# Patient Record
Sex: FEMALE | Race: WHITE | Employment: OTHER | URBAN - METROPOLITAN AREA
[De-identification: names, ages, dates, MRNs, and addresses within clinical notes are randomized per-mention and may not be internally consistent; named-entity substitution may affect disease eponyms.]

---

## 2017-04-19 ENCOUNTER — HOSPITAL ENCOUNTER (OUTPATIENT)
Dept: SURGERY | Age: 62
Discharge: HOME | DRG: 470 | End: 2017-04-19
Attending: ORTHOPAEDIC SURGERY
Payer: COMMERCIAL

## 2017-04-19 VITALS
TEMPERATURE: 98 F | HEART RATE: 70 BPM | SYSTOLIC BLOOD PRESSURE: 115 MMHG | RESPIRATION RATE: 16 BRPM | DIASTOLIC BLOOD PRESSURE: 74 MMHG | HEIGHT: 63 IN | OXYGEN SATURATION: 95 %

## 2017-04-19 LAB
ANION GAP BLD CALC-SCNC: 12 MMOL/L (ref 7–16)
APPEARANCE UR: CLEAR
APTT PPP: 27.3 SEC (ref 23.5–31.7)
BACTERIA SPEC CULT: NORMAL
BACTERIA URNS QL MICRO: 0 /HPF
BILIRUB UR QL: NEGATIVE
BUN SERPL-MCNC: 20 MG/DL (ref 8–23)
CALCIUM SERPL-MCNC: 9.3 MG/DL (ref 8.3–10.4)
CASTS URNS QL MICRO: ABNORMAL /LPF
CHLORIDE SERPL-SCNC: 103 MMOL/L (ref 98–107)
CO2 SERPL-SCNC: 24 MMOL/L (ref 21–32)
COLOR UR: YELLOW
CREAT SERPL-MCNC: 1.02 MG/DL (ref 0.6–1)
EPI CELLS #/AREA URNS HPF: ABNORMAL /HPF
ERYTHROCYTE [DISTWIDTH] IN BLOOD BY AUTOMATED COUNT: 14.5 % (ref 11.9–14.6)
GLUCOSE SERPL-MCNC: 91 MG/DL (ref 65–100)
GLUCOSE UR STRIP.AUTO-MCNC: NEGATIVE MG/DL
HCT VFR BLD AUTO: 41 % (ref 35.8–46.3)
HGB BLD-MCNC: 13.3 G/DL (ref 11.7–15.4)
HGB UR QL STRIP: ABNORMAL
INR PPP: 1 (ref 0.9–1.2)
KETONES UR QL STRIP.AUTO: NEGATIVE MG/DL
LEUKOCYTE ESTERASE UR QL STRIP.AUTO: NEGATIVE
MCH RBC QN AUTO: 28.5 PG (ref 26.1–32.9)
MCHC RBC AUTO-ENTMCNC: 32.4 G/DL (ref 31.4–35)
MCV RBC AUTO: 88 FL (ref 79.6–97.8)
NITRITE UR QL STRIP.AUTO: NEGATIVE
PH UR STRIP: 5.5 [PH] (ref 5–9)
PLATELET # BLD AUTO: 218 K/UL (ref 150–450)
PMV BLD AUTO: 10.8 FL (ref 10.8–14.1)
POTASSIUM SERPL-SCNC: 3.4 MMOL/L (ref 3.5–5.1)
PROT UR STRIP-MCNC: NEGATIVE MG/DL
PROTHROMBIN TIME: 10.7 SEC (ref 9.6–12)
RBC # BLD AUTO: 4.66 M/UL (ref 4.05–5.25)
RBC #/AREA URNS HPF: ABNORMAL /HPF
SERVICE CMNT-IMP: NORMAL
SODIUM SERPL-SCNC: 139 MMOL/L (ref 136–145)
SP GR UR REFRACTOMETRY: 1.01 (ref 1–1.02)
UROBILINOGEN UR QL STRIP.AUTO: 0.2 EU/DL (ref 0.2–1)
WBC # BLD AUTO: 8.6 K/UL (ref 4.3–11.1)
WBC URNS QL MICRO: 0 /HPF

## 2017-04-19 RX ORDER — APREPITANT 40 MG/1
80 CAPSULE ORAL ONCE
COMMUNITY
End: 2017-04-23

## 2017-04-19 NOTE — PERIOP NOTES
Lab results within anesthesia guidelines; MRSA swab result pending. All results faxed to pt's PCP, Yared Zhou MD, [phone], per surgeon's order.      Recent Results (from the past 12 hour(s))   CBC W/O DIFF    Collection Time: 04/19/17 11:50 AM   Result Value Ref Range    WBC 8.6 4.3 - 11.1 K/uL    RBC 4.66 4.05 - 5.25 M/uL    HGB 13.3 11.7 - 15.4 g/dL    HCT 41.0 35.8 - 46.3 %    MCV 88.0 79.6 - 97.8 FL    MCH 28.5 26.1 - 32.9 PG    MCHC 32.4 31.4 - 35.0 g/dL    RDW 14.5 11.9 - 14.6 %    PLATELET 253 944 - 385 K/uL    MPV 10.8 10.8 - 52.9 FL   METABOLIC PANEL, BASIC    Collection Time: 04/19/17 11:50 AM   Result Value Ref Range    Sodium 139 136 - 145 mmol/L    Potassium 3.4 (L) 3.5 - 5.1 mmol/L    Chloride 103 98 - 107 mmol/L    CO2 24 21 - 32 mmol/L    Anion gap 12 7 - 16 mmol/L    Glucose 91 65 - 100 mg/dL    BUN 20 8 - 23 MG/DL    Creatinine 1.02 (H) 0.6 - 1.0 MG/DL    GFR est AA >60 >60 ml/min/1.73m2    GFR est non-AA 59 (L) >60 ml/min/1.73m2    Calcium 9.3 8.3 - 10.4 MG/DL   PROTHROMBIN TIME + INR    Collection Time: 04/19/17 11:50 AM   Result Value Ref Range    Prothrombin time 10.7 9.6 - 12.0 sec    INR 1.0 0.9 - 1.2     PTT    Collection Time: 04/19/17 11:50 AM   Result Value Ref Range    aPTT 27.3 23.5 - 31.7 SEC   URINALYSIS W/ RFLX MICROSCOPIC    Collection Time: 04/19/17 12:19 PM   Result Value Ref Range    Color YELLOW      Appearance CLEAR      Specific gravity 1.015 1.001 - 1.023      pH (UA) 5.5 5.0 - 9.0      Protein NEGATIVE  NEG mg/dL    Glucose NEGATIVE  mg/dL    Ketone NEGATIVE  NEG mg/dL    Bilirubin NEGATIVE  NEG      Blood SMALL (A) NEG      Urobilinogen 0.2 0.2 - 1.0 EU/dL    Nitrites NEGATIVE  NEG      Leukocyte Esterase NEGATIVE  NEG      WBC 0 0 /hpf    RBC 3-5 0 /hpf    Epithelial cells 0-3 0 /hpf    Bacteria 0 0 /hpf    Casts 0-3 0 /lpf

## 2017-04-19 NOTE — PERIOP NOTES
Patient verified name, , and surgery as listed in [facility]. Type 3 surgery, joint PAT assessment complete. Labs per surgeon: CBC, BMP, PT/PTT, UA and MRSA swab collected  Labs per anesthesia protocol: no additional labs needed  EKG: done 10/25/16- result in EMR and states NSR    Pt reports hx of severe N/V with general anesthesia and had nausea after spinal anesthesia with TEREZA 2016. Call to Dr Mely Kim and Rx given to pt for Emend 40mg DOS. Pt given instructions on administration and verbalizes understanding. Hibiclens and instructions given per hospital policy. Nasal Swab collected per MD order and instructions for Mupirocin nasal ointment if required. Patient provided with handouts including Guide to Surgery, Pain Management, Hand Hygiene, Blood Transfusion Education, and [facility] Brochure. Patient answered medical/surgical history questions at their best of ability. All prior to admission medications documented in [facility]. Original medication prescription bottles not visualized during patient appointment. Patient instructed to hold all vitamins 7 days prior to surgery and NSAIDS 5 days prior to surgery. Medications to be held prior to surgery- none. Patient instructed to continue previous medications as prescribed prior to surgery and to take the following medications the day of surgery according to anesthesia guidelines with a small sip of water: atorvastatin, synthroid, omeprazole, verapamil, emend. Patient taught back and verbalized understanding.

## 2017-04-19 NOTE — PERIOP NOTES
MRSA swab result reviewed. No further action required at this time.     /19/2017  2:22 PM - Oskar, Lab In [facility]       Component Results      Component Value Flag Ref Range Units Status     Special Requests: NO SPECIAL REQUESTS     Final     Culture result:      Final     SA target not detected.                                 A MRSA NEGATIVE, SA NEGATIVE test result does not preclude MRSA or SA nasal colonization.

## 2017-04-20 ENCOUNTER — ANESTHESIA EVENT (OUTPATIENT)
Dept: SURGERY | Age: 62
DRG: 470 | End: 2017-04-20
Payer: COMMERCIAL

## 2017-04-20 NOTE — H&P
[id number] [facility]  Pre Operative History and Physical Exam    Patient ID:  Peter Mistry  [id number]  65 y.o.  1955    Today: April 20, 2017       Assessment:   1. Arthritis of the left knee      Plan:    1. Proceed with scheduled Procedure(s) (LRB):  LEFT KNEE ARTHROPLASTY TOTAL/ NIRMALA (Left)            CC: Left knee pain    HPI:   The patient has end stage arthritis of the left knee. The patient was evaluated and examined during a consultation prior to this office visit. There have been no changes to the patient's orthopedic condition since the initial consultation. The patient has failed previous conservative treatment for this condition including antiinflammatories , and lifestyle modifications. The necessity for joint replacement is present.  The patient will be admitted the day of surgery for Procedure(s) (LRB):  LEFT KNEE ARTHROPLASTY TOTAL/ NIRMALA (Left)      Past Medical/Surgical History:  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 Lacho Hidalgo        Allergies: Allergies   Allergen Reactions    Ambien [Zolpidem] Other (comments)     Periods of forgetfulness    Augmentin [Amoxicillin-Pot Clavulanate] Itching     Itching in mouth and ears    Codeine Nausea Only    Contrave [Naltrexone-Bupropion] Other (comments)     lethargy    Crestor [Rosuvastatin] Swelling    Macrobid [Nitrofurantoin Monohyd/M-Cryst] Itching      itching under arm    Tape [Adhesive] Other (comments)     Little bruises    Topamax [Topiramate] Other (comments)     Felt weird    Zocor [Simvastatin] Myalgia        Objective:                    HEENT: NC/AT                   Lungs:  Unlabored respirations, clear breath sounds                    Heart:   RRR                    Abdomen: soft                   Extremities:  Pain with ROM of the left knee    Meds:   No current facility-administered medications for this encounter. Current Outpatient Prescriptions   Medication Sig    potassium chloride (K-DUR, KLOR-CON) 10 mEq tablet Take 2 tabs today/prn    aprepitant (EMEND) 40 mg capsule Take 80 mg by mouth once.  verapamil ER (VERELAN PM) 300 mg capsule Take 1 Cap by mouth nightly. (Patient taking differently: Take 300 mg by mouth daily.)    valsartan-hydroCHLOROthiazide (DIOVAN-HCT) 160-25 mg per tablet Take 1 Tab by mouth daily.  omeprazole (PRILOSEC) 20 mg capsule Take 1 Cap by mouth daily.  levothyroxine (SYNTHROID) 125 mcg tablet Take 1 Tab by mouth Daily (before breakfast). Indications: HYPOTHYROIDISM (Patient taking differently: Take 125 mcg by mouth Daily (before breakfast). Take / use AM day of surgery  per anesthesia protocols. Indications: hypothyroidism)    atorvastatin (LIPITOR) 40 mg tablet Take 1 Tab by mouth nightly. (Patient taking differently: Take 40 mg by mouth daily.  Take / use AM day of surgery  per anesthesia protocols.  )    rOPINIRole (REQUIP) 0.5 mg tablet Take 1 Tab by mouth nightly as needed. (Patient taking differently: Take 0.5 mg by mouth every evening. Indications: Restless Legs Syndrome)    Cholecalciferol, Vitamin D3, (VITAMIN D3) 2,000 unit cap capsule Take 2,000 Units by mouth daily. [address] [place]:  Hospital Outpatient Visit on 04/19/2017   Component Date Value Ref Range Status    WBC 04/19/2017 8.6  4.3 - 11.1 K/uL Final    RBC 04/19/2017 4.66  4.05 - 5.25 M/uL Final    HGB 04/19/2017 13.3  11.7 - 15.4 g/dL Final    HCT 04/19/2017 41.0  35.8 - 46.3 % Final    MCV 04/19/2017 88.0  79.6 - 97.8 FL Final    MCH 04/19/2017 28.5  26.1 - 32.9 PG Final    MCHC 04/19/2017 32.4  31.4 - 35.0 g/dL Final    RDW 04/19/2017 14.5  11.9 - 14.6 % Final    PLATELET 27/60/2453 202  150 - 450 K/uL Final    MPV 04/19/2017 10.8  10.8 - 14.1 FL Final    Sodium 04/19/2017 139  136 - 145 mmol/L Final    Potassium 04/19/2017 3.4* 3.5 - 5.1 mmol/L Final    Chloride 04/19/2017 103  98 - 107 mmol/L Final    CO2 04/19/2017 24  21 - 32 mmol/L Final    Anion gap 04/19/2017 12  7 - 16 mmol/L Final    Glucose 04/19/2017 91  65 - 100 mg/dL Final    Comment: 47 - 60 mg/dl Consistent with, but not fully diagnostic of hypoglycemia. 101 - 125 mg/dl Impaired fasting glucose/consistent with pre-diabetes mellitus  > 126 mg/dl Fasting glucose consistent with overt diabetes mellitus      BUN 04/19/2017 20  8 - 23 MG/DL Final    Creatinine 04/19/2017 1.02* 0.6 - 1.0 MG/DL Final    GFR est AA 04/19/2017 >60  >60 ml/min/1.73m2 Final    GFR est non-AA 04/19/2017 59* >60 ml/min/1.73m2 Final    Comment: (NOTE)  Estimated GFR is calculated using the Modification of Diet in Renal   Disease (MDRD) Study equation, reported for both  Americans   (GFRAA) and non- Americans (GFRNA), and normalized to 1.73m2   body surface area. The physician must decide which value applies to   the patient.  The MDRD study equation should only be used in   individuals age 25 or older. It has not been validated for the   following: pregnant women, patients with serious comorbid conditions,   or on certain medications, or persons with extremes of body size,   muscle mass, or nutritional status.  Calcium 04/19/2017 9.3  8.3 - 10.4 MG/DL Final    Prothrombin time 04/19/2017 10.7  9.6 - 12.0 sec Final    INR 04/19/2017 1.0  0.9 - 1.2   Final    Comment: Suggested therapeutic INR range:  Venous thrombosis and embolus  2.0-3.0  Prosthetic heart valve         2.5-3.5      aPTT 04/19/2017 27.3  23.5 - 31.7 SEC Final    Heparin Therapeutic Range = 56.6-81.7 secs    Color 04/19/2017 YELLOW    Final    Appearance 04/19/2017 CLEAR    Final    Specific gravity 04/19/2017 1.015  1.001 - 1.023   Final    pH (UA) 04/19/2017 5.5  5.0 - 9.0   Final    Protein 04/19/2017 NEGATIVE   NEG mg/dL Final    Glucose 04/19/2017 NEGATIVE   mg/dL Final    Ketone 04/19/2017 NEGATIVE   NEG mg/dL Final    Bilirubin 04/19/2017 NEGATIVE   NEG   Final    Blood 04/19/2017 SMALL* NEG   Final    Urobilinogen 04/19/2017 0.2  0.2 - 1.0 EU/dL Final    Nitrites 04/19/2017 NEGATIVE   NEG   Final    Leukocyte Esterase 04/19/2017 NEGATIVE   NEG   Final    WBC 04/19/2017 0  0 /hpf Final    RBC 04/19/2017 3-5  0 /hpf Final    Epithelial cells 04/19/2017 0-3  0 /hpf Final    Bacteria 04/19/2017 0  0 /hpf Final    Casts 04/19/2017 0-3  0 /lpf Final    HYALINE    Special Requests: 04/19/2017 NO SPECIAL REQUESTS    Final    Culture result: 04/19/2017 SA target not detected. A MRSA NEGATIVE, SA NEGATIVE test result does not preclude MRSA or SA nasal colonization.     Final                 Patient Active Problem List   Diagnosis Code    Hypertension I10    GERD (gastroesophageal reflux disease) K21.9    HLD (hyperlipidemia) E78.5    Osteopenia M85.80    Sleep apnea G47.30    Chronic insomnia F51.04    Vitamin D deficiency E55.9    Restless legs G25.81    Acquired hypothyroidism E03.9    Allergic rhinitis due to allergen J30.9    Chronic pain of left knee M25.562, G89.29    Morbid obesity (HCC) E66.01    Vasomotor rhinitis J30.0    CKD (chronic kidney disease) stage 3, GFR 30-59 ml/min N18.3    Status post total replacement of right hip Z96.641         Signed By: MARY Waite  April 20, 2017

## 2017-04-21 ENCOUNTER — ANESTHESIA (OUTPATIENT)
Dept: SURGERY | Age: 62
DRG: 470 | End: 2017-04-21
Payer: COMMERCIAL

## 2017-04-21 ENCOUNTER — HOSPITAL ENCOUNTER (INPATIENT)
Age: 62
LOS: 2 days | Discharge: HOME HEALTH | DRG: 470 | End: 2017-04-23
Attending: ORTHOPAEDIC SURGERY | Admitting: ORTHOPAEDIC SURGERY
Payer: COMMERCIAL

## 2017-04-21 DIAGNOSIS — Z96.652 STATUS POST TOTAL LEFT KNEE REPLACEMENT: Primary | ICD-10-CM

## 2017-04-21 PROBLEM — Z99.89 OSA ON CPAP: Chronic | Status: ACTIVE | Noted: 2017-04-21

## 2017-04-21 PROBLEM — G47.33 OSA ON CPAP: Chronic | Status: ACTIVE | Noted: 2017-04-21

## 2017-04-21 LAB
ABO + RH BLD: NORMAL
ANION GAP BLD CALC-SCNC: 11 MMOL/L (ref 7–16)
APTT PPP: 24.9 SEC (ref 23.5–31.7)
BASOPHILS # BLD AUTO: 0 K/UL (ref 0–0.2)
BASOPHILS # BLD: 0 % (ref 0–2)
BLOOD GROUP ANTIBODIES SERPL: NORMAL
BUN SERPL-MCNC: 18 MG/DL (ref 8–23)
CALCIUM SERPL-MCNC: 8.9 MG/DL (ref 8.3–10.4)
CHLORIDE SERPL-SCNC: 105 MMOL/L (ref 98–107)
CO2 SERPL-SCNC: 25 MMOL/L (ref 21–32)
CREAT SERPL-MCNC: 1.19 MG/DL (ref 0.6–1)
DIFFERENTIAL METHOD BLD: ABNORMAL
EOSINOPHIL # BLD: 0 K/UL (ref 0–0.8)
EOSINOPHIL NFR BLD: 0 % (ref 0.5–7.8)
ERYTHROCYTE [DISTWIDTH] IN BLOOD BY AUTOMATED COUNT: 14.5 % (ref 11.9–14.6)
GLUCOSE BLD STRIP.AUTO-MCNC: 107 MG/DL (ref 65–100)
GLUCOSE SERPL-MCNC: 134 MG/DL (ref 65–100)
HCT VFR BLD AUTO: 38 % (ref 35.8–46.3)
HGB BLD-MCNC: 11.3 G/DL (ref 11.7–15.4)
HGB BLD-MCNC: 12.2 G/DL (ref 11.7–15.4)
IMM GRANULOCYTES # BLD: 0 K/UL (ref 0–0.5)
IMM GRANULOCYTES NFR BLD AUTO: 0.3 % (ref 0–5)
INR PPP: 1.1 (ref 0.9–1.2)
LYMPHOCYTES # BLD AUTO: 8 % (ref 13–44)
LYMPHOCYTES # BLD: 1 K/UL (ref 0.5–4.6)
MCH RBC QN AUTO: 28.3 PG (ref 26.1–32.9)
MCHC RBC AUTO-ENTMCNC: 32.1 G/DL (ref 31.4–35)
MCV RBC AUTO: 88.2 FL (ref 79.6–97.8)
MONOCYTES # BLD: 0.1 K/UL (ref 0.1–1.3)
MONOCYTES NFR BLD AUTO: 1 % (ref 4–12)
NEUTS SEG # BLD: 11.1 K/UL (ref 1.7–8.2)
NEUTS SEG NFR BLD AUTO: 91 % (ref 43–78)
PLATELET # BLD AUTO: 180 K/UL (ref 150–450)
PMV BLD AUTO: 10.5 FL (ref 10.8–14.1)
POTASSIUM SERPL-SCNC: 3.3 MMOL/L (ref 3.5–5.1)
PROTHROMBIN TIME: 11.3 SEC (ref 9.6–12)
RBC # BLD AUTO: 4.31 M/UL (ref 4.05–5.25)
SODIUM SERPL-SCNC: 141 MMOL/L (ref 136–145)
SPECIMEN EXP DATE BLD: NORMAL
WBC # BLD AUTO: 12.3 K/UL (ref 4.3–11.1)

## 2017-04-21 PROCEDURE — 74011250636 HC RX REV CODE- 250/636: Performed by: ORTHOPAEDIC SURGERY

## 2017-04-21 PROCEDURE — 77030002912 HC SUT ETHBND J&J -A: Performed by: ORTHOPAEDIC SURGERY

## 2017-04-21 PROCEDURE — 85730 THROMBOPLASTIN TIME PARTIAL: CPT | Performed by: PHYSICIAN ASSISTANT

## 2017-04-21 PROCEDURE — 74011000250 HC RX REV CODE- 250: Performed by: ANESTHESIOLOGY

## 2017-04-21 PROCEDURE — 76060000035 HC ANESTHESIA 2 TO 2.5 HR: Performed by: ORTHOPAEDIC SURGERY

## 2017-04-21 PROCEDURE — 77030020782 HC GWN BAIR PAWS FLX 3M -B: Performed by: ANESTHESIOLOGY

## 2017-04-21 PROCEDURE — 77030019557 HC ELECTRD VES SEAL MEDT -F: Performed by: ORTHOPAEDIC SURGERY

## 2017-04-21 PROCEDURE — 65270000029 HC RM PRIVATE

## 2017-04-21 PROCEDURE — 74011000302 HC RX REV CODE- 302: Performed by: ORTHOPAEDIC SURGERY

## 2017-04-21 PROCEDURE — 97161 PT EVAL LOW COMPLEX 20 MIN: CPT

## 2017-04-21 PROCEDURE — 74011250637 HC RX REV CODE- 250/637: Performed by: PHYSICIAN ASSISTANT

## 2017-04-21 PROCEDURE — 74011000258 HC RX REV CODE- 258: Performed by: ORTHOPAEDIC SURGERY

## 2017-04-21 PROCEDURE — 76010010054 HC POST OP PAIN BLOCK: Performed by: ORTHOPAEDIC SURGERY

## 2017-04-21 PROCEDURE — 74011000250 HC RX REV CODE- 250

## 2017-04-21 PROCEDURE — 77030002966 HC SUT PDS J&J -A: Performed by: ORTHOPAEDIC SURGERY

## 2017-04-21 PROCEDURE — 85018 HEMOGLOBIN: CPT | Performed by: PHYSICIAN ASSISTANT

## 2017-04-21 PROCEDURE — 77030007880 HC KT SPN EPDRL BBMI -B: Performed by: ANESTHESIOLOGY

## 2017-04-21 PROCEDURE — 77030035236 HC SUT PDS STRATFX BARB J&J -B: Performed by: ORTHOPAEDIC SURGERY

## 2017-04-21 PROCEDURE — 76210000016 HC OR PH I REC 1 TO 1.5 HR: Performed by: ORTHOPAEDIC SURGERY

## 2017-04-21 PROCEDURE — 77030008467 HC STPLR SKN COVD -B: Performed by: ORTHOPAEDIC SURGERY

## 2017-04-21 PROCEDURE — 76942 ECHO GUIDE FOR BIOPSY: CPT | Performed by: ORTHOPAEDIC SURGERY

## 2017-04-21 PROCEDURE — 77030003665 HC NDL SPN BBMI -A: Performed by: ANESTHESIOLOGY

## 2017-04-21 PROCEDURE — 74011250636 HC RX REV CODE- 250/636: Performed by: PHYSICIAN ASSISTANT

## 2017-04-21 PROCEDURE — 74011250636 HC RX REV CODE- 250/636: Performed by: ANESTHESIOLOGY

## 2017-04-21 PROCEDURE — 77030006789 HC BLD SAW OSC STRY -C: Performed by: ORTHOPAEDIC SURGERY

## 2017-04-21 PROCEDURE — 77030012890

## 2017-04-21 PROCEDURE — 97165 OT EVAL LOW COMPLEX 30 MIN: CPT

## 2017-04-21 PROCEDURE — 77030034849: Performed by: ORTHOPAEDIC SURGERY

## 2017-04-21 PROCEDURE — 77030003602 HC NDL NRV BLK BBMI -B: Performed by: ANESTHESIOLOGY

## 2017-04-21 PROCEDURE — 74011250636 HC RX REV CODE- 250/636

## 2017-04-21 PROCEDURE — 85025 COMPLETE CBC W/AUTO DIFF WBC: CPT | Performed by: PHYSICIAN ASSISTANT

## 2017-04-21 PROCEDURE — 77030011640 HC PAD GRND REM COVD -A: Performed by: ORTHOPAEDIC SURGERY

## 2017-04-21 PROCEDURE — 77030031139 HC SUT VCRL2 J&J -A: Performed by: ORTHOPAEDIC SURGERY

## 2017-04-21 PROCEDURE — 80048 BASIC METABOLIC PNL TOTAL CA: CPT | Performed by: PHYSICIAN ASSISTANT

## 2017-04-21 PROCEDURE — C1776 JOINT DEVICE (IMPLANTABLE): HCPCS | Performed by: ORTHOPAEDIC SURGERY

## 2017-04-21 PROCEDURE — 74011000250 HC RX REV CODE- 250: Performed by: ORTHOPAEDIC SURGERY

## 2017-04-21 PROCEDURE — 36415 COLL VENOUS BLD VENIPUNCTURE: CPT | Performed by: PHYSICIAN ASSISTANT

## 2017-04-21 PROCEDURE — 77030006720 HC BLD PAT RMR ZIMM -B: Performed by: ORTHOPAEDIC SURGERY

## 2017-04-21 PROCEDURE — 77030012935 HC DRSG AQUACEL BMS -B: Performed by: ORTHOPAEDIC SURGERY

## 2017-04-21 PROCEDURE — 77030018836 HC SOL IRR NACL ICUM -A: Performed by: ORTHOPAEDIC SURGERY

## 2017-04-21 PROCEDURE — 82962 GLUCOSE BLOOD TEST: CPT

## 2017-04-21 PROCEDURE — 77030011208: Performed by: ORTHOPAEDIC SURGERY

## 2017-04-21 PROCEDURE — 76010000171 HC OR TIME 2 TO 2.5 HR INTENSV-TIER 1: Performed by: ORTHOPAEDIC SURGERY

## 2017-04-21 PROCEDURE — 85610 PROTHROMBIN TIME: CPT | Performed by: PHYSICIAN ASSISTANT

## 2017-04-21 PROCEDURE — 77030013727 HC IRR FAN PULSVC ZIMM -B: Performed by: ORTHOPAEDIC SURGERY

## 2017-04-21 PROCEDURE — 86580 TB INTRADERMAL TEST: CPT | Performed by: ORTHOPAEDIC SURGERY

## 2017-04-21 PROCEDURE — 0SRD0J9 REPLACEMENT OF LEFT KNEE JOINT WITH SYNTHETIC SUBSTITUTE, CEMENTED, OPEN APPROACH: ICD-10-PCS | Performed by: ORTHOPAEDIC SURGERY

## 2017-04-21 PROCEDURE — 86900 BLOOD TYPING SEROLOGIC ABO: CPT | Performed by: PHYSICIAN ASSISTANT

## 2017-04-21 DEVICE — BASEPLATE TIB SZ 4 AP46MM ML70MM KNEE TRITANIUM 4 CRUCFRM: Type: IMPLANTABLE DEVICE | Site: KNEE | Status: FUNCTIONAL

## 2017-04-21 DEVICE — INSERT TIB SZ 4 THK11MM UNIV KNEE CONVENTIONAL POLYETH POST: Type: IMPLANTABLE DEVICE | Site: KNEE | Status: FUNCTIONAL

## 2017-04-21 DEVICE — COMPONENT PAT DIA35MM THK10MM SUPERIOR/INFERIOR KNEE: Type: IMPLANTABLE DEVICE | Site: KNEE | Status: FUNCTIONAL

## 2017-04-21 DEVICE — COMPONENT FEM SZ 4 L KNEE TRITANIUM PERI APATITE POST STBL: Type: IMPLANTABLE DEVICE | Site: KNEE | Status: FUNCTIONAL

## 2017-04-21 RX ORDER — TETRACAINE HCL 10 MG/ML
INJECTION SUBARACHNOID AS NEEDED
Status: DISCONTINUED | OUTPATIENT
Start: 2017-04-21 | End: 2017-04-21 | Stop reason: HOSPADM

## 2017-04-21 RX ORDER — SODIUM CHLORIDE 0.9 % (FLUSH) 0.9 %
5-10 SYRINGE (ML) INJECTION EVERY 8 HOURS
Status: DISCONTINUED | OUTPATIENT
Start: 2017-04-21 | End: 2017-04-23 | Stop reason: HOSPADM

## 2017-04-21 RX ORDER — DIPHENHYDRAMINE HCL 25 MG
25 CAPSULE ORAL
Status: DISCONTINUED | OUTPATIENT
Start: 2017-04-21 | End: 2017-04-23 | Stop reason: HOSPADM

## 2017-04-21 RX ORDER — CEFAZOLIN SODIUM IN 0.9 % NACL 2 G/50 ML
2 INTRAVENOUS SOLUTION, PIGGYBACK (ML) INTRAVENOUS EVERY 8 HOURS
Status: COMPLETED | OUTPATIENT
Start: 2017-04-21 | End: 2017-04-22

## 2017-04-21 RX ORDER — MIDAZOLAM HYDROCHLORIDE 1 MG/ML
INJECTION, SOLUTION INTRAMUSCULAR; INTRAVENOUS AS NEEDED
Status: DISCONTINUED | OUTPATIENT
Start: 2017-04-21 | End: 2017-04-21 | Stop reason: HOSPADM

## 2017-04-21 RX ORDER — HYDROCODONE BITARTRATE AND ACETAMINOPHEN 5; 325 MG/1; MG/1
2 TABLET ORAL AS NEEDED
Status: DISCONTINUED | OUTPATIENT
Start: 2017-04-21 | End: 2017-04-21 | Stop reason: HOSPADM

## 2017-04-21 RX ORDER — AMOXICILLIN 250 MG
2 CAPSULE ORAL DAILY
Status: DISCONTINUED | OUTPATIENT
Start: 2017-04-22 | End: 2017-04-23 | Stop reason: HOSPADM

## 2017-04-21 RX ORDER — CEFAZOLIN SODIUM IN 0.9 % NACL 2 G/50 ML
2 INTRAVENOUS SOLUTION, PIGGYBACK (ML) INTRAVENOUS ONCE
Status: COMPLETED | OUTPATIENT
Start: 2017-04-21 | End: 2017-04-21

## 2017-04-21 RX ORDER — LIDOCAINE HYDROCHLORIDE 20 MG/ML
INJECTION, SOLUTION EPIDURAL; INFILTRATION; INTRACAUDAL; PERINEURAL AS NEEDED
Status: DISCONTINUED | OUTPATIENT
Start: 2017-04-21 | End: 2017-04-21 | Stop reason: HOSPADM

## 2017-04-21 RX ORDER — ROPINIROLE 0.25 MG/1
0.5 TABLET, FILM COATED ORAL
Status: DISCONTINUED | OUTPATIENT
Start: 2017-04-21 | End: 2017-04-23 | Stop reason: HOSPADM

## 2017-04-21 RX ORDER — LIDOCAINE HYDROCHLORIDE 10 MG/ML
0.1 INJECTION INFILTRATION; PERINEURAL AS NEEDED
Status: DISCONTINUED | OUTPATIENT
Start: 2017-04-21 | End: 2017-04-21 | Stop reason: HOSPADM

## 2017-04-21 RX ORDER — SODIUM CHLORIDE, SODIUM LACTATE, POTASSIUM CHLORIDE, CALCIUM CHLORIDE 600; 310; 30; 20 MG/100ML; MG/100ML; MG/100ML; MG/100ML
75 INJECTION, SOLUTION INTRAVENOUS CONTINUOUS
Status: DISCONTINUED | OUTPATIENT
Start: 2017-04-21 | End: 2017-04-21 | Stop reason: HOSPADM

## 2017-04-21 RX ORDER — ROPIVACAINE HYDROCHLORIDE 2 MG/ML
INJECTION, SOLUTION EPIDURAL; INFILTRATION; PERINEURAL AS NEEDED
Status: DISCONTINUED | OUTPATIENT
Start: 2017-04-21 | End: 2017-04-21 | Stop reason: HOSPADM

## 2017-04-21 RX ORDER — PROPOFOL 10 MG/ML
INJECTION, EMULSION INTRAVENOUS
Status: DISCONTINUED | OUTPATIENT
Start: 2017-04-21 | End: 2017-04-21 | Stop reason: HOSPADM

## 2017-04-21 RX ORDER — KETOROLAC TROMETHAMINE 30 MG/ML
INJECTION, SOLUTION INTRAMUSCULAR; INTRAVENOUS AS NEEDED
Status: DISCONTINUED | OUTPATIENT
Start: 2017-04-21 | End: 2017-04-21 | Stop reason: HOSPADM

## 2017-04-21 RX ORDER — ASPIRIN 325 MG
325 TABLET ORAL 2 TIMES DAILY
Qty: 60 TAB | Refills: 1 | Status: SHIPPED | OUTPATIENT
Start: 2017-04-21 | End: 2017-05-26

## 2017-04-21 RX ORDER — HYDROMORPHONE HYDROCHLORIDE 2 MG/ML
0.5 INJECTION, SOLUTION INTRAMUSCULAR; INTRAVENOUS; SUBCUTANEOUS
Status: DISCONTINUED | OUTPATIENT
Start: 2017-04-21 | End: 2017-04-21 | Stop reason: HOSPADM

## 2017-04-21 RX ORDER — MIDAZOLAM HYDROCHLORIDE 1 MG/ML
5 INJECTION, SOLUTION INTRAMUSCULAR; INTRAVENOUS ONCE
Status: COMPLETED | OUTPATIENT
Start: 2017-04-21 | End: 2017-04-21

## 2017-04-21 RX ORDER — FAMOTIDINE 20 MG/1
20 TABLET, FILM COATED ORAL ONCE
Status: DISCONTINUED | OUTPATIENT
Start: 2017-04-21 | End: 2017-04-21 | Stop reason: HOSPADM

## 2017-04-21 RX ORDER — VERAPAMIL HYDROCHLORIDE 300 MG/1
300 CAPSULE, EXTENDED RELEASE ORAL DAILY
Status: DISCONTINUED | OUTPATIENT
Start: 2017-04-21 | End: 2017-04-23 | Stop reason: HOSPADM

## 2017-04-21 RX ORDER — OXYCODONE HYDROCHLORIDE 5 MG/1
5 TABLET ORAL
Status: DISCONTINUED | OUTPATIENT
Start: 2017-04-21 | End: 2017-04-21 | Stop reason: HOSPADM

## 2017-04-21 RX ORDER — ACETAMINOPHEN 10 MG/ML
1000 INJECTION, SOLUTION INTRAVENOUS ONCE
Status: DISPENSED | OUTPATIENT
Start: 2017-04-21 | End: 2017-04-22

## 2017-04-21 RX ORDER — ASPIRIN 325 MG
325 TABLET, DELAYED RELEASE (ENTERIC COATED) ORAL EVERY 12 HOURS
Status: DISCONTINUED | OUTPATIENT
Start: 2017-04-21 | End: 2017-04-23 | Stop reason: HOSPADM

## 2017-04-21 RX ORDER — FENTANYL CITRATE 50 UG/ML
100 INJECTION, SOLUTION INTRAMUSCULAR; INTRAVENOUS ONCE
Status: COMPLETED | OUTPATIENT
Start: 2017-04-21 | End: 2017-04-21

## 2017-04-21 RX ORDER — ONDANSETRON 2 MG/ML
4 INJECTION INTRAMUSCULAR; INTRAVENOUS
Status: DISCONTINUED | OUTPATIENT
Start: 2017-04-21 | End: 2017-04-23 | Stop reason: HOSPADM

## 2017-04-21 RX ORDER — PANTOPRAZOLE SODIUM 40 MG/1
40 TABLET, DELAYED RELEASE ORAL
Status: DISCONTINUED | OUTPATIENT
Start: 2017-04-22 | End: 2017-04-23 | Stop reason: HOSPADM

## 2017-04-21 RX ORDER — DEXAMETHASONE SODIUM PHOSPHATE 100 MG/10ML
10 INJECTION INTRAMUSCULAR; INTRAVENOUS ONCE
Status: ACTIVE | OUTPATIENT
Start: 2017-04-22 | End: 2017-04-23

## 2017-04-21 RX ORDER — NALOXONE HYDROCHLORIDE 0.4 MG/ML
.2-.4 INJECTION, SOLUTION INTRAMUSCULAR; INTRAVENOUS; SUBCUTANEOUS
Status: DISCONTINUED | OUTPATIENT
Start: 2017-04-21 | End: 2017-04-23 | Stop reason: HOSPADM

## 2017-04-21 RX ORDER — POTASSIUM CHLORIDE 750 MG/1
10 TABLET, EXTENDED RELEASE ORAL DAILY
Status: DISCONTINUED | OUTPATIENT
Start: 2017-04-22 | End: 2017-04-23 | Stop reason: HOSPADM

## 2017-04-21 RX ORDER — HYDROMORPHONE HYDROCHLORIDE 2 MG/1
2 TABLET ORAL
Status: DISCONTINUED | OUTPATIENT
Start: 2017-04-21 | End: 2017-04-23 | Stop reason: HOSPADM

## 2017-04-21 RX ORDER — MORPHINE SULFATE 10 MG/ML
INJECTION, SOLUTION INTRAMUSCULAR; INTRAVENOUS AS NEEDED
Status: DISCONTINUED | OUTPATIENT
Start: 2017-04-21 | End: 2017-04-21 | Stop reason: HOSPADM

## 2017-04-21 RX ORDER — MIDAZOLAM HYDROCHLORIDE 1 MG/ML
2 INJECTION, SOLUTION INTRAMUSCULAR; INTRAVENOUS
Status: DISCONTINUED | OUTPATIENT
Start: 2017-04-21 | End: 2017-04-21 | Stop reason: HOSPADM

## 2017-04-21 RX ORDER — SODIUM CHLORIDE 0.9 % (FLUSH) 0.9 %
5-10 SYRINGE (ML) INJECTION AS NEEDED
Status: DISCONTINUED | OUTPATIENT
Start: 2017-04-21 | End: 2017-04-23 | Stop reason: HOSPADM

## 2017-04-21 RX ORDER — LEVOTHYROXINE SODIUM 125 UG/1
125 TABLET ORAL
Status: DISCONTINUED | OUTPATIENT
Start: 2017-04-22 | End: 2017-04-23 | Stop reason: HOSPADM

## 2017-04-21 RX ORDER — CELECOXIB 200 MG/1
200 CAPSULE ORAL EVERY 12 HOURS
Status: DISCONTINUED | OUTPATIENT
Start: 2017-04-21 | End: 2017-04-21

## 2017-04-21 RX ORDER — ACETAMINOPHEN 10 MG/ML
1000 INJECTION, SOLUTION INTRAVENOUS ONCE
Status: COMPLETED | OUTPATIENT
Start: 2017-04-21 | End: 2017-04-21

## 2017-04-21 RX ORDER — HYDROMORPHONE HYDROCHLORIDE 2 MG/1
2 TABLET ORAL
Qty: 40 TAB | Refills: 0 | Status: SHIPPED | OUTPATIENT
Start: 2017-04-21 | End: 2017-06-22

## 2017-04-21 RX ORDER — ACETAMINOPHEN 500 MG
1000 TABLET ORAL EVERY 6 HOURS
Status: DISCONTINUED | OUTPATIENT
Start: 2017-04-22 | End: 2017-04-23 | Stop reason: HOSPADM

## 2017-04-21 RX ORDER — ZOLPIDEM TARTRATE 5 MG/1
5 TABLET ORAL
Status: DISCONTINUED | OUTPATIENT
Start: 2017-04-21 | End: 2017-04-22

## 2017-04-21 RX ORDER — HYDROMORPHONE HYDROCHLORIDE 1 MG/ML
1 INJECTION, SOLUTION INTRAMUSCULAR; INTRAVENOUS; SUBCUTANEOUS
Status: DISCONTINUED | OUTPATIENT
Start: 2017-04-21 | End: 2017-04-23 | Stop reason: HOSPADM

## 2017-04-21 RX ORDER — BUPIVACAINE HYDROCHLORIDE 7.5 MG/ML
INJECTION, SOLUTION INTRASPINAL AS NEEDED
Status: DISCONTINUED | OUTPATIENT
Start: 2017-04-21 | End: 2017-04-21 | Stop reason: HOSPADM

## 2017-04-21 RX ORDER — SODIUM CHLORIDE 9 MG/ML
100 INJECTION, SOLUTION INTRAVENOUS CONTINUOUS
Status: DISPENSED | OUTPATIENT
Start: 2017-04-21 | End: 2017-04-23

## 2017-04-21 RX ADMIN — PROPOFOL 50 MCG/KG/MIN: 10 INJECTION, EMULSION INTRAVENOUS at 12:35

## 2017-04-21 RX ADMIN — MIDAZOLAM HYDROCHLORIDE 0.5 MG: 1 INJECTION, SOLUTION INTRAMUSCULAR; INTRAVENOUS at 13:40

## 2017-04-21 RX ADMIN — LIDOCAINE HYDROCHLORIDE 40 MG: 20 INJECTION, SOLUTION EPIDURAL; INFILTRATION; INTRACAUDAL; PERINEURAL at 12:35

## 2017-04-21 RX ADMIN — ROPIVACAINE HYDROCHLORIDE 30 ML: 2 INJECTION, SOLUTION EPIDURAL; INFILTRATION; PERINEURAL at 11:46

## 2017-04-21 RX ADMIN — BUPIVACAINE HYDROCHLORIDE 1 ML: 7.5 INJECTION, SOLUTION INTRASPINAL at 12:24

## 2017-04-21 RX ADMIN — HYDROMORPHONE HYDROCHLORIDE 0.5 MG: 2 INJECTION, SOLUTION INTRAMUSCULAR; INTRAVENOUS; SUBCUTANEOUS at 14:51

## 2017-04-21 RX ADMIN — CEFAZOLIN 2 G: 1 INJECTION, POWDER, FOR SOLUTION INTRAMUSCULAR; INTRAVENOUS; PARENTERAL at 12:20

## 2017-04-21 RX ADMIN — SODIUM CHLORIDE, SODIUM LACTATE, POTASSIUM CHLORIDE, AND CALCIUM CHLORIDE: 600; 310; 30; 20 INJECTION, SOLUTION INTRAVENOUS at 12:09

## 2017-04-21 RX ADMIN — CEFAZOLIN 2 G: 1 INJECTION, POWDER, FOR SOLUTION INTRAMUSCULAR; INTRAVENOUS; PARENTERAL at 19:15

## 2017-04-21 RX ADMIN — FENTANYL CITRATE 50 MCG: 50 INJECTION, SOLUTION INTRAMUSCULAR; INTRAVENOUS at 11:43

## 2017-04-21 RX ADMIN — SODIUM CHLORIDE, SODIUM LACTATE, POTASSIUM CHLORIDE, AND CALCIUM CHLORIDE: 600; 310; 30; 20 INJECTION, SOLUTION INTRAVENOUS at 12:56

## 2017-04-21 RX ADMIN — HYDROMORPHONE HYDROCHLORIDE 2 MG: 2 TABLET ORAL at 19:14

## 2017-04-21 RX ADMIN — LIDOCAINE HYDROCHLORIDE 0.1 ML: 10 INJECTION, SOLUTION INFILTRATION; PERINEURAL at 10:50

## 2017-04-21 RX ADMIN — MIDAZOLAM 3 MG: 1 INJECTION INTRAMUSCULAR; INTRAVENOUS at 11:43

## 2017-04-21 RX ADMIN — MIDAZOLAM HYDROCHLORIDE 0.5 MG: 1 INJECTION, SOLUTION INTRAMUSCULAR; INTRAVENOUS at 12:56

## 2017-04-21 RX ADMIN — MIDAZOLAM HYDROCHLORIDE 0.5 MG: 1 INJECTION, SOLUTION INTRAMUSCULAR; INTRAVENOUS at 13:34

## 2017-04-21 RX ADMIN — TUBERCULIN PURIFIED PROTEIN DERIVATIVE 50 UNITS: 5 INJECTION, SOLUTION INTRADERMAL at 10:47

## 2017-04-21 RX ADMIN — Medication 10 ML: at 19:15

## 2017-04-21 RX ADMIN — MIDAZOLAM HYDROCHLORIDE 1 MG: 1 INJECTION, SOLUTION INTRAMUSCULAR; INTRAVENOUS at 12:22

## 2017-04-21 RX ADMIN — TETRACAINE HCL 1 ML: 10 INJECTION SUBARACHNOID at 12:24

## 2017-04-21 RX ADMIN — MIDAZOLAM HYDROCHLORIDE 0.5 MG: 1 INJECTION, SOLUTION INTRAMUSCULAR; INTRAVENOUS at 12:53

## 2017-04-21 RX ADMIN — MIDAZOLAM HYDROCHLORIDE 1 MG: 1 INJECTION, SOLUTION INTRAMUSCULAR; INTRAVENOUS at 12:23

## 2017-04-21 RX ADMIN — SODIUM CHLORIDE, SODIUM LACTATE, POTASSIUM CHLORIDE, AND CALCIUM CHLORIDE 75 ML/HR: 600; 310; 30; 20 INJECTION, SOLUTION INTRAVENOUS at 10:49

## 2017-04-21 RX ADMIN — VERAPAMIL HYDROCHLORIDE 300 MG: 300 CAPSULE, EXTENDED RELEASE ORAL at 21:00

## 2017-04-21 RX ADMIN — SODIUM CHLORIDE 100 ML/HR: 900 INJECTION, SOLUTION INTRAVENOUS at 16:00

## 2017-04-21 RX ADMIN — ASPIRIN 325 MG: 325 TABLET, DELAYED RELEASE ORAL at 19:15

## 2017-04-21 RX ADMIN — ACETAMINOPHEN 1000 MG: 10 INJECTION, SOLUTION INTRAVENOUS at 15:01

## 2017-04-21 RX ADMIN — ONDANSETRON 4 MG: 2 INJECTION INTRAMUSCULAR; INTRAVENOUS at 19:30

## 2017-04-21 NOTE — PROGRESS NOTES
Patient is alert and oriented x4. Able to verbalize needs. Resting quietly with no distress noted. Aquacel dressing to left knee is dry and intact. Neurovascular and peripheral vascular checks WNL. Decker draining clear yellow urine to bag. Pain is being managed with PRN Dilaudid, tolerating well. Bed low and locked. Call light within reach. Instructed to call for assistance. Patient verbalizes understanding. Will monitor.

## 2017-04-21 NOTE — PROGRESS NOTES
Problem: Self Care Deficits Care Plan (Adult)  Goal: *Acute Goals and Plan of Care (Insert Text)  GOALS:   DISCHARGE GOALS (in preparation for going home/rehab): 3 days  1. Ms. Justice Alvarenga will perform one lower body dressing activity with minimal assistance required to demonstrate improved functional mobility and safety. 2. Ms. Justice Alvarenga will perform one lower body bathing activity with minimal assistance required to demonstrate improved functional mobility and safety. 3. Ms. Justice Alvarenga will perform toileting/toilet transfer with contact guard assistance to demonstrate improved functional mobility and safety. 4. Ms. Justice Alvarenga will perform shower transfer with contact guard assistance to demonstrate improved functional mobility and safety. JOINT CAMP OCCUPATIONAL THERAPY TKA: Initial Assessment 4/21/2017  INPATIENT: Hospital Day: 1  Payor: [address] [place] [address] / Plan: [address] / Product Type: PPO /      NAME/AGE/GENDER: Kymberly Rodrgíuez is a 64 y.o. female    PRIMARY DIAGNOSIS:  Primary osteoarthritis of left knee [M17.12]              Procedure(s) and Anesthesia Type:     * LEFT KNEE ARTHROPLASTY TOTAL - Spinal (Left)  ICD-10: Treatment Diagnosis:        · Pain in Left Knee (M25.562)  · Stiffness of Left Knee, Not elsewhere classified (A46.588)       ASSESSMENT:      Ms. Justice Alvarenga is s/p left TKA and presents with decreased weight bearing on left LE and decreased independence with functional mobility and activities of daily living. Patient would benefit from skilled Occupational Therapy to maximize independence and safety with self-care task and functional mobility. Pt would also benefit from education on adaptive equipment and safety precautions in preparation for going home or for recommendations for post-hospital rehab program.  Patient plans for further rehab at home with home health services and good family support, . OT reviewed therapy schedule and plan of care with patient.   Patient was able to transfer and preform self care skills as charted below. Patient instructed to call for assistance when needing to get up from the bed and all needs in reach. Patient verbalized understanding of call light. This section established at most recent assessment   PROBLEM LIST (Impairments causing functional limitations):  1. Decreased Strength  2. Decreased ADL/Functional Activities  3. Decreased Transfer Abilities  4. Increased Pain  5. Increased Fatigue  6. Decreased Flexibility/Joint Mobility  7. Decreased Knowledge of Precautions    INTERVENTIONS PLANNED: (Benefits and precautions of occupational therapy have been discussed with the patient.)  1. Activities of daily living training  2. Adaptive equipment training  3. Balance training  4. Clothing management  5. Donning&doffing training  6. Theraputic activity      TREATMENT PLAN: Frequency/Duration: Follow patient 1-2 times to address above goals. Rehabilitation Potential For Stated Goals: GOOD      RECOMMENDED REHABILITATION/EQUIPMENT: (at time of discharge pending progress): Continue Skilled Therapy and Home Health: Physical Therapy. OCCUPATIONAL PROFILE AND HISTORY:   History of Present Injury/Illness (Reason for Referral): Pt presents this date s/p (left) TKA. Past Medical History/Comorbidities:   Ms. 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 She also has no past medical history of Adverse effect of anesthesia; Aneurysm ([facility] [address] Arrhythmia; Asthma; Autoimmune disease ([facility] [address] [facility] CAD (coronary artery disease); Cancer ([facility] [address] Chronic obstructive pulmonary disease ([facility] [address] Coagulation disorder ([facility] [address] Diabetes ([facility] [address] Difficult intubation; Endocarditis; Heart failure ([facility] [address] Ill-defined condition; Liver disease; Malignant hyperthermia due to anesthesia; Pseudocholinesterase deficiency; Psychiatric disorder; PUD (peptic ulcer disease); Rheumatic fever; Seizures ([facility] [address] Stroke [facility]); or Thromboembolus ([facility] [address] Ms. Frances Helton  has a past surgical history that includes breast augmentation (1990); knee arthroscopy (Left, 10/2012); sancho and bso (2009); tonsil and adenoidectomy (1960); bunionectomy (Bilateral, 1980); orthopaedic (Bilateral, 2010); and hip replacement (Right, 11/2016). Social History/Living Environment:   Home Environment: Private residence  # Steps to Enter: 2  Rails to Enter: Yes  Hand Rails : Right  One/Two Story Residence: Two story, live on 1st floor  Living Alone: No  Support Systems: Spouse/Significant Other/Partner  Patient Expects to be Discharged to[de-identified] Private residence  Current DME Used/Available at Home: Commode, bedside, Walker, rolling  Prior Level of Function/Work/Activity:  Independent       Number of Personal Factors/Comorbidities that affect the Plan of Care: Brief history (0):  LOW COMPLEXITY   ASSESSMENT OF OCCUPATIONAL PERFORMANCE[de-identified]   Most Recent Physical Functioning:   Balance  Sitting: Intact; Without support  Standing: Impaired; With support (walker)        Patient Vitals for the past 6 hrs:       BP BP Patient Position SpO2 O2 Flow Rate (L/min) Pulse   04/21/17 1143 126/72 At rest 100 % 2 l/min 78   04/21/17 1146 132/78 At rest 100 % 2 l/min 78   04/21/17 1151 123/60 - 96 % 2 l/min 89   04/21/17 1156 128/63 - 98 % 2 l/min 87   04/21/17 1201 130/65 - 98 % 2 l/min 93   04/21/17 1424 123/58 At rest 99 % 10 l/min 87   04/21/17 1429 124/60 At rest 100 % 10 l/min 84   04/21/17 1434 119/56 At rest 100 % 10 l/min 85   04/21/17 1439 122/65 At rest 96 % 4 l/min 87   04/21/17 1454 125/64 At rest 97 % 4 l/min 82   04/21/17 1509 135/67 At rest 96 % 4 l/min 77   04/21/17 1524 124/70 At rest 98 % 4 l/min 77        Gross Assessment: Yes  Gross Assessment  AROM: Generally decreased, functional (right LE)  Strength: Generally decreased, functional (right LE)            LLE Assessment  LLE Assessment (WDL): Exception to WDL  LLE PROM  L Knee Flexion: 50 (~post op)  L Knee Extension: -15 (~post op) Coordination  Fine Motor Skills-Upper: Left Intact; Right Intact  Gross Motor Skills-Upper: Left Intact; Right Intact           Mental Status  Neurologic State: Alert  Orientation Level: Oriented X4  Cognition: Appropriate decision making  Perception: Appears intact  Perseveration: No perseveration noted  Safety/Judgement: Awareness of environment            RLE Assessment  RLE Assessment (WDL): Within defined limits       Basic ADLs (From Assessment) Complex ADLs (From Assessment)   Basic ADL  Feeding: Independent  Oral Facial Hygiene/Grooming: Supervision  Bathing: Moderate assistance  Upper Body Dressing: Supervision  Lower Body Dressing: Moderate assistance  Toileting: Moderate assistance     Grooming/Bathing/Dressing Activities of Daily Living     Cognitive Retraining  Safety/Judgement: Awareness of environment                 Functional Transfers  Toilet Transfer : Minimum assistance  Shower Transfer: Minimum assistance     Bed/Mat Mobility  Supine to Sit: Contact guard assistance  Sit to Supine: Contact guard assistance  Sit to Stand: Minimum assistance  Bed to Chair:  (NT)  Scooting: Contact guard assistance           Physical Skills Involved:  1. Range of Motion  2. Balance  3. Mobility Cognitive Skills Affected (resulting in the inability to perform in a timely and safe manner): 1. none Psychosocial Skills Affected:  1.  Environmental Adaptations Number of elements that affect the Plan of Care: 3-5:  MODERATE COMPLEXITY   CLINICAL DECISION MAKIN[address] [place] [address] AM-PAC 6 Clicks   Basic Mobility Inpatient Short Form  How much help from another person does the patient currently need. .. Total A Lot A Little None   1. Putting on and taking off regular lower body clothing?   [ ] 1   [X] 2   [ ] 3   [ ] 4   2. Bathing (including washing, rinsing, drying)? [ ] 1   [X] 2   [ ] 3   [ ] 4   3. Toileting, which includes using toilet, bedpan or urinal?   [ ] 1   [ ] 2   [X] 3   [ ] 4   4. Putting on and taking off regular upper body clothing?   [ ] 1   [ ] 2   [ ] 3   [X] 4   5. Taking care of personal grooming such as brushing teeth? [ ] 1   [ ] 2   [ ] 3   [X] 4   6. Eating meals? [ ] 1   [ ] 2   [ ] 3   [X] 4   © , Trustees of [address] [place] [id number], under license to [employer]. All rights reserved   Score:  Initial: 19 Most Recent: X (Date: -- )     Interpretation of Tool:  Represents activities that are increasingly more difficult (i.e. Bed mobility, Transfers, Gait). Score 24 23 22-20 19-15 14-10 9-7 6       Modifier CH CI CJ CK CL CM CN         · Self Care:               - CURRENT STATUS:    CK - 40%-59% impaired, limited or restricted               - GOAL STATUS:           CJ - 20%-39% impaired, limited or restricted               - D/C STATUS:                       ---------------To be determined---------------  Payor: [facility] / Plan: [place] [facility] / Product Type: PPO /       Medical Necessity:     · Patient is expected to demonstrate progress in range of motion, balance and functional technique to increase independence with self care. Reason for Services/Other Comments:  · Patient would benefit from OT to maximize independence and safety with self care and functional mobility. .   Use of outcome tool(s) and clinical judgement create a POC that gives a: LOW COMPLEXITY                 TREATMENT:   (In addition to Assessment/Re-Assessment sessions the following treatments were rendered)      Pre-treatment Symptoms/Complaints:  Complaint of pain, RN aware   Pain: Initial:   Pain Intensity 1: 5 5 Post Session:  5      Assessment/Reassessment only, no treatment provided today     Treatment/Session Assessment:         Response to Treatment:  Pt .     Education:  [ ] Home Exercises  [X] Fall Precautions  [ ] Hip Precautions [ ] [address] Video  [X] Knee/Hip Prosthesis Review  [X] Walker Management/Safety [X] Adaptive Equipment as Needed         Interdisciplinary Collaboration:   · Physical Therapist  · Occupational Therapist  · Registered Nurse     After treatment position/precautions:   · Supine in bed  · Bed/Chair-wheels locked  · Call light within reach  · RN notified  · Family at bedside     Compliance with Program/Exercises: compliant all of the time. Recommendations/Intent for next treatment session:  Treatment next visit will focus on increasing Ms. Salinas's independence with bed mobility, transfers, self care and patient education.        Total Treatment Duration:  OT Patient Time In/Time Out  Time In: Christiano 89  Time Out: Sushma 83, OT

## 2017-04-21 NOTE — PERIOP NOTES
TRANSFER - OUT REPORT:    Verbal report given to Crys WOOD  on [employer]  being transferred to [facility]  for routine progression of care       Report consisted of patients Situation, Background, Assessment and   Recommendations(SBAR). Information from the following report(s) SBAR, Kardex, OR Summary, Intake/Output and MAR was reviewed with the receiving nurse. Lines:   Peripheral IV 04/21/17 Left Wrist (Active)   Site Assessment Clean, dry, & intact 4/21/2017  3:16 PM   Phlebitis Assessment 0 4/21/2017  3:16 PM   Infiltration Assessment 0 4/21/2017  3:16 PM   Dressing Status Clean, dry, & intact 4/21/2017  3:16 PM   Dressing Type Tape;Transparent 4/21/2017  3:16 PM   Hub Color/Line Status Infusing 4/21/2017  3:16 PM   Action Taken Blood drawn 4/21/2017 10:05 AM        Opportunity for questions and clarification was provided.       Patient transported with:   O2 @ 4 liters

## 2017-04-21 NOTE — OP NOTES
[facility]  Total Knee Arthroplasty   [facility]   : 1955  Medical Record Number:[id number]  Pre-operative Diagnosis:  Primary osteoarthritis of left knee [M17.12]  Post-operative Diagnosis: Status post total left knee replacement  Location: [address] [place] [address]  Surgeon: Konrad Burgos MD  Assistant: Tiffanie Gambino PA-C    Anesthesia: Spinal and FNB    Procedure: Procedure(s) (LRB):  LEFT KNEE ARTHROPLASTY TOTAL (Left)    The complexity of the total joint surgery requires the use of a first assistant for positioning, retraction and expertise in closure. Tourniquet Time: 0 minutes  EBL: 250cc  Findings: severe degenerative arthritis, patellar osteophytes, posterior femoral osteophytes   BMI: Body mass index is 46.47 kg/(m^2). Paris Vanessa was brought to the operating room and positioned on the operating table. She was anethestized with anesthesia. A chandler catheter was placed preoperatively and IV antibiotics was administered. Prior to the incision being made a timeout was called identifying the patient, procedure ,operative side and surgeon. .The operative leg was prepped and draped in the usual sterile manner. An anterior longitudinal incision was accomplished just medial to the tibial tubercle and extending approximal 6 centimeters proximal to the superior pole of the patella. A medial parapatellar capsular incision was performed. The medial capsular flap was elevated around to the insertion of the semimembranous tendon. The patella was everted and the knee flexed and externally rotated. The medial and external menisci were excised. The lateral half of the fat pad excised and the patella femoral ligament was released. The anterior cruciate ligament was resected and the posterior cruciate ligament was substituted. Using extramedullary instrumentation, the tibial cut was accomplished with appropriate posterior slope.   Approxiamately 9mm of bone was removed from the high side of the tibia. The distal femur was next addressed. A drill hole was made above the intracondolar notch. Using appropriate intramedullary instrumentation,a five degree valgus distal cut was accomplished. The femur was sized. The anterior and posterior cuts were then made about the distal femur. The osteophytes were removed from the tibial and femoral surfaces. The flexion and extension gaps were assessed with the appropriate spacer blocks. Additional surgical procedures included: none. The flexion and extension gaps were deemed appropriately balanced. The appropriate cutting blocks were then utilized to perform the anterior chamfer, posterior chamfer and notch cuts, with appropriate lateral tranlation accomplished for the patellofemoral groove. The tibia baseplate was sized. The tibial base plate was pinned into place with the appropriate external rotation and stem site prepared. A preliminary range of motion was accomplished with  trial components. The patient was able to obtain full extension as well as appropriate flexion. The patient's ligaments were stable in flexion and extension to medial and lateral stressing and the alignment was through the appropriate mechanical axis. The patella was then everted. The bone was resected to accomodate the three peg  patella button. A trial reduction revealed appropriate tracking through the patellofemoral groove with no lateral retinacular release being accomplished. All trial components were removed. The knee was irrigated. There were no femoral deficiencies. There were no tibial deficiencies. No augmentation was utilized. The permanent Tibial and Femoral components were impacted into place. The patella component was then pressed in place. [address] [place] [address] knee was placed through range of motion and noted to be stable as mentioned above with the trail components.   The wound was dry, therefore no drain was used. The operative knee was injected with 60cc of Naropin, 10 cc's of morphine and 1 cc of 30mg of Toradol. The knee was then soaked with a diluted betadine solution for approximately 3 min. This was then thoroughly irrigated. The capsular layer was closed using a #1 PDS suture. The knee joint was then injected with transexamic acid. The subcutaneous layers were closed using 2-0 Stratafix suture. Finally the skin was closed using 3-0 Vicryl and skin staples, which were applied in occlusive fashion and sterile bandage applied. An Iceman cryo pad was applied on the operative leg. Sponge count and needle counts were correct. [email or web address] left the operating room     Implants:   Implant Name Type Inv.  Item Serial No.  Lot No. LRB No. Used   COMPNT FEM PS TRIATHLN 4 L PA --  - [id number]  COMPNT FEM PS TRIATHLN 4 L PA --  [id number] [employer] [facility] [id number] Left 1   BASEPLT TIB PC TRITNM SZ 4 -- TRIATHLON - [id number]  BASEPLT TIB PC TRITNM SZ 4 -- TRIATHLON [id number] [employer] HOW [id number] Left 1   PAT ASYM MTL-BK 10MM SZ A35 -- TRIATHLON - [id number]  PAT ASYM MTL-BK 10MM SZ A35 -- TRIATHLON D1EN [employer] HOW [id number] Left 1   INSERT TIB PS TRIATHLN 4 11MM --  - [id number]   INSERT TIB PS TRIATHLN 4 11MM --  [id number] [employer] HOW [id number] Left 1           Signed By: Rosa Isela Olmstead MD  4/21/2017,  2:31 PM

## 2017-04-21 NOTE — CONSULTS
MD Yumiko   Medical Director  [address] [place] [address], [address] [facility]  Tel: [phone]     Physical Medicine & Rehabilitation Note-consult    Patient: Elizabeth Duke MRN: [id number]  SSN: [id number]    YOB: 1955  Age: 64 y.o. Sex: female      Admit Date: 4/21/2017  Admitting Physician: Di Bruno MD    Medical Decision Making/Plan/Recommend:  Gait impairment and functional deficits. Therapy progressing steadily, without unusual barriers to progress. She is at [facility] with transfers, and ambulation using a RW. Patient plans for SNF discharge. Best care option is admission to a sub acute rehab program at [facility]. She has non acute medical conditions described below and post op  functional deficits. She will benefit from continued daily skilled rehabilitation efforts and regular medical and nursing care at SNF. She is expected to make cotinued functional gains. Continued rehab at home via [facility] PT would be reasonable and necessary. Continue PT, OT for active/assisted/passive left TKA ROM, strengthening, mobility, transfers, gait training. Will follow progress. Chief Complaint : Gait dysfunction secondary to below.   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  Acute Rehab Dx:  Gait impairment  Debility    Mobility and ambulation deficits  Self Care/ADL deficits    Medical Dx:  Past Medical History:   Diagnosis Date    Arthritis     Back pain     Chronic insomnia     Chronic pain     left knee    CKD (chronic kidney disease) stage 3, GFR 30-59 ml/min 10/25/2016    Classical migraine without intractable migraine     Fatigue     GERD (gastroesophageal reflux disease)     controlled with med   [employer] HLD (hyperlipidemia)     HTN (hypertension)     Hypothyroidism     Morbid obesity (HCC)     Nausea & vomiting     even with spinal anes    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 female patient at [address] [place] [address] who was admitted on 4/21/2017  by Rell Bolton MD with below mentioned medical history, is being seen for Physical Medicine and Rehabilitation consult. Genet Jennings is admitted with debilitating  end stage DJD. She has exhausted conservative management efforts and is now s/p a left total knee arthroplasty per Dr. Rell Bolton MD on 4/21/2017. We are consulted to assist with rehab needs and placement. Patient is to be WBAT LLE. Patient is starting to stand, taking participate in post op acute PT and OT. She is tolerating it well, shows no major barriers. She is expected still very limited by surgical knee pain, decreased ROM and strength. Genet Jennings is seen and examined today. Medical Records reviewed. Pt denies any history of DVT/PE. She had right TEREZA in 11/2016. Se states she has still some hip pain. She denies any other pre morbid functional deficits. She has been independent with ambulation, prior to admission, limited by left knee pain.       Current Level of Function: bed mobility - min A, transfers - min A, decreased balance , ambulation -  3' with min assist      Prior Level of Function/Work/Activity:   Independent           Principal Problem:    Status post total left knee replacement (4/21/2017)    Active Problems:    Hypertension () Overview: meds      Chronic insomnia ()      Acquired hypothyroidism (10/22/2015)      Morbid obesity ([address] (5/2/2016)      Overview: Did OK with Belviq but limited success, off of it now      CKD (chronic kidney disease) stage 3, GFR 30-59 ml/min (10/25/2016)      LUCAS on CPAP (4/21/2017)        Family History   Problem Relation Age of Onset    Hypertension Father     Sleep Apnea Father     Cancer Father      prostate, pancreatic    Diabetes Father      glucose intolerance    Elevated Lipids Sister     Hypertension Sister     Stroke Mother     Dementia Mother     Heart Disease Mother      CHF    Diabetes Paternal Aunt       Social History   Substance Use Topics    Smoking status: Never Smoker    Smokeless tobacco: Never Used    Alcohol use No     Past Surgical History:   Procedure Laterality Date    HX BREAST AUGMENTATION  1990    HX BUNIONECTOMY Bilateral 1980    HX HIP REPLACEMENT Right 11/2016    HX KNEE ARTHROSCOPY Left 10/2012    HX ORTHOPAEDIC Bilateral 2010    neuroma's removed from feet    HX PHYLLIS AND BSO  2009    r/t large fibroids; Dr. Lacho Hidalgo      Prior to Admission medications    Medication Sig Start Date End Date Taking? Authorizing Provider   aspirin (ASPIRIN) 325 mg tablet Take 1 Tab by mouth two (2) times a day for 35 days. 4/21/17 5/26/17 Yes MARY Wilson   HYDROmorphone (DILAUDID) 2 mg tablet Take 1 Tab by mouth every four (4) hours as needed for Pain. Max Daily Amount: 12 mg. 4/21/17  Yes MARY Wilson   aprepitant (EMEND) 40 mg capsule Take 80 mg by mouth once. Yes Historical Provider   verapamil ER (VERELAN PM) 300 mg capsule Take 1 Cap by mouth nightly. Patient taking differently: Take 300 mg by mouth daily. 1/19/17  Yes Gerardo Vines MD   omeprazole (PRILOSEC) 20 mg capsule Take 1 Cap by mouth daily.  1/19/17  Yes Gerardo Vines MD   levothyroxine (SYNTHROID) 125 mcg tablet Take 1 Tab by mouth Daily (before breakfast). Indications: HYPOTHYROIDISM  Patient taking differently: Take 125 mcg by mouth Daily (before breakfast). Take / use AM day of surgery  per anesthesia protocols. Indications: hypothyroidism 5/2/16  Yes Rachael Burkett MD   atorvastatin (LIPITOR) 40 mg tablet Take 1 Tab by mouth nightly. Patient taking differently: Take 40 mg by mouth daily. Take / use AM day of surgery  per anesthesia protocols. 5/2/16  Yes Rachael Burkett MD   potassium chloride (K-DUR, KLOR-CON) 10 mEq tablet Take 2 tabs today/prn 4/20/17   Rachael Burkett MD   valsartan-hydroCHLOROthiazide (DIOVAN-HCT) 160-25 mg per tablet Take 1 Tab by mouth daily. 1/19/17   Rachael Burkett MD   rOPINIRole (REQUIP) 0.5 mg tablet Take 1 Tab by mouth nightly as needed. Patient taking differently: Take 0.5 mg by mouth every evening. Indications: Restless Legs Syndrome 5/2/16   Rachael Burkett MD   Cholecalciferol, Vitamin D3, (VITAMIN D3) 2,000 unit cap capsule Take 2,000 Units by mouth daily. EVERY OTHER DAY    Historical Provider     Allergies   Allergen Reactions    Ambien [Zolpidem] Other (comments)     Periods of forgetfulness    Augmentin [Amoxicillin-Pot Clavulanate] Itching     Itching in mouth and ears    Codeine Nausea Only    Contrave [Naltrexone-Bupropion] Other (comments)     lethargy    Crestor [Rosuvastatin] Swelling    Macrobid [Nitrofurantoin Monohyd/M-Cryst] Itching      itching under arm    Tape [Adhesive] Other (comments)     Little bruises    Topamax [Topiramate] Other (comments)     Felt weird    Zocor [Simvastatin] Myalgia        Review of Systems: +left knee pain, +antalgic gait. Denies chest pain, shortness of breath, cough, headache, visual problems, abdominal pain, dysurea, calf pain. Pertinent positives are as noted in the medical records and unremarkable otherwise. Objective:     Vitals:  Blood pressure (!) 143/91, pulse 80, temperature 96.5 °F (35.8 °C), resp.  rate 16, height 5' 3\" (1.6 m), weight 262 lb 5.6 oz (119 kg), SpO2 96 %. Temp (24hrs), Av.5 °F (36.4 °C), Min:96.1 °F (35.6 °C), Max:99 °F (37.2 °C)    BMI (calculated): 46.5 (17 0821)   Intake and Output:   0701 -  1900  In: 1200 [I.V.:1200]  Out: 450 [Urine:150]    Physical Exam:   General: Alert and age appropriately oriented. Obese. No acute cardio respiratory distress. HEENT: Normocephalic, no conjunctival pallor. Oral mucosa moist without cyanosis. No JVD. Lungs: Clear to auscultation anteriorly  Respiration even and unlabored   Heart: Regular rate and rhythm, S1, S2   No  Murmurs. Abdomen: Soft, non-tender, non-distended. Genitourinary: defered   Neuromuscular:      Grossly no focal motor deficits. Left knee extension strong  Left ankle dorsiflexion 5/5  Left ankle plantarflexion 5/5  No sensory deficits distally BLE to soft touch. Skin/extremity: Non tender calves BLE. No rashes, no marginal erythema.                                                                                          Labs/Studies:  Recent Results (from the past 72 hour(s))   CBC W/O DIFF    Collection Time: 17 11:50 AM   Result Value Ref Range    WBC 8.6 4.3 - 11.1 K/uL    RBC 4.66 4.05 - 5.25 M/uL    HGB 13.3 11.7 - 15.4 g/dL    HCT 41.0 35.8 - 46.3 %    MCV 88.0 79.6 - 97.8 FL    MCH 28.5 26.1 - 32.9 PG    MCHC 32.4 31.4 - 35.0 g/dL    RDW 14.5 11.9 - 14.6 %    PLATELET 051 513 - 317 K/uL    MPV 10.8 10.8 - 01.8 FL   METABOLIC PANEL, BASIC    Collection Time: 17 11:50 AM   Result Value Ref Range    Sodium 139 136 - 145 mmol/L    Potassium 3.4 (L) 3.5 - 5.1 mmol/L    Chloride 103 98 - 107 mmol/L    CO2 24 21 - 32 mmol/L    Anion gap 12 7 - 16 mmol/L    Glucose 91 65 - 100 mg/dL    BUN 20 8 - 23 MG/DL    Creatinine 1.02 (H) 0.6 - 1.0 MG/DL    GFR est AA >60 >60 ml/min/1.73m2    GFR est non-AA 59 (L) >60 ml/min/1.73m2    Calcium 9.3 8.3 - 10.4 MG/DL   PROTHROMBIN TIME + INR    Collection Time: 17 11:50 AM Result Value Ref Range    Prothrombin time 10.7 9.6 - 12.0 sec    INR 1.0 0.9 - 1.2     PTT    Collection Time: 04/19/17 11:50 AM   Result Value Ref Range    aPTT 27.3 23.5 - 31.7 SEC   MSSA/MRSA SC BY PCR, NASAL SWAB    Collection Time: 04/19/17 11:55 AM   Result Value Ref Range    Special Requests: NO SPECIAL REQUESTS      Culture result:        SA target not detected. A MRSA NEGATIVE, SA NEGATIVE test result does not preclude MRSA or SA nasal colonization. URINALYSIS W/ RFLX MICROSCOPIC    Collection Time: 04/19/17 12:19 PM   Result Value Ref Range    Color YELLOW      Appearance CLEAR      Specific gravity 1.015 1.001 - 1.023      pH (UA) 5.5 5.0 - 9.0      Protein NEGATIVE  NEG mg/dL    Glucose NEGATIVE  mg/dL    Ketone NEGATIVE  NEG mg/dL    Bilirubin NEGATIVE  NEG      Blood SMALL (A) NEG      Urobilinogen 0.2 0.2 - 1.0 EU/dL    Nitrites NEGATIVE  NEG      Leukocyte Esterase NEGATIVE  NEG      WBC 0 0 /hpf    RBC 3-5 0 /hpf    Epithelial cells 0-3 0 /hpf    Bacteria 0 0 /hpf    Casts 0-3 0 /lpf   TYPE & SCREEN    Collection Time: 04/21/17 10:05 AM   Result Value Ref Range    Crossmatch Expiration 04/24/2017     ABO/Rh(D) Bernerd Amel NEGATIVE     Antibody screen NEG    GLUCOSE, POC    Collection Time: 04/21/17 10:53 AM   Result Value Ref Range    Glucose (POC) 107 (H) 65 - 100 mg/dL   CBC WITH AUTOMATED DIFF    Collection Time: 04/21/17  5:21 PM   Result Value Ref Range    WBC 12.3 (H) 4.3 - 11.1 K/uL    RBC 4.31 4.05 - 5.25 M/uL    HGB 12.2 11.7 - 15.4 g/dL    HCT 38.0 35.8 - 46.3 %    MCV 88.2 79.6 - 97.8 FL    MCH 28.3 26.1 - 32.9 PG    MCHC 32.1 31.4 - 35.0 g/dL    RDW 14.5 11.9 - 14.6 %    PLATELET 933 291 - 996 K/uL    MPV 10.5 (L) 10.8 - 14.1 FL    DF AUTOMATED      NEUTROPHILS 91 (H) 43 - 78 %    LYMPHOCYTES 8 (L) 13 - 44 %    MONOCYTES 1 (L) 4.0 - 12.0 %    EOSINOPHILS 0 (L) 0.5 - 7.8 %    BASOPHILS 0 0.0 - 2.0 %    IMMATURE GRANULOCYTES 0.3 0.0 - 5.0 %    ABS.  NEUTROPHILS 11.1 (H) 1.7 - 8.2 K/UL    ABS. LYMPHOCYTES 1.0 0.5 - 4.6 K/UL    ABS. MONOCYTES 0.1 0.1 - 1.3 K/UL    ABS. EOSINOPHILS 0.0 0.0 - 0.8 K/UL    ABS. BASOPHILS 0.0 0.0 - 0.2 K/UL    ABS. IMM. GRANS. 0.0 0.0 - 0.5 K/UL   PROTHROMBIN TIME + INR    Collection Time: 04/21/17  5:21 PM   Result Value Ref Range    Prothrombin time 11.3 9.6 - 12.0 sec    INR 1.1 0.9 - 1.2     PTT    Collection Time: 04/21/17  5:21 PM   Result Value Ref Range    aPTT 24.9 23.5 - 79.1 SEC   METABOLIC PANEL, BASIC    Collection Time: 04/21/17  5:21 PM   Result Value Ref Range    Sodium 141 136 - 145 mmol/L    Potassium 3.3 (L) 3.5 - 5.1 mmol/L    Chloride 105 98 - 107 mmol/L    CO2 25 21 - 32 mmol/L    Anion gap 11 7 - 16 mmol/L    Glucose 134 (H) 65 - 100 mg/dL    BUN 18 8 - 23 MG/DL    Creatinine 1.19 (H) 0.6 - 1.0 MG/DL    GFR est AA 59 (L) >60 ml/min/1.73m2    GFR est non-AA 49 (L) >60 ml/min/1.73m2    Calcium 8.9 8.3 - 10.4 MG/DL   HEMOGLOBIN    Collection Time: 04/21/17  8:57 PM   Result Value Ref Range    HGB 11.3 (L) 11.7 - 15.4 g/dL       Functional Assessment:  Reviewed participation and progress in therapies  Gross Assessment  AROM: Generally decreased, functional (right LE) (04/21/17 1700)  Strength: Generally decreased, functional (right LE) (04/21/17 1700)   Bed Mobility  Supine to Sit: Contact guard assistance (04/21/17 1700)  Sit to Supine: Contact guard assistance (04/21/17 1700)  Scooting: Contact guard assistance (04/21/17 1700)   Balance  Sitting: Intact; Without support (04/21/17 1700)  Standing: Impaired; With support (walker) (04/21/17 1700)               Bed/Mat Mobility  Supine to Sit: Contact guard assistance (04/21/17 1700)  Sit to Supine: Contact guard assistance (04/21/17 1700)  Sit to Stand: Minimum assistance (04/21/17 1700)  Bed to Chair:  (NT) (04/21/17 1700)  Scooting: Contact guard assistance (04/21/17 1700)     Ambulation:  Gait  Speed/Raegan: Shuffled; Slow (04/21/17 1700)  Stance: Left decreased (04/21/17 1700)  Gait Abnormalities: Antalgic;Decreased step clearance (04/21/17 1700)  Ambulation - Level of Assistance: Minimal assistance (04/21/17 1700)  Distance (ft): 4 Feet (ft) (04/21/17 1700)  Assistive Device: Walker, rolling (04/21/17 1700)    Impression/Plan:     Principal Problem:    Status post total left knee replacement (4/21/2017)    Active Problems:    Hypertension ()      Overview: meds      Chronic insomnia ()      Acquired hypothyroidism (10/22/2015)      Morbid obesity ([facility] [address] (5/2/2016)      Overview: Did OK with Belviq but limited success, off of it now      CKD (chronic kidney disease) stage 3, GFR 30-59 ml/min (10/25/2016)      LUCAS on CPAP (4/21/2017)        Current Facility-Administered Medications   Medication Dose Route Frequency Provider Last Rate Last Dose    [START ON 4/22/2017] levothyroxine (SYNTHROID) tablet 125 mcg  125 mcg Oral ACB Pennie Climes, [address]        [START ON 4/22/2017] pantoprazole (PROTONIX) tablet 40 mg  40 mg Oral ACB Pennie Climes, [address]        [START ON 4/22/2017] potassium chloride (K-DUR, KLOR-CON) tablet 10 mEq  10 mEq Oral DAILY Pennie Climes, PA        rOPINIRole (REQUIP) tablet 0.5 mg  0.5 mg Oral QHS PRN Pennie Climes, PA        verapamil ER (VERELAN PM) capsule 300 mg  300 mg Oral DAILY Pennie Climes, [address]        0.9% sodium chloride infusion  100 mL/hr IntraVENous CONTINUOUS Pennie Climes,  mL/hr at 04/21/17 1600 100 mL/hr at 04/21/17 1600    sodium chloride (NS) flush 5-10 mL  5-10 mL IntraVENous Q8H Pennie Climes, PA   10 mL at 04/21/17 1915    sodium chloride (NS) flush 5-10 mL  5-10 mL IntraVENous PRN Pennie Climes, PA        ceFAZolin in 0.9% NS (ANCEF) IVPB soln 2 g  2 g IntraVENous Q8H Pennie Climes,  mL/hr at 04/21/17 1915 2 g at 04/21/17 1915    acetaminophen (OFIRMEV) infusion 1,000 mg  1,000 mg IntraVENous ONCE Pennie Cantu, Melisa Slaughter        [START ON 4/22/2017] acetaminophen (TYLENOL) tablet 1,000 mg  1,000 mg Oral Q6H Adra Rushing Laura Crump        HYDROmorphone (DILAUDID) tablet 2 mg  2 mg Oral Q4H PRN MARY Rainey   2 mg at 04/21/17 1914    HYDROmorphone (PF) (DILAUDID) injection 1 mg  1 mg IntraVENous Q3H PRN MARY Rainey        naloxone [facility]) injection 0.2-0.4 mg  0.2-0.4 mg IntraVENous Q10MIN PRN MARY Rainey        [START ON 4/22/2017] dexamethasone (DECADRON) injection 10 mg  10 mg IntraVENous ONCE Laura Rainey        ondansetron [facility]) injection 4 mg  4 mg IntraVENous Q4H PRN MARY Rainey   4 mg at 04/21/17 1930    diphenhydrAMINE (BENADRYL) capsule 25 mg  25 mg Oral Q4H PRN Jonny Raineyma        [START ON 4/22/2017] senna-docusate (PERICOLACE) 8.6-50 mg per tablet 2 Tab  2 Tab Oral DAILY Laura Rainey        aspirin delayed-release tablet 325 mg  325 mg Oral Q12H Jonny Raineyma   325 mg at 04/21/17 1915    [START ON 4/22/2017] valsartan/hydroCHLOROthiazide (DIOVAN HCT) 160/25 mg   Oral DAILY Maurizio Mckeon MD        zolpidem [facility]) tablet 5 mg  5 mg Oral QHS PRN Emily Rios MD            Recommendations: [facility]. Pt requests john arita. Continue Acute Rehab Program. PT, OT  to focus on  gait training, transfer training, balance activities, ROM and strengthening exercises. Coordination of rehab/medical care. Counseling of Physical Medicine & Rehab care issues management. Monitoring and management of rehab conditions per the plan of care/orders. Will follow along with you for PM&R issues and provide you follow up. Will follow with SW/ /Admissions Coordinators regarding insurance approvals and acceptance. Rehabilitation Management/ Medical Management: 1. Devices:Walkers, Type: Rolling Walker  2. Consult:Rehab team including PT, OT,  and . 3. Disposition Rehab-discussed with patient. 4. Thigh-high or knee-high JESSE's when out of bed. 5. DVT Prophylaxis - started aspirin 325mg bid x 35days.   6. Incentive spirometer Q1H while awake  7. Post op hemorrhagic anemia-   monitor.  hgb 11.3  8. Activity: WBAT LLE  9. Planned Labs: CBC,BMP  10. Pain Control: Continue scheduled tylenol,   and  PRN meds. 11. Wound Care: Keep surgical wound clean and dry and reinforce dressing PRN. May remove Aquacel 1 week post op ad replace with new one. Remove staples 12-14 post surgery, when incision appears appropriately closed and apply benzoin and 1/2\" steristrips. Follow up with Dr Enrike Casanova  2 weeks after discharge from rehab. Follow up with ORTHO per instructions. Thank you for the opportunity to participate in the care of this patient.     Signed By: Suzanne Tirado MD     April 21, 2017

## 2017-04-21 NOTE — PROGRESS NOTES
Problem: Mobility Impaired (Adult and Pediatric)  Goal: *Acute Goals and Plan of Care (Insert Text)  GOALS (1-4 days):  (1.)Ms. Mandeep Rodas will move from supine to sit and sit to supine in bed with SUPERVISION. (2.)Ms. Mandeep Rodas will transfer from bed to chair and chair to bed with STAND BY ASSIST using the least restrictive device. (3.)Ms. Mandeep Rodas will ambulate with STAND BY ASSIST for 200 feet with the least restrictive device. (4.)Ms. Mandeep Rodas will ambulate up/down 2 steps with right railing with CONTACT GUARD ASSIST with no device. (5.)Ms. Mandeep Rodas will increase left knee ROM to 5°-80°.  ________________________________________________________________________________________________      PHYSICAL THERAPY JOINT CAMP TKA: INITIAL ASSESSMENT, TREATMENT DAY: DAY OF ASSESSMENT, PM 4/21/2017  INPATIENT: Hospital Day: 1  Payor: [address] [place] [address] / Plan: [address] / Product Type: PPO /      NAME/AGE/GENDER: Silvino Ferrari is a 64 y.o. female    PRIMARY DIAGNOSIS:  Primary osteoarthritis of left knee [M17.12]              Procedure(s) and Anesthesia Type:     * LEFT KNEE ARTHROPLASTY TOTAL - Spinal (Left)  ICD-10: Treatment Diagnosis:        · Pain in Left Knee (M25.562)  · Stiffness of Left Knee, Not elsewhere classified (M25.662)  · Difficulty in walking, Not elsewhere classified (R26.2)       ASSESSMENT:      Ms. Madneep Rodas presents impaired strength & mobility s/p left TKA. Pt also had decreased stability during out of bed activity. Pt will benefit from follow up therapy to help restore safe function prior to returning home with caregiver. This section established at most recent assessment   PROBLEM LIST (Impairments causing functional limitations):  1. Decreased Strength  2. Decreased ADL/Functional Activities  3. Decreased Transfer Abilities  4. Decreased Ambulation Ability/Technique  5. Decreased Balance  6. Increased Pain  7. Decreased Activity Tolerance  8.  Decreased Flexibility/Joint Mobility  9. Decreased [place] with Home Exercise Program    INTERVENTIONS PLANNED: (Benefits and precautions of physical therapy have been discussed with the patient.)  1. Bed Mobility  2. Gait Training  3. Home Exercise Program (HEP)  4. Therapeutic Exercise/Strengthening  5. Transfer Training  6. Range of Motion: active/assisted/passive  7. Therapeutic Activities  8. Group Therapy      TREATMENT PLAN: Frequency/Duration: Follow patient BID   to address above goals. Rehabilitation Potential For Stated Goals: GOOD      RECOMMENDED REHABILITATION/EQUIPMENT: (at time of discharge pending progress): Continue Skilled Therapy and Home Health: Physical Therapy. HISTORY:   History of Present Injury/Illness (Reason for Referral): The patient has end stage arthritis of the left knee. The patient was evaluated and examined during a consultation prior to this office visit. There have been no changes to the patient's orthopedic condition since the initial consultation. The patient has failed previous conservative treatment for this condition including antiinflammatories , and lifestyle modifications. The necessity for joint replacement is present. The patient will be admitted the day of surgery for Procedure(s) (LRB):  LEFT KNEE ARTHROPLASTY TOTAL/ NIRMALA (Left)      Past Medical History/Comorbidities:   Ms. 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 She also has no past medical history of Adverse effect of anesthesia; Aneurysm ([facility] [address] Arrhythmia; Asthma; Autoimmune disease ([facility] [address] CAD (coronary artery disease); Cancer ([facility] [address] Chronic obstructive pulmonary disease ([facility] [address] Coagulation disorder ([facility] [address] Diabetes ([facility] [address] Difficult intubation; Endocarditis; Heart failure ([facility] [address] Ill-defined condition; Liver disease; Malignant hyperthermia due to anesthesia; Pseudocholinesterase deficiency; Psychiatric disorder; PUD (peptic ulcer disease); Rheumatic fever; Seizures ([facility] [address] Stroke [facility]); or Thromboembolus ([facility] [address] Ms. Abdoul Powers  has a past surgical history that includes breast augmentation (1990); knee arthroscopy (Left, 10/2012); sancho and bso (2009); tonsil and adenoidectomy (1960); bunionectomy (Bilateral, 1980); orthopaedic (Bilateral, 2010); and hip replacement (Right, 11/2016).   Social History/Living Environment:   Home Environment: Private residence  # Steps to Enter: 2  Rails to Enter: Yes  Hand Rails : Right  One/Two Story Residence: Two story, live on 1st floor  Living Alone: No  Support Systems: Spouse/Significant Other/Partner  Patient Expects to be Discharged to[de-identified] Private residence  Current DME Used/Available at Home: Commode, bedside, Walker, rolling  Prior Level of Function/Work/Activity:  Pt was independent without an assistive device prior to this admission   Number of Personal Factors/Comorbidities that affect the Plan of Care: 3+: HIGH COMPLEXITY   EXAMINATION:   Most Recent Physical Functioning:   Gross Assessment: Yes  Gross Assessment  AROM: Generally decreased, functional (right LE)  Strength: Generally decreased, functional (right LE)            LLE PROM  L Knee Flexion: 50 (~post op)  L Knee Extension: -13 (~post op)           Bed Mobility  Supine to Sit: Contact guard assistance  Sit to Supine: Contact guard assistance  Scooting: Contact guard assistance     Transfers  Sit to Stand: Minimum assistance  Stand to Sit: Minimum assistance  Bed to Chair:  (NT)     Balance  Sitting: Intact; Without support  Standing: Impaired; With support (walker)                Weight Bearing Status  Left Side Weight Bearing: As tolerated  Distance (ft): 4 Feet (ft)  Ambulation - Level of Assistance: Minimal assistance  Assistive Device: Walker, rolling  Speed/Raegan: Shuffled; Slow  Stance: Left decreased  Gait Abnormalities: Antalgic;Decreased step clearance         Braces/Orthotics: none     Left Knee Cold  Type: Cryocuff       Body Structures Involved:  1. Bones  2. Joints Body Functions Affected:  1. Sensory/Pain  2. Movement Related Activities and Participation Affected:  1. Mobility   Number of elements that affect the Plan of Care: 4+: HIGH COMPLEXITY   CLINICAL PRESENTATION:   Presentation: Stable and uncomplicated: LOW COMPLEXITY   CLINICAL DECISION MAKIN[address] [place] [id number] AM-PAC 6 Clicks   Basic Mobility Inpatient Short Form  How much difficulty does the patient currently have. .. Unable A Lot A Little None   1. Turning over in bed (including adjusting bedclothes, sheets and blankets)? [ ] 1   [ ] 2   [X] 3   [ ] 4   2. Sitting down on and standing up from a chair with arms ( e.g., wheelchair, bedside commode, etc.)   [ ] 1   [ ] 2   [X] 3   [ ] 4   3. Moving from lying on back to sitting on the side of the bed? [ ] 1   [ ] 2   [X] 3   [ ] 4   How much help from another person does the patient currently need. .. Total A Lot A Little None   4. Moving to and from a bed to a chair (including a wheelchair)? [ ] 1   [ ] 2   [X] 3   [ ] 4   5. Need to walk in hospital room? [ ] 1   [ ] 2   [X] 3   [ ] 4   6. Climbing 3-5 steps with a railing? [X] 1   [ ] 2   [ ] 3   [ ] 4   © , Trustees of [address] [place] [address], under license to [employer].  All rights reserved       Score:  Initial: 16 Most Recent: X (Date: -- )     Interpretation of Tool:  Represents activities that are increasingly more difficult (i.e. Bed mobility, Transfers, Gait). Score 24 23 22-20 19-15 14-10 9-7 6       Modifier CH CI CJ CK CL CM CN         · Mobility - Walking and Moving Around:               - CURRENT STATUS:    CK - 40%-59% impaired, limited or restricted               - GOAL STATUS:           CJ - 20%-39% impaired, limited or restricted               - D/C STATUS:                       ---------------To be determined---------------  Payor: [employer] [place] / Plan: [place] [employer] [facility] / Product Type: PPO /       Medical Necessity:     · Patient is expected to demonstrate progress in strength, range of motion, balance, coordination and functional technique to decrease assistance required with bed mobility, transfers & gait. Reason for Services/Other Comments:  · Patient continues to require skilled intervention due to pt not independent with functional mobility.    Use of outcome tool(s) and clinical judgement create a POC that gives a: Clear prediction of patient's progress: LOW COMPLEXITY                 TREATMENT:   (In addition to Assessment/Re-Assessment sessions the following treatments were rendered)      Pre-treatment Symptoms/Complaints:  none  Pain: Initial: visual scale  Pain Intensity 1: 2  Pain Location 1: Knee  Pain Orientation 1: Left  Pain Intervention(s) 1: Cold pack, Repositioned  Post Session:  2/10      Assessment/Reassessment only, no treatment provided today        Date:    Date:    Date:      ACTIVITY/EXERCISE AM PM AM PM AM PM   GROUP THERAPY  [ ]  [ ]  [ ]  [ ]  [ ]  [ ]   Ankle Pumps               Quad Sets               Gluteal Sets               Hip ABd/ADduction               Straight Leg Raises               Knee Slides               Short Arc Quads               Long Arc Quads               Chair Slides                               B = bilateral; AA = active assistive; A = active; P = passive       Treatment/Session Assessment:         Response to Treatment:  Tolerated well.     Education:  [ ] Home Exercises  [X] Fall Precautions  [ ] Hip Precautions [ ] Going Home Video  [ ] Knee/Hip Prosthesis Review  [X] Walker Management/Safety [ ] Adaptive Equipment as Needed         Interdisciplinary Collaboration:   · Occupational Therapist  · Registered Nurse     After treatment position/precautions:   · Supine in bed  · Bed/Chair-wheels locked  · Bed in low position  · Caregiver at bedside  · Call light within reach  · RN notified  · Family at bedside     Compliance with Program/Exercises: Will assess as treatment progresses. Recommendations/Intent for next treatment session:  Treatment next visit will focus on increasing Ms. Salinas's independence with bed mobility, transfers, gait training, strength/ROM exercises, modalities for pain, and patient education.        Total Treatment Duration:  PT Patient Time In/Time Out  Time In: 1625  [facility]: [facility]

## 2017-04-21 NOTE — ANESTHESIA POSTPROCEDURE EVALUATION
Post-Anesthesia Evaluation and Assessment    Patient: Amanuel Beard MRN: [id number]  SSN: [id number]    YOB: 1955  Age: 64 y.o. Sex: female       Cardiovascular Function/Vital Signs  Visit Vitals    /67 (BP 1 Location: Right arm, BP Patient Position: At rest)    Pulse 77    Temp 36.8 °C (98.3 °F)    Resp 18    Ht 5' 3\" (1.6 m)    Wt 119 kg (262 lb 5.6 oz)    SpO2 96%    BMI 46.47 kg/m2       Patient is status post spinal anesthesia for Procedure(s):  LEFT KNEE ARTHROPLASTY TOTAL. Nausea/Vomiting: None    Postoperative hydration reviewed and adequate. Pain:  Pain Scale 1: Numeric (0 - 10) (04/21/17 1509)  Pain Intensity 1: 5 (04/21/17 1509)   Managed    Neurological Status:   Neuro (WDL): Exceptions to WDL (04/21/17 1509)  Neuro  Neurologic State: Alert (04/21/17 1509)  Orientation Level: Oriented X4 (04/21/17 1509)  Cognition: Follows commands (04/21/17 1509)  LUE Motor Response: Purposeful (04/21/17 1509)  LLE Motor Response: Numbness; Pharmacologically paralyzed (04/21/17 1509)  RUE Motor Response: Purposeful (04/21/17 1509)  RLE Motor Response: Numbness; Pharmacologically paralyzed (04/21/17 1509)   At baseline    Mental Status and Level of Consciousness: Arousable    Pulmonary Status:   O2 Device: Nasal cannula (04/21/17 1509)   Adequate oxygenation and airway patent    Complications related to anesthesia: None    Post-anesthesia assessment completed.  No concerns    Signed By: Opal Plascencia MD     April 21, 2017

## 2017-04-21 NOTE — ROUTINE PROCESS
TRANSFER - IN REPORT:    Verbal report received from Gwen(name) on Narberth Mahamed  being received from [facility]) for routine post - op      Report consisted of patients Situation, Background, Assessment and   Recommendations(SBAR). Information from the following report(s) SBAR, Kardex, Procedure Summary, Intake/Output, MAR and Recent Results was reviewed with the receiving nurse. Opportunity for questions and clarification was provided. Assessment completed upon patients arrival to unit and care assumed.

## 2017-04-21 NOTE — IP AVS SNAPSHOT
Current Discharge Medication List  
  
START taking these medications Dose & Instructions Dispensing Information Comments Morning Noon Evening Bedtime  
 aspirin 325 mg tablet Commonly known as:  ASPIRIN Your last dose was: Your next dose is:    
   
   
 Dose:  325 mg Take 1 Tab by mouth two (2) times a day for 35 days. Quantity:  60 Tab Refills:  1 HYDROmorphone 2 mg tablet Commonly known as:  DILAUDID Your last dose was: Your next dose is:    
   
   
 Dose:  2 mg Take 1 Tab by mouth every four (4) hours as needed for Pain. Max Daily Amount: 12 mg. Quantity:  40 Tab Refills:  0 CONTINUE these medications which have CHANGED Dose & Instructions Dispensing Information Comments Morning Noon Evening Bedtime  
 atorvastatin 40 mg tablet Commonly known as:  LIPITOR What changed:   
- when to take this 
- additional instructions Your last dose was: Your next dose is:    
   
   
 Dose:  40 mg Take 1 Tab by mouth nightly. Quantity:  90 Tab Refills:  3  
     
   
   
   
  
 levothyroxine 125 mcg tablet Commonly known as:  SYNTHROID What changed:  additional instructions Your last dose was: Your next dose is:    
   
   
 Dose:  125 mcg Take 1 Tab by mouth Daily (before breakfast). Indications: HYPOTHYROIDISM Quantity:  90 Tab Refills:  4  
     
   
   
   
  
 rOPINIRole 0.5 mg tablet Commonly known as:  Geoffreyjose Calderón What changed:  when to take this Your last dose was: Your next dose is:    
   
   
 Dose:  0.5 mg Take 1 Tab by mouth nightly as needed. Quantity:  90 Tab Refills:  3  
     
   
   
   
  
 verapamil  mg capsule Commonly known as:  VERELAN PM  
What changed:  when to take this Your last dose was: Your next dose is:    
   
   
 Dose:  300 mg Take 1 Cap by mouth nightly. Quantity:  90 Cap Refills:  3 CONTINUE these medications which have NOT CHANGED Dose & Instructions Dispensing Information Comments Morning Noon Evening Bedtime Cholecalciferol (Vitamin D3) 2,000 unit Cap capsule Commonly known as:  VITAMIN D3 Your last dose was: Your next dose is:    
   
   
 Dose:  2000 Units Take 2,000 Units by mouth daily. EVERY OTHER DAY Refills:  0  
     
   
   
   
  
 omeprazole 20 mg capsule Commonly known as:  PRILOSEC Your last dose was: Your next dose is:    
   
   
 Dose:  20 mg Take 1 Cap by mouth daily. Quantity:  90 Cap Refills:  3  
     
   
   
   
  
 potassium chloride 10 mEq tablet Commonly known as:  K-DUR, KLOR-CON Your last dose was: Your next dose is: Take 2 tabs today/prn Quantity:  30 Tab Refills:  0  
     
   
   
   
  
 valsartan-hydroCHLOROthiazide 160-25 mg per tablet Commonly known as:  DIOVAN-HCT Your last dose was: Your next dose is:    
   
   
 Dose:  1 Tab Take 1 Tab by mouth daily. Quantity:  90 Tab Refills:  3 STOP taking these medications EMEND 40 mg capsule Generic drug:  aprepitant Where to Get Your Medications Information on where to get these meds will be given to you by the nurse or doctor. ! Ask your nurse or doctor about these medications  
  aspirin 325 mg tablet HYDROmorphone 2 mg tablet

## 2017-04-21 NOTE — CONSULTS
Subjective:     Patient: Mercy Garcia MRN: [id number]  SSN: [id number]    YOB: 1955  Age: 64 y.o. Sex: female        HPI: Ms. Karol Cai is a 65 yo WF with PMH of LUCAS on CPAP, insomnia, HTN, obesity s/p left TKA today.  at bedside and no complaints excluding pain. Had some postop hypoxia, very complaint with CPAP. 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     Neoplasm of uncertain behavior of uterus     Osteopenia     Restless legs syndrome     Sleep apnea     uses CPAP - will bring DOS    Status post total left knee replacement 4/21/2017    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    Unspecified vitamin D deficiency       Past Surgical History:   Procedure Laterality Date    HX BREAST AUGMENTATION  1990    HX BUNIONECTOMY Bilateral 1980    HX HIP REPLACEMENT Right 11/2016    HX KNEE ARTHROSCOPY Left 10/2012    HX ORTHOPAEDIC Bilateral 2010    neuroma's removed from feet    HX PHYLLIS AND BSO  2009    r/t large fibroids; Dr. Jonathan Yee      Prescriptions Prior to Admission   Medication Sig    aprepitant (EMEND) 40 mg capsule Take 80 mg by mouth once.  verapamil ER (VERELAN PM) 300 mg capsule Take 1 Cap by mouth nightly. (Patient taking differently: Take 300 mg by mouth daily.)    omeprazole (PRILOSEC) 20 mg capsule Take 1 Cap by mouth daily.  levothyroxine (SYNTHROID) 125 mcg tablet Take 1 Tab by mouth Daily (before breakfast). Indications: HYPOTHYROIDISM (Patient taking differently: Take 125 mcg by mouth Daily (before breakfast). Take / use AM day of surgery  per anesthesia protocols. Indications: hypothyroidism)    atorvastatin (LIPITOR) 40 mg tablet Take 1 Tab by mouth nightly. (Patient taking differently: Take 40 mg by mouth daily. Take / use AM day of surgery  per anesthesia protocols.  )    potassium chloride (K-DUR, KLOR-CON) 10 mEq tablet Take 2 tabs today/prn    valsartan-hydroCHLOROthiazide (DIOVAN-HCT) 160-25 mg per tablet Take 1 Tab by mouth daily.  rOPINIRole (REQUIP) 0.5 mg tablet Take 1 Tab by mouth nightly as needed. (Patient taking differently: Take 0.5 mg by mouth every evening. Indications: Restless Legs Syndrome)    Cholecalciferol, Vitamin D3, (VITAMIN D3) 2,000 unit cap capsule Take 2,000 Units by mouth daily.  EVERY OTHER DAY     Current Facility-Administered Medications   Medication Dose Route Frequency    [START ON 4/22/2017] levothyroxine (SYNTHROID) tablet 125 mcg  125 mcg Oral ACB    [START ON 4/22/2017] pantoprazole (PROTONIX) tablet 40 mg  40 mg Oral ACB    [START ON 4/22/2017] potassium chloride (K-DUR, KLOR-CON) tablet 10 mEq  10 mEq Oral DAILY    rOPINIRole (REQUIP) tablet 0.5 mg  0.5 mg Oral QHS PRN    verapamil ER (VERELAN PM) capsule 300 mg  300 mg Oral DAILY    0.9% sodium chloride infusion  100 mL/hr IntraVENous CONTINUOUS    sodium chloride (NS) flush 5-10 mL  5-10 mL IntraVENous Q8H    sodium chloride (NS) flush 5-10 mL  5-10 mL IntraVENous PRN    ceFAZolin in 0.9% NS (ANCEF) IVPB soln 2 g  2 g IntraVENous Q8H    acetaminophen (OFIRMEV) infusion 1,000 mg  1,000 mg IntraVENous ONCE    [START ON 4/22/2017] acetaminophen (TYLENOL) tablet 1,000 mg  1,000 mg Oral Q6H    celecoxib (CELEBREX) capsule 200 mg  200 mg Oral Q12H    HYDROmorphone (DILAUDID) tablet 2 mg  2 mg Oral Q4H PRN    HYDROmorphone (PF) (DILAUDID) injection 1 mg  1 mg IntraVENous Q3H PRN    naloxone (NARCAN) injection 0.2-0.4 mg  0.2-0.4 mg IntraVENous Q10MIN PRN    [START ON 4/22/2017] dexamethasone (DECADRON) injection 10 mg  10 mg IntraVENous ONCE    ondansetron (ZOFRAN) injection 4 mg  4 mg IntraVENous Q4H PRN    diphenhydrAMINE (BENADRYL) capsule 25 mg  25 mg Oral Q4H PRN    [START ON 4/22/2017] senna-docusate (Merly Riches) 8.6-50 mg per tablet 2 Tab  2 Tab Oral DAILY    aspirin delayed-release tablet 325 mg  325 mg Oral Q12H    [START ON 4/22/2017] valsartan/hydroCHLOROthiazide (DIOVAN HCT) 160/25 mg   Oral DAILY     Allergies   Allergen Reactions    Ambien [Zolpidem] Other (comments)     Periods of forgetfulness    Augmentin [Amoxicillin-Pot Clavulanate] Itching     Itching in mouth and ears    Codeine Nausea Only    Contrave [Naltrexone-Bupropion] Other (comments)     lethargy    Crestor [Rosuvastatin] Swelling    Macrobid [Nitrofurantoin Monohyd/M-Cryst] Itching      itching under arm    Tape [Adhesive] Other (comments)     Little bruises    Topamax [Topiramate] Other (comments)     Felt weird    Zocor [Simvastatin] Myalgia      Social History   Substance Use Topics    Smoking status: Never Smoker    Smokeless tobacco: Never Used    Alcohol use No      Family History   Problem Relation Age of Onset    Hypertension Father     Sleep Apnea Father     Cancer Father      prostate, pancreatic    Diabetes Father      glucose intolerance    Elevated Lipids Sister     Hypertension Sister     Stroke Mother     Dementia Mother     Heart Disease Mother      CHF    Diabetes Paternal Aunt         Review of Systems  Per HPI      I have reviewed all the pertinent medical and surgical history, social history, allergies, family history,  as well as pertinent labs and studies .       Objective:     Patient Vitals for the past 24 hrs:   BP Temp Pulse Resp SpO2   04/21/17 1524 124/70 - 77 18 98 %   04/21/17 1509 135/67 - 77 18 96 %   04/21/17 1454 125/64 - 82 18 97 %   04/21/17 1439 122/65 - 87 18 96 % 04/21/17 1434 119/56 - 85 16 100 %   04/21/17 1429 124/60 - 84 16 100 %   04/21/17 1424 123/58 98.3 °F (36.8 °C) 87 16 99 %   04/21/17 1201 130/65 - 93 16 98 %   04/21/17 1156 128/63 - 87 16 98 %   04/21/17 1151 123/60 - 89 16 96 %   04/21/17 1146 132/78 - 78 16 100 %   04/21/17 1143 126/72 - 78 16 100 %   04/21/17 1113 138/71 - 80 16 100 %   04/21/17 1002 (!) 158/94 99 °F (37.2 °C) 88 16 95 %     04/21 0701 - 04/21 1900  In: 1200 [I.V.:1200]  Out: 450 [Urine:150]       Exam:  General: well developed, well nourished, no distress, alert  Eyes: anicteric   HEENT: nasal septum midline  Neck: supple, symmetrical, trachea midline  Lungs: clear to auscultation bilaterally, good effort anterior exam  Heart: regular rate and rhythm, S1, S2 normal, no murmur, click, rub or gallop, no edema   Abdomen: soft, non-tender.  Bowel sounds normal. No masses,  no organomegaly  Extremities: extremities normal, atraumatic  Skin: Skin color, texture, turgor normal. No rashes or lesions  Neurologic: Alert and oriented X 3,    Musculoskeletal: no gross deficits   Psychiatric: appropriate mood and affect    ECG:     Data Review (Labs):   Recent Results (from the past 24 hour(s))   TYPE & SCREEN    Collection Time: 04/21/17 10:05 AM   Result Value Ref Range    Crossmatch Expiration 04/24/2017     ABO/Rh(D) Ob[place] Boyce NEGATIVE     Antibody screen NEG    GLUCOSE, POC    Collection Time: 04/21/17 10:53 AM   Result Value Ref Range    Glucose (POC) 107 (H) 65 - 100 mg/dL       Assessment / Plan:   Principal Problem:    Status post total left knee replacement (4/21/2017)    Active Problems:    Hypertension ()      Overview: meds      Chronic insomnia ()      Acquired hypothyroidism (10/22/2015)      Morbid obesity ([address] (5/2/2016)      Overview: Did OK with Belviq but limited success, off of it now      CKD (chronic kidney disease) stage 3, GFR 30-59 ml/min (10/25/2016)      LUCAS on CPAP (4/21/2017)        -followup postop labs  -stop celebrex with CKD  -add prn ambien, states not true allergy and would like to retry  -wean O2 as tolerant, continue home CPAP  DVT Prophylaxis:  FEN:oral,IVF      Will sign off and be available as needed, please call thanks    Signed By: Enid Fisher MD     April 21, 2017

## 2017-04-21 NOTE — ANESTHESIA PREPROCEDURE EVALUATION
Anesthetic History     PONV          Review of Systems / Medical History  Patient summary reviewed and pertinent labs reviewed    Pulmonary        Sleep apnea: CPAP           Neuro/Psych   Within defined limits           Cardiovascular    Hypertension: well controlled              Exercise tolerance: >4 METS     GI/Hepatic/Renal     GERD: well controlled           Endo/Other      Hypothyroidism: well controlled  Morbid obesity     Other Findings            Physical Exam    Airway  Mallampati: III  TM Distance: 4 - 6 cm  Neck ROM: normal range of motion   Mouth opening: Diminished (comment)     Cardiovascular  Regular rate and rhythm,  S1 and S2 normal,  no murmur, click, rub, or gallop             Dental         Pulmonary  Breath sounds clear to auscultation               Abdominal  GI exam deferred       Other Findings            Anesthetic Plan    ASA: 3  Anesthesia type: spinal      Post-op pain plan if not by surgeon: peripheral nerve block single      Anesthetic plan and risks discussed with: Patient

## 2017-04-21 NOTE — ANESTHESIA PROCEDURE NOTES
Spinal Block    Start time: 4/21/2017 12:23 PM  End time: 4/21/2017 12:24 PM  Performed by: Gustavo Fiore  Authorized by: Gustavo Fiore     Pre-procedure:   Indications: primary anesthetic  Preanesthetic Checklist: patient identified, risks and benefits discussed, anesthesia consent, patient being monitored and timeout performed    Timeout Time: 12:22          Spinal Block:   Patient Position:  Seated  Prep Region:  Lumbar  Prep: chlorhexidine and patient draped      Location:  L3-4  Technique:  Single shot  Local:  Lidocaine 1%  Local Dose (mL):  2    Needle:   Needle Type:  Pencan  Needle Gauge:  25 G  Attempts:  1      Events: CSF confirmed, no blood with aspiration and no paresthesia        Assessment:  Insertion:  Uncomplicated  Patient tolerance:  Patient tolerated the procedure well with no immediate complications

## 2017-04-21 NOTE — ANESTHESIA PROCEDURE NOTES
Peripheral Block    Start time: 4/21/2017 11:44 AM  End time: 4/21/2017 11:46 AM  Performed by: Paris Haynes  Authorized by: Jennie NOLASCO       Pre-procedure: Indications: at surgeon's request, post-op pain management and procedure for pain    Preanesthetic Checklist: patient identified, risks and benefits discussed, site marked, timeout performed, anesthesia consent given and patient being monitored    Timeout Time: 11:43          Block Type:   Block Type:   Adductor canal  Laterality:  Left  Monitoring:  Standard ASA monitoring, responsive to questions, continuous pulse ox, oxygen, frequent vital sign checks and heart rate  Injection Technique:  Single shot  Procedures: ultrasound guided    Patient Position: prone  Prep: chlorhexidine    Location:  Mid thigh  Needle Type:  Stimuplex  Needle Gauge:  22 G  Needle Localization:  Ultrasound guidance  Medication Injected:  0.2%  ropivacaine  Adds:  Epi 1:200K  Volume (mL):  40    Assessment:  Number of attempts:  1  Injection Assessment:  Incremental injection every 5 mL, no paresthesia, ultrasound image on chart, local visualized surrounding nerve on ultrasound, negative aspiration for blood and no intravascular symptoms  Patient tolerance:  Patient tolerated the procedure well with no immediate complications

## 2017-04-21 NOTE — PERIOP NOTES
Betadine lavage: 17.5cc of betadine lot # [id number] , exp. Date [id number]  ,  in 1cc of . 9NS Lot # [id number]  , exp.  Date : [id number]

## 2017-04-21 NOTE — IP AVS SNAPSHOT
[address] 
[phone] Patient: Jade Knox MRN: [id number] ZXN:[id number] You are allergic to the following Allergen Reactions Ambien (Zolpidem) Other (comments) Periods of forgetfulness Augmentin (Amoxicillin-Pot Clavulanate) Itching Itching in mouth and ears Codeine Nausea Only Contrave (Naltrexone-Bupropion) Other (comments) lethargy Crestor (Rosuvastatin) Swelling Macrobid (Nitrofurantoin Monohyd/M-Cryst) Itching  
  itching under arm Tape (Adhesive) Other (comments) Little bruises Topamax (Topiramate) Other (comments) Felt weird Zocor (Simvastatin) Myalgia Immunizations Administered for This Admission Name Date  
 TB Skin Test (PPD) Intradermal 4/21/2017 Recent Documentation Height Weight BMI OB Status Smoking Status 1.6 m 119 kg 46.47 kg/m2 Hysterectomy Never Smoker Emergency Contacts Name Discharge Info Relation Home Work Mobile Romie Salinas  Spouse [3] [phone] About your hospitalization You were admitted on:  April 21, 2017 [facility] You were discharged on:  April 23, 2017 Unit phone number:  [phone] Why you were hospitalized Your primary diagnosis was:  Status Post Total Left Knee Replacement Your diagnoses also included:  Acquired Hypothyroidism, Chronic Insomnia, Ckd (Chronic Kidney Disease) Stage 3, Gfr 30-59 Ml/Min, Hypertension, Morbid Obesity (Hcc), Jimbo On Cpap Providers Seen During Your Hospitalizations Provider Role Specialty Primary office phone Julien Montano MD Attending Provider Orthopedic Surgery [phone] Your Primary Care Physician (PCP) Primary Care Physician Office Phone Office Fax Judd Chaparro [phone] Follow-up Information Follow up With Details Comments Contact Info MD [address] [facility] [address] 
[phone] Nilda Pablo MD  Go to scheduled follow-up appointment. [facility] [employer] [facility] [address] [phone] Kim Alexis  Will call you within 48 hours to schedule home visit. [phone] Your Appointments Wednesday May 10, 2017  8:15 AM EDT  
LAB with [facility] LAB [facility] Internal Medicine ([facility] INTERNAL MEDICINE) [address] [place] [facility] [address]  
[phone] Wednesday May 17, 2017  2:30 PM EDT Follow Up with Temi Bowers MD  
[facility] Internal Medicine ([facility] INTERNAL MEDICINE) [address] [place] [facility] [address]  
[phone] Current Discharge Medication List  
  
START taking these medications Dose & Instructions Dispensing Information Comments Morning Noon Evening Bedtime  
 aspirin 325 mg tablet Commonly known as:  ASPIRIN Your last dose was: Your next dose is:    
   
   
 Dose:  325 mg Take 1 Tab by mouth two (2) times a day for 35 days. Quantity:  60 Tab Refills:  1 HYDROmorphone 2 mg tablet Commonly known as:  DILAUDID Your last dose was: Your next dose is:    
   
   
 Dose:  2 mg Take 1 Tab by mouth every four (4) hours as needed for Pain. Max Daily Amount: 12 mg. Quantity:  40 Tab Refills:  0 CONTINUE these medications which have CHANGED Dose & Instructions Dispensing Information Comments Morning Noon Evening Bedtime  
 atorvastatin 40 mg tablet Commonly known as:  LIPITOR What changed:   
- when to take this 
- additional instructions Your last dose was: Your next dose is:    
   
   
 Dose:  40 mg Take 1 Tab by mouth nightly. Quantity:  90 Tab Refills:  3  
     
   
   
   
  
 levothyroxine 125 mcg tablet Commonly known as:  SYNTHROID What changed:  additional instructions Your last dose was: Your next dose is:    
   
   
 Dose:  125 mcg Take 1 Tab by mouth Daily (before breakfast). Indications: HYPOTHYROIDISM Quantity:  90 Tab Refills:  4  
     
   
   
   
  
 rOPINIRole 0.5 mg tablet Commonly known as:  Milady Do What changed:  when to take this Your last dose was: Your next dose is:    
   
   
 Dose:  0.5 mg Take 1 Tab by mouth nightly as needed. Quantity:  90 Tab Refills:  3  
     
   
   
   
  
 verapamil  mg capsule Commonly known as:  VERELAN PM  
What changed:  when to take this Your last dose was: Your next dose is:    
   
   
 Dose:  300 mg Take 1 Cap by mouth nightly. Quantity:  90 Cap Refills:  3 CONTINUE these medications which have NOT CHANGED Dose & Instructions Dispensing Information Comments Morning Noon Evening Bedtime Cholecalciferol (Vitamin D3) 2,000 unit Cap capsule Commonly known as:  VITAMIN D3 Your last dose was: Your next dose is:    
   
   
 Dose:  2000 Units Take 2,000 Units by mouth daily. EVERY OTHER DAY Refills:  0  
     
   
   
   
  
 omeprazole 20 mg capsule Commonly known as:  PRILOSEC Your last dose was: Your next dose is:    
   
   
 Dose:  20 mg Take 1 Cap by mouth daily. Quantity:  90 Cap Refills:  3  
     
   
   
   
  
 potassium chloride 10 mEq tablet Commonly known as:  K-DUR, KLOR-CON Your last dose was: Your next dose is: Take 2 tabs today/prn Quantity:  30 Tab Refills:  0  
     
   
   
   
  
 valsartan-hydroCHLOROthiazide 160-25 mg per tablet Commonly known as:  DIOVAN-HCT Your last dose was: Your next dose is:    
   
   
 Dose:  1 Tab Take 1 Tab by mouth daily. Quantity:  90 Tab Refills:  3 STOP taking these medications EMEND 40 mg capsule Generic drug:  aprepitant Where to Get Your Medications Information on where to get these meds will be given to you by the nurse or doctor. ! Ask your nurse or doctor about these medications  
  aspirin 325 mg tablet HYDROmorphone 2 mg tablet Discharge Instructions [facility] [employer] Patient Discharge Instructions Ralph العلي / [id number] : 1955 Admitted 2017 Discharged: 2017 IF YOU HAVE ANY PROBLEMS ONCE YOU ARE AT HOME CALL THE FOLLOWING NUMBERS:  
Main office number: [phone] Medications · The medications you are to continue on are listed on the medication reconciliation sheet. · Narcotic pain medications as well as supplemental iron can cause constipation. If this occurs try stopping the narcotic pain medication and/or the iron. · It is important that you take the medication exactly as they are prescribed. · Medications which increase your risk of blood clots are listed to stop for 5 weeks after surgery as well as medications or supplements which increase your risk of bleeding complications. · Keep your medication in the bottles provided by the pharmacist and keep a list of the medication names, dosages, and times to be taken in your wallet. · Do not take other medications without consulting your doctor. Important Information Do NOT smoke as this will greatly increase your risk of infection! Resume your prehospital diet. If you have excessive nausea or vomitting call your doctor's office Leg swelling and warmth is normal for 6 months after surgery. If you experience swelling in your leg elevate you leg while laying down with your toes above your heart. If you have sudden onset severe swelling with leg pain call our office.  Use Han Hose stockings until we see you in the office for your follow up appointment. The stitches deep inside take approximately 6 months to dissolve. There will be sharp shooting, stinging and burning pain. This is normal and will resolve between 3-6 months after surgery. Difficulty sleeping is normal following total Knee and Hip replacement. You may try melatonin, an over-the-counter sleep aid or benadryl to help with sleep. Most patients will resume sleeping through the night 8 weeks after surgery. Home Physical Therapy is arranged. Home Health will contact you within 48 hrs of discharge that you have chosen. If you have not received a call within this time frame please contact that provider you chose. You should be given this information before you leave the hospital.  
 
You are at a risk for falls. Use the rolling walker when walking. Patients who have had a joint replacement should not drive if they are still taking narcotic pain mediation during the daytime hours. Most patients wean themselves off of pain medication within 2-5 weeks after surgery. When to Call the office - If you have a temperature greater then 101 
- Uncontrolled vomiting - Loose control of your bladder or bowel function - Are unable to bear any wieght  
- Need a pain medication refill Information obtained by : 
I understand that if any problems occur once I am at home I am to contact my physician. I understand and acknowledge receipt of the instructions indicated above. Physician's or R.N.'s Signature                                                                  Date/Time Patient or Representative Signature                                                          Date/Time DISCHARGE SUMMARY from Nurse The following personal items are in your possession at time of discharge: 
 
Dental Appliances: None Visual Aid: None Home Medications: Kept at bedside Jewelry: None Clothing: Footwear, Shirt, Pants Other Valuables: None PATIENT INSTRUCTIONS: 
 
After general anesthesia or intravenous sedation, for 24 hours or while taking prescription Narcotics: · Limit your activities · Do not drive and operate hazardous machinery · Do not make important personal or business decisions · Do  not drink alcoholic beverages · If you have not urinated within 8 hours after discharge, please contact your surgeon on call. Report the following to your surgeon: 
· Excessive pain, swelling, redness or odor of or around the surgical area · Temperature over 100.5 · Nausea and vomiting lasting longer than 4 hours or if unable to take medications · Any signs of decreased circulation or nerve impairment to extremity: change in color, persistent  numbness, tingling, coldness or increase pain · Any questions These are general instructions for a healthy lifestyle: No smoking/ No tobacco products/ Avoid exposure to second hand smoke Surgeon General's Warning:  Quitting smoking now greatly reduces serious risk to your health. Obesity, smoking, and sedentary lifestyle greatly increases your risk for illness A healthy diet, regular physical exercise & weight monitoring are important for maintaining a healthy lifestyle You may be retaining fluid if you have a history of heart failure or if you experience any of the following symptoms:  Weight gain of 3 pounds or more overnight or 5 pounds in a week, increased swelling in our hands or feet or shortness of breath while lying flat in bed. Please call your doctor as soon as you notice any of these symptoms; do not wait until your next office visit. Recognize signs and symptoms of STROKE: 
 
F-face looks uneven A-arms unable to move or move unevenly S-speech slurred or non-existent T-time-call 911 as soon as signs and symptoms begin-DO NOT go Back to bed or wait to see if you get better-TIME IS BRAIN. Warning Signs of HEART ATTACK Call 911 if you have these symptoms: 
? Chest discomfort. Most heart attacks involve discomfort in the center of the chest that lasts more than a few minutes, or that goes away and comes back. It can feel like uncomfortable pressure, squeezing, fullness, or pain. ? Discomfort in other areas of the upper body. Symptoms can include pain or discomfort in one or both arms, the back, neck, jaw, or stomach. ? Shortness of breath with or without chest discomfort. ? Other signs may include breaking out in a cold sweat, nausea, or lightheadedness. Don't wait more than five minutes to call [address]! Fast action can save your life. Calling 911 is almost always the fastest way to get lifesaving treatment. Emergency Medical Services staff can begin treatment when they arrive  up to an hour sooner than if someone gets to the hospital by car. The discharge information has been reviewed with the patient. The patient verbalized understanding. Discharge medications reviewed with the patient and appropriate educational materials and side effects teaching were provided. Total Knee Replacement: What to Expect at Home Your Recovery When you leave the hospital, you should be able to move around with a walker or crutches. But you will need someone to help you at home for the next few weeks or until you have more energy and can move around better. If there is no one to help you at home, you may go to a rehabilitation center. You will go home with a bandage and stitches or staples. Change the bandage as your doctor tells you to. Your doctor will remove your stitches or staples 10 to 21 days after your surgery.  You may still have some mild pain, and the area may be swollen for 3 to 6 months after surgery. Your knee will continue to improve for 6 to 12 months. You will probably use a walker for 1 to 3 weeks and then use crutches. When you are ready, you can use a cane. You will probably be able to walk on your own in 4 to 8 weeks. You will need to do months of physical rehabilitation (rehab) after a knee replacement. Rehab will help you strengthen the muscles of the knee and help you regain movement. After you recover, your artificial knee will allow you to do normal daily activities with less pain or no pain at all. You may be able to hike, dance, ride a bike, and play golf. Talk to your doctor about whether you can do more strenuous activities. Always tell your caregivers that you have an artificial knee. How long it will take to walk on your own, return to normal activities, and go back to work depends on your health and how well your rehabilitation (rehab) program goes. The better you do with your rehab exercises, the quicker you will get your strength and movement back. This care sheet gives you a general idea about how long it will take for you to recover. But each person recovers at a different pace. Follow the steps below to get better as quickly as possible. How can you care for yourself at home? Activity · Rest when you feel tired. You may take a nap, but do not stay in bed all day. When you sit, use a chair with arms. You can use the arms to help you stand up. · Work with your physical therapist to find the best way to exercise. You may be able to take frequent, short walks using crutches or a walker. What you can do as your knee heals will depend on whether your new knee is cemented or uncemented. You may not be able to do certain things for a while if your new knee is uncemented. · After your knee has healed enough, you can do more strenuous activities with caution. ¨ You can golf, but use a golf cart, and do not wear shoes with spikes. ¨ You can bike on a flat road or on a stationary bike. Avoid biking up hills. ¨ Your doctor may suggest that you stay away from activities that put stress on your knee. These include tennis or badminton, squash or racquetball, contact sports like football, jumping (such as in basketball), jogging, or running. ¨ Avoid activities where you might fall. These include horseback riding, skiing, and mountain biking. · Do not sit for more than 1 hour at a time. Get up and walk around for a while before you sit again. If you must sit for a long time, prop up your leg with a chair or footstool. This will help you avoid swelling. · Ask your doctor when you can shower. You may need to take sponge baths until your stitches or staples have been removed. · Ask your doctor when you can drive again. It may take up to 8 weeks after knee replacement surgery before it is safe for you to drive. · When you get into a car, sit on the edge of the seat. Then pull in your legs, and turn to face the front. · You should be able to do many everyday activities 3 to 6 weeks after your surgery. You will probably need to take 4 to 16 weeks off from work. When you can go back to work depends on the type of work you do and how you feel. · Ask your doctor when it is okay for you to have sex. · Do not lift anything heavier than 10 pounds and do not lift weights for 12 weeks. Diet · By the time you leave the hospital, you should be eating your normal diet. If your stomach is upset, try bland, low-fat foods like plain rice, broiled chicken, toast, and yogurt. Your doctor may suggest that you take iron and vitamin supplements. · Drink plenty of fluids (unless your doctor tells you not to). · Eat healthy foods, and watch your portion sizes. Try to stay at your ideal weight. Too much weight puts more stress on your new knee. · You may notice that your bowel movements are not regular right after your surgery. This is common. Try to avoid constipation and straining with bowel movements. You may want to take a fiber supplement every day. If you have not had a bowel movement after a couple of days, ask your doctor about taking a mild laxative. Medicines · Your doctor will tell you if and when you can restart your medicines. He or she will also give you instructions about taking any new medicines. · If you take blood thinners, such as warfarin (Coumadin), clopidogrel (Plavix), or aspirin, be sure to talk to your doctor. He or she will tell you if and when to start taking those medicines again. Make sure that you understand exactly what your doctor wants you to do. · Your doctor may give you a blood-thinning medicine to prevent blood clots. If you take a blood thinner, be sure you get instructions about how to take your medicine safely. Blood thinners can cause serious bleeding problems. This medicine could be in pill form or as a shot (injection). If a shot is necessary, your doctor will tell you how to do this. · Be safe with medicines. Take pain medicines exactly as directed. ¨ If the doctor gave you a prescription medicine for pain, take it as prescribed. ¨ If you are not taking a prescription pain medicine, ask your doctor if you can take an over-the-counter medicine. ¨ Plan to take your pain medicine 30 minutes before exercises. It is easier to prevent pain before it starts than to stop it once it has started. · If you think your pain medicine is making you sick to your stomach: 
¨ Take your medicine after meals (unless your doctor has told you not to). ¨ Ask your doctor for a different pain medicine. · If your doctor prescribed antibiotics, take them as directed. Do not stop taking them just because you feel better. You need to take the full course of antibiotics. Incision care · You will have a bandage over the cut (incision) and staples or stitches. Take the bandage off when your doctor says it is okay. · Your doctor will remove the staples or stitches 10 days to 3 weeks after the surgery and replace them with strips of tape. Leave the tape on for a week or until it falls off. Exercise · Your rehab program will give you a number of exercises to do to help you get back your knee's range of motion and strength. Always do them as your therapist tells you. Ice and elevation · For pain and swelling, put ice or a cold pack on the area for 10 to 20 minutes at a time. Put a thin cloth between the ice and your skin. Other instructions · Continue to wear your support stockings as your doctor says. These help to prevent blood clots. The length of time that you will have to wear them depends on your activity level and the amount of swelling. · Wear medical alert jewelry that says you may need antibiotics before any procedure, including dental work. You can buy this at most [employer]. · You have metal pieces in your knee. These may set off some airport metal detectors. Carry a medical alert card that says you have an artificial joint, just in case. Follow-up care is a key part of your treatment and safety. Be sure to make and go to all appointments, and call your doctor if you are having problems. It's also a good idea to know your test results and keep a list of the medicines you take. When should you call for help? Call 911 anytime you think you may need emergency care. For example, call if: 
· You passed out (lost consciousness). · You have severe trouble breathing. · You have sudden chest pain and shortness of breath, or you cough up blood. Call your doctor now or seek immediate medical care if: 
· You have signs of infection, such as: 
¨ Increased pain, swelling, warmth, or redness. ¨ Red streaks leading from the incision. ¨ Pus draining from the incision. ¨ A fever. · You have signs of a blood clot, such as: 
¨ Pain in your calf, back of the knee, thigh, or groin. ¨ Redness and swelling in your leg or groin. · Your incision comes open and begins to bleed, or the bleeding increases. · You have pain that does not get better after you take pain medicine. Watch closely for changes in your health, and be sure to contact your doctor if: 
· You do not have a bowel movement after taking a laxative. Where can you learn more? Go to http://victorino-justin.info/. Enter L742 in the search box to learn more about \"Total Knee Replacement: What to Expect at Home. \" Current as of: August 4, 2016 Content Version: 11.2 © 7862-1605 [employer]. Care instructions adapted under license by [employer] (which disclaims liability or warranty for this information). If you have questions about a medical condition or this instruction, always ask your healthcare professional. [place] [address] any warranty or liability for your use of this information. Discharge Orders Procedure Order Date Status Priority Quantity Spec Type Associated Dx CALL YOUR DOCTOR For: Temperature greater than 100.4., Severe uncontrolled pain. , Persistant nausea and vomiting., Persistant dizziness or light-headedness. , Hives, Difficulty breathing, headache, or visual disturbances. , Redness, tenderness, or s. .. 04/21/17 1441 Normal Routine 1  Status post total left knee replacement [[id number]] Questions: For:  Temperature greater than 100.4. For:  Severe uncontrolled pain. For:  Persistant nausea and vomiting. For:  Persistant dizziness or light-headedness. For:  Campbell Eduardo For:  Difficulty breathing, headache, or visual disturbances. For:  Redness, tenderness, or signs of infection.   
        
 ACTIVITY AFTER DISCHARGE Patient should: Restrict driving, Restrict lifting, Other (specify) 04/21/17 1441 Normal Routine 1  Status post total left knee replacement [[id number]] Questions: Patient should:  Restrict driving Patient should:  Restrict lifting Patient should: Other (specify) DRESSING, CHANGE SPECIFY 04/21/17 1441 Normal Routine 1  Status post total left knee replacement [[id number]] Comments:  Routine dressing changes. Notify if excessive drainage. If staples are present they are to be removed 10 days post surgery and steri strips applied. REFERRAL TO HOME HEALTH 04/21/17 1441 Normal Routine 1  Status post total left knee replacement [[id number]] REFERRAL TO PHYSICAL THERAPY 04/21/17 1441 Normal Routine 1  Status post total left knee replacement [[id number]] Comments:  Referral to Home PT [facility]! Dear Frantz Awan: 
Thank you for requesting a [employer] account. Our records indicate that you already have an active [employer] account. You can access your account anytime at [email or web address]. [email or web address] Did you know that you can access your hospital and ER discharge instructions at any time in Mor[employer]? You can also review all of your test results from your hospital stay or ER visit. Additional Information If you have questions, please visit the Frequently Asked Questions section of the [employer] website at [email or web address]. [email or web address]/. Remember, [employer]sl is NOT to be used for urgent needs. For medical emergencies, dial 911. Now available from your iPhone and Android! General Information Please provide this summary of care documentation to your next provider. Patient Signature:  ____________________________________________________________ Date:  ____________________________________________________________  
  
Peter Yen Provider Signature:  ____________________________________________________________ Date:  ____________________________________________________________

## 2017-04-21 NOTE — H&P
The patient has end stage arthritis of the left knee. The patient was see and examined and there are no changes to the patient's orthopedic condition. They have tried conservative treatment for this condition; including antiinflammatories and lifestyle modifications and have failed. The necessity for the joint replacement is still present, and the H&P from the office is still current. The patient will be admitted today for Procedure(s) (LRB):  LEFT KNEE ARTHROPLASTY TOTAL/ NIRMALA (Left) .

## 2017-04-22 LAB
ANION GAP BLD CALC-SCNC: 7 MMOL/L (ref 7–16)
BUN SERPL-MCNC: 20 MG/DL (ref 8–23)
CALCIUM SERPL-MCNC: 8.5 MG/DL (ref 8.3–10.4)
CHLORIDE SERPL-SCNC: 104 MMOL/L (ref 98–107)
CO2 SERPL-SCNC: 26 MMOL/L (ref 21–32)
CREAT SERPL-MCNC: 1.11 MG/DL (ref 0.6–1)
GLUCOSE SERPL-MCNC: 132 MG/DL (ref 65–100)
HGB BLD-MCNC: 10.8 G/DL (ref 11.7–15.4)
POTASSIUM SERPL-SCNC: 3.8 MMOL/L (ref 3.5–5.1)
SODIUM SERPL-SCNC: 137 MMOL/L (ref 136–145)

## 2017-04-22 PROCEDURE — 74011250637 HC RX REV CODE- 250/637: Performed by: ORTHOPAEDIC SURGERY

## 2017-04-22 PROCEDURE — 97110 THERAPEUTIC EXERCISES: CPT

## 2017-04-22 PROCEDURE — 65270000029 HC RM PRIVATE

## 2017-04-22 PROCEDURE — 97116 GAIT TRAINING THERAPY: CPT

## 2017-04-22 PROCEDURE — 94760 N-INVAS EAR/PLS OXIMETRY 1: CPT

## 2017-04-22 PROCEDURE — 74011250636 HC RX REV CODE- 250/636: Performed by: PHYSICIAN ASSISTANT

## 2017-04-22 PROCEDURE — 74011250637 HC RX REV CODE- 250/637: Performed by: PHYSICIAN ASSISTANT

## 2017-04-22 PROCEDURE — 85018 HEMOGLOBIN: CPT | Performed by: PHYSICIAN ASSISTANT

## 2017-04-22 PROCEDURE — 36415 COLL VENOUS BLD VENIPUNCTURE: CPT | Performed by: PHYSICIAN ASSISTANT

## 2017-04-22 PROCEDURE — 97530 THERAPEUTIC ACTIVITIES: CPT

## 2017-04-22 PROCEDURE — 80048 BASIC METABOLIC PNL TOTAL CA: CPT | Performed by: PHYSICIAN ASSISTANT

## 2017-04-22 PROCEDURE — 97535 SELF CARE MNGMENT TRAINING: CPT

## 2017-04-22 RX ADMIN — HYDROMORPHONE HYDROCHLORIDE 2 MG: 2 TABLET ORAL at 12:51

## 2017-04-22 RX ADMIN — HYDROMORPHONE HYDROCHLORIDE 2 MG: 2 TABLET ORAL at 17:42

## 2017-04-22 RX ADMIN — ACETAMINOPHEN 1000 MG: 500 TABLET, FILM COATED ORAL at 05:15

## 2017-04-22 RX ADMIN — CEFAZOLIN 2 G: 1 INJECTION, POWDER, FOR SOLUTION INTRAMUSCULAR; INTRAVENOUS; PARENTERAL at 05:15

## 2017-04-22 RX ADMIN — LEVOTHYROXINE SODIUM 125 MCG: 125 TABLET ORAL at 05:15

## 2017-04-22 RX ADMIN — SODIUM CHLORIDE 100 ML/HR: 900 INJECTION, SOLUTION INTRAVENOUS at 00:24

## 2017-04-22 RX ADMIN — SENNOSIDES AND DOCUSATE SODIUM 2 TABLET: 8.6; 5 TABLET ORAL at 08:38

## 2017-04-22 RX ADMIN — HYDROMORPHONE HYDROCHLORIDE 2 MG: 2 TABLET ORAL at 05:15

## 2017-04-22 RX ADMIN — Medication 10 ML: at 05:15

## 2017-04-22 RX ADMIN — ACETAMINOPHEN 1000 MG: 500 TABLET, FILM COATED ORAL at 17:42

## 2017-04-22 RX ADMIN — ASPIRIN 325 MG: 325 TABLET, DELAYED RELEASE ORAL at 20:26

## 2017-04-22 RX ADMIN — HYDROCHLOROTHIAZIDE: 25 TABLET ORAL at 08:37

## 2017-04-22 RX ADMIN — HYDROMORPHONE HYDROCHLORIDE 2 MG: 2 TABLET ORAL at 08:37

## 2017-04-22 RX ADMIN — HYDROMORPHONE HYDROCHLORIDE 2 MG: 2 TABLET ORAL at 00:24

## 2017-04-22 RX ADMIN — VERAPAMIL HYDROCHLORIDE 300 MG: 300 CAPSULE, EXTENDED RELEASE ORAL at 21:00

## 2017-04-22 RX ADMIN — POTASSIUM CHLORIDE 10 MEQ: 10 TABLET, EXTENDED RELEASE ORAL at 08:37

## 2017-04-22 RX ADMIN — PANTOPRAZOLE SODIUM 40 MG: 40 TABLET, DELAYED RELEASE ORAL at 05:15

## 2017-04-22 RX ADMIN — ACETAMINOPHEN 1000 MG: 500 TABLET, FILM COATED ORAL at 00:24

## 2017-04-22 RX ADMIN — ASPIRIN 325 MG: 325 TABLET, DELAYED RELEASE ORAL at 08:37

## 2017-04-22 RX ADMIN — HYDROMORPHONE HYDROCHLORIDE 2 MG: 2 TABLET ORAL at 21:37

## 2017-04-22 RX ADMIN — ACETAMINOPHEN 1000 MG: 500 TABLET, FILM COATED ORAL at 12:51

## 2017-04-22 RX ADMIN — Medication 10 ML: at 20:26

## 2017-04-22 NOTE — PROGRESS NOTES
Problem: Mobility Impaired (Adult and Pediatric)  Goal: *Acute Goals and Plan of Care (Insert Text)  GOALS (1-4 days):  (1.)Ms. Terri Velez will move from supine to sit and sit to supine in bed with SUPERVISION. (2.)Ms. Terri Velez will transfer from bed to chair and chair to bed with STAND BY ASSIST using the least restrictive device. (3.)Ms. Terri Velez will ambulate with STAND BY ASSIST for 200 feet with the least restrictive device. (4.)Ms. Terri Velez will ambulate up/down 2 steps with right railing with CONTACT GUARD ASSIST with no device. (5.)Ms. Terri Velez will increase left knee ROM to 5°-80°.  ________________________________________________________________________________________________      PHYSICAL THERAPY JOINT CAMP TKA: Daily Note, Treatment Day: 1st and AM 4/22/2017  INPATIENT: Hospital Day: 2  Payor: [address] [place] [address] / Plan: [facility] / Product Type: PPO /      NAME/AGE/GENDER: Amanuel Beard is a 64 y.o. female    PRIMARY DIAGNOSIS:  Primary osteoarthritis of left knee [M17.12]              Procedure(s) and Anesthesia Type:     * LEFT KNEE ARTHROPLASTY TOTAL - Spinal (Left)  ICD-10: Treatment Diagnosis:        · Pain in Left Knee (M25.562)  · Stiffness of Left Knee, Not elsewhere classified (M25.662)  · Difficulty in walking, Not elsewhere classified (R26.2)       ASSESSMENT:      Ms. Terri Velez showed increased gait distance & improved level of assist for transfers & gait in am session. This section established at most recent assessment   PROBLEM LIST (Impairments causing functional limitations):  1. Decreased Strength  2. Decreased ADL/Functional Activities  3. Decreased Transfer Abilities  4. Decreased Ambulation Ability/Technique  5. Decreased Balance  6. Increased Pain  7. Decreased Activity Tolerance  8. Decreased Flexibility/Joint Mobility  9.  Decreased [place] with Home Exercise Program    INTERVENTIONS PLANNED: (Benefits and precautions of physical therapy have been discussed with the patient.)  1. Bed Mobility  2. Gait Training  3. Home Exercise Program (HEP)  4. Therapeutic Exercise/Strengthening  5. Transfer Training  6. Range of Motion: active/assisted/passive  7. Therapeutic Activities  8. Group Therapy      TREATMENT PLAN: Frequency/Duration: Follow patient BID   to address above goals. Rehabilitation Potential For Stated Goals: GOOD      RECOMMENDED REHABILITATION/EQUIPMENT: (at time of discharge pending progress): Continue Skilled Therapy and Home Health: Physical Therapy. HISTORY:   History of Present Injury/Illness (Reason for Referral): The patient has end stage arthritis of the left knee. The patient was evaluated and examined during a consultation prior to this office visit. There have been no changes to the patient's orthopedic condition since the initial consultation. The patient has failed previous conservative treatment for this condition including antiinflammatories , and lifestyle modifications. The necessity for joint replacement is present. The patient will be admitted the day of surgery for Procedure(s) (LRB):  LEFT KNEE ARTHROPLASTY TOTAL/ NIRMALA (Left)      Past Medical History/Comorbidities:   Ms. 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 She also has no past medical history of Adverse effect of anesthesia; Aneurysm ([address] Arrhythmia;  Asthma; Autoimmune disease ([facility] CAD (coronary artery disease); Cancer ([facility] Chronic obstructive pulmonary disease ([facility] Coagulation disorder ([facility] Diabetes ([facility] Difficult intubation; Endocarditis; Heart failure ([facility] Ill-defined condition; Liver disease; Malignant hyperthermia due to anesthesia; Pseudocholinesterase deficiency; Psychiatric disorder; PUD (peptic ulcer disease); Rheumatic fever; Seizures ([facility] Stroke [facility]); or Thromboembolus ([facility] Ms. Fili Collins  has a past surgical history that includes breast augmentation (1990); knee arthroscopy (Left, 10/2012); sancho and bso (2009); tonsil and adenoidectomy (1960); bunionectomy (Bilateral, 1980); orthopaedic (Bilateral, 2010); and hip replacement (Right, 11/2016). Social History/Living Environment:   Home Environment: Private residence  # Steps to Enter: 2  Rails to Enter: Yes  Hand Rails : Right  One/Two Story Residence: Two story, live on 1st floor  Living Alone: No  Support Systems: Spouse/Significant Other/Partner  Patient Expects to be Discharged to[de-identified] Private residence  Current DME Used/Available at Home: Walker, rolling, Commode, bedside  Prior Level of Function/Work/Activity:  Pt was independent without an assistive device prior to this admission   Number of Personal Factors/Comorbidities that affect the Plan of Care: 3+: HIGH COMPLEXITY   EXAMINATION:   Most Recent Physical Functioning:                   LLE PROM  L Knee Flexion: 60 (~post op)  L Knee Extension: -10 (~post op)           Bed Mobility  Supine to Sit: Contact guard assistance  Sit to Supine:  (NT)  Scooting: Contact guard assistance     Transfers  Sit to Stand: Contact guard assistance  Stand to Sit: Contact guard assistance  Bed to Chair: Contact guard assistance (with walker)     Balance  Sitting: Intact; Without support  Standing: Impaired; With support (walker)                Weight Bearing Status  Left Side Weight Bearing: As tolerated  Distance (ft): 60 Feet (ft)  Ambulation - Level of Assistance: Contact guard assistance  Assistive Device: Walker, rolling  Speed/Raegan: Delayed  Stance: Left decreased  Gait Abnormalities: Antalgic;Decreased step clearance         Braces/Orthotics: none     Left Knee Cold  Type: Cryocuff       Body Structures Involved:  1. Bones  2. Joints Body Functions Affected:  1. Sensory/Pain  2. Movement Related Activities and Participation Affected:  1. Mobility   Number of elements that affect the Plan of Care: 4+: HIGH COMPLEXITY   CLINICAL PRESENTATION:   Presentation: Stable and uncomplicated: LOW COMPLEXITY   CLINICAL DECISION MAKIN[address] [place] [address] AM-PAC 6 Clicks   Basic Mobility Inpatient Short Form  How much difficulty does the patient currently have. .. Unable A Lot A Little None   1. Turning over in bed (including adjusting bedclothes, sheets and blankets)? [ ] 1   [ ] 2   [X] 3   [ ] 4   2. Sitting down on and standing up from a chair with arms ( e.g., wheelchair, bedside commode, etc.)   [ ] 1   [ ] 2   [X] 3   [ ] 4   3. Moving from lying on back to sitting on the side of the bed? [ ] 1   [ ] 2   [X] 3   [ ] 4   How much help from another person does the patient currently need. .. Total A Lot A Little None   4. Moving to and from a bed to a chair (including a wheelchair)? [ ] 1   [ ] 2   [X] 3   [ ] 4   5. Need to walk in hospital room? [ ] 1   [ ] 2   [X] 3   [ ] 4   6. Climbing 3-5 steps with a railing? [X] 1   [ ] 2   [ ] 3   [ ] 4   © , Trustees of [address] [place] [address], under license to [employer]. All rights reserved       Score:  Initial: 16 Most Recent: X (Date: -- )     Interpretation of Tool:  Represents activities that are increasingly more difficult (i.e. Bed mobility, Transfers, Gait).        Score 24 23 22-20 19-15 14-10 9-7 6       Modifier CH CI CJ CK CL CM CN         · Mobility - Walking and Moving Around:               - CURRENT STATUS:    CK - 40%-59% impaired, limited or restricted               - GOAL STATUS:           CJ - 20%-39% impaired, limited or restricted               - D/C STATUS:                       ---------------To be determined---------------  Payor: [facility] / Plan: [address] [facility] [address] / Product Type: PPO /       Medical Necessity:     · Patient is expected to demonstrate progress in strength, range of motion, balance, coordination and functional technique to decrease assistance required with bed mobility, transfers & gait. Reason for Services/Other Comments:  · Patient continues to require skilled intervention due to pt not independent with functional mobility. Use of outcome tool(s) and clinical judgement create a POC that gives a: Clear prediction of patient's progress: LOW COMPLEXITY                 TREATMENT:   (In addition to Assessment/Re-Assessment sessions the following treatments were rendered)      Pre-treatment Symptoms/Complaints:  Pt encouraged by her progress  Pain: Initial: visual scale  Pain Intensity 1: 3  Pain Location 1: Knee  Pain Orientation 1: Left  Pain Intervention(s) 1: Cold pack, Exercise  Post Session:  3/10      Therapeutic Activity: (  12 Minutes (extra time to work through activity noted) ):  Therapeutic activities including bed mobility, transfers, short distance ambualtion to improve mobility, strength, balance, coordination and dynamic movement of leg - left to improve functional WB potential.     Therapeutic Exercise: (13 Minutes):  Exercises per grid below to improve mobility and dynamic movement of leg - left to improve functional endurance. Required minimal verbal cues to promote proper body alignment and promote proper body mechanics. Progressed range and repetitions as indicated.              Date:  4/22  Date:    Date:      ACTIVITY/EXERCISE AM PM AM PM AM PM   GROUP THERAPY  [ ]  [ ]  [ ]  [ ]  [ ]  [ ]   Ankle Pumps 15              Quad Sets  15             Gluteal Sets  15             Hip ABd/ADduction 15              Straight Leg Raises  15             Knee Slides  15             Short Arc Quads  15             Long Arc Quads               Chair Slides                               B = bilateral; AA = active assistive; A = active; P = passive       Treatment/Session Assessment:         Response to Treatment:  Tolerated well. Education:  [x ] Home Exercises  [X] Fall Precautions  [ ] Hip Precautions [ ] Going Home Video  [ ] Knee/Hip Prosthesis Review  [X] Walker Management/Safety [ ] Adaptive Equipment as Needed         Interdisciplinary Collaboration:   · Registered Nurse     After treatment position/precautions:   · Up in chair, Bed/Chair-wheels locked, Call light within reach and RN notified     Compliance with Program/Exercises: Will assess as treatment progresses. Recommendations/Intent for next treatment session:  Treatment next visit will focus on increasing Ms. Salinas's independence with bed mobility, transfers, gait training, strength/ROM exercises, modalities for pain, and patient education.        Total Treatment Duration:  PT Patient Time In/Time Out  Time In: 0712  Time Out: 7[address] Ben Montero, PT

## 2017-04-22 NOTE — PROGRESS NOTES
Problem: Self Care Deficits Care Plan (Adult)  Goal: *Acute Goals and Plan of Care (Insert Text)  GOALS:   DISCHARGE GOALS (in preparation for going home/rehab): 3 days  1. Ms. Terri Velez will perform one lower body dressing activity with minimal assistance required to demonstrate improved functional mobility and safety. GOAL MET 4/22/2017    2. Ms. Terri Velez will perform one lower body bathing activity with minimal assistance required to demonstrate improved functional mobility and safety. GOAL MET 4/22/2017    3. Ms. Terri Velez will perform toileting/toilet transfer with contact guard assistance to demonstrate improved functional mobility and safety. GOAL MET 4/22/2017    4. Ms. Terri Velez will perform shower transfer with contact guard assistance to demonstrate improved functional mobility and safety. GOAL MET 4/22/2017        JOINT CAMP OCCUPATIONAL THERAPY TKA: Daily Note and AM 4/22/2017  INPATIENT: Hospital Day: 2  Payor: [address] [place] [address] / Plan: [address] [place] [address] Avenue / Product Type: PPO /      NAME/AGE/GENDER: Amanuel Beard is a 64 y.o. female    PRIMARY DIAGNOSIS:  Primary osteoarthritis of left knee [M17.12]              Procedure(s) and Anesthesia Type:     * LEFT KNEE ARTHROPLASTY TOTAL - Spinal (Left)  ICD-10: Treatment Diagnosis:        · Pain in Left Knee (M25.562)  · Stiffness of Left Knee, Not elsewhere classified (G62.867)       ASSESSMENT:      Ms. Terri Velez is s/p left TKA and presents with decreased weight bearing on left LE and decreased independence with functional mobility and activities of daily living. Patient completed shower and dressing as charter below in ADL grid and is ambulating with rolling walker and stand by assist.  Patient has met 4/4 goals and plans to return home tomorrow  with good family support. Family able to provide patient with appropriate level of assistance at this time. OT reviewed safety precautions throughout session and therapy schedule for the remainder of today.   Patient instructed to call for assistance when needing to get up from recliner and all needs in reach. Patient verbalized understanding of call light. This section established at most recent assessment   PROBLEM LIST (Impairments causing functional limitations):  1. Decreased Strength  2. Decreased ADL/Functional Activities  3. Decreased Transfer Abilities  4. Increased Pain  5. Increased Fatigue  6. Decreased Flexibility/Joint Mobility  7. Decreased Knowledge of Precautions    INTERVENTIONS PLANNED: (Benefits and precautions of occupational therapy have been discussed with the patient.)  1. Activities of daily living training  2. Adaptive equipment training  3. Balance training  4. Clothing management  5. Donning&doffing training  6. Theraputic activity      TREATMENT PLAN: Frequency/Duration: Follow patient 1-2 times to address above goals. Rehabilitation Potential For Stated Goals: GOOD      RECOMMENDED REHABILITATION/EQUIPMENT: (at time of discharge pending progress): Continue Skilled Therapy and Home Health: Physical Therapy. OCCUPATIONAL PROFILE AND HISTORY:   History of Present Injury/Illness (Reason for Referral): Pt presents this date s/p (left) TKA. Past Medical History/Comorbidities:   Ms. 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  She also has no past medical history of Adverse effect of anesthesia; Aneurysm ([facility] Arrhythmia; Asthma; Autoimmune disease ([address] CAD (coronary artery disease); Cancer ([address] Chronic obstructive pulmonary disease ([address] Coagulation disorder ([address] Diabetes ([facility] Difficult intubation; Endocarditis; Heart failure ([address] Ill-defined condition; Liver disease; Malignant hyperthermia due to anesthesia; Pseudocholinesterase deficiency; Psychiatric disorder; PUD (peptic ulcer disease); Rheumatic fever; Seizures ([facility] Stroke [facility]); or Thromboembolus ([facility] Ms. Loyola Lung  has a past surgical history that includes breast augmentation (1990); knee arthroscopy (Left, 10/2012); sancho and bso (2009); tonsil and adenoidectomy (1960); bunionectomy (Bilateral, 1980); orthopaedic (Bilateral, 2010); and hip replacement (Right, 11/2016). Social History/Living Environment:   Home Environment: Private residence  # Steps to Enter: 2  Rails to Enter: Yes  Hand Rails : Right  One/Two Story Residence: Two story, live on 1st floor  Living Alone: No  Support Systems: Spouse/Significant Other/Partner  Patient Expects to be Discharged to[de-identified] Private residence  Current DME Used/Available at Home: silvia Castro, Commode, bedside  Prior Level of Function/Work/Activity:  Independent       Number of Personal Factors/Comorbidities that affect the Plan of Care: Brief history (0):  LOW COMPLEXITY   ASSESSMENT OF OCCUPATIONAL PERFORMANCE[de-identified]   Most Recent Physical Functioning:   Balance  Sitting: Intact; Without support  Standing: Impaired; With support (walker)        Patient Vitals for the past 6 hrs:   BP BP Patient Position SpO2 Pulse   04/22/17 0500 114/66 At rest 92 % 68   04/22/17 0852 123/82 At rest 92 % 65   04/22/17 0859 - - 93 % -                     LLE Assessment  LLE Assessment (WDL): Exception to WDL  LLE PROM  L Knee Flexion: 60 (~post op)  L Knee Extension: -10 (~post op)             Mental Status  Neurologic State: Alert; Appropriate for age  Orientation Level: Appropriate for age  Cognition: Appropriate decision making; Appropriate for age attention/concentration; Appropriate safety awareness; Follows commands  Perception: Appears intact  Perseveration: No perseveration noted  Safety/Judgement: Awareness of environment; Fall prevention            RLE Assessment  RLE Assessment (WDL): Within defined limits       Basic ADLs (From Assessment) Complex ADLs (From Assessment)   Basic ADL  Feeding: Independent  Oral Facial Hygiene/Grooming: Supervision  Bathing: Moderate assistance  Upper Body Dressing: Supervision  Lower Body Dressing: Moderate assistance  Toileting: Moderate assistance     Grooming/Bathing/Dressing Activities of Daily Living   Grooming  Grooming Assistance: Supervision/set up;Stand-by assistance  Washing Face: Supervision/set-up; Stand-by assistance  Washing Hands: Supervision/set-up; Stand-by assistance  Brushing Teeth: Supervision/set-up; Stand-by assistance  Brushing/Combing Hair: Supervision/set-up; Stand-by assistance Cognitive Retraining  Safety/Judgement: Awareness of environment; Fall prevention   Upper Body Bathing  Bathing Assistance: Supervision/set-up; Stand-by assistance  Position Performed: Standing     Lower Body Bathing  Bathing Assistance: Minimum assistance  Perineal  : Stand-by assistance  Position Performed: Standing  Lower Body : Minimum assistance  Position Performed: Seated in chair;Standing  Adaptive Equipment: Grab bar; Shower chair     Upper Body Dressing Assistance  Dressing Assistance: Supervision/set-up  Pullover Shirt: Supervision/set-up Functional Transfers  Bathroom Mobility: Stand-by assistance  Toilet Transfer : Stand-by asssistance  Shower Transfer: Stand-by asssistance   Lower Body Dressing Assistance  Dressing Assistance: Minimum assistance  Underpants: Minimum assistance  Protective Undergarmet: Supervision/set-up  Pants With Elastic Waist: Minimum assistance  Socks:  Moderate assistance  Antiembolitic Stockings: Maximum assistance Bed/Mat Mobility  Supine to Sit: Contact guard assistance  Sit to Supine:  (NT)  Sit to Stand: Contact guard assistance  Bed to Chair: Contact guard assistance (with walker)  Scooting: Contact guard assistance           Physical Skills Involved:  1. Range of Motion  2. Balance  3. Mobility Cognitive Skills Affected (resulting in the inability to perform in a timely and safe manner): 1. none Psychosocial Skills Affected:  1. Environmental Adaptations   Number of elements that affect the Plan of Care: 3-5:  MODERATE COMPLEXITY   CLINICAL DECISION MAKIN[address] [place] [address] AM-PAC 6 Clicks   Basic Mobility Inpatient Short Form  How much help from another person does the patient currently need. .. Total A Lot A Little None   1. Putting on and taking off regular lower body clothing?   [ ] 1   [X] 2   [ ] 3   [ ] 4   2. Bathing (including washing, rinsing, drying)? [ ] 1   [X] 2   [ ] 3   [ ] 4   3. Toileting, which includes using toilet, bedpan or urinal?   [ ] 1   [ ] 2   [X] 3   [ ] 4   4. Putting on and taking off regular upper body clothing?   [ ] 1   [ ] 2   [ ] 3   [X] 4   5. Taking care of personal grooming such as brushing teeth? [ ] 1   [ ] 2   [ ] 3   [X] 4   6. Eating meals? [ ] 1   [ ] 2   [ ] 3   [X] 4   © , Trustees of [address] [place] [address], under license to [employer]. All rights reserved   Score:  Initial: 19 Most Recent: X (Date: -- )     Interpretation of Tool:  Represents activities that are increasingly more difficult (i.e. Bed mobility, Transfers, Gait).        Score 24 23 22-20 19-15 14-10 9-7 6       Modifier CH CI CJ CK CL CM CN         · Self Care:               - CURRENT STATUS:    CK - 40%-59% impaired, limited or restricted               - GOAL STATUS:           CJ - 20%-39% impaired, limited or restricted               - D/C STATUS:                       ---------------To be determined---------------  Payor: [employer] / Plan: [place] [employer] / Product Type: PPO /       Medical Necessity:     · Patient is expected to demonstrate progress in range of motion, balance and functional technique to increase independence with self care. Reason for Services/Other Comments:  · Patient would benefit from OT to maximize independence and safety with self care and functional mobility. .   Use of outcome tool(s) and clinical judgement create a POC that gives a: LOW COMPLEXITY                 TREATMENT:   (In addition to Assessment/Re-Assessment sessions the following treatments were rendered)      Pre-treatment Symptoms/Complaints:  Complaint of pain, RN aware   Pain: Initial:   Pain Intensity 1: 3  Pain Location 1: Knee  Pain Orientation 1: Left  Pain Intervention(s) 1: Shower Post Session:  2/10 iceman      Self Care: (25): Procedure(s) (per grid) utilized to improve and/or restore self-care/home management as related to dressing, bathing, toileting and grooming. Required minimal visual and verbal cueing to facilitate activities of daily living skills and compensatory activities. Treatment/Session Assessment:         Response to Treatment:  Pt wanted to shower today, tolerated well, plans to go home tomorrow     Education:  [ ] Home Exercises  [X] Fall Precautions  [ ] Hip Precautions [ ] [address] [employer] Video  [X] Knee/Hip Prosthesis Review  [X] Walker Management/Safety [X] Adaptive Equipment as Needed         Interdisciplinary Collaboration:   · Physical Therapist, Occupational Therapist, Registered Nurse and Certified Nursing Assistant/Patient Care Technician     After treatment position/precautions:   · Up in chair, Bed/Chair-wheels locked, Bed in low position, Call light within reach and RN notified     Compliance with Program/Exercises: compliant all of the time. Recommendations/Intent for next treatment session:  Treatment next visit will focus on increasing Ms. Salinas's independence with bed mobility, transfers, self care and patient education.        Total Treatment Duration:25  OT Patient Time In/Time Out  Time In: 0139  Time Out: 1100 [facility], OT

## 2017-04-22 NOTE — PROGRESS NOTES
Care Management Interventions  Transition of Care Consult (CM Consult): Discharge Planning  Current Support Network: Lives with Spouse  Plan discussed with Pt/Family/Caregiver: Yes  Discharge Location  Discharge Placement:  (Patient requesting [facility])

## 2017-04-22 NOTE — PROGRESS NOTES
Problem: Mobility Impaired (Adult and Pediatric)  Goal: *Acute Goals and Plan of Care (Insert Text)  GOALS (1-4 days):  (1.)Ms. Fili Collins will move from supine to sit and sit to supine in bed with SUPERVISION. (2.)Ms. Fili Collins will transfer from bed to chair and chair to bed with STAND BY ASSIST using the least restrictive device. Met 4/22  (3.)Ms. Fili Collins will ambulate with STAND BY ASSIST for 200 feet with the least restrictive device. (4.)Ms. Fili Collins will ambulate up/down 2 steps with right railing with CONTACT GUARD ASSIST with no device. (5.)Ms. Fili Collins will increase left knee ROM to 5°-80°.  ________________________________________________________________________________________________      PHYSICAL THERAPY JOINT CAMP TKA: Daily Note, Treatment Day: 1st and PM 4/22/2017  INPATIENT: Hospital Day: 2  Payor: [address] [place] [address] / Plan: [address] / Product Type: PPO /      NAME/AGE/GENDER: René Martinez is a 64 y.o. female    PRIMARY DIAGNOSIS:  Primary osteoarthritis of left knee [M17.12]              Procedure(s) and Anesthesia Type:     * LEFT KNEE ARTHROPLASTY TOTAL - Spinal (Left)  ICD-10: Treatment Diagnosis:        · Pain in Left Knee (M25.562)  · Stiffness of Left Knee, Not elsewhere classified (M25.662)  · Difficulty in walking, Not elsewhere classified (R26.2)       ASSESSMENT:      Ms. Fili Collins showed increased gait distance & improved level of assist for transfers & gait in am session. In pm session pt continued to show steady gains with functional mobility & improved tolerance to exercises. This section established at most recent assessment   PROBLEM LIST (Impairments causing functional limitations):  1. Decreased Strength  2. Decreased ADL/Functional Activities  3. Decreased Transfer Abilities  4. Decreased Ambulation Ability/Technique  5. Decreased Balance  6. Increased Pain  7. Decreased Activity Tolerance  8. Decreased Flexibility/Joint Mobility  9.  Decreased [place] with Home Exercise Program    INTERVENTIONS PLANNED: (Benefits and precautions of physical therapy have been discussed with the patient.)  1. Bed Mobility  2. Gait Training  3. Home Exercise Program (HEP)  4. Therapeutic Exercise/Strengthening  5. Transfer Training  6. Range of Motion: active/assisted/passive  7. Therapeutic Activities  8. Group Therapy      TREATMENT PLAN: Frequency/Duration: Follow patient BID   to address above goals. Rehabilitation Potential For Stated Goals: GOOD      RECOMMENDED REHABILITATION/EQUIPMENT: (at time of discharge pending progress): Continue Skilled Therapy and Home Health: Physical Therapy. HISTORY:   History of Present Injury/Illness (Reason for Referral): The patient has end stage arthritis of the left knee. The patient was evaluated and examined during a consultation prior to this office visit. There have been no changes to the patient's orthopedic condition since the initial consultation. The patient has failed previous conservative treatment for this condition including antiinflammatories , and lifestyle modifications. The necessity for joint replacement is present. The patient will be admitted the day of surgery for Procedure(s) (LRB):  LEFT KNEE ARTHROPLASTY TOTAL/ NIRMALA (Left)      Past Medical History/Comorbidities:   Ms. 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 She also has no past medical history of Adverse effect of anesthesia; Aneurysm ([facility] [address] Arrhythmia; Asthma; Autoimmune disease ([facility] [address] CAD (coronary artery disease); Cancer ([address] Chronic obstructive pulmonary disease ([address] Coagulation disorder ([address] Diabetes ([facility] [address] [facility] Difficult intubation; Endocarditis; Heart failure ([facility] [address] Ill-defined condition; Liver disease; Malignant hyperthermia due to anesthesia; Pseudocholinesterase deficiency; Psychiatric disorder; PUD (peptic ulcer disease); Rheumatic fever; Seizures ([facility] [address] Stroke [facility]); or Thromboembolus ([facility] [address] Ms. Spencer Downey  has a past surgical history that includes breast augmentation (1990); knee arthroscopy (Left, 10/2012); sancho and bso (2009); tonsil and adenoidectomy (1960); bunionectomy (Bilateral, 1980); orthopaedic (Bilateral, 2010); and hip replacement (Right, 11/2016). Social History/Living Environment:   Home Environment: Private residence  # Steps to Enter: 2  Rails to Enter: Yes  Hand Rails : Right  One/Two Story Residence: Two story, live on 1st floor  Living Alone: No  Support Systems: Spouse/Significant Other/Partner  Patient Expects to be Discharged to[de-identified] Private residence  Current DME Used/Available at Home: Walker, rolling, Commode, bedside  Prior Level of Function/Work/Activity:  Pt was independent without an assistive device prior to this admission   Number of Personal Factors/Comorbidities that affect the Plan of Care: 3+: HIGH COMPLEXITY   EXAMINATION:   Most Recent Physical Functioning:                   LLE PROM  L Knee Flexion: 60 (~post op)  L Knee Extension: -10 (~post op)           Bed Mobility  Supine to Sit:  (NT)  Sit to Supine: Minimum assistance  Scooting: Stand-by asssistance     Transfers  Sit to Stand: Stand-by asssistance  Stand to Sit: Stand-by asssistance  Bed to Chair: Stand-by asssistance (with walker)     Balance  Sitting: Intact; Without support  Standing: Impaired; With support (walker)                Weight Bearing Status  Left Side Weight Bearing: As tolerated  Distance (ft): 140 Feet (ft)  Ambulation - Level of Assistance: Stand-by asssistance  Assistive Device: Walker, rolling  Speed/Raegan: Delayed  Stance: Left decreased  Gait Abnormalities: Antalgic;Decreased step clearance         Braces/Orthotics: none     Left Knee Cold  Type: Cryocuff       Body Structures Involved:  1. Bones  2. Joints Body Functions Affected:  1. Sensory/Pain  2. Movement Related Activities and Participation Affected:  1. Mobility   Number of elements that affect the Plan of Care: 4+: HIGH COMPLEXITY   CLINICAL PRESENTATION:   Presentation: Stable and uncomplicated: LOW COMPLEXITY   CLINICAL DECISION MAKIN[address] [place] [id number] AM-PAC 6 Clicks   Basic Mobility Inpatient Short Form  How much difficulty does the patient currently have. .. Unable A Lot A Little None   1. Turning over in bed (including adjusting bedclothes, sheets and blankets)? [ ] 1   [ ] 2   [X] 3   [ ] 4   2. Sitting down on and standing up from a chair with arms ( e.g., wheelchair, bedside commode, etc.)   [ ] 1   [ ] 2   [X] 3   [ ] 4   3. Moving from lying on back to sitting on the side of the bed? [ ] 1   [ ] 2   [X] 3   [ ] 4   How much help from another person does the patient currently need. .. Total A Lot A Little None   4. Moving to and from a bed to a chair (including a wheelchair)? [ ] 1   [ ] 2   [X] 3   [ ] 4   5. Need to walk in hospital room? [ ] 1   [ ] 2   [X] 3   [ ] 4   6. Climbing 3-5 steps with a railing? [X] 1   [ ] 2   [ ] 3   [ ] 4   © , Trustees of [address] [place] [id number], under license to [employer] | [employer]. All rights reserved       Score:  Initial: 16 Most Recent: X (Date: -- )     Interpretation of Tool:  Represents activities that are increasingly more difficult (i.e. Bed mobility, Transfers, Gait).        Score 24 23 22-20 19-15 14-10 9-7 6       Modifier CH CI CJ CK CL CM CN         · Mobility - Walking and Moving Around:               - CURRENT STATUS:    CK - 40%-59% impaired, limited or restricted               - GOAL STATUS:           CJ - 20%-39% impaired, limited or restricted               - D/C STATUS:                       ---------------To be determined---------------  Payor: [employer] / Plan: [address] / Product Type: PPO /       Medical Necessity:     · Patient is expected to demonstrate progress in strength, range of motion, balance, coordination and functional technique to decrease assistance required with bed mobility, transfers & gait. Reason for Services/Other Comments:  · Patient continues to require skilled intervention due to pt not independent with functional mobility. Use of outcome tool(s) and clinical judgement create a POC that gives a: Clear prediction of patient's progress: LOW COMPLEXITY                 TREATMENT:   (In addition to Assessment/Re-Assessment sessions the following treatments were rendered)      Pre-treatment Symptoms/Complaints:  none  Pain: Initial: visual scale  Pain Intensity 1: 3  Pain Location 1: Knee  Pain Orientation 1: Left  Pain Intervention(s) 1: Cold pack, Exercise  Post Session:  3/10      Therapeutic Exercise: (13 Minutes):  Exercises per grid below to improve mobility and dynamic movement of leg - left to improve functional endurance. Required minimal verbal cues to promote proper body alignment and promote proper body mechanics. Progressed range and repetitions as indicated. Gait Training (12 Minutes):  Gait training to improve and/or restore physical functioning as related to mobility, strength, balance, coordination and dynamic movement of leg - left to improve functional gait. Ambulated 140 Feet (ft) with Stand-by asssistance using a Walker, rolling and minimal cues related to their stride length and heel strike to promote proper body alignment, promote proper body posture and promote proper body mechanics.              Date:  4/22  Date:    Date: ACTIVITY/EXERCISE AM PM AM PM AM PM   GROUP THERAPY  [ ]  [ ]  [ ]  [ ]  [ ]  [ ]   Ankle Pumps 15  15            Quad Sets  15  15           Gluteal Sets  15  15           Hip ABd/ADduction 15   15           Straight Leg Raises  15  15           Knee Slides  15  15           Short Arc Quads  13  15           Long Arc Quads               Chair Slides                               B = bilateral; AA = active assistive; A = active; P = passive       Treatment/Session Assessment:         Response to Treatment:  No concerns     Education:  [x ] Home Exercises  [X] Fall Precautions  [ ] Hip Precautions [ ] Going Home Video  [ ] Knee/Hip Prosthesis Review  [X] Walker Management/Safety [ ] Adaptive Equipment as Needed         Interdisciplinary Collaboration:   · Registered Nurse     After treatment position/precautions:   · Supine in bed, Bed/Chair-wheels locked, Bed in low position, Caregiver at bedside, Call light within reach, RN notified and Family at bedside     Compliance with Program/Exercises: Will assess as treatment progresses. Recommendations/Intent for next treatment session:  Treatment next visit will focus on increasing Ms. Salinas's independence with bed mobility, transfers, gait training, strength/ROM exercises, modalities for pain, and patient education.        Total Treatment Duration:  PT Patient Time In/Time Out  Time In: 1435  Time Out: Ruth 37, PT

## 2017-04-23 VITALS
TEMPERATURE: 97.5 F | WEIGHT: 262.35 LBS | DIASTOLIC BLOOD PRESSURE: 92 MMHG | SYSTOLIC BLOOD PRESSURE: 138 MMHG | OXYGEN SATURATION: 97 % | HEART RATE: 65 BPM | HEIGHT: 63 IN | RESPIRATION RATE: 18 BRPM | BODY MASS INDEX: 46.48 KG/M2

## 2017-04-23 LAB — HGB BLD-MCNC: 10 G/DL (ref 11.7–15.4)

## 2017-04-23 PROCEDURE — 74011250637 HC RX REV CODE- 250/637: Performed by: ORTHOPAEDIC SURGERY

## 2017-04-23 PROCEDURE — 36415 COLL VENOUS BLD VENIPUNCTURE: CPT | Performed by: PHYSICIAN ASSISTANT

## 2017-04-23 PROCEDURE — 85018 HEMOGLOBIN: CPT | Performed by: PHYSICIAN ASSISTANT

## 2017-04-23 PROCEDURE — 97116 GAIT TRAINING THERAPY: CPT

## 2017-04-23 PROCEDURE — 97150 GROUP THERAPEUTIC PROCEDURES: CPT

## 2017-04-23 PROCEDURE — 74011250637 HC RX REV CODE- 250/637: Performed by: PHYSICIAN ASSISTANT

## 2017-04-23 PROCEDURE — 77030012935 HC DRSG AQUACEL BMS -B

## 2017-04-23 RX ADMIN — LEVOTHYROXINE SODIUM 125 MCG: 125 TABLET ORAL at 05:32

## 2017-04-23 RX ADMIN — HYDROMORPHONE HYDROCHLORIDE 2 MG: 2 TABLET ORAL at 09:41

## 2017-04-23 RX ADMIN — HYDROMORPHONE HYDROCHLORIDE 2 MG: 2 TABLET ORAL at 05:32

## 2017-04-23 RX ADMIN — HYDROCHLOROTHIAZIDE: 25 TABLET ORAL at 09:41

## 2017-04-23 RX ADMIN — SENNOSIDES AND DOCUSATE SODIUM 2 TABLET: 8.6; 5 TABLET ORAL at 09:41

## 2017-04-23 RX ADMIN — Medication 10 ML: at 05:32

## 2017-04-23 RX ADMIN — POTASSIUM CHLORIDE 10 MEQ: 10 TABLET, EXTENDED RELEASE ORAL at 09:41

## 2017-04-23 RX ADMIN — ASPIRIN 325 MG: 325 TABLET, DELAYED RELEASE ORAL at 09:41

## 2017-04-23 RX ADMIN — HYDROMORPHONE HYDROCHLORIDE 2 MG: 2 TABLET ORAL at 01:35

## 2017-04-23 RX ADMIN — PANTOPRAZOLE SODIUM 40 MG: 40 TABLET, DELAYED RELEASE ORAL at 05:32

## 2017-04-23 RX ADMIN — ACETAMINOPHEN 1000 MG: 500 TABLET, FILM COATED ORAL at 05:32

## 2017-04-23 NOTE — PROGRESS NOTES
Subjective:     Post-Operative Day: 2 Status Post left Total Knee Arthroplasty  Systemic or Specific Complaints:No Complaints    Objective:     Patient Vitals for the past 24 hrs:   BP Temp Pulse Resp SpO2   04/23/17 0812 (!) 138/92 97.5 °F (36.4 °C) 65 18 97 %   04/23/17 0500 150/89 96.7 °F (35.9 °C) 74 18 97 %   04/23/17 0100 113/66 96.8 °F (36 °C) 68 18 95 %   04/22/17 2143 129/69 97.9 °F (36.6 °C) 70 18 98 %   04/22/17 2005 - - - - 95 %   04/22/17 1215 110/61 97.2 °F (36.2 °C) 72 18 94 %       General: alert, cooperative, no distress, appears stated age   Wound: Wound clean and dry no evidence of infection. Motion: Extension: Full Extension   DVT Exam: No evidence of DVT seen on physical exam.     Data Review:    Recent Results (from the past 24 hour(s))   HEMOGLOBIN    Collection Time: 04/23/17  5:19 AM   Result Value Ref Range    HGB 10.0 (L) 11.7 - 15.4 g/dL         Assessment:     Status Post left Total Knee Arthroplasty. Doing well postoperatively.      Plan:     Discharge today, Return to Clinic: as scheduled

## 2017-04-23 NOTE — PROGRESS NOTES
Problem: Mobility Impaired (Adult and Pediatric)  Goal: *Acute Goals and Plan of Care (Insert Text)  GOALS (1-4 days):  (1.)Ms. Dulce Brothers will move from supine to sit and sit to supine in bed with SUPERVISION. (2.)Ms. Dulce Brothers will transfer from bed to chair and chair to bed with STAND BY ASSIST using the least restrictive device. Met 4/22  (3.)Ms. Dulce Brothers will ambulate with STAND BY ASSIST for 200 feet with the least restrictive device. (4.)Ms. Dulce Brothers will ambulate up/down 2 steps with right railing with CONTACT GUARD ASSIST with no device. Met 4/23  (5.)Ms. Dulce Brothers will increase left knee ROM to 5°-80°. Met 4/23  ________________________________________________________________________________________________      PHYSICAL THERAPY JOINT CAMP TKA: Daily Note, Treatment Day: 2nd and AM 4/23/2017  INPATIENT: Hospital Day: 3  Payor: [id number] [facility] / Plan: [address] / Product Type: PPO /      NAME/AGE/GENDER: Jade Knox is a 64 y.o. female    PRIMARY DIAGNOSIS:  Primary osteoarthritis of left knee [M17.12]              Procedure(s) and Anesthesia Type:     * LEFT KNEE ARTHROPLASTY TOTAL - Spinal (Left)  ICD-10: Treatment Diagnosis:        · Pain in Left Knee (M25.562)  · Stiffness of Left Knee, Not elsewhere classified (M25.662)  · Difficulty in walking, Not elsewhere classified (R26.2)       ASSESSMENT:      Ms. Dulce Brothers continues to show steady gains with functional mobility & tolerance to exercises. This section established at most recent assessment   PROBLEM LIST (Impairments causing functional limitations):  1. Decreased Strength  2. Decreased ADL/Functional Activities  3. Decreased Transfer Abilities  4. Decreased Ambulation Ability/Technique  5. Decreased Balance  6. Increased Pain  7. Decreased Activity Tolerance  8. Decreased Flexibility/Joint Mobility  9.  Decreased [place] with Home Exercise Program    INTERVENTIONS PLANNED: (Benefits and precautions of physical therapy have been discussed with the patient.)  1. Bed Mobility  2. Gait Training  3. Home Exercise Program (HEP)  4. Therapeutic Exercise/Strengthening  5. Transfer Training  6. Range of Motion: active/assisted/passive  7. Therapeutic Activities  8. Group Therapy      TREATMENT PLAN: Frequency/Duration: Follow patient BID   to address above goals. Rehabilitation Potential For Stated Goals: GOOD      RECOMMENDED REHABILITATION/EQUIPMENT: (at time of discharge pending progress): Continue Skilled Therapy and Home Health: Physical Therapy. HISTORY:   History of Present Injury/Illness (Reason for Referral): The patient has end stage arthritis of the left knee. The patient was evaluated and examined during a consultation prior to this office visit. There have been no changes to the patient's orthopedic condition since the initial consultation. The patient has failed previous conservative treatment for this condition including antiinflammatories , and lifestyle modifications. The necessity for joint replacement is present. The patient will be admitted the day of surgery for Procedure(s) (LRB):  LEFT KNEE ARTHROPLASTY TOTAL/ NIRMALA (Left)      Past Medical History/Comorbidities:   Ms. 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 She also has no past medical history of Adverse effect of anesthesia;  Aneurysm ([address] Arrhythmia; Asthma; Autoimmune disease ([facility] CAD (coronary artery disease); Cancer ([address] [facility] [address] Chronic obstructive pulmonary disease ([address] [facility] [address] [facility] Coagulation disorder ([address] [facility] [address] Diabetes ([address] [facility] [address] Difficult intubation; Endocarditis; Heart failure ([facility] [address] [facility] Ill-defined condition; Liver disease; Malignant hyperthermia due to anesthesia; Pseudocholinesterase deficiency; Psychiatric disorder; PUD (peptic ulcer disease); Rheumatic fever; Seizures ([address] [facility] [address] Stroke [facility]); or Thromboembolus ([address] [address] Ms. Jasmyn Marie  has a past surgical history that includes breast augmentation (1990); knee arthroscopy (Left, 10/2012); sancho and bso (2009); tonsil and adenoidectomy (1960); bunionectomy (Bilateral, 1980); orthopaedic (Bilateral, 2010); and hip replacement (Right, 11/2016). Social History/Living Environment:   Home Environment: Private residence  # Steps to Enter: 2  Rails to Enter: Yes  Hand Rails : Right  One/Two Story Residence: Two story, live on 1st floor  Living Alone: No  Support Systems: Spouse/Significant Other/Partner  Patient Expects to be Discharged to[de-identified] Private residence  Current DME Used/Available at Home: Walker, rolling, Commode, bedside  Prior Level of Function/Work/Activity:  Pt was independent without an assistive device prior to this admission   Number of Personal Factors/Comorbidities that affect the Plan of Care: 3+: HIGH COMPLEXITY   EXAMINATION:   Most Recent Physical Functioning:                   LLE PROM  L Knee Flexion: 80  L Knee Extension: 0           Bed Mobility  Supine to Sit:  (NT)  Sit to Supine:  (NT)  Scooting: Supervision     Transfers  Sit to Stand: Supervision  Stand to Sit: Supervision  Bed to Chair: Supervision (with walker)     Balance  Sitting: Intact; Without support  Standing: Intact; With support (walker)                Weight Bearing Status  Left Side Weight Bearing: As tolerated  Distance (ft): 92 Feet (ft) (x 2)  Ambulation - Level of Assistance: Supervision  Assistive Device: Walker, rolling  Speed/Raegan: Delayed  Stance: Left decreased  Gait Abnormalities: Antalgic;Decreased step clearance  Number of Stairs Trained: 3  Stairs - Level of Assistance: Contact guard assistance  Rail Use: Right       Braces/Orthotics: none     Left Knee Cold  Type: Cryocuff       Body Structures Involved:  1. Bones  2. Joints Body Functions Affected:  1. Sensory/Pain  2. Movement Related Activities and Participation Affected:  1. Mobility   Number of elements that affect the Plan of Care: 4+: HIGH COMPLEXITY   CLINICAL PRESENTATION:   Presentation: Stable and uncomplicated: LOW COMPLEXITY   CLINICAL DECISION MAKIN[address] [place] [address] AM-PAC 6 Clicks   Basic Mobility Inpatient Short Form  How much difficulty does the patient currently have. .. Unable A Lot A Little None   1. Turning over in bed (including adjusting bedclothes, sheets and blankets)? [ ] 1   [ ] 2   [X] 3   [ ] 4   2. Sitting down on and standing up from a chair with arms ( e.g., wheelchair, bedside commode, etc.)   [ ] 1   [ ] 2   [X] 3   [ ] 4   3. Moving from lying on back to sitting on the side of the bed? [ ] 1   [ ] 2   [X] 3   [ ] 4   How much help from another person does the patient currently need. .. Total A Lot A Little None   4. Moving to and from a bed to a chair (including a wheelchair)? [ ] 1   [ ] 2   [X] 3   [ ] 4   5. Need to walk in hospital room? [ ] 1   [ ] 2   [X] 3   [ ] 4   6. Climbing 3-5 steps with a railing? [X] 1   [ ] 2   [ ] 3   [ ] 4   © , Trustees of [address] [place] [address], under license to [employer]. All rights reserved       Score:  Initial: 16 Most Recent: X (Date: -- )     Interpretation of Tool:  Represents activities that are increasingly more difficult (i.e. Bed mobility, Transfers, Gait).        Score 24 23 22-20 19-15 14-10 9-7 6       Modifier CH CI CJ CK CL CM CN         · Mobility - Walking and Moving Around:               - CURRENT STATUS:    CK - 40%-59% impaired, limited or restricted               - GOAL STATUS:           CJ - 20%-39% impaired, limited or restricted               - D/C STATUS:                       ---------------To be determined---------------  Payor: [employer] / Plan: [address] / Product Type: PPO /       Medical Necessity:     · Patient is expected to demonstrate progress in strength, range of motion, balance, coordination and functional technique to decrease assistance required with bed mobility, transfers & gait. Reason for Services/Other Comments:  · Patient continues to require skilled intervention due to pt not independent with functional mobility. Use of outcome tool(s) and clinical judgement create a POC that gives a: Clear prediction of patient's progress: LOW COMPLEXITY                 TREATMENT:   (In addition to Assessment/Re-Assessment sessions the following treatments were rendered)      Pre-treatment Symptoms/Complaints:  Pt happy with her progress  Pain: Initial: visual scale  Pain Intensity 1: 2  Pain Location 1: Knee  Pain Orientation 1: Left  Pain Intervention(s) 1: Cold pack, Exercise  Post Session:  2/10      Therapeutic Exercise: (45 Minutes (group)):  Exercises per grid below to improve mobility and dynamic movement of leg - left to improve functional endurance. Required minimal verbal cues to promote proper body alignment and promote proper body mechanics. Progressed range and repetitions as indicated. Gait Training (15 Minutes (extra time to work through activity noted)):  Gait training to improve and/or restore physical functioning as related to mobility, strength, balance, coordination and dynamic movement of leg - left to improve functional gait. Ambulated 92 Feet (ft) (x 2) with Supervision using a Walker, rolling and minimal cues related to their stride length and heel strike to promote proper body alignment, promote proper body posture and promote proper body mechanics.              Date:  4/22  Date:  4/23  Date: ACTIVITY/EXERCISE AM PM AM PM AM PM   GROUP THERAPY  [ ]  [ ]  [x ]  [ ]  [ ]  [ ]   Ankle Pumps 15  15   20         Quad Sets  15  15  20         Gluteal Sets  15  15  20         Hip ABd/ADduction 15   15  20         Straight Leg Raises  15  15  20aa         Knee Slides  15  15  20         Short Arc Quads  15  15  20         Long Arc Quads               Chair Slides      20                         B = bilateral; AA = active assistive; A = active; P = passive       Treatment/Session Assessment:         Response to Treatment:  Tolerated well     Education:  [x ] Home Exercises  [X] Fall Precautions  [ ] Hip Precautions [ ] Going Home Video  [ x] Knee/Hip Prosthesis Review  [X] Walker Management/Safety [ ] Adaptive Equipment as Needed         Interdisciplinary Collaboration:   · Registered Nurse     After treatment position/precautions:   · Up in chair, Bed/Chair-wheels locked, Caregiver at bedside, Call light within reach, RN notified and Family at bedside     Compliance with Program/Exercises: Will assess as treatment progresses. Recommendations/Intent for next treatment session:  Treatment next visit will focus on increasing Ms. Salinas's independence with bed mobility, transfers, gait training, strength/ROM exercises, modalities for pain, and patient education.        Total Treatment Duration:  PT Patient Time In/Time Out  Time In: 1030  Time Out: 1130     Myles Ruiz, PT

## 2017-04-23 NOTE — DISCHARGE INSTRUCTIONS
[facility]   Patient Discharge Instructions    Francine Conn / [id number] : 1955    Admitted 2017 Discharged: 2017     IF YOU HAVE ANY PROBLEMS ONCE YOU ARE AT HOME CALL THE FOLLOWING NUMBERS:   Main office number: [phone]      Medications    · The medications you are to continue on are listed on the medication reconciliation sheet. · Narcotic pain medications as well as supplemental iron can cause constipation. If this occurs try stopping the narcotic pain medication and/or the iron. · It is important that you take the medication exactly as they are prescribed. · Medications which increase your risk of blood clots are listed to stop for 5 weeks after surgery as well as medications or supplements which increase your risk of bleeding complications. · Keep your medication in the bottles provided by the pharmacist and keep a list of the medication names, dosages, and times to be taken in your wallet. · Do not take other medications without consulting your doctor. Important Information    Do NOT smoke as this will greatly increase your risk of infection! Resume your prehospital diet. If you have excessive nausea or vomitting call your doctor's office     Leg swelling and warmth is normal for 6 months after surgery. If you experience swelling in your leg elevate you leg while laying down with your toes above your heart. If you have sudden onset severe swelling with leg pain call our office. Use Han Hose stockings until we see you in the office for your follow up appointment. The stitches deep inside take approximately 6 months to dissolve. There will be sharp shooting, stinging and burning pain. This is normal and will resolve between 3-6 months after surgery. Difficulty sleeping is normal following total Knee and Hip replacement. You may try melatonin, an over-the-counter sleep aid or benadryl to help with sleep.  Most patients will resume sleeping through the night 8 weeks after surgery. Home Physical Therapy is arranged. Home Health will contact you within 48 hrs of discharge that you have chosen. If you have not received a call within this time frame please contact that provider you chose. You should be given this information before you leave the hospital.     You are at a risk for falls. Use the rolling walker when walking. Patients who have had a joint replacement should not drive if they are still taking narcotic pain mediation during the daytime hours. Most patients wean themselves off of pain medication within 2-5 weeks after surgery. When to Call the office    - If you have a temperature greater then 101  - Uncontrolled vomiting   - Loose control of your bladder or bowel function  - Are unable to bear any wieght   - Need a pain medication refill     Information obtained by :  I understand that if any problems occur once I am at home I am to contact my physician. I understand and acknowledge receipt of the instructions indicated above. Physician's or R.N.'s Signature                                                                  Date/Time                                                                                                                                              Patient or Representative Signature                                                          Date/Time      DISCHARGE SUMMARY from Nurse    The following personal items are in your possession at time of discharge:    Dental Appliances: None  Visual Aid: None     Home Medications: Kept at bedside  Jewelry: None  Clothing:  Footwear, Shirt, Pants  Other Valuables: None             PATIENT INSTRUCTIONS:    After general anesthesia or intravenous sedation, for 24 hours or while taking prescription Narcotics:  · Limit your activities  · Do not drive and operate hazardous machinery  · Do not make important personal or business decisions  · Do  not drink alcoholic beverages  · If you have not urinated within 8 hours after discharge, please contact your surgeon on call. Report the following to your surgeon:  · Excessive pain, swelling, redness or odor of or around the surgical area  · Temperature over 100.5  · Nausea and vomiting lasting longer than 4 hours or if unable to take medications  · Any signs of decreased circulation or nerve impairment to extremity: change in color, persistent  numbness, tingling, coldness or increase pain  · Any questions      These are general instructions for a healthy lifestyle:    No smoking/ No tobacco products/ Avoid exposure to second hand smoke    Surgeon General's Warning:  Quitting smoking now greatly reduces serious risk to your health. Obesity, smoking, and sedentary lifestyle greatly increases your risk for illness    A healthy diet, regular physical exercise & weight monitoring are important for maintaining a healthy lifestyle    You may be retaining fluid if you have a history of heart failure or if you experience any of the following symptoms:  Weight gain of 3 pounds or more overnight or 5 pounds in a week, increased swelling in our hands or feet or shortness of breath while lying flat in bed. Please call your doctor as soon as you notice any of these symptoms; do not wait until your next office visit. Recognize signs and symptoms of STROKE:    F-face looks uneven    A-arms unable to move or move unevenly    S-speech slurred or non-existent    T-time-call 911 as soon as signs and symptoms begin-DO NOT go       Back to bed or wait to see if you get better-TIME IS BRAIN. Warning Signs of HEART ATTACK     Call 911 if you have these symptoms:   Chest discomfort.  Most heart attacks involve discomfort in the center of the chest that lasts more than a few minutes, or that goes away and comes back. It can feel like uncomfortable pressure, squeezing, fullness, or pain.  Discomfort in other areas of the upper body. Symptoms can include pain or discomfort in one or both arms, the back, neck, jaw, or stomach.  Shortness of breath with or without chest discomfort.  Other signs may include breaking out in a cold sweat, nausea, or lightheadedness. Don't wait more than five minutes to call 911 - MINUTES MATTER! Fast action can save your life. Calling 911 is almost always the fastest way to get lifesaving treatment. Emergency Medical Services staff can begin treatment when they arrive -- up to an hour sooner than if someone gets to the hospital by car. The discharge information has been reviewed with the patient. The patient verbalized understanding. Discharge medications reviewed with the patient and appropriate educational materials and side effects teaching were provided. Total Knee Replacement: What to Expect at [address] [place] [address]    When you leave the hospital, you should be able to move around with a walker or crutches. But you will need someone to help you at home for the next few weeks or until you have more energy and can move around better. If there is no one to help you at home, you may go to a rehabilitation center. You will go home with a bandage and stitches or staples. Change the bandage as your doctor tells you to. Your doctor will remove your stitches or staples 10 to 21 days after your surgery. You may still have some mild pain, and the area may be swollen for 3 to 6 months after surgery. Your knee will continue to improve for 6 to 12 months. You will probably use a walker for 1 to 3 weeks and then use crutches. When you are ready, you can use a cane. You will probably be able to walk on your own in 4 to 8 weeks. You will need to do months of physical rehabilitation (rehab) after a knee replacement.  Rehab will help you strengthen the muscles of the knee and help you regain movement. After you recover, your artificial knee will allow you to do normal daily activities with less pain or no pain at all. You may be able to hike, dance, ride a bike, and play golf. Talk to your doctor about whether you can do more strenuous activities. Always tell your caregivers that you have an artificial knee. How long it will take to walk on your own, return to normal activities, and go back to work depends on your health and how well your rehabilitation (rehab) program goes. The better you do with your rehab exercises, the quicker you will get your strength and movement back. This care sheet gives you a general idea about how long it will take for you to recover. But each person recovers at a different pace. Follow the steps below to get better as quickly as possible. How can you care for yourself at home? Activity  · Rest when you feel tired. You may take a nap, but do not stay in bed all day. When you sit, use a chair with arms. You can use the arms to help you stand up. · Work with your physical therapist to find the best way to exercise. You may be able to take frequent, short walks using crutches or a walker. What you can do as your knee heals will depend on whether your new knee is cemented or uncemented. You may not be able to do certain things for a while if your new knee is uncemented. · After your knee has healed enough, you can do more strenuous activities with caution. ¨ You can golf, but use a golf cart, and do not wear shoes with spikes. ¨ You can bike on a flat road or on a stationary bike. Avoid biking up hills. ¨ Your doctor may suggest that you stay away from activities that put stress on your knee. These include tennis or badminton, squash or racquetball, contact sports like football, jumping (such as in basketball), jogging, or running. ¨ Avoid activities where you might fall. These include horseback riding, skiing, and mountain biking.   · Do not sit for more than 1 hour at a time. Get up and walk around for a while before you sit again. If you must sit for a long time, prop up your leg with a chair or footstool. This will help you avoid swelling. · Ask your doctor when you can shower. You may need to take sponge baths until your stitches or staples have been removed. · Ask your doctor when you can drive again. It may take up to 8 weeks after knee replacement surgery before it is safe for you to drive. · When you get into a car, sit on the edge of the seat. Then pull in your legs, and turn to face the front. · You should be able to do many everyday activities 3 to 6 weeks after your surgery. You will probably need to take 4 to 16 weeks off from work. When you can go back to work depends on the type of work you do and how you feel. · Ask your doctor when it is okay for you to have sex. · Do not lift anything heavier than 10 pounds and do not lift weights for 12 weeks. Diet  · By the time you leave the hospital, you should be eating your normal diet. If your stomach is upset, try bland, low-fat foods like plain rice, broiled chicken, toast, and yogurt. Your doctor may suggest that you take iron and vitamin supplements. · Drink plenty of fluids (unless your doctor tells you not to). · Eat healthy foods, and watch your portion sizes. Try to stay at your ideal weight. Too much weight puts more stress on your new knee. · You may notice that your bowel movements are not regular right after your surgery. This is common. Try to avoid constipation and straining with bowel movements. You may want to take a fiber supplement every day. If you have not had a bowel movement after a couple of days, ask your doctor about taking a mild laxative. Medicines  · Your doctor will tell you if and when you can restart your medicines. He or she will also give you instructions about taking any new medicines.   · If you take blood thinners, such as warfarin (Coumadin), clopidogrel (Plavix), or aspirin, be sure to talk to your doctor. He or she will tell you if and when to start taking those medicines again. Make sure that you understand exactly what your doctor wants you to do. · Your doctor may give you a blood-thinning medicine to prevent blood clots. If you take a blood thinner, be sure you get instructions about how to take your medicine safely. Blood thinners can cause serious bleeding problems. This medicine could be in pill form or as a shot (injection). If a shot is necessary, your doctor will tell you how to do this. · Be safe with medicines. Take pain medicines exactly as directed. ¨ If the doctor gave you a prescription medicine for pain, take it as prescribed. ¨ If you are not taking a prescription pain medicine, ask your doctor if you can take an over-the-counter medicine. ¨ Plan to take your pain medicine 30 minutes before exercises. It is easier to prevent pain before it starts than to stop it once it has started. · If you think your pain medicine is making you sick to your stomach:  ¨ Take your medicine after meals (unless your doctor has told you not to). ¨ Ask your doctor for a different pain medicine. · If your doctor prescribed antibiotics, take them as directed. Do not stop taking them just because you feel better. You need to take the full course of antibiotics. Incision care  · You will have a bandage over the cut (incision) and staples or stitches. Take the bandage off when your doctor says it is okay. · Your doctor will remove the staples or stitches 10 days to 3 weeks after the surgery and replace them with strips of tape. Leave the tape on for a week or until it falls off. Exercise  · Your rehab program will give you a number of exercises to do to help you get back your knee's range of motion and strength. Always do them as your therapist tells you.   Ice and elevation  · For pain and swelling, put ice or a cold pack on the area for 10 to 20 minutes at a time. Put a thin cloth between the ice and your skin. Other instructions  · Continue to wear your support stockings as your doctor says. These help to prevent blood clots. The length of time that you will have to wear them depends on your activity level and the amount of swelling. · Wear medical alert jewelry that says you may need antibiotics before any procedure, including dental work. You can buy this at most drugstores. · You have metal pieces in your knee. These may set off some airport metal detectors. Carry a medical alert card that says you have an artificial joint, just in case. Follow-up care is a key part of your treatment and safety. Be sure to make and go to all appointments, and call your doctor if you are having problems. It's also a good idea to know your test results and keep a list of the medicines you take. When should you call for help? Call 911 anytime you think you may need emergency care. For example, call if:  · You passed out (lost consciousness). · You have severe trouble breathing. · You have sudden chest pain and shortness of breath, or you cough up blood. Call your doctor now or seek immediate medical care if:  · You have signs of infection, such as:  ¨ Increased pain, swelling, warmth, or redness. ¨ Red streaks leading from the incision. ¨ Pus draining from the incision. ¨ A fever. · You have signs of a blood clot, such as:  ¨ Pain in your calf, back of the knee, thigh, or groin. ¨ Redness and swelling in your leg or groin. · Your incision comes open and begins to bleed, or the bleeding increases. · You have pain that does not get better after you take pain medicine. Watch closely for changes in your health, and be sure to contact your doctor if:  · You do not have a bowel movement after taking a laxative. Where can you learn more? Go to http://victorino-justin.info/.   Enter J395 in the search box to learn more about \"Total Knee Replacement: What to Expect at Home. \"  Current as of: August 4, 2016  Content Version: 11.2  © 5951-3375 [employer]. Care instructions adapted under license by [employer] (which disclaims liability or warranty for this information). If you have questions about a medical condition or this instruction, always ask your healthcare professional. [place] [address] any warranty or liability for your use of this information.

## 2017-04-23 NOTE — PROGRESS NOTES
Patient is alert and oriented x4. Able to verbalize needs. Resting quietly with no distress noted. Aquacel dressing to left knee is dry and intact. Neurovascular and peripheral vascular checks WNL. Voiding clear yellow urine. Pain is being managed with Dilaudid, tolerating well. Bed low and locked. Call light within reach. Instructed to call for assistance. Patient verbalizes understanding. Will monitor.

## 2017-05-16 ENCOUNTER — HOSPITAL ENCOUNTER (OUTPATIENT)
Dept: PHYSICAL THERAPY | Age: 62
Discharge: HOME | End: 2017-05-16
Payer: COMMERCIAL

## 2017-05-16 PROCEDURE — 97110 THERAPEUTIC EXERCISES: CPT

## 2017-05-16 PROCEDURE — 97161 PT EVAL LOW COMPLEX 20 MIN: CPT

## 2017-05-17 NOTE — PROGRESS NOTES
Ambulatory/Rehab Services H2 Model Falls Risk Assessment    Risk Factor Pts. ·   Confusion/Disorientation/Impulsivity  []    4 ·   Symptomatic Depression  []   2 ·   Altered Elimination  []   1 ·   Dizziness/Vertigo  []   1 ·   Gender (Male)  []   1 ·   Any administered antiepileptics (anticonvulsants):  []   2 ·   Any administered benzodiazepines:  []   1 ·   Visual Impairment (specify):  []   1 ·   Portable Oxygen Use  []   1 ·   Orthostatic ? BP  []   1 ·   History of Recent Falls (within 3 mos.)  []   5     Ability to Rise from Chair (choose one) Pts. ·   Ability to rise in a single movement  []   0 ·   Pushes up, successful in one attempt  [x]   1 ·   Multiple attempts, but successful  []   3 ·   Unable to rise without assistance  []   4   Total: (5 or greater = High Risk) 1     Falls Prevention Plan:   []                Physical Limitations to Exercise (specify):   []                Mobility Assistance Device (type):   []                Exercise/Equipment Adaptation (specify):    ©2010 [facility] [place] Patent #[id number].  Federal Law prohibits the replication, distribution or use without written permission from [facility]

## 2017-05-17 NOTE — PROGRESS NOTES
[facility]   [address], [address]  [phone]   [phone]    Valentin Clifton  : 1955       OUTPATIENT PHYSICAL THERAPY:Initial Assessment 2017    ICD-10: Treatment Diagnosis:   R26.2 Difficulty in walking, not elsewhere classified     M25.662 Stiffness of left knee, not elsewhere classified     M25.562 Pain in left knee     Precautions/Allergies:   Ambien [zolpidem]; Augmentin [amoxicillin-pot clavulanate]; Codeine; Contrave [naltrexone-bupropion]; Crestor [rosuvastatin]; Macrobid [nitrofurantoin monohyd/m-cryst]; Tape [adhesive]; Topamax [topiramate]; and Zocor [simvastatin]   Fall Risk Score: 1 (? 5 = High Risk)  MD Orders: Eval and treat MEDICAL/REFERRING DIAGNOSIS:  Presence of left artificial knee joint [Z96.652]   DATE OF ONSET:   REFERRING PHYSICIAN: Sarahi Dixon, *  RETURN PHYSICIAN APPOINTMENT: 2 weeks      INITIAL ASSESSMENT:  Ms. Jasmyn Marie presents with decreased ROM of L knee, weakness of both LE's, painin R knee and fair gait post L TKA on 17. PT will progress from rolling walker to straight cane. Work to increase ROM and strength to enable return to prior activity level. PROBLEM LIST (Impacting functional limitations):  1. Decreased Strength  2. Decreased Ambulation Ability/Technique  3. Increased Pain  4. Increased Fatigue  5. Decreased Flexibility/Joint Mobility  6. Edema/Girth INTERVENTIONS PLANNED:  1. Home Exercise Program (HEP)  2. Manual Therapy  3. Range of Motion (ROM)  4. Therapeutic Activites  5. Therapeutic Exercise/Strengthening   TREATMENT PLAN:  Effective Dates: 17 TO 17. Frequency/Duration: 2 times a week for 12 weeks and upon reassessment,  will adjust frequency and duration as progress indicates. GOALS: (Goals have been discussed and agreed upon with patient.)  Short-Term Functional Goals: Time Frame: 4 weeks  1.  Establish independent HEP with no cueing to increase ROM and strength  2. Increase L knee ROM to 0-125 to increase ease of sitting and ambulation    3. Independent gait with straight cane in home and community  4. Increase strength to enable initiation of reciprocal pattern on stairs. .  Discharge Goals: Time Frame: 12 weeks   1. Improve score on LEFS by 9 points to enable prolonged sitting, standing and ambulation   2. Independent gait without assistive device in home and community  3. Increase LE strength 1/2 to 1 grade to enable reciprocal pattern on stairs. 4. Able to ambulate 20 min with minimal to no increase in pain  Rehabilitation Potential For Stated Goals: Good  Regarding Jean Caballero Brad's therapy, I certify that the treatment plan above will be carried out by a therapist or under their direction. Thank you for this referral,  Hoda Holloway, PT     Referring Physician Signature: Bety Sanford., *              Date                    The information in this section was collected on 5/16/17 (except where otherwise noted). HISTORY:   History of Present Injury/Illness (Reason for Referral):  Pt is post L TKA on 4/21/17. Pt initially injured her L knee in 2009 secondary to a fall. She had an arthroscopy for MMT. Reports that she never had complete relief of pain after surgery and that it has become progressively worse. She had Home PT for 8 visits until yesterday. She presents with decreased ROM of L knee, weakness of both LE's and fair gait. She is referred to outpatient PT for ROM and strengthening of L knee and LE as well as gait training. Past Medical History/Comorbidities:   Ms. 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 She also has no past medical history of Adverse effect of anesthesia; Aneurysm ([facility] [address] Arrhythmia; Asthma; Autoimmune disease ([facility] [address] CAD (coronary artery disease); Cancer ([address] Chronic obstructive pulmonary disease ([address] Coagulation disorder ([address] Diabetes ([address] Difficult intubation; Endocarditis; Heart failure ([facility] [address] Ill-defined condition; Liver disease; Malignant hyperthermia due to anesthesia; Pseudocholinesterase deficiency; Psychiatric disorder; PUD (peptic ulcer disease); Rheumatic fever; Seizures ([facility] [address] Stroke [facility]); or Thromboembolus ([facility] [address] Ms. Abdoul Powers  has a past surgical history that includes breast augmentation (1990); knee arthroscopy (Left, 10/2012); sancho and bso (2009); tonsil and adenoidectomy (1960); bunionectomy (Bilateral, 1980); orthopaedic (Bilateral, 2010); and hip replacement (Right, 11/2016). 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 Wants to be able to walk without pain and be able to travel  Dominant Side:         RIGHT  Current Medications:       Current Outpatient Prescriptions:     aspirin (ASPIRIN) 325 mg tablet, Take 1 Tab by mouth two (2) times a day for 35 days. , Disp: 60 Tab, Rfl: 1    HYDROmorphone (DILAUDID) 2 mg tablet, Take 1 Tab by mouth every four (4) hours as needed for Pain. Max Daily Amount: 12 mg., Disp: 40 Tab, Rfl: 0    potassium chloride (K-DUR, KLOR-CON) 10 mEq tablet, Take 2 tabs today/prn, Disp: 30 Tab, Rfl: 0    verapamil ER (VERELAN PM) 300 mg capsule, Take 1 Cap by mouth nightly.  (Patient taking differently: Take 300 mg by mouth daily.), Disp: 90 Cap, Rfl: 3    valsartan-hydroCHLOROthiazide (DIOVAN-HCT) 160-25 mg per tablet, Take 1 Tab by mouth daily. , Disp: 90 Tab, Rfl: 3    omeprazole (PRILOSEC) 20 mg capsule, Take 1 Cap by mouth daily. , Disp: 90 Cap, Rfl: 3    levothyroxine (SYNTHROID) 125 mcg tablet, Take 1 Tab by mouth Daily (before breakfast). Indications: HYPOTHYROIDISM (Patient taking differently: Take 125 mcg by mouth Daily (before breakfast). Take / use AM day of surgery  per anesthesia protocols. Indications: hypothyroidism), Disp: 90 Tab, Rfl: 4    atorvastatin (LIPITOR) 40 mg tablet, Take 1 Tab by mouth nightly. (Patient taking differently: Take 40 mg by mouth daily. Take / use AM day of surgery  per anesthesia protocols. ), Disp: 90 Tab, Rfl: 3    rOPINIRole (REQUIP) 0.5 mg tablet, Take 1 Tab by mouth nightly as needed. (Patient taking differently: Take 0.5 mg by mouth every evening. Indications: Restless Legs Syndrome), Disp: 90 Tab, Rfl: 3    Cholecalciferol, Vitamin D3, (VITAMIN D3) 2,000 unit cap capsule, Take 2,000 Units by mouth daily. EVERY OTHER DAY, Disp: , Rfl:    Date Last Reviewed:  5/16/2017     Number of Personal Factors/Comorbidities that affect the Plan of Care: 1-2: MODERATE COMPLEXITY   EXAMINATION:   Observation/Orthostatic Postural Assessment:          Pt to PT with rolling walker. Slow gait with short step length. Slight limp to L. Pt with decreased knee ext at heel strike an stance. Decreased toe off. Tight HS. In 90/90, Hs are 60. Moderate edema noted   Palpation:          Pain along medial patella and back of knee. Soreness in thigh  Functional Mobility:         Gait/Ambulation:  Independent with rolling walker        Stairs:  Unable to do reciprocal pattern.   Difficulty with single step  ROM:     L  Knee -10 to 120   R knee  0-130                                    Strength:     Knee ext  L 3+/5,  R 4/5    Knee et  L 4-/5,  R 4/5         Hip flex  L 4/5, R 4/5    Hip abd  L 3+/5,  R 4-/5    Hip ext   L 4-/5,  R 4/5              Neurological Screen: Intact to light touch  Balance:  Good with rolling walker   Body Structures Involved:  1. Bones  2. Joints  3. Muscles  4. Ligaments Body Functions Affected:  1. Sensory/Pain  2. Neuromusculoskeletal  3. Movement Related Activities and Participation Affected:  1. Learning and Applying Knowledge  2. General Tasks and Demands  3. Mobility  4. Domestic Life  5. Community, Social and [place]   Number of elements (examined above) that affect the Plan of Care: 3: MODERATE COMPLEXITY   CLINICAL PRESENTATION:   Presentation: Evolving clinical presentation with changing clinical characteristics: MODERATE COMPLEXITY   CLINICAL DECISION MAKING:   Outcome Measure: Tool Used: Lower Extremity Functional Scale (LEFS)  Score:  Initial: 32/80 Most Recent: X/80 (Date: -- )   Interpretation of Score: 20 questions each scored on a 5 point scale with 0 representing \"extreme difficulty or unable to perform\" and 4 representing \"no difficulty\". The lower the score, the greater the functional disability. 80/80 represents no disability. Minimal detectable change is 9 points. Score 80 79-63 62-48 47-32 31-16 15-1 0   Modifier CH CI CJ CK CL CM CN     ? Mobility - Walking and Moving Around:     - CURRENT STATUS: CK - 40%-59% impaired, limited or restricted    - GOAL STATUS: CK - 40%-59% impaired, limited or restricted    - D/C STATUS:  ---------------To be determined---------------    Medical Necessity:   · Patient is expected to demonstrate progress in strength, range of motion, balance and gait and decreased pain to increase independence with with home and community activities. Reason for Services/Other Comments:  · Patient continues to require skilled intervention due to decreased ROM and strength of L LE as well as increased pain and fair gait.    Use of outcome tool(s) and clinical judgement create a POC that gives a: Clear prediction of patient's progress: LOW COMPLEXITY            TREATMENT:   (In addition to Assessment/Re-Assessment sessions the following treatments were rendered)  Pre-treatment Symptoms/Complaints:  Pain and stiffness of L knee. Weakness of L LE. Fair gait with rolling walker  Pain: Initial:   Pain Intensity 1: 5  Post Session:  4     THERAPEUTIC EXERCISE: (15 minutes):  Exercises per grid below to improve mobility, strength and gait and pain. Required moderate verbal and manual cues to promote proper body alignment, promote proper body posture and promote proper body mechanics. Progressed resistance, range and repetitions as indicated. Instructed in QS (quadriceps sets), SLR (straight leg raises), standing hip abd, SAQ's (short arc quadriceps), LAQ's (long arc quadriceps), and standing HSC. Instructed in HS and HC stretch. Worked on gait with straight cane and minimal assist progressing to CGA and SBA. Told to use cane in home. Instructed in 6 in step up x 10. Evaluation (  xx   ):    Manual Therapy (      ): Patella and patella tendon mobs. Soft tissue work to [employer] and HS. PROM for knee flex and extn. Therapeutic Modalities:    HEP: As above; handouts given to patient for all exercises. Treatment/Session Assessment:  Pt is post L TKA on 4/21/17. She presents with decreased ROM of L knee and decreased LE strength. Pain is a limiting factor. To PT with rolling walker. Instructed and progressed to ambulation with straight cane . Told to use cane at home. Work to increase ROM to increase ease of sitting and ambulation. Work to increase strenght to normalize gait and to enable return to prior activity level including walking for exercise. Pain is currently a limiting factor. Very motivated and should progress well. · Response to Treatment:  Gait with straight cane and SBA. Increased knee morion. · Compliance with Program/Exercises: Will assess as treatment progresses.   · Recommendations/Intent for next treatment session: \"Next visit will focus on aggressive ROM and strengtheing of L knee and LE. \".   Total Treatment Duration:  PT Patient Time In/Time Out  Time In: 0400  Time Out: 0500  Treatment number  1  Renate Lynn, PT

## 2017-05-19 ENCOUNTER — HOSPITAL ENCOUNTER (OUTPATIENT)
Dept: PHYSICAL THERAPY | Age: 62
Discharge: HOME | End: 2017-05-19
Payer: COMMERCIAL

## 2017-05-19 PROCEDURE — 97140 MANUAL THERAPY 1/> REGIONS: CPT

## 2017-05-19 PROCEDURE — 97166 OT EVAL MOD COMPLEX 45 MIN: CPT

## 2017-05-19 PROCEDURE — 97110 THERAPEUTIC EXERCISES: CPT

## 2017-05-19 NOTE — PROGRESS NOTES
[facility]   [address], [address]  [phone]   [phone]    April Diana  : 1955       OUTPATIENT PHYSICAL THERAPY:Daily Note 2017    ICD-10: Treatment Diagnosis:   R26.2 Difficulty in walking, not elsewhere classified     M25.662 Stiffness of left knee, not elsewhere classified     M25.562 Pain in left knee     Precautions/Allergies:   Ambien [zolpidem]; Augmentin [amoxicillin-pot clavulanate]; Codeine; Contrave [naltrexone-bupropion]; Crestor [rosuvastatin]; Macrobid [nitrofurantoin monohyd/m-cryst]; Tape [adhesive]; Topamax [topiramate]; and Zocor [simvastatin]   Fall Risk Score: 1 (? 5 = High Risk)  MD Orders: Eval and treat MEDICAL/REFERRING DIAGNOSIS:  Presence of left artificial knee joint [Z96.652]   DATE OF ONSET:   REFERRING PHYSICIAN: Srinivasa Holliday., *  RETURN PHYSICIAN APPOINTMENT: 2 weeks      INITIAL ASSESSMENT:  Ms. John Almaguer presents with decreased ROM of L knee, weakness of both LE's, painin R knee and fair gait post L TKA on 17. PT will progress from rolling walker to straight cane. Work to increase ROM and strength to enable return to prior activity level. PROBLEM LIST (Impacting functional limitations):  1. Decreased Strength  2. Decreased Ambulation Ability/Technique  3. Increased Pain  4. Increased Fatigue  5. Decreased Flexibility/Joint Mobility  6. Edema/Girth INTERVENTIONS PLANNED:  1. Home Exercise Program (HEP)  2. Manual Therapy  3. Range of Motion (ROM)  4. Therapeutic Activites  5. Therapeutic Exercise/Strengthening   TREATMENT PLAN:  Effective Dates: 17 TO 17. Frequency/Duration: 2 times a week for 12 weeks and upon reassessment,  will adjust frequency and duration as progress indicates. GOALS: (Goals have been discussed and agreed upon with patient.)  Short-Term Functional Goals: Time Frame: 4 weeks  1.  Establish independent HEP with no cueing to increase ROM and strength  2. Increase L knee ROM to 0-125 to increase ease of sitting and ambulation    3. Independent gait with straight cane in home and community  4. Increase strength to enable initiation of reciprocal pattern on stairs. .  Discharge Goals: Time Frame: 12 weeks   1. Improve score on LEFS by 9 points to enable prolonged sitting, standing and ambulation   2. Independent gait without assistive device in home and community  3. Increase LE strength 1/2 to 1 grade to enable reciprocal pattern on stairs. 4. Able to ambulate 20 min with minimal to no increase in pain  Rehabilitation Potential For Stated Goals: Good  Regarding Rell Salinas's therapy, I certify that the treatment plan above will be carried out by a therapist or under their direction. Thank you for this referral,  Yevgeniy Rodrgiuez, PT     Referring Physician Signature: Carlyle Richter, *              Date                    The information in this section was collected on 5/16/17 (except where otherwise noted). HISTORY:   History of Present Injury/Illness (Reason for Referral):  Pt is post L TKA on 4/21/17. Pt initially injured her L knee in 2009 secondary to a fall. She had an arthroscopy for MMT. Reports that she never had complete relief of pain after surgery and that it has become progressively worse. She had Home PT for 8 visits until yesterday. She presents with decreased ROM of L knee, weakness of both LE's and fair gait. She is referred to outpatient PT for ROM and strengthening of L knee and LE as well as gait training. Past Medical History/Comorbidities:   Ms. 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 She also has no past medical history of Adverse effect of anesthesia; Aneurysm ([facility] Arrhythmia; Asthma; Autoimmune disease ([facility] CAD (coronary artery disease); Cancer ([address] Chronic obstructive pulmonary disease ([address] Coagulation disorder ([address] Diabetes ([facility] Difficult intubation; Endocarditis; Heart failure ([facility] Ill-defined condition; Liver disease; Malignant hyperthermia due to anesthesia; Pseudocholinesterase deficiency; Psychiatric disorder; PUD (peptic ulcer disease); Rheumatic fever; Seizures ([facility] Stroke [facility]); or Thromboembolus ([facility] Ms. Nelly Jeronimo  has a past surgical history that includes breast augmentation (1990); knee arthroscopy (Left, 10/2012); sancho and bso (2009); tonsil and adenoidectomy (1960); bunionectomy (Bilateral, 1980); orthopaedic (Bilateral, 2010); and hip replacement (Right, 11/2016). 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 Wants to be able to walk without pain and be able to travel  Dominant Side:         RIGHT  Current Medications:       Current Outpatient Prescriptions:     aspirin (ASPIRIN) 325 mg tablet, Take 1 Tab by mouth two (2) times a day for 35 days. , Disp: 60 Tab, Rfl: 1    HYDROmorphone (DILAUDID) 2 mg tablet, Take 1 Tab by mouth every four (4) hours as needed for Pain. Max Daily Amount: 12 mg., Disp: 40 Tab, Rfl: 0    potassium chloride (K-DUR, KLOR-CON) 10 mEq tablet, Take 2 tabs today/prn, Disp: 30 Tab, Rfl: 0    verapamil ER (VERELAN PM) 300 mg capsule, Take 1 Cap by mouth nightly.  (Patient taking differently: Take 300 mg by mouth daily.), Disp: 90 Cap, Rfl: 3    valsartan-hydroCHLOROthiazide (DIOVAN-HCT) 160-25 mg per tablet, Take 1 Tab by mouth daily. , Disp: 90 Tab, Rfl: 3    omeprazole (PRILOSEC) 20 mg capsule, Take 1 Cap by mouth daily. , Disp: 90 Cap, Rfl: 3    levothyroxine (SYNTHROID) 125 mcg tablet, Take 1 Tab by mouth Daily (before breakfast). Indications: HYPOTHYROIDISM (Patient taking differently: Take 125 mcg by mouth Daily (before breakfast). Take / use AM day of surgery  per anesthesia protocols. Indications: hypothyroidism), Disp: 90 Tab, Rfl: 4    atorvastatin (LIPITOR) 40 mg tablet, Take 1 Tab by mouth nightly. (Patient taking differently: Take 40 mg by mouth daily. Take / use AM day of surgery  per anesthesia protocols. ), Disp: 90 Tab, Rfl: 3    rOPINIRole (REQUIP) 0.5 mg tablet, Take 1 Tab by mouth nightly as needed. (Patient taking differently: Take 0.5 mg by mouth every evening. Indications: Restless Legs Syndrome), Disp: 90 Tab, Rfl: 3    Cholecalciferol, Vitamin D3, (VITAMIN D3) 2,000 unit cap capsule, Take 2,000 Units by mouth daily. EVERY OTHER DAY, Disp: , Rfl:    Date Last Reviewed:  5/19/2017     Number of Personal Factors/Comorbidities that affect the Plan of Care: 1-2: MODERATE COMPLEXITY   EXAMINATION:   Observation/Orthostatic Postural Assessment:          Pt to PT with rolling walker. Slow gait with short step length. Slight limp to L. Pt with decreased knee ext at heel strike an stance. Decreased toe off. Tight HS. In 90/90, Hs are 60. Moderate edema noted   Palpation:          Pain along medial patella and back of knee. Soreness in thigh  Functional Mobility:         Gait/Ambulation:  Independent with rolling walker        Stairs:  Unable to do reciprocal pattern.   Difficulty with single step  ROM:     L  Knee -10 to 120   R knee  0-130                                    Strength:     Knee ext  L 3+/5,  R 4/5    Knee et  L 4-/5,  R 4/5         Hip flex  L 4/5, R 4/5    Hip abd  L 3+/5,  R 4-/5    Hip ext   L 4-/5,  R 4/5              Neurological Screen: Intact to light touch  Balance:  Good with rolling walker   Body Structures Involved:  1. Bones  2. Joints  3. Muscles  4. Ligaments Body Functions Affected:  1. Sensory/Pain  2. Neuromusculoskeletal  3. Movement Related Activities and Participation Affected:  1. Learning and Applying Knowledge  2. General Tasks and Demands  3. Mobility  4. Domestic Life  5. Community, Social and [place]   Number of elements (examined above) that affect the Plan of Care: 3: MODERATE COMPLEXITY   CLINICAL PRESENTATION:   Presentation: Evolving clinical presentation with changing clinical characteristics: MODERATE COMPLEXITY   CLINICAL DECISION MAKING:   Outcome Measure: Tool Used: Lower Extremity Functional Scale (LEFS)  Score:  Initial: 32/80 Most Recent: X/80 (Date: -- )   Interpretation of Score: 20 questions each scored on a 5 point scale with 0 representing \"extreme difficulty or unable to perform\" and 4 representing \"no difficulty\". The lower the score, the greater the functional disability. 80/80 represents no disability. Minimal detectable change is 9 points. Score 80 79-63 62-48 47-32 31-16 15-1 0   Modifier CH CI CJ CK CL CM CN     ? Mobility - Walking and Moving Around:     - CURRENT STATUS: CK - 40%-59% impaired, limited or restricted    - GOAL STATUS: CK - 40%-59% impaired, limited or restricted    - D/C STATUS:  ---------------To be determined---------------    Medical Necessity:   · Patient is expected to demonstrate progress in strength, range of motion, balance and gait and decreased pain to increase independence with with home and community activities. Reason for Services/Other Comments:  · Patient continues to require skilled intervention due to decreased ROM and strength of L LE as well as increased pain and fair gait.    Use of outcome tool(s) and clinical judgement create a POC that gives a: Clear prediction of patient's progress: LOW COMPLEXITY            TREATMENT:   (In addition to Assessment/Re-Assessment sessions the following treatments were rendered)  Pre-treatment Symptoms/Complaints:  Pain and stiffness of L knee. Painful to move knee much. Reports low back discomfort. To PT with straight cane. Pt reports that she was sore with exercises. R hip has had more discomfort since knee surgery. Weakness of B LE's. Trying to decrease pain med   Pain: Initial:   Pain Intensity 1: 5  Post Session:  4     THERAPEUTIC EXERCISE: (30 minutes):  Exercises per grid below to improve mobility, strength and gait and pain. Required moderate verbal and manual cues to promote proper body alignment, promote proper body posture and promote proper body mechanics. Progressed resistance, range and repetitions as indicated. Reviewed  QS (quadriceps sets), SLR (straight leg raises), standing hip abd, SAQ's (short arc quadriceps),and  LAQ's (long arc quadriceps) correcting technique as needed    HS and HC stretch. Seated HS curls at 15# x 3 sets of 10. Initiated cable for hio flex and abd x 15 at 12 3. Complained of R hip pain. Worked on gait with straight cane  and SBA. Worked on heel toe gait. Olivia Escalera Performed  6 in step up x  2 sets of 1010. Manual Therapy ( 30 min  ): Patella and patella tendon mobs. Soft tissue work to [employer] and HS. PROM for knee flex and extn. Sitting knee flex to 122. Passive ext to -4. Pt with discomfort of lateral knee. Therapeutic Modalities:    HEP: As above; handouts given to patient for all exercises. Treatment/Session Assessment:  Pt is post L TKA on 4/21/17. She presents with decreased ROM of L knee and decreased LE strength. Pain is a limiting factor. Discussed taking OTC for pain as she is trying to decrease the Dilaudid. Now using straight cane. Discussed need to work to increase strength to increase activity level. Pain is limiting factor. .Discussed taking pain med at night to sleep.   Work to increase ROM to increase ease of sitting and ambulation. Work to increase strenght to normalize gait and to enable return to prior activity level including walking for exercise. Pain is currently a limiting factor. Very motivated and should progress well. · Response to Treatment:  Gait with straight cane and SBA. Increased knee morion. · Compliance with Program/Exercises: Will assess as treatment progresses. · Recommendations/Intent for next treatment session: \"Next visit will focus on aggressive ROM and strengtheing of L knee and LE. \".   Total Treatment Duration:  PT Patient Time In/Time Out  Time In: 0100  Time Out: 0200  Treatment number  2  Katia Vázquez, PT

## 2017-05-19 NOTE — PROGRESS NOTES
[address] [place] [address]  : 1955 [facility]  [address], [facility], [address]  [phone]   [phone]         OUTPATIENT OCCUPATIONAL THERAPY: Initial Assessment and Daily Note 2017    ICD-10: Treatment Diagnosis: I89.0 lymphedema, not elsewhere specified  Precautions/Allergies:   Ambien [zolpidem]; Augmentin [amoxicillin-pot clavulanate]; Codeine; Contrave [naltrexone-bupropion]; Crestor [rosuvastatin]; Macrobid [nitrofurantoin monohyd/m-cryst]; Tape [adhesive]; Topamax [topiramate]; and Zocor [simvastatin]   Fall Risk Score: 1 (? 5 = High Risk)  MD Orders: lymphedema eval and treat LLE MEDICAL/REFERRING DIAGNOSIS:   Lymphedema, not elsewhere classified [I89.0]   DATE OF ONSET: 4 weeks ago   REFERRING PHYSICIAN: Zack Prieto MD  RETURN PHYSICIAN APPOINTMENT: TBD     INITIAL ASSESSMENT:  Ms. Chapo Sahu presents with LLE lymphedema following recent L TKA. Since her surgery she has had persistent swelling of the LLE that is not resolving on its own. She will benefit from MLD, multi layer bandaging and fitting for compression to aid in promoting optimal surgical outcome of her L TKA. PLAN OF CARE:   PROBLEM LIST:  1. Edema/Girth INTERVENTIONS PLANNED:  1. Manual therapy/MLD  2. Theraputic exercise/activity  3. Multi layer bandaging  4. kinesiotaping prn  5. Fitting for compression  6. Patient education. TREATMENT PLAN:  Effective Dates: 2017 TO 2017. Frequency/Duration: 2 times a week for 8 weeks  GOALS: (Goals have been discussed and agreed upon with patient.)  Short-Term Functional Goals: Time Frame: 4 weeks  1. Patient to verbalize lymphedema precautions. 2. Patient to be independent in self bandaging of LLE  3. Patient to be independent in lymphatic exercises  Discharge Goals: Time Frame: 8 weeks  1.  Patient's LLE circumferential measurements will decrease on volumetric graph by 5-8cm to aid in optimal outcome of L TKA  2. Patient to be independent for HEP  3. Patient to be fitted for compression garments prn  Rehabilitation Potential For Stated Goals: Good  Regarding Rad Salinas's therapy, I certify that the treatment plan above will be carried out by a therapist or under their direction. Thank you for this referral,  Brisa Pugh, OT     Referring Physician Signature: Flores Castellanos MD _________________________  Date _________            The information in this section was collected on 5/19/2017 (except where otherwise noted). OCCUPATIONAL PROFILE & HISTORY:   History of Present Injury/Illness (Reason for Referral):  Patient was referred to occupational therapy for LLE lymphedema following recent L TKA. Since her surgery she has had persistent swelling of the LLE that is not resolving on its own. She will benefit from MLD, multi layer bandaging and fitting for compression to aid in promoting optimal surgical outcome of her L TKA  Past Medical History/Comorbidities:   Ms. 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 She also has no past medical history of Adverse effect of anesthesia; Aneurysm ([address] Arrhythmia; Asthma; Autoimmune disease ([address] CAD (coronary artery disease); Cancer ([address] Chronic obstructive pulmonary disease ([address] Coagulation disorder ([address] Diabetes ([address]  Difficult intubation; Endocarditis; Heart failure ([facility] Ill-defined condition; Liver disease; Malignant hyperthermia due to anesthesia; Pseudocholinesterase deficiency; Psychiatric disorder; PUD (peptic ulcer disease); Rheumatic fever; Se[facility]ures ([facility] Stroke [facility]); or Thromboembolus ([facility] Ms. Renzo Abebe  has a past surgical history that includes breast augmentation (1990); knee arthroscopy (Left, 10/2012); sancho and bso (2009); tonsil and adenoidectomy (1960); bunionectomy (Bilateral, 1980); orthopaedic (Bilateral, 2010); and hip replacement (Right, 11/2016). Social History/Living Environment:    patient is  and has a supportive family that lives close by  Prior Level of Function/Work/Activity:  Retired educator  Dominant Side:         RIGHT  Personal Factors:          Sex:  female        Age:  58 y.o. Current Medications:    Current Outpatient Prescriptions:     aspirin (ASPIRIN) 325 mg tablet, Take 1 Tab by mouth two (2) times a day for 35 days. , Disp: 60 Tab, Rfl: 1    HYDROmorphone (DILAUDID) 2 mg tablet, Take 1 Tab by mouth every four (4) hours as needed for Pain. Max Daily Amount: 12 mg., Disp: 40 Tab, Rfl: 0    potassium chloride (K-DUR, KLOR-CON) 10 mEq tablet, Take 2 tabs today/prn, Disp: 30 Tab, Rfl: 0    verapamil ER (VERELAN PM) 300 mg capsule, Take 1 Cap by mouth nightly. (Patient taking differently: Take 300 mg by mouth daily.), Disp: 90 Cap, Rfl: 3    valsartan-hydroCHLOROthiazide (DIOVAN-HCT) 160-25 mg per tablet, Take 1 Tab by mouth daily. , Disp: 90 Tab, Rfl: 3    omeprazole (PRILOSEC) 20 mg capsule, Take 1 Cap by mouth daily. , Disp: 90 Cap, Rfl: 3    levothyroxine (SYNTHROID) 125 mcg tablet, Take 1 Tab by mouth Daily (before breakfast). Indications: HYPOTHYROIDISM (Patient taking differently: Take 125 mcg by mouth Daily (before breakfast). Take / use AM day of surgery  per anesthesia protocols.    Indications: hypothyroidism), Disp: 90 Tab, Rfl: 4    atorvastatin (LIPITOR) 40 mg tablet, Take 1 Tab by mouth nightly. (Patient taking differently: Take 40 mg by mouth daily. Take / use AM day of surgery  per anesthesia protocols. ), Disp: 90 Tab, Rfl: 3    rOPINIRole (REQUIP) 0.5 mg tablet, Take 1 Tab by mouth nightly as needed. (Patient taking differently: Take 0.5 mg by mouth every evening. Indications: Restless Legs Syndrome), Disp: 90 Tab, Rfl: 3    Cholecalciferol, Vitamin D3, (VITAMIN D3) 2,000 unit cap capsule, Take 2,000 Units by mouth daily. EVERY OTHER DAY, Disp: , Rfl:             Date Last Reviewed:  5/19/2017   Complexity Level : Expanded review of therapy/medical records (1-2):  MODERATE COMPLEXITY   ASSESSMENT OF OCCUPATIONAL PERFORMANCE:   Skin Integrity:          Healed scar LLE from recent L TKA  Sensation:         intact  Palpation:          pitting edema noted LLE from mid thigh to ankle  ROM:          WFL LLE except 120 knee flexion from recent L TKA    Edema/Girth:  1+    Left Right    Initial Most Recent Initial Most Recent   Upper  Extremity           Lower  Extremity  310.5cm  Foot to upper thigh             Physical Skills Involved:  1. Edema/girth  2. Limb heaviness Cognitive Skills Affected (resulting in the inability to perform in a timely and safe manner):  1. Psychosocial Skills Affected:  1. Habits  2. Routines and Behaviors   Number of elements that affect the Plan of Care: 3-5:  MODERATE COMPLEXITY   CLINICAL DECISION MAKING:   Outcome Measure: Tool Used: Lymphedema Life Impact Scale   Score:  Initial: 28 Most Recent: X (Date: -- )   Interpretation of Score: The Lymphedema Life Impact Scale (LLIS) is a validated instrument that measures the physical, functional, and psychosocial concerns pertinent to patients with extremity lymphedema. The Scale's questionnaire is administered to patients to gauge impairments, activity limitations, and participation restrictions resulting from their lymphedema. Score 0 1-13 14-26 27-40 41-54 55-67 68   Modifier CH CI CJ CK CL CM CN     ?  Other PT/OT Primary Functional Limitations:    [id number] - CURRENT STATUS: CK - 40%-59% impaired, limited or restricted    - GOAL STATUS: CJ - 20%-39% impaired, limited or restricted    - D/C STATUS:  ---------------To be determined---------------     Medical Necessity:   · Skilled intervention continues to be required due to LLE lymphedema post L TKA. Reason for Services/Other Comments:  · Patient continues to require skilled intervention due to patient's inability to self manage LLE lymphedema post L TKA. Clinical Decision-Making Assessment:  Patient requires skilled OT services to address LLE lymphedema post L TKA. She will benefit from MLD, multi layer bandaging, fitting for compression and patient education for self management principles of her lymphedema   Assessment process, impact of co-morbidities, assessment modification\need for assistance, and selection of interventions: Analytical Complexity:MODERATE COMPLEXITY   TREATMENT:   (In addition to Assessment/Re-Assessment sessions the following treatments were rendered)    Pre-treatment Symptoms/Complaints:  LLE lymphedema post L TKA  Pain: Initial:   Pain Intensity 1: 5  Post Session:  1:5     OT eval completed followed by patient education for: lymphedema signs/symptoms, treatment guidelines and compression options. Therapy initiated today. Manual (30 minutes):  MLD to LLE/trunk followed by multi layer bandaging from the knee to foot over surgigrip using 2 short stretch bandages. She will keep bandages on until her next appointment  unless she experiences pain in LLE or SOB. Patient encouraged to continue with knee rehab exercises with bandages on to aid in promoting lymphatic flow. Treatment/Session Assessment:    · Response to Treatment:  Patient tolerated treatment well with no complications. .  · Compliance with Program/Exercises: Will assess as treatment progresses. · Recommendations/Intent for next treatment session:  \"Next visit will focus on lymphedema treatment guidelines to decrease LLE lymphedema for optimal knee rehab outcome for recent L TKA\".   Total Treatment Duration:  OT Patient Time In/Time Out  Time In: 1200  Time Out: Bianca [address], OT

## 2017-05-22 ENCOUNTER — HOSPITAL ENCOUNTER (OUTPATIENT)
Dept: PHYSICAL THERAPY | Age: 62
Discharge: HOME | End: 2017-05-22
Payer: COMMERCIAL

## 2017-05-22 PROCEDURE — 97140 MANUAL THERAPY 1/> REGIONS: CPT

## 2017-05-22 PROCEDURE — 97110 THERAPEUTIC EXERCISES: CPT

## 2017-05-23 ENCOUNTER — HOSPITAL ENCOUNTER (OUTPATIENT)
Dept: PHYSICAL THERAPY | Age: 62
Discharge: HOME | End: 2017-05-23
Payer: COMMERCIAL

## 2017-05-23 PROCEDURE — 97140 MANUAL THERAPY 1/> REGIONS: CPT

## 2017-05-23 NOTE — PROGRESS NOTES
[facility] [place] [address] Therapy   [address], [address]  [phone]   [phone]    Gamal Bailey  : 1955       OUTPATIENT PHYSICAL THERAPY:Daily Note 2017    ICD-10: Treatment Diagnosis:   R26.2 Difficulty in walking, not elsewhere classified     M25.662 Stiffness of left knee, not elsewhere classified     M25.562 Pain in left knee     Precautions/Allergies:   Ambien [zolpidem]; Augmentin [amoxicillin-pot clavulanate]; Codeine; Contrave [naltrexone-bupropion]; Crestor [rosuvastatin]; Macrobid [nitrofurantoin monohyd/m-cryst]; Tape [adhesive]; Topamax [topiramate]; and Zocor [simvastatin]   Fall Risk Score: 1 (? 5 = High Risk)  MD Orders: Eval and treat MEDICAL/REFERRING DIAGNOSIS:  Presence of left artificial knee joint [Z96.652]   DATE OF ONSET:   REFERRING PHYSICIAN: Wei Cedeño., *  RETURN PHYSICIAN APPOINTMENT: 2 weeks      INITIAL ASSESSMENT:  Ms. Nicole Salgado presents with decreased ROM of L knee, weakness of both LE's, painin R knee and fair gait post L TKA on 17. PT will progress from rolling walker to straight cane. Work to increase ROM and strength to enable return to prior activity level. PROBLEM LIST (Impacting functional limitations):  1. Decreased Strength  2. Decreased Ambulation Ability/Technique  3. Increased Pain  4. Increased Fatigue  5. Decreased Flexibility/Joint Mobility  6. Edema/Girth INTERVENTIONS PLANNED:  1. Home Exercise Program (HEP)  2. Manual Therapy  3. Range of Motion (ROM)  4. Therapeutic Activites  5. Therapeutic Exercise/Strengthening   TREATMENT PLAN:  Effective Dates: 17 TO 17. Frequency/Duration: 2 times a week for 12 weeks and upon reassessment,  will adjust frequency and duration as progress indicates. GOALS: (Goals have been discussed and agreed upon with patient.)  Short-Term Functional Goals: Time Frame: 4 weeks  1.  Establish independent HEP with no cueing to increase ROM and strength  2. Increase L knee ROM to 0-125 to increase ease of sitting and ambulation    3. Independent gait with straight cane in home and community  4. Increase strength to enable initiation of reciprocal pattern on stairs. .  Discharge Goals: Time Frame: 12 weeks   1. Improve score on LEFS by 9 points to enable prolonged sitting, standing and ambulation   2. Independent gait without assistive device in home and community  3. Increase LE strength 1/2 to 1 grade to enable reciprocal pattern on stairs. 4. Able to ambulate 20 min with minimal to no increase in pain  Rehabilitation Potential For Stated Goals: Good  Regarding Rut Salinas's therapy, I certify that the treatment plan above will be carried out by a therapist or under their direction. Thank you for this referral,  Leanne Ramirez, PT     Referring Physician Signature: Rom Sen., *              Date                    The information in this section was collected on 5/16/17 (except where otherwise noted). HISTORY:   History of Present Injury/Illness (Reason for Referral):  Pt is post L TKA on 4/21/17. Pt initially injured her L knee in 2009 secondary to a fall. She had an arthroscopy for MMT. Reports that she never had complete relief of pain after surgery and that it has become progressively worse. She had Home PT for 8 visits until yesterday. She presents with decreased ROM of L knee, weakness of both LE's and fair gait. She is referred to outpatient PT for ROM and strengthening of L knee and LE as well as gait training. Past Medical History/Comorbidities:   Ms. 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 She also has no past medical history of Adverse effect of anesthesia; Aneurysm ([address] Arrhythmia; Asthma; Autoimmune disease ([address] CAD (coronary artery disease); Cancer ([address] Chronic obstructive pulmonary disease ([address] Coagulation disorder ([address] Diabetes ([address] Difficult intubation; Endocarditis; Heart failure ([address] Ill-defined condition; Liver disease; Malignant hyperthermia due to anesthesia; Pseudocholinesterase deficiency; Psychiatric disorder; PUD (peptic ulcer disease); Rheumatic fever; Seizures ([facility] Stroke [facility]); or Thromboembolus ([address] Ms. John Almaguer  has a past surgical history that includes breast augmentation (1990); knee arthroscopy (Left, 10/2012); sancho and bso (2009); tonsil and adenoidectomy (1960); bunionectomy (Bilateral, 1980); orthopaedic (Bilateral, 2010); and hip replacement (Right, 11/2016). 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 Wants to be able to walk without pain and be able to travel  Dominant Side:         RIGHT  Current Medications:       Current Outpatient Prescriptions:     aspirin (ASPIRIN) 325 mg tablet, Take 1 Tab by mouth two (2) times a day for 35 days. , Disp: 60 Tab, Rfl: 1    HYDROmorphone (DILAUDID) 2 mg tablet, Take 1 Tab by mouth every four (4) hours as needed for Pain. Max Daily Amount: 12 mg., Disp: 40 Tab, Rfl: 0    potassium chloride (K-DUR, KLOR-CON) 10 mEq tablet, Take 2 tabs today/prn, Disp: 30 Tab, Rfl: 0    verapamil ER (VERELAN PM) 300 mg capsule, Take 1 Cap by mouth nightly.  (Patient taking differently: Take 300 mg by mouth daily.), Disp: 90 Cap, Rfl: 3    valsartan-hydroCHLOROthiazide (DIOVAN-HCT) 160-25 mg per tablet, Take 1 Tab by mouth daily. , Disp: 90 Tab, Rfl: 3    omeprazole (PRILOSEC) 20 mg capsule, Take 1 Cap by mouth daily. , Disp: 90 Cap, Rfl: 3    levothyroxine (SYNTHROID) 125 mcg tablet, Take 1 Tab by mouth Daily (before breakfast). Indications: HYPOTHYROIDISM (Patient taking differently: Take 125 mcg by mouth Daily (before breakfast). Take / use AM day of surgery  per anesthesia protocols. Indications: hypothyroidism), Disp: 90 Tab, Rfl: 4    atorvastatin (LIPITOR) 40 mg tablet, Take 1 Tab by mouth nightly. (Patient taking differently: Take 40 mg by mouth daily. Take / use AM day of surgery  per anesthesia protocols. ), Disp: 90 Tab, Rfl: 3    rOPINIRole (REQUIP) 0.5 mg tablet, Take 1 Tab by mouth nightly as needed. (Patient taking differently: Take 0.5 mg by mouth every evening. Indications: Restless Legs Syndrome), Disp: 90 Tab, Rfl: 3    Cholecalciferol, Vitamin D3, (VITAMIN D3) 2,000 unit cap capsule, Take 2,000 Units by mouth daily. EVERY OTHER DAY, Disp: , Rfl:    Date Last Reviewed:  5/22/2017     Number of Personal Factors/Comorbidities that affect the Plan of Care: 1-2: MODERATE COMPLEXITY   EXAMINATION:   Observation/Orthostatic Postural Assessment:          Pt to PT with rolling walker. Slow gait with short step length. Slight limp to L. Pt with decreased knee ext at heel strike an stance. Decreased toe off. Tight HS. In 90/90, Hs are 60. Moderate edema noted   Palpation:          Pain along medial patella and back of knee. Soreness in thigh  Functional Mobility:         Gait/Ambulation:  Independent with rolling walker        Stairs:  Unable to do reciprocal pattern.   Difficulty with single step  ROM:     L  Knee -10 to 120   R knee  0-130                                    Strength:     Knee ext  L 3+/5,  R 4/5    Knee et  L 4-/5,  R 4/5         Hip flex  L 4/5, R 4/5    Hip abd  L 3+/5,  R 4-/5    Hip ext   L 4-/5,  R 4/5              Neurological Screen: Intact to light touch  Balance:  Good with rolling walker   Body Structures Involved:  1. Bones  2. Joints  3. Muscles  4. Ligaments Body Functions Affected:  1. Sensory/Pain  2. Neuromusculoskeletal  3. Movement Related Activities and Participation Affected:  1. Learning and Applying Knowledge  2. General Tasks and Demands  3. Mobility  4. Domestic Life  5. Community, Social and [place]   Number of elements (examined above) that affect the Plan of Care: 3: MODERATE COMPLEXITY   CLINICAL PRESENTATION:   Presentation: Evolving clinical presentation with changing clinical characteristics: MODERATE COMPLEXITY   CLINICAL DECISION MAKING:   Outcome Measure: Tool Used: Lower Extremity Functional Scale (LEFS)  Score:  Initial: 32/80 Most Recent: X/80 (Date: -- )   Interpretation of Score: 20 questions each scored on a 5 point scale with 0 representing \"extreme difficulty or unable to perform\" and 4 representing \"no difficulty\". The lower the score, the greater the functional disability. 80/80 represents no disability. Minimal detectable change is 9 points. Score 80 79-63 62-48 47-32 31-16 15-1 0   Modifier CH CI CJ CK CL CM CN     ? Mobility - Walking and Moving Around:     - CURRENT STATUS: CK - 40%-59% impaired, limited or restricted    - GOAL STATUS: CK - 40%-59% impaired, limited or restricted    - D/C STATUS:  ---------------To be determined---------------    Medical Necessity:   · Patient is expected to demonstrate progress in strength, range of motion, balance and gait and decreased pain to increase independence with with home and community activities. Reason for Services/Other Comments:  · Patient continues to require skilled intervention due to decreased ROM and strength of L LE as well as increased pain and fair gait.    Use of outcome tool(s) and clinical judgement create a POC that gives a: Clear prediction of patient's progress: LOW COMPLEXITY            TREATMENT:   (In addition to Assessment/Re-Assessment sessions the following treatments were rendered)  Pre-treatment Symptoms/Complaints:  Pain and stiffness of L knee. Painful to move L knee much. Reports low back discomfort. To PT with straight cane. Pt reports that she was sore with exercises. R hip has had more discomfort since knee surgery. Weakness of B LE's. Trying to decrease pain med   Pain: Initial:   Pain Intensity 1: 5  Post Session:  4     THERAPEUTIC EXERCISE: (30 minutes):  Exercises per grid below to improve mobility, strength and gait and pain. Required moderate verbal and manual cues to promote proper body alignment, promote proper body posture and promote proper body mechanics. Progressed resistance, range and repetitions as indicated. Performed  QS (quadriceps sets),standing hip abd, SAQ's (short arc quadriceps),and  LAQ's (long arc quadriceps) correcting technique as needed    HS and HC stretch. Seated HS curls at 15# x 3 sets of 10. Performed  cable for hip flex and abd x 15 at 12#. Complained of R hip pain and deferred cable with R LE. Discussed use of pain med to sleep. PT with sharp knee pain last night and concerned about moving L LE much. Discussed that she needs to work to keep knee moving and should take OTC med if needed to do exercises. . Worked on gait with straight cane  and SBA. Worked on heel toe gait. [place]wot Worthy Performed  6 in step up x  2 sets of 10. Instructed in sitting stretch of low back and trunk rotation. Manual Therapy ( 30 min  ): Patella and patella tendon mobs. Soft tissue work to [employer] and HS. PROM for knee flex and extn. Sitting knee flex to 122. Passive ext to -4. Pt with discomfort of lateral knee and R hip. .          Therapeutic Modalities:    HEP: As above; handouts given to patient for all exercises. Treatment/Session Assessment:  Pt is post L TKA on 4/21/17.   She presents with decreased ROM of L knee and decreased LE strength. Pain is a limiting factor. Discussed taking OTC for pain as she is trying to decrease the Dilaudid. Also discussed taking Dilaudid at night if needed to sleep. Had sharp pain last night and had difficulty moving knee. Did well with moveent during PT. Now using straight cane. Discussed need to work to increase strength to increase activity level. Pain is limiting factor. .  Work to increase ROM to increase ease of sitting and ambulation. Work to increase strenght to normalize gait and to enable return to prior activity level including walking for exercise. Pain is currently a limiting factor. Very motivated and should progress well. · Response to Treatment:  Gait with straight cane and SBA. Increased knee morion. · Compliance with Program/Exercises: Will assess as treatment progresses. · Recommendations/Intent for next treatment session: \"Next visit will focus on aggressive ROM and strengtheing of L knee and LE. \".   Total Treatment Duration:  PT Patient Time In/Time Out  Time In: 0100  Time Out: 0200  Treatment number  3  Cathie Enolia Aase, PT

## 2017-05-23 NOTE — PROGRESS NOTES
[address] [place] [address]  : 1955 [facility] Therapy  [address], [facility], [address]  [phone]   [phone]         OUTPATIENT OCCUPATIONAL THERAPY: Daily Note 2017    ICD-10: Treatment Diagnosis: I89.0 lymphedema, not elsewhere specified  Precautions/Allergies:   Ambien [zolpidem]; Augmentin [amoxicillin-pot clavulanate]; Codeine; Contrave [naltrexone-bupropion]; Crestor [rosuvastatin]; Macrobid [nitrofurantoin monohyd/m-cryst]; Tape [adhesive]; Topamax [topiramate]; and Zocor [simvastatin]   Fall Risk Score: 1 (? 5 = High Risk)  MD Orders: lymphedema eval and treat LLE MEDICAL/REFERRING DIAGNOSIS:   Lymphedema, not elsewhere classified [I89.0]   DATE OF ONSET: 4 weeks ago   REFERRING PHYSICIAN: Melba Garcia MD  RETURN PHYSICIAN APPOINTMENT: TBD     INITIAL ASSESSMENT:  Ms. Yaa Oneal presents with LLE lymphedema following recent L TKA. Since her surgery she has had persistent swelling of the LLE that is not resolving on its own. She will benefit from MLD, multi layer bandaging and fitting for compression to aid in promoting optimal surgical outcome of her L TKA. PLAN OF CARE:   PROBLEM LIST:  1. Edema/Girth INTERVENTIONS PLANNED:  1. Manual therapy/MLD  2. Theraputic exercise/activity  3. Multi layer bandaging  4. kinesiotaping prn  5. Fitting for compression  6. Patient education. TREATMENT PLAN:  Effective Dates: 2017 TO 2017. Frequency/Duration: 2 times a week for 8 weeks  GOALS: (Goals have been discussed and agreed upon with patient.)  Short-Term Functional Goals: Time Frame: 4 weeks  1. Patient to verbalize lymphedema precautions. 2. Patient to be independent in self bandaging of LLE  3. Patient to be independent in lymphatic exercises  Discharge Goals: Time Frame: 8 weeks  1. Patient's LLE circumferential measurements will decrease on volumetric graph by 5-8cm to aid in optimal outcome of L TKA  2.  Patient to be independent for HEP  3. Patient to be fitted for compression garments prn  Rehabilitation Potential For Stated Goals: Good  Regarding Dawit Salinas's therapy, I certify that the treatment plan above will be carried out by a therapist or under their direction. Thank you for this referral,  Mandi Casey OT     Referring Physician Signature: Sasha Del Toro MD _________________________  Date _________            The information in this section was collected on 5/19/2017 (except where otherwise noted). OCCUPATIONAL PROFILE & HISTORY:   History of Present Injury/Illness (Reason for Referral):  Patient was referred to occupational therapy for LLE lymphedema following recent L TKA. Since her surgery she has had persistent swelling of the LLE that is not resolving on its own. She will benefit from MLD, multi layer bandaging and fitting for compression to aid in promoting optimal surgical outcome of her L TKA  Past Medical History/Comorbidities:   Ms. 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 She also has no past medical history of Adverse effect of anesthesia; Aneurysm ([address] Arrhythmia; Asthma; Autoimmune disease ([address] CAD (coronary artery disease); Cancer ([address] Chronic obstructive pulmonary disease ([address] Coagulation disorder ([address] Diabetes ([address] Difficult intubation; Endocarditis;  Heart failure ([facility] Ill-defined condition; Liver disease; Malignant hyperthermia due to anesthesia; Pseudocholinesterase deficiency; Psychiatric disorder; PUD (peptic ulcer disease); Rheumatic fever; Seizures ([facility] Stroke [facility]); or Thromboembolus ([facility] Ms. Chaop Sahu  has a past surgical history that includes breast augmentation (1990); knee arthroscopy (Left, 10/2012); sancho and bso (2009); tonsil and adenoidectomy (1960); bunionectomy (Bilateral, 1980); orthopaedic (Bilateral, 2010); and hip replacement (Right, 11/2016). Social History/Living Environment:    patient is  and has a supportive family that lives close by  Prior Level of Function/Work/Activity:  Retired educator  Dominant Side:         RIGHT  Personal Factors:          Sex:  female        Age:  58 y.o. Current Medications:    Current Outpatient Prescriptions:     aspirin (ASPIRIN) 325 mg tablet, Take 1 Tab by mouth two (2) times a day for 35 days. , Disp: 60 Tab, Rfl: 1    HYDROmorphone (DILAUDID) 2 mg tablet, Take 1 Tab by mouth every four (4) hours as needed for Pain. Max Daily Amount: 12 mg., Disp: 40 Tab, Rfl: 0    potassium chloride (K-DUR, KLOR-CON) 10 mEq tablet, Take 2 tabs today/prn, Disp: 30 Tab, Rfl: 0    verapamil ER (VERELAN PM) 300 mg capsule, Take 1 Cap by mouth nightly. (Patient taking differently: Take 300 mg by mouth daily.), Disp: 90 Cap, Rfl: 3    valsartan-hydroCHLOROthiazide (DIOVAN-HCT) 160-25 mg per tablet, Take 1 Tab by mouth daily. , Disp: 90 Tab, Rfl: 3    omeprazole (PRILOSEC) 20 mg capsule, Take 1 Cap by mouth daily. , Disp: 90 Cap, Rfl: 3    levothyroxine (SYNTHROID) 125 mcg tablet, Take 1 Tab by mouth Daily (before breakfast). Indications: HYPOTHYROIDISM (Patient taking differently: Take 125 mcg by mouth Daily (before breakfast). Take / use AM day of surgery  per anesthesia protocols. Indications: hypothyroidism), Disp: 90 Tab, Rfl: 4    atorvastatin (LIPITOR) 40 mg tablet, Take 1 Tab by mouth nightly.  (Patient taking differently: Take 40 mg by mouth daily. Take / use AM day of surgery  per anesthesia protocols. ), Disp: 90 Tab, Rfl: 3    rOPINIRole (REQUIP) 0.5 mg tablet, Take 1 Tab by mouth nightly as needed. (Patient taking differently: Take 0.5 mg by mouth every evening. Indications: Restless Legs Syndrome), Disp: 90 Tab, Rfl: 3    Cholecalciferol, Vitamin D3, (VITAMIN D3) 2,000 unit cap capsule, Take 2,000 Units by mouth daily. EVERY OTHER DAY, Disp: , Rfl:             Date Last Reviewed:  5/23/2017   Complexity Level : Expanded review of therapy/medical records (1-2):  MODERATE COMPLEXITY   ASSESSMENT OF OCCUPATIONAL PERFORMANCE:   Skin Integrity:          Healed scar LLE from recent L TKA  Sensation:         intact  Palpation:          pitting edema noted LLE from mid thigh to ankle  ROM:          WFL LLE except 120 knee flexion from recent L TKA    Edema/Girth:  1+    Left Right    Initial Most Recent Initial Most Recent   Upper  Extremity           Lower  Extremity  310.5cm  Foot to upper thigh  304cm  Foot to upper thigh           Physical Skills Involved:  1. Edema/girth  2. Limb heaviness Cognitive Skills Affected (resulting in the inability to perform in a timely and safe manner):  1. Psychosocial Skills Affected:  1. Habits  2. Routines and Behaviors   Number of elements that affect the Plan of Care: 3-5:  MODERATE COMPLEXITY   CLINICAL DECISION MAKING:   Outcome Measure: Tool Used: Lymphedema Life Impact Scale   Score:  Initial: 28 Most Recent: X (Date: -- )   Interpretation of Score: The Lymphedema Life Impact Scale (LLIS) is a validated instrument that measures the physical, functional, and psychosocial concerns pertinent to patients with extremity lymphedema. The Scale's questionnaire is administered to patients to gauge impairments, activity limitations, and participation restrictions resulting from their lymphedema.   Score 0 1-13 14-26 27-40 41-54 55-67 68   Modifier CH CI CJ CK CL CM CN ? Other PT/OT Primary Functional Limitations:     - CURRENT STATUS: CK - 40%-59% impaired, limited or restricted    - GOAL STATUS: CJ - 20%-39% impaired, limited or restricted    - D/C STATUS:  ---------------To be determined---------------     Medical Necessity:   · Skilled intervention continues to be required due to LLE lymphedema post L TKA. Reason for Services/Other Comments:  · Patient continues to require skilled intervention due to patient's inability to self manage LLE lymphedema post L TKA. Clinical Decision-Making Assessment:  Patient requires skilled OT services to address LLE lymphedema post L TKA. She will benefit from MLD, multi layer bandaging, fitting for compression and patient education for self management principles of her lymphedema   Assessment process, impact of co-morbidities, assessment modification\need for assistance, and selection of interventions: Analytical Complexity:MODERATE COMPLEXITY   TREATMENT:   (In addition to Assessment/Re-Assessment sessions the following treatments were rendered)    Pre-treatment Symptoms/Complaints:  LLE lymphedema post L TKA - patient reports bandages and tape became loose so she removed a few days ago. Pain: Initial:   Pain Intensity 1: 5  Post Session:  1:5     OT eval completed followed by patient education for: lymphedema signs/symptoms, treatment guidelines and compression options. Therapy initiated today. Manual (60 minutes):  MLD to LLE/trunk followed by measuring of the LLE from the foot to upper thigh and since therapy began she has lost 6.5cm in limb size. Due to bandages becoming loose surgigrip will be used for her compression. Patient encouraged to continue with knee rehab exercises with bandages on to aid in promoting lymphatic flow. Treatment/Session Assessment:    · Response to Treatment:  Patient tolerated treatment well with no complications. . [place] Dienes will be used for compression.  She is already responding to MLD and compression as evidenced by 6.5cm loss in LLE limb size. · Compliance with Program/Exercises: good to date  · Recommendations/Intent for next treatment session: \"Next visit will focus on lymphedema treatment guidelines to decrease LLE lymphedema for optimal knee rehab outcome for recent L TKA\".   Total Treatment Duration:  OT Patient Time In/Time Out  Time In: 0300  Time Out: [address], OT

## 2017-05-25 ENCOUNTER — HOSPITAL ENCOUNTER (OUTPATIENT)
Dept: PHYSICAL THERAPY | Age: 62
Discharge: HOME | End: 2017-05-25
Payer: COMMERCIAL

## 2017-05-25 PROCEDURE — 97110 THERAPEUTIC EXERCISES: CPT

## 2017-05-25 PROCEDURE — 97140 MANUAL THERAPY 1/> REGIONS: CPT

## 2017-05-26 ENCOUNTER — HOSPITAL ENCOUNTER (OUTPATIENT)
Dept: PHYSICAL THERAPY | Age: 62
Discharge: HOME | End: 2017-05-26
Payer: COMMERCIAL

## 2017-05-26 PROCEDURE — 97140 MANUAL THERAPY 1/> REGIONS: CPT

## 2017-05-26 NOTE — PROGRESS NOTES
[address] [place] [address]  : 1955 [facility]  [address], [facility], [address]  [phone]   [phone]         OUTPATIENT OCCUPATIONAL THERAPY: Daily Note 2017    ICD-10: Treatment Diagnosis: I89.0 lymphedema, not elsewhere specified  Precautions/Allergies:   Ambien [zolpidem]; Augmentin [amoxicillin-pot clavulanate]; Codeine; Contrave [naltrexone-bupropion]; Crestor [rosuvastatin]; Macrobid [nitrofurantoin monohyd/m-cryst]; Tape [adhesive]; Topamax [topiramate]; and Zocor [simvastatin]   Fall Risk Score: 1 (? 5 = High Risk)  MD Orders: lymphedema eval and treat LLE MEDICAL/REFERRING DIAGNOSIS:   Lymphedema, not elsewhere classified [I89.0]   DATE OF ONSET: 4 weeks ago   REFERRING PHYSICIAN: Archana Vu MD  RETURN PHYSICIAN APPOINTMENT: TBD     INITIAL ASSESSMENT:  Ms. Betina Bustamante presents with LLE lymphedema following recent L TKA. Since her surgery she has had persistent swelling of the LLE that is not resolving on its own. She will benefit from MLD, multi layer bandaging and fitting for compression to aid in promoting optimal surgical outcome of her L TKA. PLAN OF CARE:   PROBLEM LIST:  1. Edema/Girth INTERVENTIONS PLANNED:  1. Manual therapy/MLD  2. Theraputic exercise/activity  3. Multi layer bandaging  4. kinesiotaping prn  5. Fitting for compression  6. Patient education. TREATMENT PLAN:  Effective Dates: 2017 TO 2017. Frequency/Duration: 2 times a week for 8 weeks  GOALS: (Goals have been discussed and agreed upon with patient.)  Short-Term Functional Goals: Time Frame: 4 weeks  1. Patient to verbalize lymphedema precautions. 2. Patient to be independent in self bandaging of LLE  3. Patient to be independent in lymphatic exercises  Discharge Goals: Time Frame: 8 weeks  1. Patient's LLE circumferential measurements will decrease on volumetric graph by 5-8cm to aid in optimal outcome of L TKA  2.  Patient to be independent for HEP  3. Patient to be fitted for compression garments prn  Rehabilitation Potential For Stated Goals: Good  Regarding Anders Salinas's therapy, I certify that the treatment plan above will be carried out by a therapist or under their direction. Thank you for this referral,  Prateek Meyer OT     Referring Physician Signature: Nivia Landau, MD _________________________  Date _________            The information in this section was collected on 5/19/2017 (except where otherwise noted). OCCUPATIONAL PROFILE & HISTORY:   History of Present Injury/Illness (Reason for Referral):  Patient was referred to occupational therapy for LLE lymphedema following recent L TKA. Since her surgery she has had persistent swelling of the LLE that is not resolving on its own. She will benefit from MLD, multi layer bandaging and fitting for compression to aid in promoting optimal surgical outcome of her L TKA  Past Medical History/Comorbidities:   Ms. 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 She also has no past medical history of Adverse effect of anesthesia; Aneurysm ([address] Arrhythmia; Asthma; Autoimmune disease ([address] CAD (coronary artery disease); Cancer ([address] Chronic obstructive pulmonary disease ([address] Coagulation disorder ([address] Diabetes ([address] Difficult intubation; Endocarditis;  Heart failure ([facility] Ill-defined condition; Liver disease; Malignant hyperthermia due to anesthesia; Pseudocholinesterase deficiency; Psychiatric disorder; PUD (peptic ulcer disease); Rheumatic fever; Seizures ([facility] Stroke [facility]); or Thromboembolus ([facility] Ms. Koby Stevenson  has a past surgical history that includes breast augmentation (1990); knee arthroscopy (Left, 10/2012); sancho and bso (2009); tonsil and adenoidectomy (1960); bunionectomy (Bilateral, 1980); orthopaedic (Bilateral, 2010); and hip replacement (Right, 11/2016). Social History/Living Environment:    patient is  and has a supportive family that lives close by  Prior Level of Function/Work/Activity:  Retired educator  Dominant Side:         RIGHT  Personal Factors:          Sex:  female        Age:  58 y.o. Current Medications:    Current Outpatient Prescriptions:     rOPINIRole (REQUIP) 0.5 mg tablet, TAKE 1 TABLET NIGHTLY AS NEEDED, Disp: 90 Tab, Rfl: 2    aspirin (ASPIRIN) 325 mg tablet, Take 1 Tab by mouth two (2) times a day for 35 days. , Disp: 60 Tab, Rfl: 1    HYDROmorphone (DILAUDID) 2 mg tablet, Take 1 Tab by mouth every four (4) hours as needed for Pain. Max Daily Amount: 12 mg., Disp: 40 Tab, Rfl: 0    potassium chloride (K-DUR, KLOR-CON) 10 mEq tablet, Take 2 tabs today/prn, Disp: 30 Tab, Rfl: 0    verapamil ER (VERELAN PM) 300 mg capsule, Take 1 Cap by mouth nightly. (Patient taking differently: Take 300 mg by mouth daily.), Disp: 90 Cap, Rfl: 3    valsartan-hydroCHLOROthiazide (DIOVAN-HCT) 160-25 mg per tablet, Take 1 Tab by mouth daily. , Disp: 90 Tab, Rfl: 3    omeprazole (PRILOSEC) 20 mg capsule, Take 1 Cap by mouth daily. , Disp: 90 Cap, Rfl: 3    levothyroxine (SYNTHROID) 125 mcg tablet, Take 1 Tab by mouth Daily (before breakfast). Indications: HYPOTHYROIDISM (Patient taking differently: Take 125 mcg by mouth Daily (before breakfast). Take / use AM day of surgery  per anesthesia protocols.    Indications: hypothyroidism), Disp: 90 Tab, Rfl: 4    atorvastatin (LIPITOR) 40 mg tablet, Take 1 Tab by mouth nightly. (Patient taking differently: Take 40 mg by mouth daily. Take / use AM day of surgery  per anesthesia protocols. ), Disp: 90 Tab, Rfl: 3    Cholecalciferol, Vitamin D3, (VITAMIN D3) 2,000 unit cap capsule, Take 2,000 Units by mouth daily. EVERY OTHER DAY, Disp: , Rfl:             Date Last Reviewed:  5/26/2017   Complexity Level : Expanded review of therapy/medical records (1-2):  MODERATE COMPLEXITY   ASSESSMENT OF OCCUPATIONAL PERFORMANCE:   Skin Integrity:          Healed scar LLE from recent L TKA  Sensation:         intact  Palpation:          pitting edema noted LLE from mid thigh to ankle  ROM:          WFL LLE except 120 knee flexion from recent L TKA    Edema/Girth:  1+    Left Right    Initial Most Recent Initial Most Recent   Upper  Extremity           Lower  Extremity  310.5cm  Foot to upper thigh  304cm  Foot to upper thigh           Physical Skills Involved:  1. Edema/girth  2. Limb heaviness Cognitive Skills Affected (resulting in the inability to perform in a timely and safe manner):  1. Psychosocial Skills Affected:  1. Habits  2. Routines and Behaviors   Number of elements that affect the Plan of Care: 3-5:  MODERATE COMPLEXITY   CLINICAL DECISION MAKING:   Outcome Measure: Tool Used: Lymphedema Life Impact Scale   Score:  Initial: 28 Most Recent: X (Date: -- )   Interpretation of Score: The Lymphedema Life Impact Scale (LLIS) is a validated instrument that measures the physical, functional, and psychosocial concerns pertinent to patients with extremity lymphedema. The Scale's questionnaire is administered to patients to gauge impairments, activity limitations, and participation restrictions resulting from their lymphedema. Score 0 1-13 14-26 27-40 41-54 55-67 68   Modifier CH CI CJ CK CL CM CN     ?  Other PT/OT Primary Functional Limitations:     - CURRENT STATUS: CK - 40%-59% impaired, limited or restricted    - GOAL STATUS: CJ - 20%-39% impaired, limited or restricted    - D/C STATUS:  ---------------To be determined---------------     Medical Necessity:   · Skilled intervention continues to be required due to LLE lymphedema post L TKA. Reason for Services/Other Comments:  · Patient continues to require skilled intervention due to patient's inability to self manage LLE lymphedema post L TKA. Clinical Decision-Making Assessment:  Patient requires skilled OT services to address LLE lymphedema post L TKA. She will benefit from MLD, multi layer bandaging, fitting for compression and patient education for self management principles of her lymphedema   Assessment process, impact of co-morbidities, assessment modification\need for assistance, and selection of interventions: Analytical Complexity:MODERATE COMPLEXITY   TREATMENT:   (In addition to Assessment/Re-Assessment sessions the following treatments were rendered)    Pre-treatment Symptoms/Complaints:  LLE lymphedema post L TKA - patient had a follow up appointment with Dr. Park Walker yesterday and per patient he was pleased with progress of LLE. Pain: Initial:   Pain Intensity 1: 5  Post Session:  1:5     OT eval completed followed by patient education for: lymphedema signs/symptoms, treatment guidelines and compression options. Manual (60 minutes):  MLD to LLE/trunk followed by measuring of the LLE from the foot to upper thigh and since therapy began she has lost 13.5cm in limb size. Due to bandages becoming loose surgigrip will be used for her compression. Patient is continuing with knee rehab exercises with surgigrip on to aid in promoting lymphatic flow. Treatment/Session Assessment:    · Response to Treatment:  Patient tolerated treatment well with no complications. . Kev Dotter will be used for compression. She is already responding to MLD and compression as evidenced by 13.5cm loss in LLE limb size.   BLE's are now equal in size.  · Compliance with Program/Exercises: good to date  · Recommendations/Intent for next treatment session: \"Next visit will focus on lymphedema treatment guidelines to decrease LLE lymphedema for optimal knee rehab outcome for recent L TKA\".   Total Treatment Duration:  OT Patient Time In/Time Out  Time In: 0900  Time Out: [id number] Caro Carson OT

## 2017-05-26 NOTE — PROGRESS NOTES
[facility] at [facility]   [address], [address]  [phone]   [phone]    [place] Razo  : 1955       OUTPATIENT PHYSICAL THERAPY:Daily Note 2017    ICD-10: Treatment Diagnosis:   R26.2 Difficulty in walking, not elsewhere classified     M25.662 Stiffness of left knee, not elsewhere classified     M25.562 Pain in left knee     Precautions/Allergies:   Ambien [zolpidem]; Augmentin [amoxicillin-pot clavulanate]; Codeine; Contrave [naltrexone-bupropion]; Crestor [rosuvastatin]; Macrobid [nitrofurantoin monohyd/m-cryst]; Tape [adhesive]; Topamax [topiramate]; and Zocor [simvastatin]   Fall Risk Score: 1 (? 5 = High Risk)  MD Orders: Eval and treat MEDICAL/REFERRING DIAGNOSIS:  Presence of left artificial knee joint [Z96.652]   DATE OF ONSET:   REFERRING PHYSICIAN: Kaitlin Welch., *  RETURN PHYSICIAN APPOINTMENT: 2 weeks      INITIAL ASSESSMENT:  Ms. Job Landaverde presents with decreased ROM of L knee, weakness of both LE's, painin R knee and fair gait post L TKA on 17. PT will progress from rolling walker to straight cane. Work to increase ROM and strength to enable return to prior activity level. PROBLEM LIST (Impacting functional limitations):  1. Decreased Strength  2. Decreased Ambulation Ability/Technique  3. Increased Pain  4. Increased Fatigue  5. Decreased Flexibility/Joint Mobility  6. Edema/Girth INTERVENTIONS PLANNED:  1. Home Exercise Program (HEP)  2. Manual Therapy  3. Range of Motion (ROM)  4. Therapeutic Activites  5. Therapeutic Exercise/Strengthening   TREATMENT PLAN:  Effective Dates: 17 TO 17. Frequency/Duration: 2 times a week for 12 weeks and upon reassessment,  will adjust frequency and duration as progress indicates. GOALS: (Goals have been discussed and agreed upon with patient.)  Short-Term Functional Goals: Time Frame: 4 weeks  1.  Establish independent HEP with no cueing to increase ROM and strength  2. Increase L knee ROM to 0-125 to increase ease of sitting and ambulation    3. Independent gait with straight cane in home and community  4. Increase strength to enable initiation of reciprocal pattern on stairs. .  Discharge Goals: Time Frame: 12 weeks   1. Improve score on LEFS by 9 points to enable prolonged sitting, standing and ambulation   2. Independent gait without assistive device in home and community  3. Increase LE strength 1/2 to 1 grade to enable reciprocal pattern on stairs. 4. Able to ambulate 20 min with minimal to no increase in pain  Rehabilitation Potential For Stated Goals: Good  Regarding Dawit Salinas's therapy, I certify that the treatment plan above will be carried out by a therapist or under their direction. Thank you for this referral,  Conor Rose, PT     Referring Physician Signature: Sarahi Joe., *              Date                    The information in this section was collected on 5/16/17 (except where otherwise noted). HISTORY:   History of Present Injury/Illness (Reason for Referral):  Pt is post L TKA on 4/21/17. Pt initially injured her L knee in 2009 secondary to a fall. She had an arthroscopy for MMT. Reports that she never had complete relief of pain after surgery and that it has become progressively worse. She had Home PT for 8 visits until yesterday. She presents with decreased ROM of L knee, weakness of both LE's and fair gait. She is referred to outpatient PT for ROM and strengthening of L knee and LE as well as gait training. Past Medical History/Comorbidities:   Ms. 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 She also has no past medical history of Adverse effect of anesthesia; Aneurysm ([facility] Arrhythmia; Asthma; Autoimmune disease ([facility] CAD (coronary artery disease); Cancer ([address] Chronic obstructive pulmonary disease ([address] Coagulation disorder ([address] Diabetes ([facility] Difficult intubation; Endocarditis; Heart failure ([facility] Ill-defined condition; Liver disease; Malignant hyperthermia due to anesthesia; Pseudocholinesterase deficiency; Psychiatric disorder; PUD (peptic ulcer disease); Rheumatic fever; Seizures ([facility] Stroke [facility]); or Thromboembolus ([facility] Ms. Milla Cabrera  has a past surgical history that includes breast augmentation (1990); knee arthroscopy (Left, 10/2012); sancho and bso (2009); tonsil and adenoidectomy (1960); bunionectomy (Bilateral, 1980); orthopaedic (Bilateral, 2010); and hip replacement (Right, 11/2016). 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 Wants to be able to walk without pain and be able to travel  Dominant Side:         RIGHT  Current Medications:       Current Outpatient Prescriptions:     rOPINIRole (REQUIP) 0.5 mg tablet, TAKE 1 TABLET NIGHTLY AS NEEDED, Disp: 90 Tab, Rfl: 2    aspirin (ASPIRIN) 325 mg tablet, Take 1 Tab by mouth two (2) times a day for 35 days. , Disp: 60 Tab, Rfl: 1    HYDROmorphone (DILAUDID) 2 mg tablet, Take 1 Tab by mouth every four (4) hours as needed for Pain.  Max Daily Amount: 12 mg., Disp: 40 Tab, Rfl: 0    potassium chloride (K-DUR, KLOR-CON) 10 mEq tablet, Take 2 tabs today/prn, Disp: 30 Tab, Rfl: 0    verapamil ER (VERELAN PM) 300 mg capsule, Take 1 Cap by mouth nightly. (Patient taking differently: Take 300 mg by mouth daily.), Disp: 90 Cap, Rfl: 3    valsartan-hydroCHLOROthiazide (DIOVAN-HCT) 160-25 mg per tablet, Take 1 Tab by mouth daily. , Disp: 90 Tab, Rfl: 3    omeprazole (PRILOSEC) 20 mg capsule, Take 1 Cap by mouth daily. , Disp: 90 Cap, Rfl: 3    levothyroxine (SYNTHROID) 125 mcg tablet, Take 1 Tab by mouth Daily (before breakfast). Indications: HYPOTHYROIDISM (Patient taking differently: Take 125 mcg by mouth Daily (before breakfast). Take / use AM day of surgery  per anesthesia protocols. Indications: hypothyroidism), Disp: 90 Tab, Rfl: 4    atorvastatin (LIPITOR) 40 mg tablet, Take 1 Tab by mouth nightly. (Patient taking differently: Take 40 mg by mouth daily. Take / use AM day of surgery  per anesthesia protocols. ), Disp: 90 Tab, Rfl: 3    Cholecalciferol, Vitamin D3, (VITAMIN D3) 2,000 unit cap capsule, Take 2,000 Units by mouth daily. EVERY OTHER DAY, Disp: , Rfl:    Date Last Reviewed:  5/25/2017     Number of Personal Factors/Comorbidities that affect the Plan of Care: 1-2: MODERATE COMPLEXITY   EXAMINATION:   Observation/Orthostatic Postural Assessment:          Pt to PT with rolling walker. Slow gait with short step length. Slight limp to L. Pt with decreased knee ext at heel strike an stance. Decreased toe off. Tight HS. In 90/90, Hs are 60. Moderate edema noted   Palpation:          Pain along medial patella and back of knee. Soreness in thigh  Functional Mobility:         Gait/Ambulation:  Independent with rolling walker        Stairs:  Unable to do reciprocal pattern.   Difficulty with single step  ROM:     L  Knee -10 to 120   R knee  0-130                                    Strength:     Knee ext  L 3+/5,  R 4/5    Knee et  L 4-/5,  R 4/5         Hip flex  L 4/5, R 4/5    Hip abd  L 3+/5,  R 4-/5    Hip ext   L 4-/5,  R 4/5              Neurological Screen: Intact to light touch  Balance:  Good with rolling walker Body Structures Involved:  1. Bones  2. Joints  3. Muscles  4. Ligaments Body Functions Affected:  1. Sensory/Pain  2. Neuromusculoskeletal  3. Movement Related Activities and Participation Affected:  1. Learning and Applying Knowledge  2. General Tasks and Demands  3. Mobility  4. Domestic Life  5. Community, Social and [place]   Number of elements (examined above) that affect the Plan of Care: 3: MODERATE COMPLEXITY   CLINICAL PRESENTATION:   Presentation: Evolving clinical presentation with changing clinical characteristics: MODERATE COMPLEXITY   CLINICAL DECISION MAKING:   Outcome Measure: Tool Used: Lower Extremity Functional Scale (LEFS)  Score:  Initial: 32/80 Most Recent: X/80 (Date: -- )   Interpretation of Score: 20 questions each scored on a 5 point scale with 0 representing \"extreme difficulty or unable to perform\" and 4 representing \"no difficulty\". The lower the score, the greater the functional disability. 80/80 represents no disability. Minimal detectable change is 9 points. Score 80 79-63 62-48 47-32 31-16 15-1 0   Modifier CH CI CJ CK CL CM CN     ? Mobility - Walking and Moving Around:     - CURRENT STATUS: CK - 40%-59% impaired, limited or restricted    - GOAL STATUS: CK - 40%-59% impaired, limited or restricted    - D/C STATUS:  ---------------To be determined---------------    Medical Necessity:   · Patient is expected to demonstrate progress in strength, range of motion, balance and gait and decreased pain to increase independence with with home and community activities. Reason for Services/Other Comments:  · Patient continues to require skilled intervention due to decreased ROM and strength of L LE as well as increased pain and fair gait.    Use of outcome tool(s) and clinical judgement create a POC that gives a: Clear prediction of patient's progress: LOW COMPLEXITY            TREATMENT:   (In addition to Assessment/Re-Assessment sessions the following treatments were rendered)  Pre-treatment Symptoms/Complaints:  Pain and stiffness of L knee. Pt reports that still with lateral knee paincthough better. Discussed that this is normal.  She did take a Dilaudid last night and slept better. Still some R hip pain post R TEREZA. See  this afternoon. To PT with straight cane. .  R hip has had more discomfort since knee surgery. Weakness of B LE's. Trying to decrease pain med . Discussed taking OTC as needed to do exercises. Pain: Initial:   Pain Intensity 1: 5  Post Session:  4     THERAPEUTIC EXERCISE: (30 minutes):  Exercises per grid below to improve mobility, strength and gait and pain. Required moderate verbal and manual cues to promote proper body alignment, promote proper body posture and promote proper body mechanics. Progressed resistance, range and repetitions as indicated. Performed  QS (quadriceps sets),standing hip abd, SAQ's (short arc quadriceps),and  LAQ's (long arc quadriceps) correcting technique as needed    HS and HC stretch. Seated HS curls at 15# x 4 sets of 10. Performed  cable for hip flex and abd x 3 sets of 10 at 12#. Complained of R hip pain and deferred cable with R LE. PT with sharp knee pain though used Dilaudid last night with some relief. Concerned about moving L LE and hurting knee. Discussed that the movement we are doing will not hurt knee though may have some pain due to not taking pain med. . Discussed that she needs to work to keep knee moving and should take OTC med if needed to do exercises. . Worked on gait with straight cane  and SBA. Worked on heel toe gait. Rudolph Becerra Performed  6 in step up x  2 sets of 10. Instructed in sitting stretch of low back and trunk rotation. Manual Therapy ( 30 min  ): Patella and patella tendon mobs. Soft tissue work to [employer] and HS. PROM for knee flex and extn. Sitting knee flex to 122. Passive ext to -3. Pt with discomfort of lateral knee and R hip. .Some relief with distraction. Kinesiotape for lateral knee pain. Therapeutic Modalities:    HEP: As above; handouts given to patient for all exercises. Treatment/Session Assessment:  Pt is post L TKA on 4/21/17. She presents with decreased ROM of L knee and decreased LE strength. Pain is a limiting factor. Discussed taking OTC for pain as she is trying to decrease the Dilaudid. She did take Dilaudid last night with better rest. . Still difficulty moving knee due to fear. .  Did well with movement during PT. Now using straight cane. Discussed need to work to increase strength to increase activity level. Pain is limiting factor. .  Work to increase ROM to increase ease of sitting and ambulation. Work to increase strenght to normalize gait and to enable return to prior activity level including walking for exercise. Pain is currently a limiting factor. Very motivated and should progress well. · Response to Treatment:  Gait with straight cane and SBA. Increased knee morion. · Compliance with Program/Exercises: Will assess as treatment progresses. · Recommendations/Intent for next treatment session: \"Next visit will focus on aggressive ROM and strengtheing of L knee and LE. \".   Total Treatment Duration:  PT Patient Time In/Time Out  Time In: 0100  Time Out: 0200  Treatment number  [address] [place], PT

## 2017-06-02 ENCOUNTER — HOSPITAL ENCOUNTER (OUTPATIENT)
Dept: PHYSICAL THERAPY | Age: 62
Discharge: HOME | End: 2017-06-02
Payer: COMMERCIAL

## 2017-06-02 PROCEDURE — 97110 THERAPEUTIC EXERCISES: CPT

## 2017-06-02 PROCEDURE — 97140 MANUAL THERAPY 1/> REGIONS: CPT

## 2017-06-02 NOTE — PROGRESS NOTES
[address] [place] [address]  : 1955 [facility] [place] [facility] [address] Therapy  [address] [place] [address], [address]  [phone]   [phone]         OUTPATIENT OCCUPATIONAL THERAPY: Daily Note 2017    ICD-10: Treatment Diagnosis: I89.0 lymphedema, not elsewhere specified  Precautions/Allergies:   Ambien [zolpidem]; Augmentin [amoxicillin-pot clavulanate]; Codeine; Contrave [naltrexone-bupropion]; Crestor [rosuvastatin]; Macrobid [nitrofurantoin monohyd/m-cryst]; Tape [adhesive]; Topamax [topiramate]; and Zocor [simvastatin]   Fall Risk Score: 1 (? 5 = High Risk)  MD Orders: lymphedema eval and treat LLE MEDICAL/REFERRING DIAGNOSIS:   Lymphedema, not elsewhere classified [I89.0]   DATE OF ONSET: 4 weeks ago   REFERRING PHYSICIAN: Nivia Landau, MD  RETURN PHYSICIAN APPOINTMENT: TBD     INITIAL ASSESSMENT:  Ms. Silvio Tejada presents with LLE lymphedema following recent L TKA. Since her surgery she has had persistent swelling of the LLE that is not resolving on its own. She will benefit from MLD, multi layer bandaging and fitting for compression to aid in promoting optimal surgical outcome of her L TKA. PLAN OF CARE:   PROBLEM LIST:  1. Edema/Girth INTERVENTIONS PLANNED:  1. Manual therapy/MLD  2. Theraputic exercise/activity  3. Multi layer bandaging  4. kinesiotaping prn  5. Fitting for compression  6. Patient education. TREATMENT PLAN:  Effective Dates: 2017 TO 2017. Frequency/Duration: 2 times a week for 8 weeks  GOALS: (Goals have been discussed and agreed upon with patient.)  Short-Term Functional Goals: Time Frame: 4 weeks  1. Patient to verbalize lymphedema precautions. 2. Patient to be independent in self bandaging of LLE  3. Patient to be independent in lymphatic exercises  Discharge Goals: Time Frame: 8 weeks  1. Patient's LLE circumferential measurements will decrease on volumetric graph by 5-8cm to aid in optimal outcome of L TKA  2.  Patient to be independent for HEP  3. Patient to be fitted for compression garments prn  Rehabilitation Potential For Stated Goals: Good  Regarding Alem Salinas's therapy, I certify that the treatment plan above will be carried out by a therapist or under their direction. Thank you for this referral,  Betsy Villarreal OT     Referring Physician Signature: Robert Bartholomew MD _________________________  Date _________            The information in this section was collected on 5/19/2017 (except where otherwise noted). OCCUPATIONAL PROFILE & HISTORY:   History of Present Injury/Illness (Reason for Referral):  Patient was referred to occupational therapy for LLE lymphedema following recent L TKA. Since her surgery she has had persistent swelling of the LLE that is not resolving on its own. She will benefit from MLD, multi layer bandaging and fitting for compression to aid in promoting optimal surgical outcome of her L TKA  Past Medical History/Comorbidities:   Ms. 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 She also has no past medical history of Adverse effect of anesthesia; Aneurysm ([address] Arrhythmia; Asthma; Autoimmune disease ([address] CAD (coronary artery disease); Cancer ([address] Chronic obstructive pulmonary disease ([address] Coagulation disorder ([address] Diabetes ([address] Difficult intubation; Endocarditis;  Heart failure ([facility] Ill-defined condition; Liver disease; Malignant hyperthermia due to anesthesia; Pseudocholinesterase deficiency; Psychiatric disorder; PUD (peptic ulcer disease); Rheumatic fever; Seizures ([facility] Stroke [facility]); or Thromboembolus ([facility] Ms. Jasmyn Marie  has a past surgical history that includes breast augmentation (1990); knee arthroscopy (Left, 10/2012); sancho and bso (2009); tonsil and adenoidectomy (1960); bunionectomy (Bilateral, 1980); orthopaedic (Bilateral, 2010); and hip replacement (Right, 11/2016). Social History/Living Environment:    patient is  and has a supportive family that lives close by  Prior Level of Function/Work/Activity:  Retired educator  Dominant Side:         RIGHT  Personal Factors:          Sex:  female        Age:  58 y.o. Current Medications:    Current Outpatient Prescriptions:     rOPINIRole (REQUIP) 0.5 mg tablet, TAKE 1 TABLET NIGHTLY AS NEEDED, Disp: 90 Tab, Rfl: 2    HYDROmorphone (DILAUDID) 2 mg tablet, Take 1 Tab by mouth every four (4) hours as needed for Pain. Max Daily Amount: 12 mg., Disp: 40 Tab, Rfl: 0    potassium chloride (K-DUR, KLOR-CON) 10 mEq tablet, Take 2 tabs today/prn, Disp: 30 Tab, Rfl: 0    verapamil ER (VERELAN PM) 300 mg capsule, Take 1 Cap by mouth nightly. (Patient taking differently: Take 300 mg by mouth daily.), Disp: 90 Cap, Rfl: 3    valsartan-hydroCHLOROthiazide (DIOVAN-HCT) 160-25 mg per tablet, Take 1 Tab by mouth daily. , Disp: 90 Tab, Rfl: 3    omeprazole (PRILOSEC) 20 mg capsule, Take 1 Cap by mouth daily. , Disp: 90 Cap, Rfl: 3    levothyroxine (SYNTHROID) 125 mcg tablet, Take 1 Tab by mouth Daily (before breakfast). Indications: HYPOTHYROIDISM (Patient taking differently: Take 125 mcg by mouth Daily (before breakfast). Take / use AM day of surgery  per anesthesia protocols. Indications: hypothyroidism), Disp: 90 Tab, Rfl: 4    atorvastatin (LIPITOR) 40 mg tablet, Take 1 Tab by mouth nightly.  (Patient taking differently: Take 40 mg by mouth daily. Take / use AM day of surgery  per anesthesia protocols. ), Disp: 90 Tab, Rfl: 3    Cholecalciferol, Vitamin D3, (VITAMIN D3) 2,000 unit cap capsule, Take 2,000 Units by mouth daily. EVERY OTHER DAY, Disp: , Rfl:             Date Last Reviewed:  6/2/2017   Complexity Level : Expanded review of therapy/medical records (1-2):  MODERATE COMPLEXITY   ASSESSMENT OF OCCUPATIONAL PERFORMANCE:   Skin Integrity:          Healed scar LLE from recent L TKA  Sensation:         intact  Palpation:          pitting edema noted LLE from mid thigh to ankle  ROM:          WFL LLE except 120 knee flexion from recent L TKA    Edema/Girth:  1+    Left Right    Initial Most Recent Initial Most Recent   Upper  Extremity           Lower  Extremity  310.5cm  Foot to upper thigh  304cm  Foot to upper thigh           Physical Skills Involved:  1. Edema/girth  2. Limb heaviness Cognitive Skills Affected (resulting in the inability to perform in a timely and safe manner):  1. Psychosocial Skills Affected:  1. Habits  2. Routines and Behaviors   Number of elements that affect the Plan of Care: 3-5:  MODERATE COMPLEXITY   CLINICAL DECISION MAKING:   Outcome Measure: Tool Used: Lymphedema Life Impact Scale   Score:  Initial: 28 Most Recent: X (Date: -- )   Interpretation of Score: The Lymphedema Life Impact Scale (LLIS) is a validated instrument that measures the physical, functional, and psychosocial concerns pertinent to patients with extremity lymphedema. The Scale's questionnaire is administered to patients to gauge impairments, activity limitations, and participation restrictions resulting from their lymphedema. Score 0 1-13 14-26 27-40 41-54 55-67 68   Modifier CH CI CJ CK CL CM CN     ?  Other PT/OT Primary Functional Limitations:     - CURRENT STATUS: CK - 40%-59% impaired, limited or restricted    - GOAL STATUS: CJ - 20%-39% impaired, limited or restricted    - D/C STATUS:  ---------------To be determined---------------     Medical Necessity:   · Skilled intervention continues to be required due to LLE lymphedema post L TKA. Reason for Services/Other Comments:  · Patient continues to require skilled intervention due to patient's inability to self manage LLE lymphedema post L TKA. Clinical Decision-Making Assessment:  Patient requires skilled OT services to address LLE lymphedema post L TKA. She will benefit from MLD, multi layer bandaging, fitting for compression and patient education for self management principles of her lymphedema   Assessment process, impact of co-morbidities, assessment modification\need for assistance, and selection of interventions: Analytical Complexity:MODERATE COMPLEXITY   TREATMENT:   (In addition to Assessment/Re-Assessment sessions the following treatments were rendered)    Pre-treatment Symptoms/Complaints:  LLE lymphedema post L TKA - patient has no new complaints. Pain: Initial:   Pain Intensity 1: 4  Post Session:  1:5     OT eval completed followed by patient education for: lymphedema signs/symptoms, treatment guidelines and compression options. Manual (60 minutes):  MLD to LLE/trunk followed by measuring of the LLE from the foot to upper thigh and since therapy began she has lost 13.5cm in limb size - limb size being maintained. . Due to bandages becoming loose surgigrip will be used for her compression. Patient is continuing with knee rehab exercises with surgigrip on to aid in promoting lymphatic flow. Treatment/Session Assessment:    · Response to Treatment:  Patient tolerated treatment well with no complications. . Nevaeh Maxx will be used for compression. She is already responding to MLD and compression as evidenced by 13.5cm loss in LLE limb size. BLE's are now equal in size. Iff LLE maintains limb siize next week will drop down to 1x/week for MLD sessions.   · Compliance with Program/Exercises: good to date  · Recommendations/Intent for next treatment session: \"Next visit will focus on lymphedema treatment guidelines to decrease LLE lymphedema for optimal knee rehab outcome for recent L TKA\".   Total Treatment Duration:  OT Patient Time In/Time Out  Time In: 1000  Time Out: [address], [place]

## 2017-06-02 NOTE — PROGRESS NOTES
[facility]   [address], [address]  [phone]   [phone]    Tamika Raymundo  : 1955       OUTPATIENT PHYSICAL THERAPY:Daily Note 2017    ICD-10: Treatment Diagnosis:   R26.2 Difficulty in walking, not elsewhere classified     M25.662 Stiffness of left knee, not elsewhere classified     M25.562 Pain in left knee     Precautions/Allergies:   Ambien [zolpidem]; Augmentin [amoxicillin-pot clavulanate]; Codeine; Contrave [naltrexone-bupropion]; Crestor [rosuvastatin]; Macrobid [nitrofurantoin monohyd/m-cryst]; Tape [adhesive]; Topamax [topiramate]; and Zocor [simvastatin]   Fall Risk Score: 1 (? 5 = High Risk)  MD Orders: Eval and treat MEDICAL/REFERRING DIAGNOSIS:  Presence of left artificial knee joint [Z96.652]   DATE OF ONSET:   REFERRING PHYSICIAN: Ulysses Jovel, *  RETURN PHYSICIAN APPOINTMENT: 2 weeks      INITIAL ASSESSMENT:  Ms. Swati Richter presents with decreased ROM of L knee, weakness of both LE's, painin R knee and fair gait post L TKA on 17. PT will progress from rolling walker to straight cane. Work to increase ROM and strength to enable return to prior activity level. PROBLEM LIST (Impacting functional limitations):  1. Decreased Strength  2. Decreased Ambulation Ability/Technique  3. Increased Pain  4. Increased Fatigue  5. Decreased Flexibility/Joint Mobility  6. Edema/Girth INTERVENTIONS PLANNED:  1. Home Exercise Program (HEP)  2. Manual Therapy  3. Range of Motion (ROM)  4. Therapeutic Activites  5. Therapeutic Exercise/Strengthening   TREATMENT PLAN:  Effective Dates: 17 TO 17. Frequency/Duration: 2 times a week for 12 weeks and upon reassessment,  will adjust frequency and duration as progress indicates. GOALS: (Goals have been discussed and agreed upon with patient.)  Short-Term Functional Goals: Time Frame: 4 weeks  1.  Establish independent HEP with no cueing to increase ROM and strength  2. Increase L knee ROM to 0-125 to increase ease of sitting and ambulation    3. Independent gait with straight cane in home and community  4. Increase strength to enable initiation of reciprocal pattern on stairs. .  Discharge Goals: Time Frame: 12 weeks   1. Improve score on LEFS by 9 points to enable prolonged sitting, standing and ambulation   2. Independent gait without assistive device in home and community  3. Increase LE strength 1/2 to 1 grade to enable reciprocal pattern on stairs. 4. Able to ambulate 20 min with minimal to no increase in pain  Rehabilitation Potential For Stated Goals: Good  Regarding Olivia Salinas's therapy, I certify that the treatment plan above will be carried out by a therapist or under their direction. Thank you for this referral,  Ashley Solis, PT     Referring Physician Signature: Rosy Amador., *              Date                    The information in this section was collected on 5/16/17 (except where otherwise noted). HISTORY:   History of Present Injury/Illness (Reason for Referral):  Pt is post L TKA on 4/21/17. Pt initially injured her L knee in 2009 secondary to a fall. She had an arthroscopy for MMT. Reports that she never had complete relief of pain after surgery and that it has become progressively worse. She had Home PT for 8 visits until yesterday. She presents with decreased ROM of L knee, weakness of both LE's and fair gait. She is referred to outpatient PT for ROM and strengthening of L knee and LE as well as gait training. Past Medical History/Comorbidities:   Ms. 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 She also has no past medical history of Adverse effect of anesthesia; Aneurysm ([facility] Arrhythmia; Asthma; Autoimmune disease ([facility] CAD (coronary artery disease); Cancer ([address] Chronic obstructive pulmonary disease ([address] Coagulation disorder ([address] Diabetes ([address] Difficult intubation; Endocarditis; Heart failure ([facility] Ill-defined condition; Liver disease; Malignant hyperthermia due to anesthesia; Pseudocholinesterase deficiency; Psychiatric disorder; PUD (peptic ulcer disease); Rheumatic fever; Seizures ([facility] Stroke [facility]); or Thromboembolus ([facility] Ms. Charmaine Hager  has a past surgical history that includes breast augmentation (1990); knee arthroscopy (Left, 10/2012); sancho and bso (2009); tonsil and adenoidectomy (1960); bunionectomy (Bilateral, 1980); orthopaedic (Bilateral, 2010); and hip replacement (Right, 11/2016). 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 Wants to be able to walk without pain and be able to travel  Dominant Side:         RIGHT  Current Medications:       Current Outpatient Prescriptions:     rOPINIRole (REQUIP) 0.5 mg tablet, TAKE 1 TABLET NIGHTLY AS NEEDED, Disp: 90 Tab, Rfl: 2    HYDROmorphone (DILAUDID) 2 mg tablet, Take 1 Tab by mouth every four (4) hours as needed for Pain. Max Daily Amount: 12 mg., Disp: 40 Tab, Rfl: 0    potassium chloride (K-DUR, KLOR-CON) 10 mEq tablet, Take 2 tabs today/prn, Disp: 30 Tab, Rfl: 0    verapamil ER (VERELAN PM) 300 mg capsule, Take 1 Cap by mouth nightly.  (Patient taking differently: Take 300 mg by mouth daily.), Disp: 90 Cap, Rfl: 3   valsartan-hydroCHLOROthiazide (DIOVAN-HCT) 160-25 mg per tablet, Take 1 Tab by mouth daily. , Disp: 90 Tab, Rfl: 3    omeprazole (PRILOSEC) 20 mg capsule, Take 1 Cap by mouth daily. , Disp: 90 Cap, Rfl: 3    levothyroxine (SYNTHROID) 125 mcg tablet, Take 1 Tab by mouth Daily (before breakfast). Indications: HYPOTHYROIDISM (Patient taking differently: Take 125 mcg by mouth Daily (before breakfast). Take / use AM day of surgery  per anesthesia protocols. Indications: hypothyroidism), Disp: 90 Tab, Rfl: 4    atorvastatin (LIPITOR) 40 mg tablet, Take 1 Tab by mouth nightly. (Patient taking differently: Take 40 mg by mouth daily. Take / use AM day of surgery  per anesthesia protocols. ), Disp: 90 Tab, Rfl: 3    Cholecalciferol, Vitamin D3, (VITAMIN D3) 2,000 unit cap capsule, Take 2,000 Units by mouth daily. EVERY OTHER DAY, Disp: , Rfl:    Date Last Reviewed:  6/2/2017     Number of Personal Factors/Comorbidities that affect the Plan of Care: 1-2: MODERATE COMPLEXITY   EXAMINATION:   Observation/Orthostatic Postural Assessment:          Pt to PT with rolling walker. Slow gait with short step length. Slight limp to L. Pt with decreased knee ext at heel strike an stance. Decreased toe off. Tight HS. In 90/90, Hs are 60. Moderate edema noted   Palpation:          Pain along medial patella and back of knee. Soreness in thigh  Functional Mobility:         Gait/Ambulation:  Independent with rolling walker        Stairs:  Unable to do reciprocal pattern. Difficulty with single step  ROM:     L  Knee -10 to 120   R knee  0-130                                    Strength:     Knee ext  L 3+/5,  R 4/5    Knee et  L 4-/5,  R 4/5         Hip flex  L 4/5, R 4/5    Hip abd  L 3+/5,  R 4-/5    Hip ext   L 4-/5,  R 4/5              Neurological Screen: Intact to light touch  Balance:  Good with rolling walker   Body Structures Involved:  1. Bones  2. Joints  3. Muscles  4.  Ligaments Body Functions Affected:  1. Sensory/Pain  2. Neuromusculoskeletal  3. Movement Related Activities and Participation Affected:  1. Learning and Applying Knowledge  2. General Tasks and Demands  3. Mobility  4. Domestic Life  5. Community, Social and [place]   Number of elements (examined above) that affect the Plan of Care: 3: MODERATE COMPLEXITY   CLINICAL PRESENTATION:   Presentation: Evolving clinical presentation with changing clinical characteristics: MODERATE COMPLEXITY   CLINICAL DECISION MAKING:   Outcome Measure: Tool Used: Lower Extremity Functional Scale (LEFS)  Score:  Initial: 32/80 Most Recent: X/80 (Date: -- )   Interpretation of Score: 20 questions each scored on a 5 point scale with 0 representing \"extreme difficulty or unable to perform\" and 4 representing \"no difficulty\". The lower the score, the greater the functional disability. 80/80 represents no disability. Minimal detectable change is 9 points. Score 80 79-63 62-48 47-32 31-16 15-1 0   Modifier CH CI CJ CK CL CM CN     ? Mobility - Walking and Moving Around:     - CURRENT STATUS: CK - 40%-59% impaired, limited or restricted    - GOAL STATUS: CK - 40%-59% impaired, limited or restricted    - D/C STATUS:  ---------------To be determined---------------    Medical Necessity:   · Patient is expected to demonstrate progress in strength, range of motion, balance and gait and decreased pain to increase independence with with home and community activities. Reason for Services/Other Comments:  · Patient continues to require skilled intervention due to decreased ROM and strength of L LE as well as increased pain and fair gait.    Use of outcome tool(s) and clinical judgement create a POC that gives a: Clear prediction of patient's progress: LOW COMPLEXITY            TREATMENT:   (In addition to Assessment/Re-Assessment sessions the following treatments were rendered)  Pre-treatment Symptoms/Complaints:   Pt reports that still with lateral knee pain.  When I move my knee a certain way I get a sharp pain. Discussed that this not unusual.  Tends to be with slight rotation motion and discussed not twisting knee. Still some R hip pain with prolonged weight bearing post R TEREZA. To PT with straight cane. Weakness of B LE's. Trying to decrease pain med . Pain: Initial:   Pain Intensity 1: 4  Post Session:  3     THERAPEUTIC EXERCISE: (30 minutes):  Exercises per grid below to improve mobility, strength and gait and pain. Required moderate verbal and manual cues to promote proper body alignment, promote proper body posture and promote proper body mechanics. Progressed resistance, range and repetitions as indicated. Performed  QS (quadriceps sets),standing hip abd, SAQ's (short arc quadriceps),and  LAQ's (long arc quadriceps) correcting technique as needed    HS and HC stretch. Seated HS curls at 15# x 4 sets of 10. Performed  cable for hip flex and abd x 3 sets of 10 at 12#. Complained of R hip pain and deferred cable with R LE. Concerned about moving L LE and hurting knee. Discussed that the movement we are doing will not hurt knee though may have some pain due to not taking pain med. . Discussed that she needs to work to keep knee moving and should take OTC med if needed to do exercises. Worked on gait with straight cane  and SBA. Worked on heel toe gait. Performed  sitting stretch of low back and trunk rotation. Manual Therapy ( 30 min  ): Patella and patella tendon mobs. Soft tissue work to [employer] and HS. PROM for knee flex and extn. Sitting knee flex to 125. Passive ext to -2. Pt with discomfort of lateral knee and R hip. .Some relief with distraction. Therapeutic Modalities:    HEP: As above; handouts given to patient for all exercises. Treatment/Session Assessment:  Pt is post L TKA on 4/21/17. She presents with decreased ROM of L knee and decreased LE strength. Pain is a limiting factor.  Discussed taking OTC for pain as she is trying to decrease the Dilaudid. She did take Dilaudid at night with better rest. .Better knee flexion today. .  Did well with movement during PT. Using straight cane. Discussed need to work to increase strength to increase activity level. Pain is limiting factor. Work to increase ROM to increase ease of sitting and ambulation. Work to increase strength to normalize gait and to enable return to prior activity level including walking for exercise. Needs to work to increase strength. Pain is currently a limiting factor. Very motivated and should progress well. · Response to Treatment:  Gait with straight cane and SBA. Increased knee morion. · Compliance with Program/Exercises: Will assess as treatment progresses. · Recommendations/Intent for next treatment session: \"Next visit will focus on aggressive ROM and strengtheing of L knee and LE. \".   Total Treatment Duration:  PT Patient Time In/Time Out  Time In: 0900  Time Out: 1000  Treatment number  [address], PT

## 2017-06-05 ENCOUNTER — HOSPITAL ENCOUNTER (OUTPATIENT)
Dept: PHYSICAL THERAPY | Age: 62
Discharge: HOME | End: 2017-06-05
Payer: COMMERCIAL

## 2017-06-05 PROCEDURE — 97140 MANUAL THERAPY 1/> REGIONS: CPT

## 2017-06-05 PROCEDURE — 97110 THERAPEUTIC EXERCISES: CPT

## 2017-06-05 NOTE — PROGRESS NOTES
[address] [place] [address]  : 1955 [facility] [place] [address] [facility]  [address], [address]  [phone]   [phone]         OUTPATIENT OCCUPATIONAL THERAPY:Progress and Daily Note 2017    ICD-10: Treatment Diagnosis: I89.0 lymphedema, not elsewhere specified  Precautions/Allergies:   Ambien [zolpidem]; Augmentin [amoxicillin-pot clavulanate]; Codeine; Contrave [naltrexone-bupropion]; Crestor [rosuvastatin]; Macrobid [nitrofurantoin monohyd/m-cryst]; Tape [adhesive]; Topamax [topiramate]; and Zocor [simvastatin]   Fall Risk Score: 1 (? 5 = High Risk)  MD Orders: lymphedema eval and treat LLE MEDICAL/REFERRING DIAGNOSIS:   Lymphedema, not elsewhere classified [I89.0]   DATE OF ONSET: 4 weeks ago   REFERRING PHYSICIAN: Arianna Galeas MD  RETURN PHYSICIAN APPOINTMENT: TBD     INITIAL ASSESSMENT:  Ms. Swati Richter presents with LLE lymphedema following recent L TKA. Since her surgery she has had persistent swelling of the LLE that is not resolving on its own. She will benefit from MLD, multi layer bandaging and fitting for compression to aid in promoting optimal surgical outcome of her L TKA. PLAN OF CARE:   PROBLEM LIST:  1. Edema/Girth INTERVENTIONS PLANNED:  1. Manual therapy/MLD  2. Theraputic exercise/activity  3. Multi layer bandaging  4. kinesiotaping prn  5. Fitting for compression  6. Patient education. TREATMENT PLAN:  Effective Dates: 2017 TO 2017. Frequency/Duration: 2 times a week for 8 weeks  GOALS: (Goals have been discussed and agreed upon with patient.)  Short-Term Functional Goals: Time Frame: 4 weeks STG MET  1. Patient to verbalize lymphedema precautions. 2. Patient to be independent in self bandaging of LLE  3. Patient to be independent in lymphatic exercises  Discharge Goals: Time Frame: 8 weeks  1.  Patient's LLE circumferential measurements will decrease on volumetric graph by 5-8cm to aid in optimal outcome of L TKA - GOAL MET  2. Patient to be independent for HEP - GOAL ONGOING  3. Patient to be fitted for compression garments prn - GOAL ONGOING  Rehabilitation Potential For Stated Goals: Good  Regarding Ladonna Salinas's therapy, I certify that the treatment plan above will be carried out by a therapist or under their direction. Thank you for this referral,  Maye Lugo OT     Referring Physician Signature: Babak Jordan MD _________________________  Date _________            The information in this section was collected on 5/19/2017 (except where otherwise noted). OCCUPATIONAL PROFILE & HISTORY:   History of Present Injury/Illness (Reason for Referral):  Patient was referred to occupational therapy for LLE lymphedema following recent L TKA. Since her surgery she has had persistent swelling of the LLE that is not resolving on its own. She will benefit from MLD, multi layer bandaging and fitting for compression to aid in promoting optimal surgical outcome of her L TKA  Past Medical History/Comorbidities:   Ms. 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 She also has no past medical history of Adverse effect of anesthesia; Aneurysm ([address] Arrhythmia; Asthma; Autoimmune disease ([address] CAD (coronary artery disease); Cancer ([address] Chronic obstructive pulmonary disease ([address]  Coagulation disorder ([address] Diabetes ([facility] Difficult intubation; Endocarditis; Heart failure ([facility] Ill-defined condition; Liver disease; Malignant hyperthermia due to anesthesia; Pseudocholinesterase deficiency; Psychiatric disorder; PUD (peptic ulcer disease); Rheumatic fever; Seizures ([facility] Stroke [facility]); or Thromboembolus ([facility] Ms. Milla Cabrera  has a past surgical history that includes breast augmentation (1990); knee arthroscopy (Left, 10/2012); sancho and bso (2009); tonsil and adenoidectomy (1960); bunionectomy (Bilateral, 1980); orthopaedic (Bilateral, 2010); and hip replacement (Right, 11/2016). Social History/Living Environment:    patient is  and has a supportive family that lives close by  Prior Level of Function/Work/Activity:  Retired educator  Dominant Side:         RIGHT  Personal Factors:          Sex:  female        Age:  58 y.o. Current Medications:    Current Outpatient Prescriptions:     rOPINIRole (REQUIP) 0.5 mg tablet, TAKE 1 TABLET NIGHTLY AS NEEDED, Disp: 90 Tab, Rfl: 2    HYDROmorphone (DILAUDID) 2 mg tablet, Take 1 Tab by mouth every four (4) hours as needed for Pain. Max Daily Amount: 12 mg., Disp: 40 Tab, Rfl: 0    potassium chloride (K-DUR, KLOR-CON) 10 mEq tablet, Take 2 tabs today/prn, Disp: 30 Tab, Rfl: 0    verapamil ER (VERELAN PM) 300 mg capsule, Take 1 Cap by mouth nightly. (Patient taking differently: Take 300 mg by mouth daily.), Disp: 90 Cap, Rfl: 3    valsartan-hydroCHLOROthiazide (DIOVAN-HCT) 160-25 mg per tablet, Take 1 Tab by mouth daily. , Disp: 90 Tab, Rfl: 3    omeprazole (PRILOSEC) 20 mg capsule, Take 1 Cap by mouth daily. , Disp: 90 Cap, Rfl: 3    levothyroxine (SYNTHROID) 125 mcg tablet, Take 1 Tab by mouth Daily (before breakfast). Indications: HYPOTHYROIDISM (Patient taking differently: Take 125 mcg by mouth Daily (before breakfast). Take / use AM day of surgery  per anesthesia protocols.    Indications: hypothyroidism), Disp: 90 Tab, Rfl: 4    atorvastatin (LIPITOR) 40 mg tablet, Take 1 Tab by mouth nightly. (Patient taking differently: Take 40 mg by mouth daily. Take / use AM day of surgery  per anesthesia protocols. ), Disp: 90 Tab, Rfl: 3    Cholecalciferol, Vitamin D3, (VITAMIN D3) 2,000 unit cap capsule, Take 2,000 Units by mouth daily. EVERY OTHER DAY, Disp: , Rfl:             Date Last Reviewed:  6/5/2017   Complexity Level : Expanded review of therapy/medical records (1-2):  MODERATE COMPLEXITY   ASSESSMENT OF OCCUPATIONAL PERFORMANCE:   Skin Integrity:          Healed scar LLE from recent L TKA  Sensation:         intact  Palpation:          Mild edema noted LLE from mid thigh to ankle - since therapy began 15.5cm in LLE limb size (mid thigh to foot)  ROM:          WFL LLE except 125 knee flexion from recent L TKA    Edema/Girth:  1+    Left Right    Initial Most Recent Initial Most Recent   Upper  Extremity           Lower  Extremity  310.5cm  Foot to upper thigh  295cm  Foot to upper thigh           Physical Skills Involved:  1. Edema/girth  2. Limb heaviness Cognitive Skills Affected (resulting in the inability to perform in a timely and safe manner):  1. Psychosocial Skills Affected:  1. Habits  2. Routines and Behaviors   Number of elements that affect the Plan of Care: 3-5:  MODERATE COMPLEXITY   CLINICAL DECISION MAKING:   Outcome Measure: Tool Used: Lymphedema Life Impact Scale   Score:  Initial: 28 Most Recent: X (Date: -- )   Interpretation of Score: The Lymphedema Life Impact Scale (LLIS) is a validated instrument that measures the physical, functional, and psychosocial concerns pertinent to patients with extremity lymphedema. The Scale's questionnaire is administered to patients to gauge impairments, activity limitations, and participation restrictions resulting from their lymphedema. Score 0 1-13 14-26 27-40 41-54 55-67 68   Modifier CH CI CJ CK CL CM CN     ?  Other PT/OT Primary Functional Limitations:     - CURRENT STATUS: CK - 40%-59% impaired, limited or restricted    - GOAL STATUS: CJ - 20%-39% impaired, limited or restricted    - D/C STATUS:  ---------------To be determined---------------     Medical Necessity:   · Skilled intervention continues to be required due to LLE lymphedema post L TKA. Reason for Services/Other Comments:  · Patient continues to require skilled intervention due to patient's inability to self manage LLE lymphedema post L TKA. Clinical Decision-Making Assessment:  Patient requires skilled OT services to address LLE lymphedema post L TKA. She will benefit from MLD, multi layer bandaging, fitting for compression and patient education for self management principles of her lymphedema   Assessment process, impact of co-morbidities, assessment modification\need for assistance, and selection of interventions: Analytical Complexity:MODERATE COMPLEXITY   TREATMENT:   (In addition to Assessment/Re-Assessment sessions the following treatments were rendered)    Pre-treatment Symptoms/Complaints:  LLE lymphedema post L TKA - patient has no new complaints. She feels functional use of LLE is improving. Pain: Initial:   Pain Intensity 1: 4  Post Session:  1:5     OT eval completed followed by patient education for: lymphedema signs/symptoms, treatment guidelines and compression options. Manual (55 minutes):  MLD to LLE/trunk followed by measuring of the LLE from the foot to upper thigh and since therapy began she has lost 15.5cm in limb size - limb size being maintained. . Due to bandages becoming loose surgigrip will be used for her compression. Patient is continuing with knee rehab exercises with surgigrip on to aid in promoting lymphatic flow. Treatment/Session Assessment:    · Response to Treatment:  Patient tolerated treatment well with no complications. . Chiquis Mole will be used for compression. She is responding to MLD and compression as evidenced by 15.5cm loss in LLE limb size. BLE's are now equal in size.   Patient will now be seen prn if any issues with LLE lymphedema arises. · Compliance with Program/Exercises: good to date  · Recommendations/Intent for next treatment session: \"Next visit will focus on lymphedema treatment guidelines to decrease LLE lymphedema for optimal knee rehab outcome for recent L TKA  . Patient will be seen prn if any LLE lymphedema issues arise. \"  Total Treatment Duration:  OT Patient Time In/Time Out  Time In: 1005  Time Out: One Richardson Browne OT

## 2017-06-06 NOTE — PROGRESS NOTES
[facility]   [address], [address]  [phone]   [phone]    Mercy Garcia  : 1955       OUTPATIENT PHYSICAL THERAPY:Daily Note 2017    ICD-10: Treatment Diagnosis:   R26.2 Difficulty in walking, not elsewhere classified     M25.662 Stiffness of left knee, not elsewhere classified     M25.562 Pain in left knee     Precautions/Allergies:   Ambien [zolpidem]; Augmentin [amoxicillin-pot clavulanate]; Codeine; Contrave [naltrexone-bupropion]; Crestor [rosuvastatin]; Macrobid [nitrofurantoin monohyd/m-cryst]; Tape [adhesive]; Topamax [topiramate]; and Zocor [simvastatin]   Fall Risk Score: 1 (? 5 = High Risk)  MD Orders: Eval and treat MEDICAL/REFERRING DIAGNOSIS:  Presence of left artificial knee joint [Z96.652]   DATE OF ONSET:   REFERRING PHYSICIAN: Eduarda De La Fuente., *  RETURN PHYSICIAN APPOINTMENT: 2 weeks      INITIAL ASSESSMENT:  Ms. Karol Cai presents with decreased ROM of L knee, weakness of both LE's, painin R knee and fair gait post L TKA on 17. PT will progress from rolling walker to straight cane. Work to increase ROM and strength to enable return to prior activity level. PROBLEM LIST (Impacting functional limitations):  1. Decreased Strength  2. Decreased Ambulation Ability/Technique  3. Increased Pain  4. Increased Fatigue  5. Decreased Flexibility/Joint Mobility  6. Edema/Girth INTERVENTIONS PLANNED:  1. Home Exercise Program (HEP)  2. Manual Therapy  3. Range of Motion (ROM)  4. Therapeutic Activites  5. Therapeutic Exercise/Strengthening   TREATMENT PLAN:  Effective Dates: 17 TO 17. Frequency/Duration: 2 times a week for 12 weeks and upon reassessment,  will adjust frequency and duration as progress indicates. GOALS: (Goals have been discussed and agreed upon with patient.)  Short-Term Functional Goals: Time Frame: 4 weeks  1.  Establish independent HEP with no cueing to increase ROM and strength  2. Increase L knee ROM to 0-125 to increase ease of sitting and ambulation    3. Independent gait with straight cane in home and community  4. Increase strength to enable initiation of reciprocal pattern on stairs. .  Discharge Goals: Time Frame: 12 weeks   1. Improve score on LEFS by 9 points to enable prolonged sitting, standing and ambulation   2. Independent gait without assistive device in home and community  3. Increase LE strength 1/2 to 1 grade to enable reciprocal pattern on stairs. 4. Able to ambulate 20 min with minimal to no increase in pain  Rehabilitation Potential For Stated Goals: Good  Regarding Rut Salinas's therapy, I certify that the treatment plan above will be carried out by a therapist or under their direction. Thank you for this referral,  Leanne Ramirez, PT     Referring Physician Signature: Rom Sen., *              Date                    The information in this section was collected on 5/16/17 (except where otherwise noted). HISTORY:   History of Present Injury/Illness (Reason for Referral):  Pt is post L TKA on 4/21/17. Pt initially injured her L knee in 2009 secondary to a fall. She had an arthroscopy for MMT. Reports that she never had complete relief of pain after surgery and that it has become progressively worse. She had Home PT for 8 visits until yesterday. She presents with decreased ROM of L knee, weakness of both LE's and fair gait. She is referred to outpatient PT for ROM and strengthening of L knee and LE as well as gait training. Past Medical History/Comorbidities:   Ms. 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 She also has no past medical history of Adverse effect of anesthesia; Aneurysm ([facility] Arrhythmia; Asthma; Autoimmune disease ([facility] CAD (coronary artery disease); Cancer ([address] Chronic obstructive pulmonary disease ([address] Coagulation disorder ([address] Diabetes ([address] Difficult intubation; Endocarditis; Heart failure ([address] Ill-defined condition; Liver disease; Malignant hyperthermia due to anesthesia; Pseudocholinesterase deficiency; Psychiatric disorder; PUD (peptic ulcer disease); Rheumatic fever; Seizures ([facility] Stroke [facility]); or Thromboembolus ([facility] Ms. Justice Alvarenga  has a past surgical history that includes breast augmentation (1990); knee arthroscopy (Left, 10/2012); sancho and bso (2009); tonsil and adenoidectomy (1960); bunionectomy (Bilateral, 1980); orthopaedic (Bilateral, 2010); and hip replacement (Right, 11/2016). 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 Wants to be able to walk without pain and be able to travel  Dominant Side:         RIGHT  Current Medications:       Current Outpatient Prescriptions:     rOPINIRole (REQUIP) 0.5 mg tablet, TAKE 1 TABLET NIGHTLY AS NEEDED, Disp: 90 Tab, Rfl: 2    HYDROmorphone (DILAUDID) 2 mg tablet, Take 1 Tab by mouth every four (4) hours as needed for Pain. Max Daily Amount: 12 mg., Disp: 40 Tab, Rfl: 0    potassium chloride (K-DUR, KLOR-CON) 10 mEq tablet, Take 2 tabs today/prn, Disp: 30 Tab, Rfl: 0    verapamil ER (VERELAN PM) 300 mg capsule, Take 1 Cap by mouth nightly.  (Patient taking differently: Take 300 mg by mouth daily.), Disp: 90 Cap, Rfl: 3   valsartan-hydroCHLOROthiazide (DIOVAN-HCT) 160-25 mg per tablet, Take 1 Tab by mouth daily. , Disp: 90 Tab, Rfl: 3    omeprazole (PRILOSEC) 20 mg capsule, Take 1 Cap by mouth daily. , Disp: 90 Cap, Rfl: 3    levothyroxine (SYNTHROID) 125 mcg tablet, Take 1 Tab by mouth Daily (before breakfast). Indications: HYPOTHYROIDISM (Patient taking differently: Take 125 mcg by mouth Daily (before breakfast). Take / use AM day of surgery  per anesthesia protocols. Indications: hypothyroidism), Disp: 90 Tab, Rfl: 4    atorvastatin (LIPITOR) 40 mg tablet, Take 1 Tab by mouth nightly. (Patient taking differently: Take 40 mg by mouth daily. Take / use AM day of surgery  per anesthesia protocols. ), Disp: 90 Tab, Rfl: 3    Cholecalciferol, Vitamin D3, (VITAMIN D3) 2,000 unit cap capsule, Take 2,000 Units by mouth daily. EVERY OTHER DAY, Disp: , Rfl:    Date Last Reviewed:  6/5/2017     Number of Personal Factors/Comorbidities that affect the Plan of Care: 1-2: MODERATE COMPLEXITY   EXAMINATION:   Observation/Orthostatic Postural Assessment:          Pt to PT with rolling walker. Slow gait with short step length. Slight limp to L. Pt with decreased knee ext at heel strike an stance. Decreased toe off. Tight HS. In 90/90, Hs are 60. Moderate edema noted   Palpation:          Pain along medial patella and back of knee. Soreness in thigh  Functional Mobility:         Gait/Ambulation:  Independent with rolling walker        Stairs:  Unable to do reciprocal pattern. Difficulty with single step  ROM:     L  Knee -10 to 120   R knee  0-130                                    Strength:     Knee ext  L 3+/5,  R 4/5    Knee et  L 4-/5,  R 4/5         Hip flex  L 4/5, R 4/5    Hip abd  L 3+/5,  R 4-/5    Hip ext   L 4-/5,  R 4/5              Neurological Screen: Intact to light touch  Balance:  Good with rolling walker   Body Structures Involved:  1. Bones  2. Joints  3. Muscles  4.  Ligaments Body Functions Affected:  1. Sensory/Pain  2. Neuromusculoskeletal  3. Movement Related Activities and Participation Affected:  1. Learning and Applying Knowledge  2. General Tasks and Demands  3. Mobility  4. Domestic Life  5. Community, Social and [place]   Number of elements (examined above) that affect the Plan of Care: 3: MODERATE COMPLEXITY   CLINICAL PRESENTATION:   Presentation: Evolving clinical presentation with changing clinical characteristics: MODERATE COMPLEXITY   CLINICAL DECISION MAKING:   Outcome Measure: Tool Used: Lower Extremity Functional Scale (LEFS)  Score:  Initial: 32/80 Most Recent: X/80 (Date: -- )   Interpretation of Score: 20 questions each scored on a 5 point scale with 0 representing \"extreme difficulty or unable to perform\" and 4 representing \"no difficulty\". The lower the score, the greater the functional disability. 80/80 represents no disability. Minimal detectable change is 9 points. Score 80 79-63 62-48 47-32 31-16 15-1 0   Modifier CH CI CJ CK CL CM CN     ? Mobility - Walking and Moving Around:     - CURRENT STATUS: CK - 40%-59% impaired, limited or restricted    - GOAL STATUS: CK - 40%-59% impaired, limited or restricted    - D/C STATUS:  ---------------To be determined---------------    Medical Necessity:   · Patient is expected to demonstrate progress in strength, range of motion, balance and gait and decreased pain to increase independence with with home and community activities. Reason for Services/Other Comments:  · Patient continues to require skilled intervention due to decreased ROM and strength of L LE as well as increased pain and fair gait.    Use of outcome tool(s) and clinical judgement create a POC that gives a: Clear prediction of patient's progress: LOW COMPLEXITY            TREATMENT:   (In addition to Assessment/Re-Assessment sessions the following treatments were rendered)  Pre-treatment Symptoms/Complaints:   Pt reports that she still feels like she has  a \"catch\" at lateral knee but has been able to stop it at times. When I move my knee a certain way I get a sharp pain. Tends to be with slight rotation motion and discussed trying not to twist knee. Still some R hip pain with prolonged weight bearing post R TEREZA. To PT with straight cane. Trying to decrease pain med . Pain: Initial:   Pain Intensity 1: 4  Post Session:  3     THERAPEUTIC EXERCISE: (30 minutes):  Exercises per grid below to improve mobility, strength and gait and pain. Required moderate verbal and manual cues to promote proper body alignment, promote proper body posture and promote proper body mechanics. Progressed resistance, range and repetitions as indicated. Performed  QS (quadriceps sets),standing hip abd, SAQ's (short arc quadriceps),and  LAQ's (long arc quadriceps) correcting technique as needed    HS and HC stretch. Seated HS curls at 20# x 4 sets of 10. Performed  cable for hip flex and abd x 2 sets of 15 at 17# and hip ext at 17# x 2 sets of 15. Still some discomfort with R hip with prolonged stance but able to do exercises. Worked on 6 in step up x 2 sets of 10 with each LE and step up and over with L LE x 10. Jeremy Rai Discussed that she needs to work to keep knee moving and should take OTC med if needed to do exercises. Worked on gait with straight cane  and SBA. Worked on heel toe gait. Improved gait. NuStep x 10 min at level 3. Manual Therapy ( 30 min  ): Patella and patella tendon mobs. Soft tissue work to [employer] and HS. PROM for knee flex and extn. Sitting knee flex to 125. Passive ext to neutral with overpressure. .  Pt with discomfort of lateral knee and R hip. Still present but less intense. Therapeutic Modalities:    HEP: As above; handouts given to patient for all exercises. Treatment/Session Assessment:  Pt is post L TKA on 4/21/17. She presents with decreased ROM of L knee and decreased LE strength. Pain is a limiting factor.  Discussed taking OTC for pain as she is trying to decrease the Dilaudid. She did take Dilaudid at night with better rest. Improved ease of motion. Did well with movement during PT. Using straight cane. Discussed need to work to increase strength to increase activity level. Tolerating strengthening exercises better and able to do more. Work to increase ROM to increase ease of sitting and ambulation. Work to increase strength to normalize gait and to enable return to prior activity level including walking for exercise. Discussed walking at home for about 5 min. Needs to work to increase strength. Pain is currently a limiting factor. .   · Response to Treatment:  Gait with straight cane and SBA. Increased knee morion. · Compliance with Program/Exercises: Will assess as treatment progresses. · Recommendations/Intent for next treatment session: \"Next visit will focus on aggressive ROM and strengtheing of L knee and LE. \".   Total Treatment Duration:  PT Patient Time In/Time Out  Time In: 0900  Time Out: 1000  Treatment number  [id number]

## 2017-06-08 ENCOUNTER — HOSPITAL ENCOUNTER (OUTPATIENT)
Dept: PHYSICAL THERAPY | Age: 62
Discharge: HOME | End: 2017-06-08
Payer: COMMERCIAL

## 2017-06-08 PROCEDURE — 97140 MANUAL THERAPY 1/> REGIONS: CPT

## 2017-06-08 PROCEDURE — 97110 THERAPEUTIC EXERCISES: CPT

## 2017-06-08 NOTE — PROGRESS NOTES
[facility] at [facility]   [address], [address] [facility] [phone]   [phone]    April Diana  : 1955       OUTPATIENT PHYSICAL THERAPY:Daily Note 2017    ICD-10: Treatment Diagnosis:   R26.2 Difficulty in walking, not elsewhere classified     M25.662 Stiffness of left knee, not elsewhere classified     M25.562 Pain in left knee     Precautions/Allergies:   Ambien [zolpidem]; Augmentin [amoxicillin-pot clavulanate]; Codeine; Contrave [naltrexone-bupropion]; Crestor [rosuvastatin]; Macrobid [nitrofurantoin monohyd/m-cryst]; Tape [adhesive]; Topamax [topiramate]; and Zocor [simvastatin]   Fall Risk Score: 1 (? 5 = High Risk)  MD Orders: Eval and treat MEDICAL/REFERRING DIAGNOSIS:  Presence of left artificial knee joint [Z96.652]   DATE OF ONSET:   REFERRING PHYSICIAN: Srinivasa Holliday., *  RETURN PHYSICIAN APPOINTMENT: 2 weeks      INITIAL ASSESSMENT:  Ms. John Almaguer presents with decreased ROM of L knee, weakness of both LE's, painin R knee and fair gait post L TKA on 17. PT will progress from rolling walker to straight cane. Work to increase ROM and strength to enable return to prior activity level. PROBLEM LIST (Impacting functional limitations):  1. Decreased Strength  2. Decreased Ambulation Ability/Technique  3. Increased Pain  4. Increased Fatigue  5. Decreased Flexibility/Joint Mobility  6. Edema/Girth INTERVENTIONS PLANNED:  1. Home Exercise Program (HEP)  2. Manual Therapy  3. Range of Motion (ROM)  4. Therapeutic Activites  5. Therapeutic Exercise/Strengthening   TREATMENT PLAN:  Effective Dates: 17 TO 17. Frequency/Duration: 2 times a week for 12 weeks and upon reassessment,  will adjust frequency and duration as progress indicates. GOALS: (Goals have been discussed and agreed upon with patient.)  Short-Term Functional Goals: Time Frame: 4 weeks  1.  Establish independent HEP with no cueing to increase ROM and strength  2. Increase L knee ROM to 0-125 to increase ease of sitting and ambulation    3. Independent gait with straight cane in home and community  4. Increase strength to enable initiation of reciprocal pattern on stairs. .  Discharge Goals: Time Frame: 12 weeks   1. Improve score on LEFS by 9 points to enable prolonged sitting, standing and ambulation   2. Independent gait without assistive device in home and community  3. Increase LE strength 1/2 to 1 grade to enable reciprocal pattern on stairs. 4. Able to ambulate 20 min with minimal to no increase in pain  Rehabilitation Potential For Stated Goals: Good  Regarding Jarrett Louis Stephenple's therapy, I certify that the treatment plan above will be carried out by a therapist or under their direction. Thank you for this referral,  Dajuan Stahl, PT     Referring Physician Signature: Kevyn Sparrow., *              Date                    The information in this section was collected on 5/16/17 (except where otherwise noted). HISTORY:   History of Present Injury/Illness (Reason for Referral):  Pt is post L TKA on 4/21/17. Pt initially injured her L knee in 2009 secondary to a fall. She had an arthroscopy for MMT. Reports that she never had complete relief of pain after surgery and that it has become progressively worse. She had Home PT for 8 visits until yesterday. She presents with decreased ROM of L knee, weakness of both LE's and fair gait. She is referred to outpatient PT for ROM and strengthening of L knee and LE as well as gait training. Past Medical History/Comorbidities:   Ms. 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 She also has no past medical history of Adverse effect of anesthesia; Aneurysm ([facility] Arrhythmia; Asthma; Autoimmune disease ([facility] CAD (coronary artery disease); Cancer ([address] Chronic obstructive pulmonary disease ([address] Coagulation disorder ([address] Diabetes ([facility] Difficult intubation; Endocarditis; Heart failure ([facility] Ill-defined condition; Liver disease; Malignant hyperthermia due to anesthesia; Pseudocholinesterase deficiency; Psychiatric disorder; PUD (peptic ulcer disease); Rheumatic fever; Seizures ([facility] [address] [facility] Stroke [facility]); or Thromboembolus ([facility] Ms. Abdoul Powers  has a past surgical history that includes breast augmentation (1990); knee arthroscopy (Left, 10/2012); sancho and bso (2009); tonsil and adenoidectomy (1960); bunionectomy (Bilateral, 1980); orthopaedic (Bilateral, 2010); and hip replacement (Right, 11/2016). 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 Wants to be able to walk without pain and be able to travel  Dominant Side:         RIGHT  Current Medications:       Current Outpatient Prescriptions:     rOPINIRole (REQUIP) 0.5 mg tablet, TAKE 1 TABLET NIGHTLY AS NEEDED, Disp: 90 Tab, Rfl: 2    HYDROmorphone (DILAUDID) 2 mg tablet, Take 1 Tab by mouth every four (4) hours as needed for Pain. Max Daily Amount: 12 mg., Disp: 40 Tab, Rfl: 0    potassium chloride (K-DUR, KLOR-CON) 10 mEq tablet, Take 2 tabs today/prn, Disp: 30 Tab, Rfl: 0    verapamil ER (VERELAN PM) 300 mg capsule, Take 1 Cap by mouth nightly.  (Patient taking differently: Take 300 mg by mouth daily.), Disp: 90 Cap, Rfl: 3   valsartan-hydroCHLOROthiazide (DIOVAN-HCT) 160-25 mg per tablet, Take 1 Tab by mouth daily. , Disp: 90 Tab, Rfl: 3    omeprazole (PRILOSEC) 20 mg capsule, Take 1 Cap by mouth daily. , Disp: 90 Cap, Rfl: 3    levothyroxine (SYNTHROID) 125 mcg tablet, Take 1 Tab by mouth Daily (before breakfast). Indications: HYPOTHYROIDISM (Patient taking differently: Take 125 mcg by mouth Daily (before breakfast). Take / use AM day of surgery  per anesthesia protocols. Indications: hypothyroidism), Disp: 90 Tab, Rfl: 4    atorvastatin (LIPITOR) 40 mg tablet, Take 1 Tab by mouth nightly. (Patient taking differently: Take 40 mg by mouth daily. Take / use AM day of surgery  per anesthesia protocols. ), Disp: 90 Tab, Rfl: 3    Cholecalciferol, Vitamin D3, (VITAMIN D3) 2,000 unit cap capsule, Take 2,000 Units by mouth daily. EVERY OTHER DAY, Disp: , Rfl:    Date Last Reviewed:  6/8/2017     Number of Personal Factors/Comorbidities that affect the Plan of Care: 1-2: MODERATE COMPLEXITY   EXAMINATION:   Observation/Orthostatic Postural Assessment:          Pt to PT with rolling walker. Slow gait with short step length. Slight limp to L. Pt with decreased knee ext at heel strike an stance. Decreased toe off. Tight HS. In 90/90, Hs are 60. Moderate edema noted   Palpation:          Pain along medial patella and back of knee. Soreness in thigh  Functional Mobility:         Gait/Ambulation:  Independent with rolling walker        Stairs:  Unable to do reciprocal pattern. Difficulty with single step  ROM:     L  Knee -10 to 120   R knee  0-130                                    Strength:     Knee ext  L 3+/5,  R 4/5    Knee et  L 4-/5,  R 4/5         Hip flex  L 4/5, R 4/5    Hip abd  L 3+/5,  R 4-/5    Hip ext   L 4-/5,  R 4/5              Neurological Screen: Intact to light touch  Balance:  Good with rolling walker   Body Structures Involved:  1. Bones  2. Joints  3. Muscles  4.  Ligaments Body Functions Affected:  1. Sensory/Pain  2. Neuromusculoskeletal  3. Movement Related Activities and Participation Affected:  1. Learning and Applying Knowledge  2. General Tasks and Demands  3. Mobility  4. Domestic Life  5. Community, Social and Walker [place]   Number of elements (examined above) that affect the Plan of Care: 3: MODERATE COMPLEXITY   CLINICAL PRESENTATION:   Presentation: Evolving clinical presentation with changing clinical characteristics: MODERATE COMPLEXITY   CLINICAL DECISION MAKING:   Outcome Measure: Tool Used: Lower Extremity Functional Scale (LEFS)  Score:  Initial: 32/80 Most Recent: X/80 (Date: -- )   Interpretation of Score: 20 questions each scored on a 5 point scale with 0 representing \"extreme difficulty or unable to perform\" and 4 representing \"no difficulty\". The lower the score, the greater the functional disability. 80/80 represents no disability. Minimal detectable change is 9 points. Score 80 79-63 62-48 47-32 31-16 15-1 0   Modifier CH CI CJ CK CL CM CN     ? Mobility - Walking and Moving Around:     - CURRENT STATUS: CK - 40%-59% impaired, limited or restricted    - GOAL STATUS: CK - 40%-59% impaired, limited or restricted    - D/C STATUS:  ---------------To be determined---------------    Medical Necessity:   · Patient is expected to demonstrate progress in strength, range of motion, balance and gait and decreased pain to increase independence with with home and community activities. Reason for Services/Other Comments:  · Patient continues to require skilled intervention due to decreased ROM and strength of L LE as well as increased pain and fair gait. Use of outcome tool(s) and clinical judgement create a POC that gives a: Clear prediction of patient's progress: LOW COMPLEXITY            TREATMENT:   (In addition to Assessment/Re-Assessment sessions the following treatments were rendered)  Pre-treatment Symptoms/Complaints:   Pt major complaint is lateral knee pain. Feels like a \"catch\" and then sharp pain. R hip is painful. I did sleep better last night. Pain: Initial:   Pain Intensity 1: 4  Post Session:  3     THERAPEUTIC EXERCISE: (30 minutes):  Exercises per grid below to improve mobility, strength and gait and pain. Required moderate verbal and manual cues to promote proper body alignment, promote proper body posture and promote proper body mechanics. Progressed resistance, range and repetitions as indicated. Performed  QS (quadriceps sets),standing hip abd, SAQ's (short arc quadriceps),and  LAQ's (long arc quadriceps) correcting technique as needed    HS and HC stretch. Seated HS curls at 20# x 4 sets of 10. Performed  cable for hip flex and abd x 2 sets of 15 at 17# and hip ext at 25# x 2 sets of 15. Still some discomfort with R hip with prolonged stance but able to do exercises better. . Worked on 6 in step up x 10 with each LE and then step up and over with L LE x 20. Otto Getachew Discussed that she needs to work to keep knee moving and should take OTC med if needed to do exercises. Worked on gait with straight cane  and SBA. Worked on heel toe gait. Improved knee ext at heel strike. . NuStep x 10 min at level 3. Manual Therapy ( 30 min  ): Patella and patella tendon mobs. Soft tissue work to [employer] and HS. PROM for knee flex and extn. Sitting knee flex to 126. Passive ext to neutral with overpressure. .  Pt with discomfort of lateral knee and R hip. Still present but less intense. Therapeutic Modalities:    HEP: As above; handouts given to patient for all exercises. Treatment/Session Assessment:  Pt is post L TKA on 4/21/17. She presents with decreased ROM of L knee and decreased LE strength. Pain is a limiting factor. Discussed taking OTC for pain as she is trying to decrease the Dilaudid. She did take Dilaudid at night with better rest. Good improvement with ROM.   Needs to work to increase strength so to improve gait and to increase activity level with less pain. Using straight cane. Tolerating strengthening exercises better and able to do more reps. .  Work to increase strength to normalize gait and to enable return to prior activity level including walking for exercise. Discussed walking at home for about 5-10 min. Pain is currently a limiting factor. .   · Response to Treatment:  Gait with straight cane and SBA. Increased knee morion. · Compliance with Program/Exercises: Will assess as treatment progresses. · Recommendations/Intent for next treatment session: \"Next visit will focus on aggressive ROM and strengtheing of L knee and LE. \".   Total Treatment Duration:  PT Patient Time In/Time Out  Time In: 0930  Time Out: 1030  Treatment number  24 Sage Montero, PT

## 2017-06-08 NOTE — PROGRESS NOTES
OUTPATIENT DAILY NOTE    NAME/AGE/GENDER: Suman Lujan is a 58 y.o. female. DATE: 6/8/2017    Patient will now be seen in OT prn if any lymphedema arise as related to LLE.     Katherine Recinos, OT

## 2017-06-12 ENCOUNTER — HOSPITAL ENCOUNTER (OUTPATIENT)
Dept: PHYSICAL THERAPY | Age: 62
Discharge: HOME | End: 2017-06-12
Payer: COMMERCIAL

## 2017-06-12 ENCOUNTER — HOSPITAL ENCOUNTER (OUTPATIENT)
Dept: PHYSICAL THERAPY | Age: 62
End: 2017-06-12
Payer: COMMERCIAL

## 2017-06-12 PROCEDURE — 97110 THERAPEUTIC EXERCISES: CPT

## 2017-06-12 PROCEDURE — 97140 MANUAL THERAPY 1/> REGIONS: CPT

## 2017-06-12 NOTE — PROGRESS NOTES
[facility]   [address], [facility] [address]  [phone]   [phone]    Peter Mistry  : 1955       OUTPATIENT PHYSICAL THERAPY:Daily Note 2017    ICD-10: Treatment Diagnosis:   R26.2 Difficulty in walking, not elsewhere classified     M25.662 Stiffness of left knee, not elsewhere classified     M25.562 Pain in left knee     Precautions/Allergies:   Ambien [zolpidem]; Augmentin [amoxicillin-pot clavulanate]; Codeine; Contrave [naltrexone-bupropion]; Crestor [rosuvastatin]; Macrobid [nitrofurantoin monohyd/m-cryst]; Tape [adhesive]; Topamax [topiramate]; and Zocor [simvastatin]   Fall Risk Score: 1 (? 5 = High Risk)  MD Orders: Eval and treat MEDICAL/REFERRING DIAGNOSIS:  Presence of left artificial knee joint [Z96.652]   DATE OF ONSET:   REFERRING PHYSICIAN: Rafael Paz, *  RETURN PHYSICIAN APPOINTMENT: 2 weeks      INITIAL ASSESSMENT:  Ms. Mylene Gomez presents with decreased ROM of L knee, weakness of both LE's, painin R knee and fair gait post L TKA on 17. PT will progress from rolling walker to straight cane. Work to increase ROM and strength to enable return to prior activity level. PROBLEM LIST (Impacting functional limitations):  1. Decreased Strength  2. Decreased Ambulation Ability/Technique  3. Increased Pain  4. Increased Fatigue  5. Decreased Flexibility/Joint Mobility  6. Edema/Girth INTERVENTIONS PLANNED:  1. Home Exercise Program (HEP)  2. Manual Therapy  3. Range of Motion (ROM)  4. Therapeutic Activites  5. Therapeutic Exercise/Strengthening   TREATMENT PLAN:  Effective Dates: 17 TO 17. Frequency/Duration: 2 times a week for 12 weeks and upon reassessment,  will adjust frequency and duration as progress indicates. GOALS: (Goals have been discussed and agreed upon with patient.)  Short-Term Functional Goals: Time Frame: 4 weeks  1.  Establish independent HEP with no cueing to increase ROM and strength  2. Increase L knee ROM to 0-125 to increase ease of sitting and ambulation    3. Independent gait with straight cane in home and community  4. Increase strength to enable initiation of reciprocal pattern on stairs. .  Discharge Goals: Time Frame: 12 weeks   1. Improve score on LEFS by 9 points to enable prolonged sitting, standing and ambulation   2. Independent gait without assistive device in home and community  3. Increase LE strength 1/2 to 1 grade to enable reciprocal pattern on stairs. 4. Able to ambulate 20 min with minimal to no increase in pain  Rehabilitation Potential For Stated Goals: Good  Regarding Driss Salinas's therapy, I certify that the treatment plan above will be carried out by a therapist or under their direction. Thank you for this referral,  Luke Mays, PT     Referring Physician Signature: Miguel Lemons., *              Date                    The information in this section was collected on 5/16/17 (except where otherwise noted). HISTORY:   History of Present Injury/Illness (Reason for Referral):  Pt is post L TKA on 4/21/17. Pt initially injured her L knee in 2009 secondary to a fall. She had an arthroscopy for MMT. Reports that she never had complete relief of pain after surgery and that it has become progressively worse. She had Home PT for 8 visits until yesterday. She presents with decreased ROM of L knee, weakness of both LE's and fair gait. She is referred to outpatient PT for ROM and strengthening of L knee and LE as well as gait training. Past Medical History/Comorbidities:   Ms. 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 She also has no past medical history of Adverse effect of anesthesia; Aneurysm ([facility] [address] Arrhythmia; Asthma; Autoimmune disease ([facility] [address] CAD (coronary artery disease); Cancer ([address] Chronic obstructive pulmonary disease ([address] Coagulation disorder ([address] Diabetes ([facility] [address] Difficult intubation; Endocarditis; Heart failure ([facility] [address] Ill-defined condition; Liver disease; Malignant hyperthermia due to anesthesia; Pseudocholinesterase deficiency; Psychiatric disorder; PUD (peptic ulcer disease); Rheumatic fever; Seizures ([facility] [address] Stroke [facility]); or Thromboembolus ([facility] [address] Ms. Marcella Haddad  has a past surgical history that includes breast augmentation (1990); knee arthroscopy (Left, 10/2012); sancho and bso (2009); tonsil and adenoidectomy (1960); bunionectomy (Bilateral, 1980); orthopaedic (Bilateral, 2010); and hip replacement (Right, 11/2016). 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 Wants to be able to walk without pain and be able to travel  Dominant Side:         RIGHT  Current Medications:       Current Outpatient Prescriptions:     rOPINIRole (REQUIP) 0.5 mg tablet, TAKE 1 TABLET NIGHTLY AS NEEDED, Disp: 90 Tab, Rfl: 2    HYDROmorphone (DILAUDID) 2 mg tablet, Take 1 Tab by mouth every four (4) hours as needed for Pain. Max Daily Amount: 12 mg., Disp: 40 Tab, Rfl: 0    potassium chloride (K-DUR, KLOR-CON) 10 mEq tablet, Take 2 tabs today/prn, Disp: 30 Tab, Rfl: 0    verapamil ER (VERELAN PM) 300 mg capsule, Take 1 Cap by mouth nightly.  (Patient taking differently: Take 300 mg by mouth daily.), Disp: 90 Cap, Rfl: 3   valsartan-hydroCHLOROthiazide (DIOVAN-HCT) 160-25 mg per tablet, Take 1 Tab by mouth daily. , Disp: 90 Tab, Rfl: 3    omeprazole (PRILOSEC) 20 mg capsule, Take 1 Cap by mouth daily. , Disp: 90 Cap, Rfl: 3    levothyroxine (SYNTHROID) 125 mcg tablet, Take 1 Tab by mouth Daily (before breakfast). Indications: HYPOTHYROIDISM (Patient taking differently: Take 125 mcg by mouth Daily (before breakfast). Take / use AM day of surgery  per anesthesia protocols. Indications: hypothyroidism), Disp: 90 Tab, Rfl: 4    atorvastatin (LIPITOR) 40 mg tablet, Take 1 Tab by mouth nightly. (Patient taking differently: Take 40 mg by mouth daily. Take / use AM day of surgery  per anesthesia protocols. ), Disp: 90 Tab, Rfl: 3    Cholecalciferol, Vitamin D3, (VITAMIN D3) 2,000 unit cap capsule, Take 2,000 Units by mouth daily. EVERY OTHER DAY, Disp: , Rfl:    Date Last Reviewed:  6/12/2017     Number of Personal Factors/Comorbidities that affect the Plan of Care: 1-2: MODERATE COMPLEXITY   EXAMINATION:   Observation/Orthostatic Postural Assessment:          Pt to PT with rolling walker. Slow gait with short step length. Slight limp to L. Pt with decreased knee ext at heel strike an stance. Decreased toe off. Tight HS. In 90/90, Hs are 60. Moderate edema noted   Palpation:          Pain along medial patella and back of knee. Soreness in thigh  Functional Mobility:         Gait/Ambulation:  Independent with rolling walker        Stairs:  Unable to do reciprocal pattern. Difficulty with single step  ROM:     L  Knee -10 to 120   R knee  0-130                                    Strength:     Knee ext  L 3+/5,  R 4/5    Knee et  L 4-/5,  R 4/5         Hip flex  L 4/5, R 4/5    Hip abd  L 3+/5,  R 4-/5    Hip ext   L 4-/5,  R 4/5              Neurological Screen: Intact to light touch  Balance:  Good with rolling walker   Body Structures Involved:  1. Bones  2. Joints  3. Muscles  4.  Ligaments Body Functions Affected:  1. Sensory/Pain  2. Neuromusculoskeletal  3. Movement Related Activities and Participation Affected:  1. Learning and Applying Knowledge  2. General Tasks and Demands  3. Mobility  4. Domestic Life  5. Community, Social and [place]   Number of elements (examined above) that affect the Plan of Care: 3: MODERATE COMPLEXITY   CLINICAL PRESENTATION:   Presentation: Evolving clinical presentation with changing clinical characteristics: MODERATE COMPLEXITY   CLINICAL DECISION MAKING:   Outcome Measure: Tool Used: Lower Extremity Functional Scale (LEFS)  Score:  Initial: 32/80 Most Recent: X/80 (Date: -- )   Interpretation of Score: 20 questions each scored on a 5 point scale with 0 representing \"extreme difficulty or unable to perform\" and 4 representing \"no difficulty\". The lower the score, the greater the functional disability. 80/80 represents no disability. Minimal detectable change is 9 points. Score 80 79-63 62-48 47-32 31-16 15-1 0   Modifier CH CI CJ CK CL CM CN     ? Mobility - Walking and Moving Around:     - CURRENT STATUS: CK - 40%-59% impaired, limited or restricted    - GOAL STATUS: CK - 40%-59% impaired, limited or restricted    - D/C STATUS:  ---------------To be determined---------------    Medical Necessity:   · Patient is expected to demonstrate progress in strength, range of motion, balance and gait and decreased pain to increase independence with with home and community activities. Reason for Services/Other Comments:  · Patient continues to require skilled intervention due to decreased ROM and strength of L LE as well as increased pain and fair gait. Use of outcome tool(s) and clinical judgement create a POC that gives a: Clear prediction of patient's progress: LOW COMPLEXITY            TREATMENT:   (In addition to Assessment/Re-Assessment sessions the following treatments were rendered)  Pre-treatment Symptoms/Complaints:   Pt major complaint is lateral knee pain. Pain is less intense but still present rasta with certain movements. .  R hip is painful. Pain: Initial:   Pain Intensity 1: 3  Post Session:  3     THERAPEUTIC EXERCISE: (30 minutes):  Exercises per grid below to improve mobility, strength and gait and pain. Required moderate verbal and manual cues to promote proper body alignment, promote proper body posture and promote proper body mechanics. Progressed resistance, range and repetitions as indicated. Performed  QS (quadriceps sets),standing hip abd, SAQ's (short arc quadriceps),and  LAQ's (long arc quadriceps) correcting technique as needed    HS and HC stretch. Seated HS curls at 20# x 4 sets of 10. Performed  cable for hip flex and abd x 2 sets of 15 at 17# and hip ext at 25# x 2 sets of 15. Still some discomfort with R hip with prolonged stance but able to do exercises better. . Worked on 6 in step up x 10 with each LE and then step up and over with L LE x 20. Olivia Escalera Discussed that she needs to work to keep knee moving and should take OTC med if needed to do exercises. Worked on gait with straight cane  and SBA. Worked on heel toe gait. Improved knee ext at heel strike. . NuStep x 10 min at level 3. Manual Therapy ( 30 min  ): Patella and patella tendon mobs. Soft tissue work to [employer] and HS. PROM for knee flex and extn. Sitting knee flex to 126. Passive ext to neutral with overpressure. .  Pt with discomfort of lateral knee and R hip. Still present but less intense. Therapeutic Modalities:    HEP: As above; handouts given to patient for all exercises. Treatment/Session Assessment:  Pt is post L TKA on 4/21/17. She presents with decreased ROM of L knee and decreased LE strength. Pain is a limiting factor. PT reports less intense pain though she still has lateral pain. Taping is helping some. Good improvement with ROM. Gait is much improved. Able to walk some at home without cane.   Needs to continue  to work to increase strength so to improve gait and to increase activity level with less pain. . Tolerating strengthening exercises better and able to do more reps. .  Work to increase strength to normalize gait and to enable return to prior activity level including walking for exercise. Discussed walking at home for about 10 min. Pain is currently a limiting factor. .   · Response to Treatment:  Gait with straight cane and SBA. Increased knee morion. · Compliance with Program/Exercises: Will assess as treatment progresses. · Recommendations/Intent for next treatment session: \"Next visit will focus on aggressive ROM and strengtheing of L knee and LE. \".   Total Treatment Duration:  PT Patient Time In/Time Out  Time In: 0930  Time Out: 1030  Treatment number  DUSTIN Hull

## 2017-06-15 ENCOUNTER — APPOINTMENT (OUTPATIENT)
Dept: PHYSICAL THERAPY | Age: 62
End: 2017-06-15
Payer: COMMERCIAL

## 2017-06-15 ENCOUNTER — HOSPITAL ENCOUNTER (OUTPATIENT)
Dept: PHYSICAL THERAPY | Age: 62
Discharge: HOME | End: 2017-06-15
Payer: COMMERCIAL

## 2017-06-15 PROCEDURE — 97140 MANUAL THERAPY 1/> REGIONS: CPT

## 2017-06-15 PROCEDURE — 97110 THERAPEUTIC EXERCISES: CPT

## 2017-06-15 NOTE — PROGRESS NOTES
[facility]   [address], [address]  [phone]   [phone]    Radha dAams  : 1955       OUTPATIENT PHYSICAL THERAPY:Daily Note 6/15/2017    ICD-10: Treatment Diagnosis:   R26.2 Difficulty in walking, not elsewhere classified     M25.662 Stiffness of left knee, not elsewhere classified     M25.562 Pain in left knee     Precautions/Allergies:   Ambien [zolpidem]; Augmentin [amoxicillin-pot clavulanate]; Codeine; Contrave [naltrexone-bupropion]; Crestor [rosuvastatin]; Macrobid [nitrofurantoin monohyd/m-cryst]; Tape [adhesive]; Topamax [topiramate]; and Zocor [simvastatin]   Fall Risk Score: 1 (? 5 = High Risk)  MD Orders: Eval and treat MEDICAL/REFERRING DIAGNOSIS:  Presence of left artificial knee joint [Z96.652]   DATE OF ONSET:   REFERRING PHYSICIAN: Carlyle Richter, *  RETURN PHYSICIAN APPOINTMENT: 2 weeks      INITIAL ASSESSMENT:  Ms. Koby Stevenson presents with decreased ROM of L knee, weakness of both LE's, painin R knee and fair gait post L TKA on 17. PT will progress from rolling walker to straight cane. Work to increase ROM and strength to enable return to prior activity level. PROBLEM LIST (Impacting functional limitations):  1. Decreased Strength  2. Decreased Ambulation Ability/Technique  3. Increased Pain  4. Increased Fatigue  5. Decreased Flexibility/Joint Mobility  6. Edema/Girth INTERVENTIONS PLANNED:  1. Home Exercise Program (HEP)  2. Manual Therapy  3. Range of Motion (ROM)  4. Therapeutic Activites  5. Therapeutic Exercise/Strengthening   TREATMENT PLAN:  Effective Dates: 17 TO 17. Frequency/Duration: 2 times a week for 12 weeks and upon reassessment,  will adjust frequency and duration as progress indicates. GOALS: (Goals have been discussed and agreed upon with patient.)  Short-Term Functional Goals: Time Frame: 4 weeks  1.  Establish independent HEP with no cueing to increase ROM and strength  2. Increase L knee ROM to 0-125 to increase ease of sitting and ambulation    3. Independent gait with straight cane in home and community  4. Increase strength to enable initiation of reciprocal pattern on stairs. .  Discharge Goals: Time Frame: 12 weeks   1. Improve score on LEFS by 9 points to enable prolonged sitting, standing and ambulation   2. Independent gait without assistive device in home and community  3. Increase LE strength 1/2 to 1 grade to enable reciprocal pattern on stairs. 4. Able to ambulate 20 min with minimal to no increase in pain  Rehabilitation Potential For Stated Goals: Good  Regarding Dollie Halsted Temple's therapy, I certify that the treatment plan above will be carried out by a therapist or under their direction. Thank you for this referral,  Dominique Trujillo, PT     Referring Physician Signature: Charline Sanchez., *              Date                    The information in this section was collected on 5/16/17 (except where otherwise noted). HISTORY:   History of Present Injury/Illness (Reason for Referral):  Pt is post L TKA on 4/21/17. Pt initially injured her L knee in 2009 secondary to a fall. She had an arthroscopy for MMT. Reports that she never had complete relief of pain after surgery and that it has become progressively worse. She had Home PT for 8 visits until yesterday. She presents with decreased ROM of L knee, weakness of both LE's and fair gait. She is referred to outpatient PT for ROM and strengthening of L knee and LE as well as gait training. Past Medical History/Comorbidities:   Ms. 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 She also has no past medical history of Adverse effect of anesthesia; Aneurysm ([facility] Arrhythmia; Asthma; Autoimmune disease ([facility] CAD (coronary artery disease); Cancer ([address] Chronic obstructive pulmonary disease ([address] Coagulation disorder ([address] Diabetes ([facility] Difficult intubation; Endocarditis; Heart failure ([facility] Ill-defined condition; Liver disease; Malignant hyperthermia due to anesthesia; Pseudocholinesterase deficiency; Psychiatric disorder; PUD (peptic ulcer disease); Rheumatic fever; Seizures ([facility] [address] [facility] Stroke [facility]); or Thromboembolus ([facility] Ms. Nelly Jeronimo  has a past surgical history that includes breast augmentation (1990); knee arthroscopy (Left, 10/2012); sancho and bso (2009); tonsil and adenoidectomy (1960); bunionectomy (Bilateral, 1980); orthopaedic (Bilateral, 2010); and hip replacement (Right, 11/2016). 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 Wants to be able to walk without pain and be able to travel  Dominant Side:         RIGHT  Current Medications:       Current Outpatient Prescriptions:     rOPINIRole (REQUIP) 0.5 mg tablet, TAKE 1 TABLET NIGHTLY AS NEEDED, Disp: 90 Tab, Rfl: 2    HYDROmorphone (DILAUDID) 2 mg tablet, Take 1 Tab by mouth every four (4) hours as needed for Pain. Max Daily Amount: 12 mg., Disp: 40 Tab, Rfl: 0    potassium chloride (K-DUR, KLOR-CON) 10 mEq tablet, Take 2 tabs today/prn, Disp: 30 Tab, Rfl: 0    verapamil ER (VERELAN PM) 300 mg capsule, Take 1 Cap by mouth nightly.  (Patient taking differently: Take 300 mg by mouth daily.), Disp: 90 Cap, Rfl: 3   valsartan-hydroCHLOROthiazide (DIOVAN-HCT) 160-25 mg per tablet, Take 1 Tab by mouth daily. , Disp: 90 Tab, Rfl: 3    omeprazole (PRILOSEC) 20 mg capsule, Take 1 Cap by mouth daily. , Disp: 90 Cap, Rfl: 3    levothyroxine (SYNTHROID) 125 mcg tablet, Take 1 Tab by mouth Daily (before breakfast). Indications: HYPOTHYROIDISM (Patient taking differently: Take 125 mcg by mouth Daily (before breakfast). Take / use AM day of surgery  per anesthesia protocols. Indications: hypothyroidism), Disp: 90 Tab, Rfl: 4    atorvastatin (LIPITOR) 40 mg tablet, Take 1 Tab by mouth nightly. (Patient taking differently: Take 40 mg by mouth daily. Take / use AM day of surgery  per anesthesia protocols. ), Disp: 90 Tab, Rfl: 3    Cholecalciferol, Vitamin D3, (VITAMIN D3) 2,000 unit cap capsule, Take 2,000 Units by mouth daily. EVERY OTHER DAY, Disp: , Rfl:    Date Last Reviewed:  6/15/2017     Number of Personal Factors/Comorbidities that affect the Plan of Care: 1-2: MODERATE COMPLEXITY   EXAMINATION:   Observation/Orthostatic Postural Assessment:          Pt to PT with rolling walker. Slow gait with short step length. Slight limp to L. Pt with decreased knee ext at heel strike an stance. Decreased toe off. Tight HS. In 90/90, Hs are 60. Moderate edema noted   Palpation:          Pain along medial patella and back of knee. Soreness in thigh  Functional Mobility:         Gait/Ambulation:  Independent with rolling walker        Stairs:  Unable to do reciprocal pattern. Difficulty with single step  ROM:     L  Knee -10 to 120   R knee  0-130                                    Strength:     Knee ext  L 3+/5,  R 4/5    Knee et  L 4-/5,  R 4/5         Hip flex  L 4/5, R 4/5    Hip abd  L 3+/5,  R 4-/5    Hip ext   L 4-/5,  R 4/5              Neurological Screen: Intact to light touch  Balance:  Good with rolling walker   Body Structures Involved:  1. Bones  2. Joints  3. Muscles  4.  Ligaments Body Functions Affected:  1. Sensory/Pain  2. Neuromusculoskeletal  3. Movement Related Activities and Participation Affected:  1. Learning and Applying Knowledge  2. General Tasks and Demands  3. Mobility  4. Domestic Life  5. Community, Social and [place]   Number of elements (examined above) that affect the Plan of Care: 3: MODERATE COMPLEXITY   CLINICAL PRESENTATION:   Presentation: Evolving clinical presentation with changing clinical characteristics: MODERATE COMPLEXITY   CLINICAL DECISION MAKING:   Outcome Measure: Tool Used: Lower Extremity Functional Scale (LEFS)  Score:  Initial: 32/80 Most Recent: X/80 (Date: -- )   Interpretation of Score: 20 questions each scored on a 5 point scale with 0 representing \"extreme difficulty or unable to perform\" and 4 representing \"no difficulty\". The lower the score, the greater the functional disability. 80/80 represents no disability. Minimal detectable change is 9 points. Score 80 79-63 62-48 47-32 31-16 15-1 0   Modifier CH CI CJ CK CL CM CN     ? Mobility - Walking and Moving Around:     - CURRENT STATUS: CK - 40%-59% impaired, limited or restricted    - GOAL STATUS: CK - 40%-59% impaired, limited or restricted    - D/C STATUS:  ---------------To be determined---------------    Medical Necessity:   · Patient is expected to demonstrate progress in strength, range of motion, balance and gait and decreased pain to increase independence with with home and community activities. Reason for Services/Other Comments:  · Patient continues to require skilled intervention due to decreased ROM and strength of L LE as well as increased pain and fair gait.    Use of outcome tool(s) and clinical judgement create a POC that gives a: Clear prediction of patient's progress: LOW COMPLEXITY            TREATMENT:   (In addition to Assessment/Re-Assessment sessions the following treatments were rendered)  Pre-treatment Symptoms/Complaints:   Pt major complaint still is lateral knee pain but less severe. Knee motion is improving with increased ease. Walking better. R hip is painful with forward flexion. .   Pain: Initial:   Pain Intensity 1: 3  Post Session:  3     THERAPEUTIC EXERCISE: (30 minutes):  Exercises per grid below to improve mobility, strength and gait and pain. Required moderate verbal and manual cues to promote proper body alignment, promote proper body posture and promote proper body mechanics. Progressed resistance, range and repetitions as indicated. Performed  QS (quadriceps sets),standing hip abd, SAQ's (short arc quadriceps),and  LAQ's (long arc quadriceps) correcting technique as needed    HS and HC stretch. Seated HS curls at 20# x 4 sets of 10. Performed  cable for hip flex and abd x 2 sets of 15 at 17# and hip ext at 25# x 2 sets of 15. Still some discomfort with R hip with prolonged stance but able to do exercises. Instructed to do standing forward flexion of hip, as well as forward flex R hip and slightly ER. Instructed in figure 4 stretch. Worked on 6 in step up x 10 with each LE and then step up and over with L LE x 20 leading with L LE. Worked on gait with straight cane  and SBA. Worked on heel toe gait. Improved knee ext at heel strike and good toe off. . NuStep x 10 min at level 3. Manual Therapy ( 30 min  ): Patella and patella tendon mobs. Soft tissue work to [employer] and HS. PROM for knee flex and extn. Sitting knee flex to 126. Passive ext to neutral with overpressure. .  Pt with discomfort of lateral knee and R hip. Still present but less intense. Therapeutic Modalities:    HEP: As above; handouts given to patient for all exercises. Treatment/Session Assessment:  Pt is post L TKA on 4/21/17. She presents with decreased ROM of L knee and decreased LE strength. Pain is a limiting factor but now less intense and able to increase activity level. Taping is helping some. Good improvement with ROM. Good flexion and ext.  Gait is much improved. Able to walk  at home without cane. Needs to continue  to work to increase strength so to improve gait and to increase activity level with less pain. Tolerating strengthening exercises better and able to do more reps. Work to increase strength to normalize gait and to enable return to prior activity level including walking for exercise. Discussed walking at home for about 10 min. · Response to Treatment:  Gait with straight cane and SBA. Increased knee morion. · Compliance with Program/Exercises: Doing exercises at home. · Recommendations/Intent for next treatment session: \"Next visit will focus on aggressive ROM and strengtheing of L knee and LE. \".   Total Treatment Duration:  PT Patient Time In/Time Out  Time In: 1000  Time Out: 1100  Treatment number  [address] [place], PT

## 2017-06-19 ENCOUNTER — HOSPITAL ENCOUNTER (OUTPATIENT)
Dept: PHYSICAL THERAPY | Age: 62
Discharge: HOME | End: 2017-06-19
Payer: COMMERCIAL

## 2017-06-19 PROCEDURE — 97140 MANUAL THERAPY 1/> REGIONS: CPT

## 2017-06-19 PROCEDURE — 97110 THERAPEUTIC EXERCISES: CPT

## 2017-06-20 NOTE — PROGRESS NOTES
[facility] [address] [facility]   [address] [place] [address], [address]  [phone]   [phone]    Kimberly Moody  : 1955       OUTPATIENT PHYSICAL THERAPY:Daily Note 2017    ICD-10: Treatment Diagnosis:   R26.2 Difficulty in walking, not elsewhere classified     M25.662 Stiffness of left knee, not elsewhere classified     M25.562 Pain in left knee     Precautions/Allergies:   Ambien [zolpidem]; Augmentin [amoxicillin-pot clavulanate]; Codeine; Contrave [naltrexone-bupropion]; Crestor [rosuvastatin]; Macrobid [nitrofurantoin monohyd/m-cryst]; Tape [adhesive]; Topamax [topiramate]; and Zocor [simvastatin]   Fall Risk Score: 1 (? 5 = High Risk)  MD Orders: Eval and treat MEDICAL/REFERRING DIAGNOSIS:  Presence of left artificial knee joint [Z96.652]   DATE OF ONSET:   REFERRING PHYSICIAN: Aldo Colon., *  RETURN PHYSICIAN APPOINTMENT: 2 weeks      INITIAL ASSESSMENT:  Ms. Rupert Richardson presents with decreased ROM of L knee, weakness of both LE's, painin R knee and fair gait post L TKA on 17. PT will progress from rolling walker to straight cane. Work to increase ROM and strength to enable return to prior activity level. PROBLEM LIST (Impacting functional limitations):  1. Decreased Strength  2. Decreased Ambulation Ability/Technique  3. Increased Pain  4. Increased Fatigue  5. Decreased Flexibility/Joint Mobility  6. Edema/Girth INTERVENTIONS PLANNED:  1. Home Exercise Program (HEP)  2. Manual Therapy  3. Range of Motion (ROM)  4. Therapeutic Activites  5. Therapeutic Exercise/Strengthening   TREATMENT PLAN:  Effective Dates: 17 TO 17. Frequency/Duration: 2 times a week for 12 weeks and upon reassessment,  will adjust frequency and duration as progress indicates. GOALS: (Goals have been discussed and agreed upon with patient.)  Short-Term Functional Goals: Time Frame: 4 weeks  1.  Establish independent HEP with no cueing to increase ROM and strength  2. Increase L knee ROM to 0-125 to increase ease of sitting and ambulation    3. Independent gait with straight cane in home and community  4. Increase strength to enable initiation of reciprocal pattern on stairs. .  Discharge Goals: Time Frame: 12 weeks   1. Improve score on LEFS by 9 points to enable prolonged sitting, standing and ambulation   2. Independent gait without assistive device in home and community  3. Increase LE strength 1/2 to 1 grade to enable reciprocal pattern on stairs. 4. Able to ambulate 20 min with minimal to no increase in pain  Rehabilitation Potential For Stated Goals: Good  Regarding Renata Salinas's therapy, I certify that the treatment plan above will be carried out by a therapist or under their direction. Thank you for this referral,  Francois Bowie, PT     Referring Physician Signature: Aby Ho., *              Date                    The information in this section was collected on 5/16/17 (except where otherwise noted). HISTORY:   History of Present Injury/Illness (Reason for Referral):  Pt is post L TKA on 4/21/17. Pt initially injured her L knee in 2009 secondary to a fall. She had an arthroscopy for MMT. Reports that she never had complete relief of pain after surgery and that it has become progressively worse. She had Home PT for 8 visits until yesterday. She presents with decreased ROM of L knee, weakness of both LE's and fair gait. She is referred to outpatient PT for ROM and strengthening of L knee and LE as well as gait training. Past Medical History/Comorbidities:   Ms. 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 She also has no past medical history of Adverse effect of anesthesia; Aneurysm ([address] Arrhythmia; Asthma; Autoimmune disease ([address] CAD (coronary artery disease); Cancer ([address] Chronic obstructive pulmonary disease ([address] Coagulation disorder ([address] Diabetes ([address] Difficult intubation; Endocarditis; Heart failure ([address] Ill-defined condition; Liver disease; Malignant hyperthermia due to anesthesia; Pseudocholinesterase deficiency; Psychiatric disorder; PUD (peptic ulcer disease); Rheumatic fever; Seizures ([address] Stroke [facility]); or Thromboembolus ([address] Ms. Silvio Tejada  has a past surgical history that includes breast augmentation (1990); knee arthroscopy (Left, 10/2012); sancho and bso (2009); tonsil and adenoidectomy (1960); bunionectomy (Bilateral, 1980); orthopaedic (Bilateral, 2010); and hip replacement (Right, 11/2016). 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 Wants to be able to walk without pain and be able to travel  Dominant Side:         RIGHT  Current Medications:       Current Outpatient Prescriptions:     rOPINIRole (REQUIP) 0.5 mg tablet, TAKE 1 TABLET NIGHTLY AS NEEDED, Disp: 90 Tab, Rfl: 2    HYDROmorphone (DILAUDID) 2 mg tablet, Take 1 Tab by mouth every four (4) hours as needed for Pain. Max Daily Amount: 12 mg., Disp: 40 Tab, Rfl: 0    potassium chloride (K-DUR, KLOR-CON) 10 mEq tablet, Take 2 tabs today/prn, Disp: 30 Tab, Rfl: 0    verapamil ER (VERELAN PM) 300 mg capsule, Take 1 Cap by mouth nightly.  (Patient taking differently: Take 300 mg by mouth daily.), Disp: 90 Cap, Rfl: 3   valsartan-hydroCHLOROthiazide (DIOVAN-HCT) 160-25 mg per tablet, Take 1 Tab by mouth daily. , Disp: 90 Tab, Rfl: 3    omeprazole (PRILOSEC) 20 mg capsule, Take 1 Cap by mouth daily. , Disp: 90 Cap, Rfl: 3    levothyroxine (SYNTHROID) 125 mcg tablet, Take 1 Tab by mouth Daily (before breakfast). Indications: HYPOTHYROIDISM (Patient taking differently: Take 125 mcg by mouth Daily (before breakfast). Take / use AM day of surgery  per anesthesia protocols. Indications: hypothyroidism), Disp: 90 Tab, Rfl: 4    atorvastatin (LIPITOR) 40 mg tablet, Take 1 Tab by mouth nightly. (Patient taking differently: Take 40 mg by mouth daily. Take / use AM day of surgery  per anesthesia protocols. ), Disp: 90 Tab, Rfl: 3    Cholecalciferol, Vitamin D3, (VITAMIN D3) 2,000 unit cap capsule, Take 2,000 Units by mouth daily. EVERY OTHER DAY, Disp: , Rfl:    Date Last Reviewed:  6/19/2017     Number of Personal Factors/Comorbidities that affect the Plan of Care: 1-2: MODERATE COMPLEXITY   EXAMINATION:   Observation/Orthostatic Postural Assessment:          Pt to PT with rolling walker. Slow gait with short step length. Slight limp to L. Pt with decreased knee ext at heel strike an stance. Decreased toe off. Tight HS. In 90/90, Hs are 60. Moderate edema noted   Palpation:          Pain along medial patella and back of knee. Soreness in thigh  Functional Mobility:         Gait/Ambulation:  Independent with rolling walker        Stairs:  Unable to do reciprocal pattern. Difficulty with single step  ROM:     L  Knee -10 to 120   R knee  0-130                                    Strength:     Knee ext  L 3+/5,  R 4/5    Knee et  L 4-/5,  R 4/5         Hip flex  L 4/5, R 4/5    Hip abd  L 3+/5,  R 4-/5    Hip ext   L 4-/5,  R 4/5              Neurological Screen: Intact to light touch  Balance:  Good with rolling walker   Body Structures Involved:  1. Bones  2. Joints  3. Muscles  4.  Ligaments Body Functions Affected:  1. Sensory/Pain  2. Neuromusculoskeletal  3. Movement Related Activities and Participation Affected:  1. Learning and Applying Knowledge  2. General Tasks and Demands  3. Mobility  4. Domestic Life  5. Community, Social and [place]   Number of elements (examined above) that affect the Plan of Care: 3: MODERATE COMPLEXITY   CLINICAL PRESENTATION:   Presentation: Evolving clinical presentation with changing clinical characteristics: MODERATE COMPLEXITY   CLINICAL DECISION MAKING:   Outcome Measure: Tool Used: Lower Extremity Functional Scale (LEFS)  Score:  Initial: 32/80 Most Recent: X/80 (Date: -- )   Interpretation of Score: 20 questions each scored on a 5 point scale with 0 representing \"extreme difficulty or unable to perform\" and 4 representing \"no difficulty\". The lower the score, the greater the functional disability. 80/80 represents no disability. Minimal detectable change is 9 points. Score 80 79-63 62-48 47-32 31-16 15-1 0   Modifier CH CI CJ CK CL CM CN     ? Mobility - Walking and Moving Around:     - CURRENT STATUS: CK - 40%-59% impaired, limited or restricted    - GOAL STATUS: CK - 40%-59% impaired, limited or restricted    - D/C STATUS:  ---------------To be determined---------------    Medical Necessity:   · Patient is expected to demonstrate progress in strength, range of motion, balance and gait and decreased pain to increase independence with with home and community activities. Reason for Services/Other Comments:  · Patient continues to require skilled intervention due to decreased ROM and strength of L LE as well as increased pain and fair gait.    Use of outcome tool(s) and clinical judgement create a POC that gives a: Clear prediction of patient's progress: LOW COMPLEXITY            TREATMENT:   (In addition to Assessment/Re-Assessment sessions the following treatments were rendered)  Pre-treatment Symptoms/Complaints:   Pt major complaint still is lateral knee pain but less severe. Knee motion is improving with increased ease. Walking better. R hip is painful with forward flexion. .   Pain: Initial:   Pain Intensity 1: 3  Post Session:  3     THERAPEUTIC EXERCISE: (30 minutes):  Exercises per grid below to improve mobility, strength and gait and pain. Required moderate verbal and manual cues to promote proper body alignment, promote proper body posture and promote proper body mechanics. Progressed resistance, range and repetitions as indicated. Performed  QS (quadriceps sets),standing hip abd, SAQ's (short arc quadriceps),and  LAQ's (long arc quadriceps) correcting technique as needed    HS and HC stretch. Seated HS curls at 20# x 4 sets of 10. Performed  cable for hip flex and abd x 2 sets of 15 at 17# and hip ext at 25# x 2 sets of 15. Still some discomfort with R hip with prolonged stance but able to do exercises. Instructed to do standing forward flexion of hip, as well as forward flex R hip and slightly ER. Instructed in figure 4 stretch. Worked on 6 in step up x 10 with each LE and then step up and over with L LE x 20 leading with L LE. Worked on gait with straight cane  and SBA. Worked on heel toe gait. Improved knee ext at heel strike and good toe off. . NuStep x 10 min at level 3. Manual Therapy ( 30 min  ): Patella and patella tendon mobs. Soft tissue work to [employer] and HS. PROM for knee flex and extn. Sitting knee flex to 126. Passive ext to neutral with overpressure. .  Pt with discomfort of lateral knee and R hip. Still present but less intense. Therapeutic Modalities:    HEP: As above; handouts given to patient for all exercises. Treatment/Session Assessment:  Pt is post L TKA on 4/21/17. She presents with decreased ROM of L knee and decreased LE strength. Pain is a limiting factor but now less intense and able to increase activity level. Taping is helping some. Good improvement with ROM. Good flexion and ext.  Gait is much improved. Able to walk  at home without cane. Needs to continue  to work to increase strength so to improve gait and to increase activity level with less pain. Tolerating strengthening exercises better and able to do more reps. Work to increase strength to normalize gait and to enable return to prior activity level including walking for exercise. Discussed walking at home for about 10 min. · Response to Treatment:  Gait with straight cane and SBA. Increased knee morion. · Compliance with Program/Exercises: Doing exercises at home. · Recommendations/Intent for next treatment session: \"Next visit will focus on aggressive ROM and strengtheing of L knee and LE. \".   Total Treatment Duration:  PT Patient Time In/Time Out  Time In: 0115  Time Out: 7279  Treatment number  [facility],

## 2017-06-23 ENCOUNTER — HOSPITAL ENCOUNTER (OUTPATIENT)
Dept: PHYSICAL THERAPY | Age: 62
Discharge: HOME | End: 2017-06-23
Payer: COMMERCIAL

## 2017-06-23 PROCEDURE — 97110 THERAPEUTIC EXERCISES: CPT

## 2017-06-23 PROCEDURE — 97140 MANUAL THERAPY 1/> REGIONS: CPT

## 2017-06-23 NOTE — PROGRESS NOTES
[facility]   [address], [address]  [phone]   [phone]    Tosin Prabhakarjojo  : 1955       OUTPATIENT PHYSICAL THERAPY:Daily Note 2017    ICD-10: Treatment Diagnosis:   R26.2 Difficulty in walking, not elsewhere classified     M25.662 Stiffness of left knee, not elsewhere classified     M25.562 Pain in left knee     Precautions/Allergies:   Ambien [zolpidem]; Augmentin [amoxicillin-pot clavulanate]; Codeine; Contrave [naltrexone-bupropion]; Crestor [rosuvastatin]; Macrobid [nitrofurantoin monohyd/m-cryst]; Tape [adhesive]; Topamax [topiramate]; and Zocor [simvastatin]   Fall Risk Score: 1 (? 5 = High Risk)  MD Orders: Eval and treat MEDICAL/REFERRING DIAGNOSIS:  Presence of left artificial knee joint [Z96.652]   DATE OF ONSET:   REFERRING PHYSICIAN: Kiersten Cuellar., *  RETURN PHYSICIAN APPOINTMENT: 2 weeks      INITIAL ASSESSMENT:  Ms. Nelly Jeronimo presents with decreased ROM of L knee, weakness of both LE's, painin R knee and fair gait post L TKA on 17. PT will progress from rolling walker to straight cane. Work to increase ROM and strength to enable return to prior activity level. PROBLEM LIST (Impacting functional limitations):  1. Decreased Strength  2. Decreased Ambulation Ability/Technique  3. Increased Pain  4. Increased Fatigue  5. Decreased Flexibility/Joint Mobility  6. Edema/Girth INTERVENTIONS PLANNED:  1. Home Exercise Program (HEP)  2. Manual Therapy  3. Range of Motion (ROM)  4. Therapeutic Activites  5. Therapeutic Exercise/Strengthening   TREATMENT PLAN:  Effective Dates: 17 TO 17. Frequency/Duration: 2 times a week for 12 weeks and upon reassessment,  will adjust frequency and duration as progress indicates. GOALS: (Goals have been discussed and agreed upon with patient.)  Short-Term Functional Goals: Time Frame: 4 weeks  1.  Establish independent HEP with no cueing to increase ROM and strength  MET  2. Increase L knee ROM to 0-125 to increase ease of sitting and ambulation  MET  3. Independent gait with straight cane in home and community  MET  4. Increase strength to enable initiation of reciprocal pattern on stairs. .MET  Discharge Goals: Time Frame: 12 weeks   1. Improve score on LEFS by 9 points to enable prolonged sitting, standing and ambulation  ONGOING  2. Independent gait without assistive device in home and community   MET  3. Increase LE strength 1/2 to 1 grade to enable reciprocal pattern on stairs. ONGOING  4. Able to ambulate 20 min with minimal to no increase in pain  ONGOING  Rehabilitation Potential For Stated Goals: Good  Regarding Kerrie Avila Brad's therapy, I certify that the treatment plan above will be carried out by a therapist or under their direction. Thank you for this referral,  Erica Mims, PT     Referring Physician Signature: Ulysses Jovel, *              Date                    The information in this section was collected on 5/16/17 (except where otherwise noted). HISTORY:   History of Present Injury/Illness (Reason for Referral):  Pt is post L TKA on 4/21/17. Pt initially injured her L knee in 2009 secondary to a fall. She had an arthroscopy for MMT. Reports that she never had complete relief of pain after surgery and that it has become progressively worse. She had Home PT for 8 visits until yesterday. She presents with decreased ROM of L knee, weakness of both LE's and fair gait. She is referred to outpatient PT for ROM and strengthening of L knee and LE as well as gait training. Past Medical History/Comorbidities:   Ms. 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 She also has no past medical history of Adverse effect of anesthesia; Aneurysm ([address] Arrhythmia; Asthma; Autoimmune disease ([address] CAD (coronary artery disease); Cancer ([address] Chronic obstructive pulmonary disease ([address] Coagulation disorder ([address] Diabetes ([address] Difficult intubation; Endocarditis; Heart failure ([address] Ill-defined condition; Liver disease; Malignant hyperthermia due to anesthesia; Pseudocholinesterase deficiency; Psychiatric disorder; PUD (peptic ulcer disease); Rheumatic fever; Seizures ([address] Stroke [facility]); or Thromboembolus ([address] Ms. Koby Stevenson  has a past surgical history that includes breast augmentation (1990); knee arthroscopy (Left, 10/2012); sancho and bso (2009); tonsil and adenoidectomy (1960); bunionectomy (Bilateral, 1980); orthopaedic (Bilateral, 2010); hip replacement (Right, 11/2016); and knee replacement (Left, 04/01/2017). 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 Wants to be able to walk without pain and be able to travel  Dominant Side:         RIGHT  Current Medications:       Current Outpatient Prescriptions:     levothyroxine (SYNTHROID) 125 mcg tablet, TAKE 1 TABLET DAILY BEFORE BREAKFAST FOR HYPOTHYROIDISM, Disp: 90 Tab, Rfl: 3    zolpidem (AMBIEN) 10 mg tablet, Take 1 Tab by mouth nightly as needed for Sleep.  Max Daily Amount: 10 mg., Disp: 30 Tab, Rfl: 5    atorvastatin (LIPITOR) 40 mg tablet, TAKE 1 TABLET NIGHTLY, Disp: 90 Tab, Rfl: 2    rOPINIRole (REQUIP) 0.5 mg tablet, TAKE 1 TABLET NIGHTLY AS NEEDED, Disp: 90 Tab, Rfl: 2    verapamil ER (VERELAN PM) 300 mg capsule, Take 1 Cap by mouth nightly. (Patient taking differently: Take 300 mg by mouth daily.), Disp: 90 Cap, Rfl: 3    valsartan-hydroCHLOROthiazide (DIOVAN-HCT) 160-25 mg per tablet, Take 1 Tab by mouth daily. , Disp: 90 Tab, Rfl: 3    omeprazole (PRILOSEC) 20 mg capsule, Take 1 Cap by mouth daily. , Disp: 90 Cap, Rfl: 3    Cholecalciferol, Vitamin D3, (VITAMIN D3) 2,000 unit cap capsule, Take 2,000 Units by mouth daily. EVERY OTHER DAY, Disp: , Rfl:    Date Last Reviewed:  6/23/2017     Number of Personal Factors/Comorbidities that affect the Plan of Care: 1-2: MODERATE COMPLEXITY   EXAMINATION:   Observation/Orthostatic Postural Assessment:          Pt to PT without assistive device. Increased gait speed and step length. Improved heel toe gait. Mild limp to L. .  Slight limp to L. Has seen OT for edema and now controlling with surgi-. Less tight HS. In 90/90, HS are 70. Mild edema noted   Palpation:          Pain along medial patella and back of knee. Less intense  Functional Mobility:         Gait/Ambulation:  Independent without assistive device. Stairs: Able to do single step. Difficulty with reciprocal though improving with use of railing. ROM:     L  Knee -2 to 125   R knee  0-130                                    Strength:     Knee ext  L4-/5,  R 4/5    Knee et  L 4/5,  R 4+/5         Hip flex  L 4+/5, R 4+/5    Hip abd  L 4-/5,  R 4-/5    Hip ext   L 4/5,  R 4/5              Neurological Screen: Intact to light touch  Balance:  Good    Body Structures Involved:  1. Bones  2. Joints  3. Muscles  4. Ligaments Body Functions Affected:  1. Sensory/Pain  2. Neuromusculoskeletal  3. Movement Related Activities and Participation Affected:  1. Learning and Applying Knowledge  2. General Tasks and Demands  3. Mobility  4. Domestic Life  5.  Community, Social and [place]   Number of elements (examined above) that affect the Plan of Care: 3: MODERATE COMPLEXITY   CLINICAL PRESENTATION:   Presentation: Evolving clinical presentation with changing clinical characteristics: MODERATE COMPLEXITY   CLINICAL DECISION MAKING:   Outcome Measure: Tool Used: Lower Extremity Functional Scale (LEFS)  Score:  Initial: 32/80 Most Recent: 38/80 (Date: 6/19/17 )   Interpretation of Score: 20 questions each scored on a 5 point scale with 0 representing \"extreme difficulty or unable to perform\" and 4 representing \"no difficulty\". The lower the score, the greater the functional disability. 80/80 represents no disability. Minimal detectable change is 9 points. Score 80 79-63 62-48 47-32 31-16 15-1 0   Modifier CH CI CJ CK CL CM CN     ? Mobility - Walking and Moving Around:     - CURRENT STATUS: CK - 40%-59% impaired, limited or restricted    - GOAL STATUS: CK - 40%-59% impaired, limited or restricted    - D/C STATUS:  ---------------To be determined---------------    Medical Necessity:   · Patient is expected to demonstrate progress in strength, range of motion, balance and gait and decreased pain to increase independence with with home and community activities. Reason for Services/Other Comments:  · Patient continues to require skilled intervention due to decreased ROM and strength of L LE as well as increased pain and fair gait. Use of outcome tool(s) and clinical judgement create a POC that gives a: Clear prediction of patient's progress: LOW COMPLEXITY            TREATMENT:   (In addition to Assessment/Re-Assessment sessions the following treatments were rendered)  Pre-treatment Symptoms/Complaints:   Pt major complaint still is lateral knee pain but much less severe. . Knee motion is improving with increased ease. Walking better. R hip is painful with forward flexion. .Difficulty with uneven ground   Pain: Initial:   Pain Intensity 1: 3  Post Session:  3     THERAPEUTIC EXERCISE: (30 minutes):  Exercises per grid below to improve mobility, strength and gait and pain. Required moderate verbal and manual cues to promote proper body alignment, promote proper body posture and promote proper body mechanics. Progressed resistance, range and repetitions as indicated. Performed  QS (quadriceps sets),standing hip abd, SAQ's (short arc quadriceps),and  LAQ's (long arc quadriceps) correcting technique as needed    HS and HC stretch. Seated HS curls at 20# x 4 sets of 10. Performed  cable for hip flex and abd x 2 sets of 15 at 17# and hip ext at 25# x 2 sets of 15. Still some discomfort with R hip with prolonged stance but able to do exercises. Instructed to do standing forward flexion of hip, as well as forward flex R hip and slightly ER. Instructed in figure 4 stretch. Worked on 6 in  step up and over with L LE x 30 leading with L LE. Worked on gait without assistive device. . Worked on heel toe gait. Improved knee ext at heel strike and good toe off. . NuStep x 10 min at level 4. Manual Therapy ( 30 min  ): Patella and patella tendon mobs. Soft tissue work to [employer] and HS. PROM for knee flex and extn. Sitting knee flex to 126. Passive ext to neutral with overpressure. .  Pt with discomfort of lateral knee and R hip. Still present but less intense. Therapeutic Modalities:    HEP: As above; handouts given to patient for all exercises. Treatment/Session Assessment:  Pt is post L TKA on 4/21/17. She presented with decreased ROM of L knee and decreased LE strength. Pain is a limiting factor but now less intense and able to increase activity level. Taping is helping some. Good improvement with ROM. Good flexion and ext. Gait is much improved with no assitive device   Able to walk  at home without cane. Needs to continue  to work to increase strength so to improve gait and to increase activity level with less pain. Tolerating strengthening exercises better and able to do more reps.   Work to increase strength to normalize gait and to enable return to prior activity level including walking for exercise. Discussed walking at home for about 10 min. · Response to Treatment:  Gait with straight cane and SBA. Increased knee morion. · Compliance with Program/Exercises: Doing exercises at home. · Recommendations/Intent for next treatment session: \"Next visit will focus on aggressive ROM and strengtheing of L knee and LE. \".   Total Treatment Duration:  PT Patient Time In/Time Out  Time In: 0115  Time Out: 7875  Treatment number  [id number], DUSTIN

## 2017-06-26 ENCOUNTER — HOSPITAL ENCOUNTER (OUTPATIENT)
Dept: PHYSICAL THERAPY | Age: 62
Discharge: HOME | End: 2017-06-26
Payer: COMMERCIAL

## 2017-06-26 PROCEDURE — 97140 MANUAL THERAPY 1/> REGIONS: CPT

## 2017-06-26 PROCEDURE — 97110 THERAPEUTIC EXERCISES: CPT

## 2017-06-28 NOTE — PROGRESS NOTES
[facility]   [address], [address]  [phone]   [phone]    Denny Parikh  : 1955       OUTPATIENT PHYSICAL THERAPY:Daily Note 17   ICD-10: Treatment Diagnosis:   R26.2 Difficulty in walking, not elsewhere classified     M25.662 Stiffness of left knee, not elsewhere classified     M25.562 Pain in left knee     Precautions/Allergies:   Ambien [zolpidem]; Augmentin [amoxicillin-pot clavulanate]; Codeine; Contrave [naltrexone-bupropion]; Crestor [rosuvastatin]; Macrobid [nitrofurantoin monohyd/m-cryst]; Tape [adhesive]; Topamax [topiramate]; and Zocor [simvastatin]   Fall Risk Score: 1 (? 5 = High Risk)  MD Orders: Eval and treat MEDICAL/REFERRING DIAGNOSIS:  Presence of left artificial knee joint [Z96.652]   DATE OF ONSET:   REFERRING PHYSICIAN: Rom Burgess, *  RETURN PHYSICIAN APPOINTMENT: 2 weeks      INITIAL ASSESSMENT:  Ms. Ector Harry presents with decreased ROM of L knee, weakness of both LE's, painin R knee and fair gait post L TKA on 17. PT will progress from rolling walker to straight cane. Work to increase ROM and strength to enable return to prior activity level. PROBLEM LIST (Impacting functional limitations):  1. Decreased Strength  2. Decreased Ambulation Ability/Technique  3. Increased Pain  4. Increased Fatigue  5. Decreased Flexibility/Joint Mobility  6. Edema/Girth INTERVENTIONS PLANNED:  1. Home Exercise Program (HEP)  2. Manual Therapy  3. Range of Motion (ROM)  4. Therapeutic Activites  5. Therapeutic Exercise/Strengthening   TREATMENT PLAN:  Effective Dates: 17 TO 17. Frequency/Duration: 2 times a week for 12 weeks and upon reassessment,  will adjust frequency and duration as progress indicates. GOALS: (Goals have been discussed and agreed upon with patient.)  Short-Term Functional Goals: Time Frame: 4 weeks  1.  Establish independent HEP with no cueing to increase ROM and strength  MET  2. Increase L knee ROM to 0-125 to increase ease of sitting and ambulation  MET  3. Independent gait with straight cane in home and community  MET  4. Increase strength to enable initiation of reciprocal pattern on stairs. .MET  Discharge Goals: Time Frame: 12 weeks   1. Improve score on LEFS by 9 points to enable prolonged sitting, standing and ambulation  ONGOING  2. Independent gait without assistive device in home and community   MET  3. Increase LE strength 1/2 to 1 grade to enable reciprocal pattern on stairs. ONGOING  4. Able to ambulate 20 min with minimal to no increase in pain  ONGOING  Rehabilitation Potential For Stated Goals: Good  Regarding Rut Salinas's therapy, I certify that the treatment plan above will be carried out by a therapist or under their direction. Thank you for this referral,  Leanne Ramirez, PT     Referring Physician Signature: Rom Burgess, *              Date                    The information in this section was collected on 5/16/17 (except where otherwise noted). HISTORY:   History of Present Injury/Illness (Reason for Referral):  Pt is post L TKA on 4/21/17. Pt initially injured her L knee in 2009 secondary to a fall. She had an arthroscopy for MMT. Reports that she never had complete relief of pain after surgery and that it has become progressively worse. She had Home PT for 8 visits until yesterday. She presents with decreased ROM of L knee, weakness of both LE's and fair gait. She is referred to outpatient PT for ROM and strengthening of L knee and LE as well as gait training. Past Medical History/Comorbidities:   Ms. 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 She also has no past medical history of Adverse effect of anesthesia; Aneurysm ([address] Arrhythmia; Asthma; Autoimmune disease ([address] CAD (coronary artery disease); Cancer ([address] Chronic obstructive pulmonary disease ([address] Coagulation disorder ([address] Diabetes ([address] Difficult intubation; Endocarditis; Heart failure ([address] Ill-defined condition; Liver disease; Malignant hyperthermia due to anesthesia; Pseudocholinesterase deficiency; Psychiatric disorder; PUD (peptic ulcer disease); Rheumatic fever; Seizures ([facility] Stroke [facility]); or Thromboembolus ([address] Ms. Claudette Miller  has a past surgical history that includes breast augmentation (1990); knee arthroscopy (Left, 10/2012); sancho and bso (2009); tonsil and adenoidectomy (1960); bunionectomy (Bilateral, 1980); orthopaedic (Bilateral, 2010); hip replacement (Right, 11/2016); and knee replacement (Left, 04/01/2017). 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 Wants to be able to walk without pain and be able to travel  Dominant Side:         RIGHT  Current Medications:       Current Outpatient Prescriptions:     diph,Pertuss,Acell,,Tet Vac-PF (ADACEL) 2 Lf-(2.5-5-3-5 mcg)-5Lf/0.5 mL susp, 0.5 mL by IntraMUSCular route once for 1 dose., Disp: 1 Syringe, Rfl: 0    levothyroxine (SYNTHROID) 125 mcg tablet, TAKE 1 TABLET DAILY BEFORE BREAKFAST FOR HYPOTHYROIDISM, Disp: 90 Tab, Rfl: 3    zolpidem (AMBIEN) 10 mg tablet, Take 1 Tab by mouth nightly as needed for Sleep.  Max Daily Amount: 10 mg., Disp: 30 Tab, Rfl: 5    atorvastatin (LIPITOR) 40 mg tablet, TAKE 1 TABLET NIGHTLY, Disp: 90 Tab, Rfl: 2    rOPINIRole (REQUIP) 0.5 mg tablet, TAKE 1 TABLET NIGHTLY AS NEEDED, Disp: 90 Tab, Rfl: 2    verapamil ER (VERELAN PM) 300 mg capsule, Take 1 Cap by mouth nightly. (Patient taking differently: Take 300 mg by mouth daily.), Disp: 90 Cap, Rfl: 3    valsartan-hydroCHLOROthiazide (DIOVAN-HCT) 160-25 mg per tablet, Take 1 Tab by mouth daily. , Disp: 90 Tab, Rfl: 3    omeprazole (PRILOSEC) 20 mg capsule, Take 1 Cap by mouth daily. , Disp: 90 Cap, Rfl: 3    Cholecalciferol, Vitamin D3, (VITAMIN D3) 2,000 unit cap capsule, Take 2,000 Units by mouth daily. EVERY OTHER DAY, Disp: , Rfl:    Date Last Reviewed:  6/27/2017     Number of Personal Factors/Comorbidities that affect the Plan of Care: 1-2: MODERATE COMPLEXITY   EXAMINATION:   Observation/Orthostatic Postural Assessment:          Pt to PT without assistive device. Increased gait speed and step length. Improved heel toe gait. Mild limp to L. .  Slight limp to L. Has seen OT for edema and now controlling with surgi-. Less tight HS. In 90/90, HS are 70. Mild edema noted   Palpation:          Pain along medial patella and back of knee. Less intense  Functional Mobility:         Gait/Ambulation:  Independent without assistive device. Stairs: Able to do single step. Difficulty with reciprocal though improving with use of railing. ROM:     L  Knee -2 to 125   R knee  0-130                                    Strength:     Knee ext  L4-/5,  R 4/5    Knee et  L 4/5,  R 4+/5         Hip flex  L 4+/5, R 4+/5    Hip abd  L 4-/5,  R 4-/5    Hip ext   L 4/5,  R 4/5              Neurological Screen: Intact to light touch  Balance:  Good    Body Structures Involved:  1. Bones  2. Joints  3. Muscles  4. Ligaments Body Functions Affected:  1. Sensory/Pain  2. Neuromusculoskeletal  3. Movement Related Activities and Participation Affected:  1.  Learning and Applying Knowledge  2. General Tasks and Demands  3. Mobility  4. Domestic Life  5. Community, Social and [place]   Number of elements (examined above) that affect the Plan of Care: 3: MODERATE COMPLEXITY   CLINICAL PRESENTATION:   Presentation: Evolving clinical presentation with changing clinical characteristics: MODERATE COMPLEXITY   CLINICAL DECISION MAKING:   Outcome Measure: Tool Used: Lower Extremity Functional Scale (LEFS)  Score:  Initial: 32/80 Most Recent: 38/80 (Date: 6/19/17 )   Interpretation of Score: 20 questions each scored on a 5 point scale with 0 representing \"extreme difficulty or unable to perform\" and 4 representing \"no difficulty\". The lower the score, the greater the functional disability. 80/80 represents no disability. Minimal detectable change is 9 points. Score 80 79-63 62-48 47-32 31-16 15-1 0   Modifier CH CI CJ CK CL CM CN     ? Mobility - Walking and Moving Around:     - CURRENT STATUS: CK - 40%-59% impaired, limited or restricted    - GOAL STATUS: CK - 40%-59% impaired, limited or restricted    - D/C STATUS:  ---------------To be determined---------------    Medical Necessity:   · Patient is expected to demonstrate progress in strength, range of motion, balance and gait and decreased pain to increase independence with with home and community activities. Reason for Services/Other Comments:  · Patient continues to require skilled intervention due to decreased ROM and strength of L LE as well as increased pain and fair gait. Use of outcome tool(s) and clinical judgement create a POC that gives a: Clear prediction of patient's progress: LOW COMPLEXITY            TREATMENT:   (In addition to Assessment/Re-Assessment sessions the following treatments were rendered)  Pre-treatment Symptoms/Complaints:   Pt major complaint still is lateral knee pain but much less severe. . Knee motion is improving with increased ease. Walking better.  R hip is painful with forward flexion. .Difficulty with uneven ground   Pain: Initial:   Pain Intensity 1: 3  Post Session:  3     THERAPEUTIC EXERCISE: (30 minutes):  Exercises per grid below to improve mobility, strength and gait and pain. Required moderate verbal and manual cues to promote proper body alignment, promote proper body posture and promote proper body mechanics. Progressed resistance, range and repetitions as indicated. Performed  QS (quadriceps sets),standing hip abd, SAQ's (short arc quadriceps),and  LAQ's (long arc quadriceps) correcting technique as needed    HS and HC stretch. Seated HS curls at 20# x 4 sets of 10. Performed  cable for hip flex and abd x 2 sets of 15 at 17# and hip ext at 25# x 2 sets of 15. Still some discomfort with R hip with prolonged stance but able to do exercises. Instructed to do standing forward flexion of hip, as well as forward flex R hip and slightly ER. Instructed in figure 4 stretch. Worked on 6 in  step up and over with L LE x 30 leading with L LE. Worked on gait without assistive device. . Worked on heel toe gait. Improved knee ext at heel strike and good toe off. . NuStep x 10 min at level 4. Manual Therapy ( 30 min  ): Patella and patella tendon mobs. Soft tissue work to [employer] and HS. PROM for knee flex and extn. Sitting knee flex to 126. Passive ext to neutral with overpressure. .  Pt with discomfort of lateral knee and R hip. Still present but less intense. Therapeutic Modalities:    HEP: As above; handouts given to patient for all exercises. Treatment/Session Assessment:  Pt is post L TKA on 4/21/17. She presented with decreased ROM of L knee and decreased LE strength. Pain is a limiting factor but now less intense and able to increase activity level. Taping is helping some. Good improvement with ROM. Good flexion and ext. Gait is much improved with no assitive device   Able to walk  at home without cane.   Needs to continue  to work to increase strength so to improve gait and to increase activity level with less pain. Tolerating strengthening exercises better and able to do more reps. Work to increase strength to normalize gait and to enable return to prior activity level including walking for exercise. Discussed walking at home for about 10 min. · Response to Treatment:  Gait with straight cane and SBA. Increased knee morion. · Compliance with Program/Exercises: Doing exercises at home. · Recommendations/Intent for next treatment session: \"Next visit will focus on aggressive ROM and strengtheing of L knee and LE. \".   Total Treatment Duration:  PT Patient Time In/Time Out  Time In: 0400  Time Out: 0500  Treatment number  [id number], DUSTIN

## 2017-06-30 ENCOUNTER — HOSPITAL ENCOUNTER (OUTPATIENT)
Dept: PHYSICAL THERAPY | Age: 62
Discharge: HOME | End: 2017-06-30
Payer: COMMERCIAL

## 2017-06-30 PROCEDURE — 97140 MANUAL THERAPY 1/> REGIONS: CPT

## 2017-06-30 PROCEDURE — 97110 THERAPEUTIC EXERCISES: CPT

## 2017-06-30 NOTE — PROGRESS NOTES
[facility]   [address], [address]  [phone]   [phone]    Erica Awad  : 1955       OUTPATIENT PHYSICAL THERAPY:Daily Note and Progress Report 17   ICD-10: Treatment Diagnosis:   R26.2 Difficulty in walking, not elsewhere classified     M25.662 Stiffness of left knee, not elsewhere classified     M25.562 Pain in left knee     Precautions/Allergies:   Ambien [zolpidem]; Augmentin [amoxicillin-pot clavulanate]; Codeine; Contrave [naltrexone-bupropion]; Crestor [rosuvastatin]; Macrobid [nitrofurantoin monohyd/m-cryst]; Tape [adhesive]; Topamax [topiramate]; and Zocor [simvastatin]   Fall Risk Score: 1 (? 5 = High Risk)  MD Orders: Eval and treat MEDICAL/REFERRING DIAGNOSIS:  Presence of left artificial knee joint [Z96.652]   DATE OF ONSET:   REFERRING PHYSICIAN: Amadeo Corbett., *  RETURN PHYSICIAN APPOINTMENT: 2 weeks      INITIAL ASSESSMENT:  Ms. Jas Cameron presents with decreased ROM of L knee, weakness of both LE's, painin R knee and fair gait post L TKA on 17. PT will progress from rolling walker to straight cane. Work to increase ROM and strength to enable return to prior activity level. PROBLEM LIST (Impacting functional limitations):  1. Decreased Strength  2. Decreased Ambulation Ability/Technique  3. Increased Pain  4. Increased Fatigue  5. Decreased Flexibility/Joint Mobility  6. Edema/Girth INTERVENTIONS PLANNED:  1. Home Exercise Program (HEP)  2. Manual Therapy  3. Range of Motion (ROM)  4. Therapeutic Activites  5. Therapeutic Exercise/Strengthening   TREATMENT PLAN:  Effective Dates: 17 TO 17. Frequency/Duration: 2 times a week for 12 weeks and upon reassessment,  will adjust frequency and duration as progress indicates. GOALS: (Goals have been discussed and agreed upon with patient.)  Short-Term Functional Goals: Time Frame: 4 weeks  1.  Establish independent HEP with no cueing to increase ROM and strength  MET  2. Increase L knee ROM to 0-125 to increase ease of sitting and ambulation  MET  3. Independent gait with straight cane in home and community  MET  4. Increase strength to enable initiation of reciprocal pattern on stairs. .MET  Discharge Goals: Time Frame: 12 weeks   1. Improve score on LEFS by 9 points to enable prolonged sitting, standing and ambulation  ONGOING  2. Independent gait without assistive device in home and community   MET  3. Increase LE strength 1/2 to 1 grade to enable reciprocal pattern on stairs. ONGOING  4. Able to ambulate 20 min with minimal to no increase in pain  ONGOING  Rehabilitation Potential For Stated Goals: Good  Regarding Marce Salinas's therapy, I certify that the treatment plan above will be carried out by a therapist or under their direction. Thank you for this referral,  Pricila Burden, PT     Referring Physician Signature: Daniel Tomlinson., *              Date                    The information in this section was collected on 5/16/17 (except where otherwise noted). HISTORY:   History of Present Injury/Illness (Reason for Referral):  Pt is post L TKA on 4/21/17. Pt initially injured her L knee in 2009 secondary to a fall. She had an arthroscopy for MMT. Reports that she never had complete relief of pain after surgery and that it has become progressively worse. She had Home PT for 8 visits until yesterday. She presents with decreased ROM of L knee, weakness of both LE's and fair gait. She is referred to outpatient PT for ROM and strengthening of L knee and LE as well as gait training. Past Medical History/Comorbidities:   Ms. 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 She also has no past medical history of Adverse effect of anesthesia; Aneurysm ([address] Arrhythmia; Asthma; Autoimmune disease ([address] CAD (coronary artery disease); Cancer ([address] Chronic obstructive pulmonary disease ([address] Coagulation disorder ([address] Diabetes ([address] Difficult intubation; Endocarditis; Heart failure ([address] Ill-defined condition; Liver disease; Malignant hyperthermia due to anesthesia; Pseudocholinesterase deficiency; Psychiatric disorder; PUD (peptic ulcer disease); Rheumatic fever; Seizures ([facility] Stroke [facility]); or Thromboembolus ([address] Ms. Peres Gravely  has a past surgical history that includes breast augmentation (1990); knee arthroscopy (Left, 10/2012); sancho and bso (2009); tonsil and adenoidectomy (1960); bunionectomy (Bilateral, 1980); orthopaedic (Bilateral, 2010); hip replacement (Right, 11/2016); and knee replacement (Left, 04/01/2017). 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 Wants to be able to walk without pain and be able to travel  Dominant Side:         RIGHT  Current Medications:       Current Outpatient Prescriptions:     levothyroxine (SYNTHROID) 125 mcg tablet, TAKE 1 TABLET DAILY BEFORE BREAKFAST FOR HYPOTHYROIDISM, Disp: 90 Tab, Rfl: 3    zolpidem (AMBIEN) 10 mg tablet, Take 1 Tab by mouth nightly as needed for Sleep.  Max Daily Amount: 10 mg., Disp: 30 Tab, Rfl: 5    atorvastatin (LIPITOR) 40 mg tablet, TAKE 1 TABLET NIGHTLY, Disp: 90 Tab, Rfl: 2    rOPINIRole (REQUIP) 0.5 mg tablet, TAKE 1 TABLET NIGHTLY AS NEEDED, Disp: 90 Tab, Rfl: 2    verapamil ER (VERELAN PM) 300 mg capsule, Take 1 Cap by mouth nightly. (Patient taking differently: Take 300 mg by mouth daily.), Disp: 90 Cap, Rfl: 3    valsartan-hydroCHLOROthiazide (DIOVAN-HCT) 160-25 mg per tablet, Take 1 Tab by mouth daily. , Disp: 90 Tab, Rfl: 3    omeprazole (PRILOSEC) 20 mg capsule, Take 1 Cap by mouth daily. , Disp: 90 Cap, Rfl: 3    Cholecalciferol, Vitamin D3, (VITAMIN D3) 2,000 unit cap capsule, Take 2,000 Units by mouth daily. EVERY OTHER DAY, Disp: , Rfl:    Date Last Reviewed:  6/30/2017     Number of Personal Factors/Comorbidities that affect the Plan of Care: 1-2: MODERATE COMPLEXITY   EXAMINATION:   Observation/Orthostatic Postural Assessment:          Pt to PT without assistive device. Increased gait speed and step length. Improved heel toe gait. Mild limp to L. .  Slight limp to L. Has seen OT for edema and now controlling with surgi-. Less tight HS. In 90/90, HS are 70. Mild edema noted   Palpation:          Pain along medial patella and back of knee. Less intense  Functional Mobility:         Gait/Ambulation:  Independent without assistive device. Stairs: Able to do single step. Difficulty with reciprocal though improving with use of railing. ROM:     L  Knee 0 to 126   R knee  0-130                                    Strength:     Knee ext  L4-/5,  R 4/5    Knee et  L 4/5,  R 4+/5         Hip flex  L 4+/5, R 4+/5    Hip abd  L 4-/5,  R 4-/5    Hip ext   L 4/5,  R 4/5              Neurological Screen: Intact to light touch  Balance:  Good    Body Structures Involved:  1. Bones  2. Joints  3. Muscles  4. Ligaments Body Functions Affected:  1. Sensory/Pain  2. Neuromusculoskeletal  3. Movement Related Activities and Participation Affected:  1. Learning and Applying Knowledge  2. General Tasks and Demands  3. Mobility  4. Domestic Life  5.  Community, Social and [place]   Number of elements (examined above) that affect the Plan of Care: 3: MODERATE COMPLEXITY   CLINICAL PRESENTATION:   Presentation: Evolving clinical presentation with changing clinical characteristics: MODERATE COMPLEXITY   CLINICAL DECISION MAKING:   Outcome Measure: Tool Used: Lower Extremity Functional Scale (LEFS)  Score:  Initial: 32/80 Most Recent: 38/80 (Date: 6/30/17 )   Interpretation of Score: 20 questions each scored on a 5 point scale with 0 representing \"extreme difficulty or unable to perform\" and 4 representing \"no difficulty\". The lower the score, the greater the functional disability. 80/80 represents no disability. Minimal detectable change is 9 points. Score 80 79-63 62-48 47-32 31-16 15-1 0   Modifier CH CI CJ CK CL CM CN     ? Mobility - Walking and Moving Around:     - CURRENT STATUS: CK - 40%-59% impaired, limited or restricted    - GOAL STATUS: CK - 40%-59% impaired, limited or restricted    - D/C STATUS:  ---------------To be determined---------------    Medical Necessity:   · Patient is expected to demonstrate progress in strength, range of motion, balance and gait and decreased pain to increase independence with with home and community activities. Reason for Services/Other Comments:  · Patient continues to require skilled intervention due to decreased ROM and strength of L LE as well as increased pain and fair gait. Use of outcome tool(s) and clinical judgement create a POC that gives a: Clear prediction of patient's progress: LOW COMPLEXITY            TREATMENT:   (In addition to Assessment/Re-Assessment sessions the following treatments were rendered)  Pre-treatment Symptoms/Complaints:   Pt major complaint still ispain in the back of the knee. . Knee motion is improving with increased ease. Walking better. R hip is painful with forward flexion.   Pain: Initial:   Pain Intensity 1: 3  Post Session:  3     THERAPEUTIC EXERCISE: (30 minutes):  Exercises per grid below to improve mobility, strength and gait and pain. Required moderate verbal and manual cues to promote proper body alignment, promote proper body posture and promote proper body mechanics. Progressed resistance, range and repetitions as indicated. Performed  QS (quadriceps sets),standing hip abd, SAQ's (short arc quadriceps),and  LAQ's (long arc quadriceps) correcting technique as needed    HS and HC stretch. Seated HS curls at 20# x 4 sets of 10. Performed  cable for hip flex and abd x 2 sets of 15 at 17# and hip ext at 25# x 2 sets of 15. R hip pain  with prolonged stance and lifting R hip in forward flexion. Instructed to do standing forward flexion of hip, as well as forward flex R hip and slightly ER. Instructed in figure 4 stretch. Worked on 6 in  step up and over with L LE x 30 leading with L LE. Worked on gait without assistive device. . Worked on heel toe gait. Improved knee ext at heel strike and good toe off. . NuStep x 10 min at level 4. Manual Therapy ( 30 min  ): Patella and patella tendon mobs. Soft tissue work to [employer] and HS. PROM for knee flex and extn. Sitting knee flex to 126. Passive ext to neutral with overpressure. .  Pt with discomfort of lateral knee and R hip. Still present but less intense. Therapeutic Modalities:    HEP: As above; handouts given to patient for all exercises. Treatment/Session Assessment:  Pt is post L TKA on 4/21/17. She presented with decreased ROM of L knee and decreased LE strength. Pain is a limiting factor but now much less intense and able to increase activity level. Still with pain behind knee and above patella. Good improvement with ROM. Good flexion and ext. Gait is much improved with no assitive device. Pain is the limiting factor with pain behind the knee, above patella and at R hip. Needs to continue  to work to increase strength so to improve gait and to increase activity level with less pain. Biggest difficulty is sit to stand.   Some relief with taping. Doing exercises well. Encouraged to do more at home. Work to increase strength to normalize gait and to enable return to prior activity level including walking for exercise. Discussed walking at home for about 10 min. · Response to Treatment:  Gait with straight cane and SBA. Increased knee morion. · Compliance with Program/Exercises: Doing exercises at home. · Recommendations/Intent for next treatment session: \"Next visit will focus on aggressive ROM and strengtheing of L knee and LE. \". Continue 2 x week.    Total Treatment Duration:  PT Patient Time In/Time Out  Time In: 0115  Time Out: 5273  Treatment number  [address] [facility],

## 2017-07-03 ENCOUNTER — HOSPITAL ENCOUNTER (OUTPATIENT)
Dept: PHYSICAL THERAPY | Age: 62
Discharge: HOME | End: 2017-07-03
Payer: COMMERCIAL

## 2017-07-03 PROCEDURE — 97110 THERAPEUTIC EXERCISES: CPT

## 2017-07-03 PROCEDURE — 97140 MANUAL THERAPY 1/> REGIONS: CPT

## 2017-07-03 NOTE — PROGRESS NOTES
[facility] at [facility]   [address], [address]  [phone]   [phone]    Chery Peterson  : 1955       OUTPATIENT PHYSICAL THERAPY:Daily Note 17   ICD-10: Treatment Diagnosis:   R26.2 Difficulty in walking, not elsewhere classified     M25.662 Stiffness of left knee, not elsewhere classified     M25.562 Pain in left knee     Precautions/Allergies:   Ambien [zolpidem]; Augmentin [amoxicillin-pot clavulanate]; Codeine; Contrave [naltrexone-bupropion]; Crestor [rosuvastatin]; Macrobid [nitrofurantoin monohyd/m-cryst]; Tape [adhesive]; Topamax [topiramate]; and Zocor [simvastatin]   Fall Risk Score: 1 (? 5 = High Risk)  MD Orders: Eval and treat MEDICAL/REFERRING DIAGNOSIS:  Presence of left artificial knee joint [Z96.652]   DATE OF ONSET:   REFERRING PHYSICIAN: Di Cardona., *  RETURN PHYSICIAN APPOINTMENT: 2 weeks      INITIAL ASSESSMENT:  Ms. Lilliana Johnson presents with decreased ROM of L knee, weakness of both LE's, painin R knee and fair gait post L TKA on 17. PT will progress from rolling walker to straight cane. Work to increase ROM and strength to enable return to prior activity level. PROBLEM LIST (Impacting functional limitations):  1. Decreased Strength  2. Decreased Ambulation Ability/Technique  3. Increased Pain  4. Increased Fatigue  5. Decreased Flexibility/Joint Mobility  6. Edema/Girth INTERVENTIONS PLANNED:  1. Home Exercise Program (HEP)  2. Manual Therapy  3. Range of Motion (ROM)  4. Therapeutic Activites  5. Therapeutic Exercise/Strengthening   TREATMENT PLAN:  Effective Dates: 17 TO 17. Frequency/Duration: 2 times a week for 12 weeks and upon reassessment,  will adjust frequency and duration as progress indicates. GOALS: (Goals have been discussed and agreed upon with patient.)  Short-Term Functional Goals: Time Frame: 4 weeks  1.  Establish independent HEP with no cueing to increase ROM and strength  MET  2. Increase L knee ROM to 0-125 to increase ease of sitting and ambulation  MET  3. Independent gait with straight cane in home and community  MET  4. Increase strength to enable initiation of reciprocal pattern on stairs. .MET  Discharge Goals: Time Frame: 12 weeks   1. Improve score on LEFS by 9 points to enable prolonged sitting, standing and ambulation  ONGOING  2. Independent gait without assistive device in home and community   MET  3. Increase LE strength 1/2 to 1 grade to enable reciprocal pattern on stairs. ONGOING  4. Able to ambulate 20 min with minimal to no increase in pain  ONGOING  Rehabilitation Potential For Stated Goals: Good  Regarding Dalton Karis Salinas's therapy, I certify that the treatment plan above will be carried out by a therapist or under their direction. Thank you for this referral,  Thania Wood, PT     Referring Physician Signature: Myah Flores., *              Date                    The information in this section was collected on 5/16/17 (except where otherwise noted). HISTORY:   History of Present Injury/Illness (Reason for Referral):  Pt is post L TKA on 4/21/17. Pt initially injured her L knee in 2009 secondary to a fall. She had an arthroscopy for MMT. Reports that she never had complete relief of pain after surgery and that it has become progressively worse. She had Home PT for 8 visits until yesterday. She presents with decreased ROM of L knee, weakness of both LE's and fair gait. She is referred to outpatient PT for ROM and strengthening of L knee and LE as well as gait training. Past Medical History/Comorbidities:   Ms. 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 She also has no past medical history of Adverse effect of anesthesia; Aneurysm ([address] Arrhythmia; Asthma; Autoimmune disease ([address] CAD (coronary artery disease); Cancer ([address] Chronic obstructive pulmonary disease ([address] Coagulation disorder ([address] Diabetes ([address] Difficult intubation; Endocarditis; Heart failure ([address] Ill-defined condition; Liver disease; Malignant hyperthermia due to anesthesia; Pseudocholinesterase deficiency; Psychiatric disorder; PUD (peptic ulcer disease); Rheumatic fever; Seizures ([facility] Stroke [facility]); or Thromboembolus ([address] Ms. Francesca Beltran  has a past surgical history that includes breast augmentation (1990); knee arthroscopy (Left, 10/2012); sancho and bso (2009); tonsil and adenoidectomy (1960); bunionectomy (Bilateral, 1980); orthopaedic (Bilateral, 2010); hip replacement (Right, 11/2016); and knee replacement (Left, 04/01/2017). 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 Wants to be able to walk without pain and be able to travel  Dominant Side:         RIGHT  Current Medications:       Current Outpatient Prescriptions:     levothyroxine (SYNTHROID) 125 mcg tablet, TAKE 1 TABLET DAILY BEFORE BREAKFAST FOR HYPOTHYROIDISM, Disp: 90 Tab, Rfl: 3    zolpidem (AMBIEN) 10 mg tablet, Take 1 Tab by mouth nightly as needed for Sleep.  Max Daily Amount: 10 mg., Disp: 30 Tab, Rfl: 5    atorvastatin (LIPITOR) 40 mg tablet, TAKE 1 TABLET NIGHTLY, Disp: 90 Tab, Rfl: 2    rOPINIRole (REQUIP) 0.5 mg tablet, TAKE 1 TABLET NIGHTLY AS NEEDED, Disp: 90 Tab, Rfl: 2    verapamil ER (VERELAN PM) 300 mg capsule, Take 1 Cap by mouth nightly. (Patient taking differently: Take 300 mg by mouth daily.), Disp: 90 Cap, Rfl: 3    valsartan-hydroCHLOROthiazide (DIOVAN-HCT) 160-25 mg per tablet, Take 1 Tab by mouth daily. , Disp: 90 Tab, Rfl: 3    omeprazole (PRILOSEC) 20 mg capsule, Take 1 Cap by mouth daily. , Disp: 90 Cap, Rfl: 3    Cholecalciferol, Vitamin D3, (VITAMIN D3) 2,000 unit cap capsule, Take 2,000 Units by mouth daily. EVERY OTHER DAY, Disp: , Rfl:    Date Last Reviewed:  7/3/2017     Number of Personal Factors/Comorbidities that affect the Plan of Care: 1-2: MODERATE COMPLEXITY   EXAMINATION:   Observation/Orthostatic Postural Assessment:          Pt to PT without assistive device. Increased gait speed and step length. Improved heel toe gait. Mild limp to L. .  Slight limp to L. Has seen OT for edema and now controlling with surgi-. Less tight HS. In 90/90, HS are 70. Mild edema noted   Palpation:          Pain along medial patella and back of knee. Less intense  Functional Mobility:         Gait/Ambulation:  Independent without assistive device. Stairs: Able to do single step. Difficulty with reciprocal though improving with use of railing. ROM:     L  Knee -2 to 125   R knee  0-130                                    Strength:     Knee ext  L4-/5,  R 4/5    Knee et  L 4/5,  R 4+/5         Hip flex  L 4+/5, R 4+/5    Hip abd  L 4-/5,  R 4-/5    Hip ext   L 4/5,  R 4/5              Neurological Screen: Intact to light touch  Balance:  Good    Body Structures Involved:  1. Bones  2. Joints  3. Muscles  4. Ligaments Body Functions Affected:  1. Sensory/Pain  2. Neuromusculoskeletal  3. Movement Related Activities and Participation Affected:  1. Learning and Applying Knowledge  2. General Tasks and Demands  3. Mobility  4. Domestic Life  5.  Community, Social and [place]   Number of elements (examined above) that affect the Plan of Care: 3: MODERATE COMPLEXITY   CLINICAL PRESENTATION:   Presentation: Evolving clinical presentation with changing clinical characteristics: MODERATE COMPLEXITY   CLINICAL DECISION MAKING:   Outcome Measure: Tool Used: Lower Extremity Functional Scale (LEFS)  Score:  Initial: 32/80 Most Recent: 38/80 (Date: 6/19/17 )   Interpretation of Score: 20 questions each scored on a 5 point scale with 0 representing \"extreme difficulty or unable to perform\" and 4 representing \"no difficulty\". The lower the score, the greater the functional disability. 80/80 represents no disability. Minimal detectable change is 9 points. Score 80 79-63 62-48 47-32 31-16 15-1 0   Modifier CH CI CJ CK CL CM CN     ? Mobility - Walking and Moving Around:     - CURRENT STATUS: CK - 40%-59% impaired, limited or restricted    - GOAL STATUS: CK - 40%-59% impaired, limited or restricted    - D/C STATUS:  ---------------To be determined---------------    Medical Necessity:   · Patient is expected to demonstrate progress in strength, range of motion, balance and gait and decreased pain to increase independence with with home and community activities. Reason for Services/Other Comments:  · Patient continues to require skilled intervention due to decreased ROM and strength of L LE as well as increased pain and fair gait. Use of outcome tool(s) and clinical judgement create a POC that gives a: Clear prediction of patient's progress: LOW COMPLEXITY            TREATMENT:   (In addition to Assessment/Re-Assessment sessions the following treatments were rendered)  Pre-treatment Symptoms/Complaints:   Pt reports that her knee is generally better. Major complaint is pain in lateral HS behind the knee and at quad proximal of patella. Knee motion is good. Walking better. Complains that R hip is painful with lifting it into forward flexion. .   Pain: Initial:   Pain Intensity 1: 3  Post Session:  2     THERAPEUTIC EXERCISE: (30 minutes):  Exercises per grid below to improve mobility, strength and gait and pain. Required moderate verbal and manual cues to promote proper body alignment, promote proper body posture and promote proper body mechanics. Progressed resistance, range and repetitions as indicated. Performed  QS (quadriceps sets),standing hip abd, SAQ's (short arc quadriceps),and  LAQ's (long arc quadriceps) correcting technique as needed    HS and HC stretch. Seated HS curls at 25# x 4 sets of 10. Performed  cable for hip flex and abd x 2 sets of 20 at 17# and hip ext at 32# x 2 sets of 20. Still has discomfort with R hip when she lifts it int forward flexion. Reports no pain when walking. Performed standing forward flexion of hip, as well as forward flex R hip and slightly ER. Performed figure 4 stretch. Worked on 6 in  step up and over with L LE x 15 then step up and over with 2 steps to work on reciprocal pattern. PT had difficulty with reciprocal pattern. Worked on gait without assistive device. . Worked on heel toe gait. Good heel toe gait pattern. Instructed in sit to stand with hands on knees then with hand hold assist x 2 sets  10. Pain distal of patella due to increased stress on knee. NuStep x 10 min at level 4. Manual Therapy ( 30 min  ): Patella and patella tendon mobs. Soft tissue work to [employer] and HS. PROM for knee flex and extn. Sitting knee flex to 128. Passive ext to neutral with overpressure. .  Pt with discomfort of lateral knee and R hip. Still present but less intense. Therapeutic Modalities:    HEP: As above; handouts given to patient for all exercises. Treatment/Session Assessment:  Pt is post L TKA on 4/21/17. She presented with decreased ROM of L knee and decreased LE strength. Pain is a limiting factor but now less intense and able to increase activity level. Still increased discomfort with sit to stand and stairs. Taping is helping some. Good improvement with ROM. Good flexion and ext. Gait is  improved with no assitive device. Able to walk  at home without cane. Needs to continue  to work to increase strength so to improve gait and to increase activity level with less pain. Needs to work to increase quad strength to increase ease of sit to stand and stairs. Tolerating strengthening exercises better and able to do more reps. Work to increase strength  to enable return to prior activity level including walking for exercise. Discussed walking at home for about 10 min. · Response to Treatment:  Gait with straight cane and SBA. Increased knee morion. · Compliance with Program/Exercises: Doing exercises at home. · Recommendations/Intent for next treatment session: \"Next visit will focus on aggressive ROM and strengtheing of L knee and LE. \".   Total Treatment Duration:  PT Patient Time In/Time Out  Time In: 0200  Time Out: 0300  Treatment number  [address] [facility],

## 2017-07-06 ENCOUNTER — HOSPITAL ENCOUNTER (OUTPATIENT)
Dept: PHYSICAL THERAPY | Age: 62
Discharge: HOME | End: 2017-07-06
Payer: COMMERCIAL

## 2017-07-06 PROCEDURE — 97110 THERAPEUTIC EXERCISES: CPT

## 2017-07-06 PROCEDURE — 97140 MANUAL THERAPY 1/> REGIONS: CPT

## 2017-07-06 NOTE — PROGRESS NOTES
[facility] at [facility]   [address], [address]  [phone]   [phone]    Darcy Cooks  : 1955       OUTPATIENT PHYSICAL THERAPY:Daily Note 17   ICD-10: Treatment Diagnosis:   R26.2 Difficulty in walking, not elsewhere classified     M25.662 Stiffness of left knee, not elsewhere classified     M25.562 Pain in left knee     Precautions/Allergies:   Ambien [zolpidem]; Augmentin [amoxicillin-pot clavulanate]; Codeine; Contrave [naltrexone-bupropion]; Crestor [rosuvastatin]; Macrobid [nitrofurantoin monohyd/m-cryst]; Tape [adhesive]; Topamax [topiramate]; and Zocor [simvastatin]   Fall Risk Score: 1 (? 5 = High Risk)  MD Orders: Eval and treat MEDICAL/REFERRING DIAGNOSIS:  Presence of left artificial knee joint [Z96.652]   DATE OF ONSET:   REFERRING PHYSICIAN: Rhonda Kumar, *  RETURN PHYSICIAN APPOINTMENT: 2 weeks      INITIAL ASSESSMENT:  Ms. Vy Priest presents with decreased ROM of L knee, weakness of both LE's, painin R knee and fair gait post L TKA on 17. PT will progress from rolling walker to straight cane. Work to increase ROM and strength to enable return to prior activity level. PROBLEM LIST (Impacting functional limitations):  1. Decreased Strength  2. Decreased Ambulation Ability/Technique  3. Increased Pain  4. Increased Fatigue  5. Decreased Flexibility/Joint Mobility  6. Edema/Girth INTERVENTIONS PLANNED:  1. Home Exercise Program (HEP)  2. Manual Therapy  3. Range of Motion (ROM)  4. Therapeutic Activites  5. Therapeutic Exercise/Strengthening   TREATMENT PLAN:  Effective Dates: 17 TO 17. Frequency/Duration: 2 times a week for 12 weeks and upon reassessment,  will adjust frequency and duration as progress indicates. GOALS: (Goals have been discussed and agreed upon with patient.)  Short-Term Functional Goals: Time Frame: 4 weeks  1.  Establish independent HEP with no cueing to increase ROM and strength  MET  2. Increase L knee ROM to 0-125 to increase ease of sitting and ambulation  MET  3. Independent gait with straight cane in home and community  MET  4. Increase strength to enable initiation of reciprocal pattern on stairs. .MET  Discharge Goals: Time Frame: 12 weeks   1. Improve score on LEFS by 9 points to enable prolonged sitting, standing and ambulation  ONGOING  2. Independent gait without assistive device in home and community   MET  3. Increase LE strength 1/2 to 1 grade to enable reciprocal pattern on stairs. ONGOING  4. Able to ambulate 20 min with minimal to no increase in pain  ONGOING  Rehabilitation Potential For Stated Goals: Good  Regarding Sondra Salinas's therapy, I certify that the treatment plan above will be carried out by a therapist or under their direction. Thank you for this referral,  Lisha Canales, PT     Referring Physician Signature: Chanel Ta., *              Date                    The information in this section was collected on 5/16/17 (except where otherwise noted). HISTORY:   History of Present Injury/Illness (Reason for Referral):  Pt is post L TKA on 4/21/17. Pt initially injured her L knee in 2009 secondary to a fall. She had an arthroscopy for MMT. Reports that she never had complete relief of pain after surgery and that it has become progressively worse. She had Home PT for 8 visits until yesterday. She presents with decreased ROM of L knee, weakness of both LE's and fair gait. She is referred to outpatient PT for ROM and strengthening of L knee and LE as well as gait training. Past Medical History/Comorbidities:   Ms. 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 She also has no past medical history of Adverse effect of anesthesia; Aneurysm ([address] Arrhythmia; Asthma; Autoimmune disease ([address] CAD (coronary artery disease); Cancer ([address] Chronic obstructive pulmonary disease ([address] Coagulation disorder ([address] Diabetes ([address] Difficult intubation; Endocarditis; Heart failure ([address] Ill-defined condition; Liver disease; Malignant hyperthermia due to anesthesia; Pseudocholinesterase deficiency; Psychiatric disorder; PUD (peptic ulcer disease); Rheumatic fever; Seizures ([address] Stroke [facility]); or Thromboembolus ([address] Ms. Munoz Stage  has a past surgical history that includes breast augmentation (1990); knee arthroscopy (Left, 10/2012); sancho and bso (2009); tonsil and adenoidectomy (1960); bunionectomy (Bilateral, 1980); orthopaedic (Bilateral, 2010); hip replacement (Right, 11/2016); and knee replacement (Left, 04/01/2017). 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 Wants to be able to walk without pain and be able to travel  Dominant Side:         RIGHT  Current Medications:       Current Outpatient Prescriptions:     levothyroxine (SYNTHROID) 125 mcg tablet, TAKE 1 TABLET DAILY BEFORE BREAKFAST FOR HYPOTHYROIDISM, Disp: 90 Tab, Rfl: 3    zolpidem (AMBIEN) 10 mg tablet, Take 1 Tab by mouth nightly as needed for Sleep.  Max Daily Amount: 10 mg., Disp: 30 Tab, Rfl: 5    atorvastatin (LIPITOR) 40 mg tablet, TAKE 1 TABLET NIGHTLY, Disp: 90 Tab, Rfl: 2    rOPINIRole (REQUIP) 0.5 mg tablet, TAKE 1 TABLET NIGHTLY AS NEEDED, Disp: 90 Tab, Rfl: 2    verapamil ER (VERELAN PM) 300 mg capsule, Take 1 Cap by mouth nightly. (Patient taking differently: Take 300 mg by mouth daily.), Disp: 90 Cap, Rfl: 3    valsartan-hydroCHLOROthiazide (DIOVAN-HCT) 160-25 mg per tablet, Take 1 Tab by mouth daily. , Disp: 90 Tab, Rfl: 3    omeprazole (PRILOSEC) 20 mg capsule, Take 1 Cap by mouth daily. , Disp: 90 Cap, Rfl: 3    Cholecalciferol, Vitamin D3, (VITAMIN D3) 2,000 unit cap capsule, Take 2,000 Units by mouth daily. EVERY OTHER DAY, Disp: , Rfl:    Date Last Reviewed:  7/6/2017     Number of Personal Factors/Comorbidities that affect the Plan of Care: 1-2: MODERATE COMPLEXITY   EXAMINATION:   Observation/Orthostatic Postural Assessment:          Pt to PT without assistive device. Increased gait speed and step length. Improved heel toe gait. Mild limp to L. .  Slight limp to L. Has seen OT for edema and now controlling with surgi-. Less tight HS. In 90/90, HS are 70. Mild edema noted   Palpation:          Pain along medial patella and back of knee. Less intense  Functional Mobility:         Gait/Ambulation:  Independent without assistive device. Stairs: Able to do single step. Difficulty with reciprocal though improving with use of railing. ROM:     L  Knee -2 to 125   R knee  0-130                                    Strength:     Knee ext  L4-/5,  R 4/5    Knee et  L 4/5,  R 4+/5         Hip flex  L 4+/5, R 4+/5    Hip abd  L 4-/5,  R 4-/5    Hip ext   L 4/5,  R 4/5              Neurological Screen: Intact to light touch  Balance:  Good    Body Structures Involved:  1. Bones  2. Joints  3. Muscles  4. Ligaments Body Functions Affected:  1. Sensory/Pain  2. Neuromusculoskeletal  3. Movement Related Activities and Participation Affected:  1. Learning and Applying Knowledge  2. General Tasks and Demands  3. Mobility  4. Domestic Life  5.  Community, Social and [place]   Number of elements (examined above) that affect the Plan of Care: 3: MODERATE COMPLEXITY   CLINICAL PRESENTATION:   Presentation: Evolving clinical presentation with changing clinical characteristics: MODERATE COMPLEXITY   CLINICAL DECISION MAKING:   Outcome Measure: Tool Used: Lower Extremity Functional Scale (LEFS)  Score:  Initial: 32/80 Most Recent: 38/80 (Date: 6/19/17 )   Interpretation of Score: 20 questions each scored on a 5 point scale with 0 representing \"extreme difficulty or unable to perform\" and 4 representing \"no difficulty\". The lower the score, the greater the functional disability. 80/80 represents no disability. Minimal detectable change is 9 points. Score 80 79-63 62-48 47-32 31-16 15-1 0   Modifier CH CI CJ CK CL CM CN     ? Mobility - Walking and Moving Around:     - CURRENT STATUS: CK - 40%-59% impaired, limited or restricted    - GOAL STATUS: CK - 40%-59% impaired, limited or restricted    - D/C STATUS:  ---------------To be determined---------------    Medical Necessity:   · Patient is expected to demonstrate progress in strength, range of motion, balance and gait and decreased pain to increase independence with with home and community activities. Reason for Services/Other Comments:  · Patient continues to require skilled intervention due to decreased ROM and strength of L LE as well as increased pain and fair gait. Use of outcome tool(s) and clinical judgement create a POC that gives a: Clear prediction of patient's progress: LOW COMPLEXITY            TREATMENT:   (In addition to Assessment/Re-Assessment sessions the following treatments were rendered)  Pre-treatment Symptoms/Complaints:   Pt reports still having some discomfort along the outside of her knee and across the top with certain movements. Pain: Initial:r  Pain Intensity 1: 3  Post Session:  2     THERAPEUTIC EXERCISE: (30 minutes):  Exercises per grid below to improve mobility, strength and gait and pain.   Required moderate verbal and manual cues to promote proper body alignment, promote proper body posture and promote proper body mechanics. Progressed resistance, range and repetitions as indicated. Performed  QS (quadriceps sets),standing hip abd, SAQ's (short arc quadriceps),and  LAQ's (long arc quadriceps) correcting technique as needed    HS and HC stretch. Seated HS curls at 25# x 4 sets of 10. Performed  cable for hip flex and abd x 2 sets of 20 at 17# and hip ext at 32# x 2 sets of 20. Still has discomfort with R hip when she lifts it int forward flexion. Reports no pain when walking. Performed standing forward flexion of hip, as well as forward flex R hip and slightly ER. Performed figure 4 stretch. Worked on 6 in  step up and over with L LE x 15 then step up and over with 2 steps to work on reciprocal pattern. PT had difficulty with reciprocal pattern. Worked on gait without assistive device. Worked on heel toe gait. Good heel toe gait pattern. Instructed in sit to stand with hands on knees then with hand hold assist x 2 sets  10. Pain distal of patella due to increased stress on knee. NuStep x 10 min at level 4. Wall ball squats with hand held assistance x 15        Manual Therapy ( 30 min  ): Patella and patella tendon mobs. Soft tissue work to [employer] and HS. PROM for knee flex and extn. Sitting knee flex to 128. Passive ext to neutral with overpressure. .  Pt with discomfort of lateral knee and R hip. Still present but less intense. Therapeutic Modalities:    HEP: As directed. Treatment/Session Assessment:  Pt is post L TKA on 4/21/17. She presented with decreased ROM of L knee and decreased LE strength. Pain is a limiting factor but now less intense and able to increase activity level. Still increased discomfort with sit to stand and stairs. Taping is helping some. Good improvement with ROM. Needs to work to increase quad strength to increase ease of sit to stand and stairs.    Tolerating strengthening exercises better and able to do more reps. Work to increase strength  to enable return to prior activity level including walking for exercise. Discussed walking at home for about 10 min and continue other home exercises as directed. · Response to Treatment:  Gait with straight cane and SBA. Increased knee morion. · Compliance with Program/Exercises: Doing exercises at home. · Recommendations/Intent for next treatment session: \"Next visit will focus on aggressive ROM and strengtheing of L knee and LE. \".   Total Treatment Duration:  PT Patient Time In/Time Out  Time In: 0300  Time Out: 0400  Treatment number  [place], [place]

## 2017-07-11 ENCOUNTER — HOSPITAL ENCOUNTER (OUTPATIENT)
Dept: PHYSICAL THERAPY | Age: 62
Discharge: HOME | End: 2017-07-11
Payer: COMMERCIAL

## 2017-07-11 PROCEDURE — 97110 THERAPEUTIC EXERCISES: CPT

## 2017-07-11 PROCEDURE — 97140 MANUAL THERAPY 1/> REGIONS: CPT

## 2017-07-14 ENCOUNTER — HOSPITAL ENCOUNTER (OUTPATIENT)
Dept: PHYSICAL THERAPY | Age: 62
Discharge: HOME | End: 2017-07-14
Payer: COMMERCIAL

## 2017-07-14 PROCEDURE — 97110 THERAPEUTIC EXERCISES: CPT

## 2017-07-14 PROCEDURE — 97140 MANUAL THERAPY 1/> REGIONS: CPT

## 2017-07-14 NOTE — PROGRESS NOTES
[facility]   [address], [address]  [phone]   [phone]    Amee Bautista  : 1955       OUTPATIENT PHYSICAL THERAPY:Daily Note 17   ICD-10: Treatment Diagnosis:   R26.2 Difficulty in walking, not elsewhere classified     M25.662 Stiffness of left knee, not elsewhere classified     M25.562 Pain in left knee     Precautions/Allergies:   Ambien [zolpidem]; Augmentin [amoxicillin-pot clavulanate]; Codeine; Contrave [naltrexone-bupropion]; Crestor [rosuvastatin]; Macrobid [nitrofurantoin monohyd/m-cryst]; Tape [adhesive]; Topamax [topiramate]; and Zocor [simvastatin]   Fall Risk Score: 1 (? 5 = High Risk)  MD Orders: Eval and treat MEDICAL/REFERRING DIAGNOSIS:  Presence of left artificial knee joint [Z96.652]   DATE OF ONSET:   REFERRING PHYSICIAN: Darell Quigley., *  RETURN PHYSICIAN APPOINTMENT: 2 weeks      INITIAL ASSESSMENT:  Ms. Monse Aaron presents with decreased ROM of L knee, weakness of both LE's, painin R knee and fair gait post L TKA on 17. PT will progress from rolling walker to straight cane. Work to increase ROM and strength to enable return to prior activity level. PROBLEM LIST (Impacting functional limitations):  1. Decreased Strength  2. Decreased Ambulation Ability/Technique  3. Increased Pain  4. Increased Fatigue  5. Decreased Flexibility/Joint Mobility  6. Edema/Girth INTERVENTIONS PLANNED:  1. Home Exercise Program (HEP)  2. Manual Therapy  3. Range of Motion (ROM)  4. Therapeutic Activites  5. Therapeutic Exercise/Strengthening   TREATMENT PLAN:  Effective Dates: 17 TO 17. Frequency/Duration: 2 times a week for 12 weeks and upon reassessment,  will adjust frequency and duration as progress indicates. GOALS: (Goals have been discussed and agreed upon with patient.)  Short-Term Functional Goals: Time Frame: 4 weeks  1.  Establish independent HEP with no cueing to increase ROM and strength  MET  2. Increase L knee ROM to 0-125 to increase ease of sitting and ambulation  MET  3. Independent gait with straight cane in home and community  MET  4. Increase strength to enable initiation of reciprocal pattern on stairs. .MET  Discharge Goals: Time Frame: 12 weeks   1. Improve score on LEFS by 9 points to enable prolonged sitting, standing and ambulation  ONGOING  2. Independent gait without assistive device in home and community   MET  3. Increase LE strength 1/2 to 1 grade to enable reciprocal pattern on stairs. ONGOING  4. Able to ambulate 20 min with minimal to no increase in pain  ONGOING  Rehabilitation Potential For Stated Goals: Good  Regarding Bianca Salinas's therapy, I certify that the treatment plan above will be carried out by a therapist or under their direction. Thank you for this referral,  Danielle Rehman, PT     Referring Physician Signature: Felipa Yao., *              Date                    The information in this section was collected on 5/16/17 (except where otherwise noted). HISTORY:   History of Present Injury/Illness (Reason for Referral):  Pt is post L TKA on 4/21/17. Pt initially injured her L knee in 2009 secondary to a fall. She had an arthroscopy for MMT. Reports that she never had complete relief of pain after surgery and that it has become progressively worse. She had Home PT for 8 visits until yesterday. She presents with decreased ROM of L knee, weakness of both LE's and fair gait. She is referred to outpatient PT for ROM and strengthening of L knee and LE as well as gait training. Past Medical History/Comorbidities:   Ms. 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 She also has no past medical history of Adverse effect of anesthesia; Aneurysm ([address] Arrhythmia; Asthma; Autoimmune disease ([address] CAD (coronary artery disease); Cancer ([address] Chronic obstructive pulmonary disease ([address] Coagulation disorder ([address] Diabetes ([address] Difficult intubation; Endocarditis; Heart failure ([address] Ill-defined condition; Liver disease; Malignant hyperthermia due to anesthesia; Pseudocholinesterase deficiency; Psychiatric disorder; PUD (peptic ulcer disease); Rheumatic fever; Seizures ([address] Stroke [facility]); or Thromboembolus ([address] Ms. Jeny Arias  has a past surgical history that includes breast augmentation (1990); knee arthroscopy (Left, 10/2012); sancho and bso (2009); tonsil and adenoidectomy (1960); bunionectomy (Bilateral, 1980); orthopaedic (Bilateral, 2010); hip replacement (Right, 11/2016); and knee replacement (Left, 04/01/2017). 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 Wants to be able to walk without pain and be able to travel  Dominant Side:         RIGHT  Current Medications:       Current Outpatient Prescriptions:     levothyroxine (SYNTHROID) 125 mcg tablet, TAKE 1 TABLET DAILY BEFORE BREAKFAST FOR HYPOTHYROIDISM, Disp: 90 Tab, Rfl: 3    zolpidem (AMBIEN) 10 mg tablet, Take 1 Tab by mouth nightly as needed for Sleep.  Max Daily Amount: 10 mg., Disp: 30 Tab, Rfl: 5    atorvastatin (LIPITOR) 40 mg tablet, TAKE 1 TABLET NIGHTLY, Disp: 90 Tab, Rfl: 2    rOPINIRole (REQUIP) 0.5 mg tablet, TAKE 1 TABLET NIGHTLY AS NEEDED, Disp: 90 Tab, Rfl: 2    verapamil ER (VERELAN PM) 300 mg capsule, Take 1 Cap by mouth nightly. (Patient taking differently: Take 300 mg by mouth daily.), Disp: 90 Cap, Rfl: 3    valsartan-hydroCHLOROthiazide (DIOVAN-HCT) 160-25 mg per tablet, Take 1 Tab by mouth daily. , Disp: 90 Tab, Rfl: 3    omeprazole (PRILOSEC) 20 mg capsule, Take 1 Cap by mouth daily. , Disp: 90 Cap, Rfl: 3    Cholecalciferol, Vitamin D3, (VITAMIN D3) 2,000 unit cap capsule, Take 2,000 Units by mouth daily. EVERY OTHER DAY, Disp: , Rfl:    Date Last Reviewed:  7/14/2017     Number of Personal Factors/Comorbidities that affect the Plan of Care: 1-2: MODERATE COMPLEXITY   EXAMINATION:   Observation/Orthostatic Postural Assessment:          Pt to PT without assistive device. Increased gait speed and step length. Improved heel toe gait. Mild limp to L. .  Slight limp to L. Has seen OT for edema and now controlling with surgi-. Less tight HS. In 90/90, HS are 70. Mild edema noted   Palpation:          Pain along medial patella and back of knee. Less intense  Functional Mobility:         Gait/Ambulation:  Independent without assistive device. Stairs: Able to do single step. Difficulty with reciprocal though improving with use of railing. ROM:     L  Knee -2 to 125   R knee  0-130                                    Strength:     Knee ext  L4-/5,  R 4/5    Knee et  L 4/5,  R 4+/5         Hip flex  L 4+/5, R 4+/5    Hip abd  L 4-/5,  R 4-/5    Hip ext   L 4/5,  R 4/5              Neurological Screen: Intact to light touch  Balance:  Good    Body Structures Involved:  1. Bones  2. Joints  3. Muscles  4. Ligaments Body Functions Affected:  1. Sensory/Pain  2. Neuromusculoskeletal  3. Movement Related Activities and Participation Affected:  1. Learning and Applying Knowledge  2. General Tasks and Demands  3. Mobility  4. Domestic Life  5.  Community, Social and [place]   Number of elements (examined above) that affect the Plan of Care: 3: MODERATE COMPLEXITY   CLINICAL PRESENTATION:   Presentation: Evolving clinical presentation with changing clinical characteristics: MODERATE COMPLEXITY   CLINICAL DECISION MAKING:   Outcome Measure: Tool Used: Lower Extremity Functional Scale (LEFS)  Score:  Initial: 32/80 Most Recent: 38/80 (Date: 6/19/17 )   Interpretation of Score: 20 questions each scored on a 5 point scale with 0 representing \"extreme difficulty or unable to perform\" and 4 representing \"no difficulty\". The lower the score, the greater the functional disability. 80/80 represents no disability. Minimal detectable change is 9 points. Score 80 79-63 62-48 47-32 31-16 15-1 0   Modifier CH CI CJ CK CL CM CN     ? Mobility - Walking and Moving Around:     - CURRENT STATUS: CK - 40%-59% impaired, limited or restricted    - GOAL STATUS: CK - 40%-59% impaired, limited or restricted    - D/C STATUS:  ---------------To be determined---------------    Medical Necessity:   · Patient is expected to demonstrate progress in strength, range of motion, balance and gait and decreased pain to increase independence with with home and community activities. Reason for Services/Other Comments:  · Patient continues to require skilled intervention due to decreased ROM and strength of L LE as well as increased pain and fair gait. Use of outcome tool(s) and clinical judgement create a POC that gives a: Clear prediction of patient's progress: LOW COMPLEXITY            TREATMENT:   (In addition to Assessment/Re-Assessment sessions the following treatments were rendered)  Pre-treatment Symptoms/Complaints:   Pt reports still having discomfort about and along the back of her knee. Pain: Initial:r  Pain Intensity 1: 2  Post Session:  2     THERAPEUTIC EXERCISE: (30 minutes):  Exercises per grid below to improve mobility, strength and gait and pain.   Required moderate verbal and manual cues to promote proper body alignment, promote proper body posture and promote proper body mechanics. Progressed resistance, range and repetitions as indicated. Performed  QS (quadriceps sets),standing hip abd, SAQ's (short arc quadriceps),and  LAQ's (long arc quadriceps) correcting technique as needed    HS and HC stretch. Seated HS curls at 25# x 4 sets of 10. Performed  cable for hip flex and abd x 2 sets of 20 at 17# and hip ext at 32# x 2 sets of 20. Still has discomfort with R hip when she lifts it int forward flexion. Reports no pain when walking. Performed standing forward flexion of hip, as well as forward flex R hip and slightly ER. Performed figure 4 stretch. Worked on 6 in  step up and over with L LE x 15 then step up and over with 2 steps to work on reciprocal pattern. PT had difficulty with reciprocal pattern. Worked on gait without assistive device. Worked on heel toe gait. Good heel toe gait pattern. Instructed in sit to stand with hands on knees then with hand hold assist x 2 sets  10. Pain distal of patella due to increased stress on knee. NuStep x 10 min at level 4. Wall ball squats with hand held assistance x 15. kinesiotape of quad with knee in flex. Resistive gait side stepping # 20 x 10        Manual Therapy ( 30 min  ): Patella and patella tendon mobs. Soft tissue work to [employer] and HS. PROM for knee flex and extn. Sitting knee flex to 128. Passive ext to neutral with overpressure. .  Pt with discomfort of lateral knee and R hip. Still present but less intense. Therapeutic Modalities:    HEP: As directed. Treatment/Session Assessment:  Pt is post L TKA on 4/21/17. She presented with decreased ROM of L knee and decreased LE strength. Pain is a limiting factor but now less intense and able to increase activity level. Most of the knee discomfort is the back of the knee laterally. Encouraged to stretch HS well. Still increased discomfort with sit to stand and stairs. Good improvement with ROM.    Needs to work to increase quad strength to increase ease of sit to stand and stairs. Work to increase strength  to enable return to prior activity level including walking for exercise. Patient indicates that she was [place]. Discussed walking at home for about 10 min and continue other home exercises as directed. · Response to Treatment:  Gait with straight cane and SBA. Increased knee morion. · Compliance with Program/Exercises: Doing exercises at home. · Recommendations/Intent for next treatment session: \"Next visit will focus on aggressive ROM and strengtheing of L knee and LE. \".   Total Treatment Duration:  PT Patient Time In/Time Out  Time In: 0100  Time Out: 0200  Treatment number  [id number] [place], [place]

## 2017-07-17 ENCOUNTER — HOSPITAL ENCOUNTER (OUTPATIENT)
Dept: PHYSICAL THERAPY | Age: 62
Discharge: HOME | End: 2017-07-17
Payer: COMMERCIAL

## 2017-07-17 NOTE — PROGRESS NOTES
Dede Del Valle  : 1955 [facility] [place] [address] [facility]  [address], [facility] [address]  [phone]   [phone]        OUTPATIENT DAILY NOTE    NAME/AGE/GENDER: Dede Del Valle is a 58 y.o. female. DATE: 2017    Patient did not come  for appointment today due to out of town. Will plan to follow up on next scheduled visit.     scenios, PT

## 2017-07-20 ENCOUNTER — HOSPITAL ENCOUNTER (OUTPATIENT)
Dept: PHYSICAL THERAPY | Age: 62
Discharge: HOME | End: 2017-07-20
Payer: COMMERCIAL

## 2017-07-20 PROCEDURE — 97110 THERAPEUTIC EXERCISES: CPT

## 2017-07-20 PROCEDURE — 97140 MANUAL THERAPY 1/> REGIONS: CPT

## 2017-07-24 ENCOUNTER — HOSPITAL ENCOUNTER (OUTPATIENT)
Dept: PHYSICAL THERAPY | Age: 62
Discharge: HOME | End: 2017-07-24
Payer: COMMERCIAL

## 2017-07-24 PROCEDURE — 97140 MANUAL THERAPY 1/> REGIONS: CPT

## 2017-07-24 PROCEDURE — 97110 THERAPEUTIC EXERCISES: CPT

## 2017-07-24 NOTE — PROGRESS NOTES
[facility] [address] [facility]   [address], [address]  [phone]   [phone]    Ryley Dillon  : 1955       OUTPATIENT PHYSICAL THERAPY:Daily Note 17   ICD-10: Treatment Diagnosis:   R26.2 Difficulty in walking, not elsewhere classified     M25.662 Stiffness of left knee, not elsewhere classified     M25.562 Pain in left knee     Precautions/Allergies:   Ambien [zolpidem]; Augmentin [amoxicillin-pot clavulanate]; Codeine; Contrave [naltrexone-bupropion]; Crestor [rosuvastatin]; Macrobid [nitrofurantoin monohyd/m-cryst]; Tape [adhesive]; Topamax [topiramate]; and Zocor [simvastatin]   Fall Risk Score: 1 (? 5 = High Risk)  MD Orders: Eval and treat MEDICAL/REFERRING DIAGNOSIS:  Presence of left artificial knee joint [Z96.652]   DATE OF ONSET:   REFERRING PHYSICIAN: Gianna Alfaro., *  RETURN PHYSICIAN APPOINTMENT: 2 weeks      INITIAL ASSESSMENT:  Ms. Munoz Stage presents with decreased ROM of L knee, weakness of both LE's, painin R knee and fair gait post L TKA on 17. PT will progress from rolling walker to straight cane. Work to increase ROM and strength to enable return to prior activity level. PROBLEM LIST (Impacting functional limitations):  1. Decreased Strength  2. Decreased Ambulation Ability/Technique  3. Increased Pain  4. Increased Fatigue  5. Decreased Flexibility/Joint Mobility  6. Edema/Girth INTERVENTIONS PLANNED:  1. Home Exercise Program (HEP)  2. Manual Therapy  3. Range of Motion (ROM)  4. Therapeutic Activites  5. Therapeutic Exercise/Strengthening   TREATMENT PLAN:  Effective Dates: 17 TO 17. Frequency/Duration: 2 times a week for 12 weeks and upon reassessment,  will adjust frequency and duration as progress indicates. GOALS: (Goals have been discussed and agreed upon with patient.)  Short-Term Functional Goals: Time Frame: 4 weeks  1.  Establish independent HEP with no cueing to increase ROM and strength  MET  2. Increase L knee ROM to 0-125 to increase ease of sitting and ambulation  MET  3. Independent gait with straight cane in home and community  MET  4. Increase strength to enable initiation of reciprocal pattern on stairs. .MET  Discharge Goals: Time Frame: 12 weeks   1. Improve score on LEFS by 9 points to enable prolonged sitting, standing and ambulation  ONGOING  2. Independent gait without assistive device in home and community   MET  3. Increase LE strength 1/2 to 1 grade to enable reciprocal pattern on stairs. ONGOING  4. Able to ambulate 20 min with minimal to no increase in pain  ONGOING  Rehabilitation Potential For Stated Goals: Good  Regarding Carey Priest Salinas's therapy, I certify that the treatment plan above will be carried out by a therapist or under their direction. Thank you for this referral,  Melinda Alfaro, PT     Referring Physician Signature: Darell Quigley., *              Date                    The information in this section was collected on 5/16/17 (except where otherwise noted). HISTORY:   History of Present Injury/Illness (Reason for Referral):  Pt is post L TKA on 4/21/17. Pt initially injured her L knee in 2009 secondary to a fall. She had an arthroscopy for MMT. Reports that she never had complete relief of pain after surgery and that it has become progressively worse. She had Home PT for 8 visits until yesterday. She presents with decreased ROM of L knee, weakness of both LE's and fair gait. She is referred to outpatient PT for ROM and strengthening of L knee and LE as well as gait training. Past Medical History/Comorbidities:   Ms. 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 She also has no past medical history of Adverse effect of anesthesia; Aneurysm ([address] Arrhythmia; Asthma; Autoimmune disease ([address] CAD (coronary artery disease); Cancer ([address] Chronic obstructive pulmonary disease ([address] Coagulation disorder ([address] Diabetes ([address] Difficult intubation; Endocarditis; Heart failure ([address] Ill-defined condition; Liver disease; Malignant hyperthermia due to anesthesia; Pseudocholinesterase deficiency; Psychiatric disorder; PUD (peptic ulcer disease); Rheumatic fever; Seizures ([address] Stroke [facility]); or Thromboembolus ([address] Ms. Kelsey Kaur  has a past surgical history that includes breast augmentation (1990); knee arthroscopy (Left, 10/2012); sancho and bso (2009); tonsil and adenoidectomy (1960); bunionectomy (Bilateral, 1980); orthopaedic (Bilateral, 2010); hip replacement (Right, 11/2016); and knee replacement (Left, 04/01/2017). 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 Wants to be able to walk without pain and be able to travel  Dominant Side:         RIGHT  Current Medications:       Current Outpatient Prescriptions:     levothyroxine (SYNTHROID) 125 mcg tablet, TAKE 1 TABLET DAILY BEFORE BREAKFAST FOR HYPOTHYROIDISM, Disp: 90 Tab, Rfl: 3    zolpidem (AMBIEN) 10 mg tablet, Take 1 Tab by mouth nightly as needed for Sleep.  Max Daily Amount: 10 mg., Disp: 30 Tab, Rfl: 5    atorvastatin (LIPITOR) 40 mg tablet, TAKE 1 TABLET NIGHTLY, Disp: 90 Tab, Rfl: 2    rOPINIRole (REQUIP) 0.5 mg tablet, TAKE 1 TABLET NIGHTLY AS NEEDED, Disp: 90 Tab, Rfl: 2    verapamil ER (VERELAN PM) 300 mg capsule, Take 1 Cap by mouth nightly. (Patient taking differently: Take 300 mg by mouth daily.), Disp: 90 Cap, Rfl: 3    valsartan-hydroCHLOROthiazide (DIOVAN-HCT) 160-25 mg per tablet, Take 1 Tab by mouth daily. , Disp: 90 Tab, Rfl: 3    omeprazole (PRILOSEC) 20 mg capsule, Take 1 Cap by mouth daily. , Disp: 90 Cap, Rfl: 3    Cholecalciferol, Vitamin D3, (VITAMIN D3) 2,000 unit cap capsule, Take 2,000 Units by mouth daily. EVERY OTHER DAY, Disp: , Rfl:    Date Last Reviewed:  7/24/2017     Number of Personal Factors/Comorbidities that affect the Plan of Care: 1-2: MODERATE COMPLEXITY   EXAMINATION:   Observation/Orthostatic Postural Assessment:          Pt to PT without assistive device. Increased gait speed and step length. Improved heel toe gait. Slight limp to L with fatigue. Has seen OT for edema and now controlling with surgi-. Less tight HS. In 90/90, HS are 70. Mild edema noted   Palpation:          Pain along medial patella and back of knee. Less intense  Functional Mobility:         Gait/Ambulation:  Independent without assistive device. Stairs: Able to do single step. Difficulty with reciprocal though much improved with use of railing. ROM:     L  Knee -0-126   R knee  0-130                                    Strength:     Knee ext  L4/5,  R 4/5    Knee et  L 4/5,  R 4+/5         Hip flex  L 4+/5, R 4+/5    Hip abd  L 4-/5,  R 4-/5    Hip ext   L 4/5,  R 4/5              Neurological Screen: Intact to light touch  Balance:  Good    Body Structures Involved:  1. Bones  2. Joints  3. Muscles  4. Ligaments Body Functions Affected:  1. Sensory/Pain  2. Neuromusculoskeletal  3. Movement Related Activities and Participation Affected:  1. Learning and Applying Knowledge  2. General Tasks and Demands  3. Mobility  4. Domestic Life  5.  Community, Social and [place]   Number of elements (examined above) that affect the Plan of Care: 3: MODERATE COMPLEXITY   CLINICAL PRESENTATION:   Presentation: Evolving clinical presentation with changing clinical characteristics: MODERATE COMPLEXITY   CLINICAL DECISION MAKING:   Outcome Measure: Tool Used: Lower Extremity Functional Scale (LEFS)  Score:  Initial: 32/80 Most Recent: 38/80 (Date: 6/30/17 )   Interpretation of Score: 20 questions each scored on a 5 point scale with 0 representing \"extreme difficulty or unable to perform\" and 4 representing \"no difficulty\". The lower the score, the greater the functional disability. 80/80 represents no disability. Minimal detectable change is 9 points. Score 80 79-63 62-48 47-32 31-16 15-1 0   Modifier CH CI CJ CK CL CM CN     ? Mobility - Walking and Moving Around:     - CURRENT STATUS: CK - 40%-59% impaired, limited or restricted    - GOAL STATUS: CK - 40%-59% impaired, limited or restricted    - D/C STATUS:  ---------------To be determined---------------    Medical Necessity:   · Patient is expected to demonstrate progress in strength, range of motion, balance and gait and decreased pain to increase independence with with home and community activities. Reason for Services/Other Comments:  · Patient continues to require skilled intervention due to decreased ROM and strength of L LE as well as increased pain and fair gait. Use of outcome tool(s) and clinical judgement create a POC that gives a: Clear prediction of patient's progress: LOW COMPLEXITY            TREATMENT:   (In addition to Assessment/Re-Assessment sessions the following treatments were rendered)  Pre-treatment Symptoms/Complaints:   Pt reports knee felt a little better tape seemed to help on top not sure about the back yet. Pain: Initial:r  Pain Intensity 1: 2  Post Session:  2     THERAPEUTIC EXERCISE: (30 minutes):  Exercises per grid below to improve mobility, strength and gait and pain.   Required moderate verbal and manual cues to promote proper body alignment, promote proper body posture and promote proper body mechanics. Progressed resistance, range and repetitions as indicated. Performed  QS (quadriceps sets),standing hip abd, SAQ's (short arc quadriceps),and  LAQ's (long arc quadriceps) correcting technique as needed    HS and HC stretch. Seated HS curls at 25# x 4 sets of 10. Performed  cable for hip flex and abd x 2 sets of 20 at 17# and hip ext at 32# x 2 sets of 20. .  Reports no pain when walking. Performed standing forward flexion of hip, as well as forward flex R hip and slightly ER. Worked on 6 in step up and over with 2 steps to work on reciprocal pattern x 30. PT had difficulty with reciprocal pattern. Worked on gait without assistive device. Worked on heel toe gait. Good heel toe gait pattern. Performed sit to stand from plinth and then chair with 2 pillows x 2 sets  10. NuStep x 10 min at level 4. Wall ball squats with hand held assistance x 15. kinesiotape of quad with knee in flex. Resistive gait side stepping # 20 x 10 and sit to stand x 10        Manual Therapy ( 30 min  ): Patella and patella tendon mobs. Soft tissue work to [employer] and HS. PROM for knee flex and extn. Pt with discomfort above patella and back of knee. Placed 2 straps across area  Above patella with decreased pain with sit to stand. Also place strap across back of LE just below popiteal crease with less discomfort. Therapeutic Modalities:    HEP: As directed. Treatment/Session Assessment:  Pt is post L TKA on 4/21/17. She presented with decreased ROM of L knee and decreased LE strength. Pain is a limiting factor but now less intense and able to increase activity level. Most of the knee discomfort is the back of the knee laterally but also painful above patella with sit to stand. Some relief with taping pattern today and will reassess at next visit. . Encouraged to stretch HS well. Still increased discomfort with sit to stand .   Increasing ease of stairs. Good improvement with ROM. Discussed walking at home for about 10-15  min and continue other home exercises as directed. · Response to Treatment:  Independent gait. Increased knee morion. Work to decrease knee pain. · Compliance with Program/Exercises: Doing exercises at home. · Recommendations/Intent for next treatment session: \"Next visit will focus on aggressive ROM and strengtheing of L knee and LE. \".   Total Treatment Duration:  PT Patient Time In/Time Out  Time In: 0200  Time Out: 0300  Treatment number  [address], [place]

## 2017-08-01 ENCOUNTER — HOSPITAL ENCOUNTER (OUTPATIENT)
Dept: PHYSICAL THERAPY | Age: 62
Discharge: HOME | End: 2017-08-01
Payer: COMMERCIAL

## 2017-08-01 PROCEDURE — 97110 THERAPEUTIC EXERCISES: CPT

## 2017-08-01 PROCEDURE — 97140 MANUAL THERAPY 1/> REGIONS: CPT

## 2017-08-01 NOTE — PROGRESS NOTES
[facility]   [address], [address]  [phone]   [phone]    Kathy Olmstead  : 1955       OUTPATIENT PHYSICAL THERAPY:Daily Note and Discharge 17   ICD-10: Treatment Diagnosis:   R26.2 Difficulty in walking, not elsewhere classified     M25.662 Stiffness of left knee, not elsewhere classified     M25.562 Pain in left knee     Precautions/Allergies:   Ambien [zolpidem]; Augmentin [amoxicillin-pot clavulanate]; Codeine; Contrave [naltrexone-bupropion]; Crestor [rosuvastatin]; Macrobid [nitrofurantoin monohyd/m-cryst]; Tape [adhesive]; Topamax [topiramate]; and Zocor [simvastatin]   Fall Risk Score: 1 (? 5 = High Risk)  MD Orders: Eval and treat MEDICAL/REFERRING DIAGNOSIS:  Presence of left artificial knee joint [Z96.652]   DATE OF ONSET:   REFERRING PHYSICIAN: Dakotah Steinberg., *  RETURN PHYSICIAN APPOINTMENT: 2 weeks      INITIAL ASSESSMENT:  Ms. Rivera presents with decreased ROM of L knee, weakness of both LE's, painin R knee and fair gait post L TKA on 17. PT will progress from rolling walker to straight cane. Work to increase ROM and strength to enable return to prior activity level. PROBLEM LIST (Impacting functional limitations):  1. Decreased Strength  2. Decreased Ambulation Ability/Technique  3. Increased Pain  4. Increased Fatigue  5. Decreased Flexibility/Joint Mobility  6. Edema/Girth INTERVENTIONS PLANNED:  1. Home Exercise Program (HEP)  2. Manual Therapy  3. Range of Motion (ROM)  4. Therapeutic Activites  5. Therapeutic Exercise/Strengthening   TREATMENT PLAN:  Effective Dates: 17 TO 17. Frequency/Duration: 2 times a week for 12 weeks and upon reassessment,  will adjust frequency and duration as progress indicates. GOALS: (Goals have been discussed and agreed upon with patient.)  Short-Term Functional Goals: Time Frame: 4 weeks  1.  Establish independent HEP with no cueing to increase ROM and strength  MET  2. Increase L knee ROM to 0-125 to increase ease of sitting and ambulation  MET  3. Independent gait with straight cane in home and community  MET  4. Increase strength to enable initiation of reciprocal pattern on stairs. .MET  Discharge Goals: Time Frame: 12 weeks   1. Improve score on LEFS by 9 points to enable prolonged sitting, standing and ambulation  MET  2. Independent gait without assistive device in home and community   MET  3. Increase LE strength 1/2 to 1 grade to enable reciprocal pattern on stairs. MET  4. Able to ambulate 20 min with minimal to no increase in pain  MET  Rehabilitation Potential For Stated Goals: Good  Regarding Andrew Salinas's therapy, I certify that the treatment plan above will be carried out by a therapist or under their direction. Thank you for this referral,  Cholo Davenport, PT     Referring Physician Signature: Mary Otero, *              Date                    The information in this section was collected on 5/16/17 (except where otherwise noted). HISTORY:   History of Present Injury/Illness (Reason for Referral):  Pt is post L TKA on 4/21/17. Pt initially injured her L knee in 2009 secondary to a fall. She had an arthroscopy for MMT. Reports that she never had complete relief of pain after surgery and that it has become progressively worse. She had Home PT for 8 visits until yesterday. She presents with decreased ROM of L knee, weakness of both LE's and fair gait. She is referred to outpatient PT for ROM and strengthening of L knee and LE as well as gait training. Past Medical History/Comorbidities:   Ms. 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 She also has no past medical history of Adverse effect of anesthesia; Aneurysm ([address] Arrhythmia; Asthma; Autoimmune disease ([address] CAD (coronary artery disease); Cancer ([address] Chronic obstructive pulmonary disease ([address] Coagulation disorder ([address] Diabetes ([address] Difficult intubation; Endocarditis; Heart failure ([address] Ill-defined condition; Liver disease; Malignant hyperthermia due to anesthesia; Pseudocholinesterase deficiency; Psychiatric disorder; PUD (peptic ulcer disease); Rheumatic fever; Seizures ([facility] Stroke [facility]); or Thromboembolus ([address] Ms. Karin Chung  has a past surgical history that includes breast augmentation (1990); knee arthroscopy (Left, 10/2012); sancho and bso (2009); tonsil and adenoidectomy (1960); bunionectomy (Bilateral, 1980); orthopaedic (Bilateral, 2010); hip replacement (Right, 11/2016); and knee replacement (Left, 04/01/2017). 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 Wants to be able to walk without pain and be able to travel  Dominant Side:         RIGHT  Current Medications:       Current Outpatient Prescriptions:     levothyroxine (SYNTHROID) 125 mcg tablet, TAKE 1 TABLET DAILY BEFORE BREAKFAST FOR HYPOTHYROIDISM, Disp: 90 Tab, Rfl: 3    zolpidem (AMBIEN) 10 mg tablet, Take 1 Tab by mouth nightly as needed for Sleep.  Max Daily Amount: 10 mg., Disp: 30 Tab, Rfl: 5    atorvastatin (LIPITOR) 40 mg tablet, TAKE 1 TABLET NIGHTLY, Disp: 90 Tab, Rfl: 2    rOPINIRole (REQUIP) 0.5 mg tablet, TAKE 1 TABLET NIGHTLY AS NEEDED, Disp: 90 Tab, Rfl: 2    verapamil ER (VERELAN PM) 300 mg capsule, Take 1 Cap by mouth nightly. (Patient taking differently: Take 300 mg by mouth daily.), Disp: 90 Cap, Rfl: 3    valsartan-hydroCHLOROthiazide (DIOVAN-HCT) 160-25 mg per tablet, Take 1 Tab by mouth daily. , Disp: 90 Tab, Rfl: 3    omeprazole (PRILOSEC) 20 mg capsule, Take 1 Cap by mouth daily. , Disp: 90 Cap, Rfl: 3    Cholecalciferol, Vitamin D3, (VITAMIN D3) 2,000 unit cap capsule, Take 2,000 Units by mouth daily. EVERY OTHER DAY, Disp: , Rfl:    Date Last Reviewed:  8/1/2017     Number of Personal Factors/Comorbidities that affect the Plan of Care: 1-2: MODERATE COMPLEXITY   EXAMINATION:   Observation/Orthostatic Postural Assessment:          Pt to PT without assistive device. Increased gait speed and step length. Good heel toe gait. Slight limp to L with fatigue. Has seen OT for edema and now controlling with surgi-. Less tight HS. In 90/90, HS are 75. Palpation:          Pain along  back of knee. Less intense but still present with sit to stand. No longer present on stairs  Functional Mobility:         Gait/Ambulation:  Independent without assistive device. Stairs: Able to reciprocal pattern on stairs   ROM:     L  Knee -0-128   R knee  0-130                                    Strength:     Knee ext  L4/5,  R 4+/5    Knee et  L 4+/5,  R 5/5         Hip flex  L 5/5, R 5/5    Hip abd  L 4/5,  R 4/5    Hip ext   L 4/5,  R 4/5              Neurological Screen: Intact to light touch  Balance:  Good    Body Structures Involved:  1. Bones  2. Joints  3. Muscles  4. Ligaments Body Functions Affected:  1. Sensory/Pain  2. Neuromusculoskeletal  3. Movement Related Activities and Participation Affected:  1. Learning and Applying Knowledge  2. General Tasks and Demands  3. Mobility  4. Domestic Life  5.  Community, Social and [place]   Number of elements (examined above) that affect the Plan of Care: 3: MODERATE COMPLEXITY   CLINICAL PRESENTATION: Presentation: Evolving clinical presentation with changing clinical characteristics: MODERATE COMPLEXITY   CLINICAL DECISION MAKING:   Outcome Measure: Tool Used: Lower Extremity Functional Scale (LEFS)  Score:  Initial: 32/80 Most Recent: 54/80 (Date: 8/1/17 )   Interpretation of Score: 20 questions each scored on a 5 point scale with 0 representing \"extreme difficulty or unable to perform\" and 4 representing \"no difficulty\". The lower the score, the greater the functional disability. 80/80 represents no disability. Minimal detectable change is 9 points. Score 80 79-63 62-48 47-32 31-16 15-1 0   Modifier CH CI CJ CK CL CM CN     ? Mobility - Walking and Moving Around:     - CURRENT STATUS: CJ - 20%-39% impaired, limited or restricted    - GOAL STATUS: CK - 40%-59% impaired, limited or restricted    - D/C STATUS:  CJ - 20%-39% impaired, limited or restricted    Medical Necessity:   · Patient is expected to demonstrate progress in strength, range of motion, balance and gait and decreased pain to increase independence with with home and community activities. Reason for Services/Other Comments:  · Patient continues to require skilled intervention due to decreased ROM and strength of L LE as well as increased pain and fair gait. Use of outcome tool(s) and clinical judgement create a POC that gives a: Clear prediction of patient's progress: LOW COMPLEXITY            TREATMENT:   (In addition to Assessment/Re-Assessment sessions the following treatments were rendered)  Pre-treatment Symptoms/Complaints:   Pt reports knee feels better with most all activities. Still discomfort at back of knee with sit to stand but less intense and improving. Has increased activity at home. Now walking about 20 min. Pain: Initial:r  Pain Intensity 1: 2  Post Session:  2     THERAPEUTIC EXERCISE: (30 minutes):  Exercises per grid below to improve mobility, strength and gait and pain.   Required moderate verbal and manual cues to promote proper body alignment, promote proper body posture and promote proper body mechanics. Progressed resistance, range and repetitions as indicated. Performed  QS (quadriceps sets),standing hip abd, SAQ's (short arc quadriceps),and  LAQ's (long arc quadriceps) correcting technique as needed    HS and HC stretch. Seated HS curls at 25# x 4 sets of 10. Performed  cable for hip flex and abd x 2 sets of 20 at 17# and hip ext at 32# x 2 sets of 20. .  Reports no pain when walking. Performed standing forward flexion of hip, as well as forward flex R hip and slightly ER. Pt still with some discomfort at R hip post R TEREZA and discussed need to move hip as much as possible. Worked on 6 in step up and over with 2 steps to work on reciprocal pattern x 30. Worked on gait without assistive device. Worked on heel toe gait. Good heel toe gait pattern. Performed sit to stand from plinth and then chair with  pillow x 2 sets  10. NuStep x 10 min at level 4. Wall ball squats with hand held assistance x 15. kinesiotape of quad with knee in flex. Reviewed HEP and what she can do in gym if joins. Manual Therapy ( 30 min  ): Patella and patella tendon mobs. Soft tissue work to [employer] and HS. PROM for knee flex and extn. Pt with discomfort above patella and back of knee. Pain  In both these areas is much less and generally present with sit to stand. Placed 2 straps across area  above patella with decreased pain with sit to stand. Also place strap across back of LE just below popiteal crease with less discomfort. Pt shown how to do this at home as needed. Sitting knee flexion to 128. Full knee ext. Therapeutic Modalities:    HEP: As directed. Treatment/Session Assessment:  Pt is post L TKA on 4/21/17. She presented with decreased ROM of L knee and decreased LE strength. Pain has been a  limiting factor but now much  less intense and generally present with sit to stand.  No longer has pain with up/down stairs. Has returned to most home and community activities. Most of the knee discomfort is the back of the knee laterally and above patella with sit to stand. Good relief with taping pattern and pt shown how to tape as needed. Encouraged to stretch HS well. Good improvement with ROM. Excellent ROM. Steady increase in strength with increased activity level. Should continue to progress with HEP. Discussed continued walking at home for about 15-20  min and continue other home exercises or work on strengthening at gym. · Response to Treatment:  Independent gait. Increased knee motion and strength  · Compliance with Program/Exercises: Doing exercises at home.   · Recommendations/Intent for next treatment session: \"DC to HEP  Total Treatment Duration:  PT Patient Time In/Time Out  Time In: 0900  Time Out: 1000  Treatment number  Wolfgang Colin PT,

## 2017-09-12 NOTE — PROGRESS NOTES
[address] [place] [address]  : 1955 [facility] at [facility]  [address], [facility], [address]  [phone]   [phone]         OUTPATIENT OCCUPATIONAL THERAPY:DISCONTINUATION NOTE 2017    ICD-10: Treatment Diagnosis: I89.0 lymphedema, not elsewhere specified  Precautions/Allergies:   Ambien [zolpidem]; Augmentin [amoxicillin-pot clavulanate]; Codeine; Contrave [naltrexone-bupropion]; Crestor [rosuvastatin]; Macrobid [nitrofurantoin monohyd/m-cryst]; Tape [adhesive]; Topamax [topiramate]; and Zocor [simvastatin]   Fall Risk Score: 1 (? 5 = High Risk)  MD Orders: lymphedema eval and treat LLE MEDICAL/REFERRING DIAGNOSIS:   Lymphedema, not elsewhere classified [I89.0]   DATE OF ONSET: 4 weeks ago   REFERRING PHYSICIAN: Urvashi Arnold MD  RETURN PHYSICIAN APPOINTMENT: TBD     INITIAL ASSESSMENT:  Ms. Frances Helton presents with LLE lymphedema following recent L TKA. Since her surgery she has had persistent swelling of the LLE that is not resolving on its own. She will benefit from MLD, multi layer bandaging and fitting for compression to aid in promoting optimal surgical outcome of her L TKA. PLAN OF CARE:   PROBLEM LIST:  1. Edema/Girth INTERVENTIONS PLANNED:  1. Manual therapy/MLD  2. Theraputic exercise/activity  3. Multi layer bandaging  4. kinesiotaping prn  5. Fitting for compression  6. Patient education. TREATMENT PLAN:  Effective Dates: 2017 TO 2017. Frequency/Duration: 2 times a week for 8 weeks  GOALS: (Goals have been discussed and agreed upon with patient.)  Short-Term Functional Goals: Time Frame: 4 weeks STG MET  1. Patient to verbalize lymphedema precautions. 2. Patient to be independent in self bandaging of LLE  3. Patient to be independent in lymphatic exercises  Discharge Goals: Time Frame: 8 weeks  1.  Patient's LLE circumferential measurements will decrease on volumetric graph by 5-8cm to aid in optimal outcome of L TKA - GOAL MET  2. Patient to be independent for HEP - GOAL MET  3. Patient to be fitted for compression garments prn - GOAL MET  Rehabilitation Potential For Stated Goals: Good  Regarding Adria Salinas's therapy, I certify that the treatment plan above will be carried out by a therapist or under their direction. Thank you for this referral,  Rashid Brandt OT     Referring Physician Signature: Theoplis Brittle, MD _________________________  Date _________            The information in this section was collected on 5/19/2017 (except where otherwise noted). OCCUPATIONAL PROFILE & HISTORY:   History of Present Injury/Illness (Reason for Referral):  Patient was referred to occupational therapy for LLE lymphedema following recent L TKA. Since her surgery she has had persistent swelling of the LLE that is not resolving on its own. She will benefit from MLD, multi layer bandaging and fitting for compression to aid in promoting optimal surgical outcome of her L TKA  Past Medical History/Comorbidities:   Ms. 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 She also has no past medical history of Adverse effect of anesthesia; Aneurysm ([address] Arrhythmia; Asthma; Autoimmune disease ([address] CAD (coronary artery disease); Cancer ([address] Chronic obstructive pulmonary disease ([address] Coagulation disorder ([address]  Diabetes ([address] Difficult intubation; Endocarditis; Heart failure ([facility] Ill-defined condition; Liver disease; Malignant hyperthermia due to anesthesia; Pseudocholinesterase deficiency; Psychiatric disorder; PUD (peptic ulcer disease); Rheumatic fever; Seizures ([facility] [address] [facility] Stroke [facility]); or Thromboembolus ([facility] Ms. Anderson Soriano  has a past surgical history that includes breast augmentation (1990); knee arthroscopy (Left, 10/2012); sancho and bso (2009); tonsil and adenoidectomy (1960); bunionectomy (Bilateral, 1980); orthopaedic (Bilateral, 2010); hip replacement (Right, 11/2016); and knee replacement (Left, 04/01/2017). Social History/Living Environment:    patient is  and has a supportive family that lives close by  Prior Level of Function/Work/Activity:  Retired educator  Dominant Side:         RIGHT  Personal Factors:          Sex:  female        Age:  58 y.o. Current Medications:    Current Outpatient Prescriptions:     levothyroxine (SYNTHROID) 125 mcg tablet, TAKE 1 TABLET DAILY BEFORE BREAKFAST FOR HYPOTHYROIDISM, Disp: 90 Tab, Rfl: 3    zolpidem (AMBIEN) 10 mg tablet, Take 1 Tab by mouth nightly as needed for Sleep. Max Daily Amount: 10 mg., Disp: 30 Tab, Rfl: 5    atorvastatin (LIPITOR) 40 mg tablet, TAKE 1 TABLET NIGHTLY, Disp: 90 Tab, Rfl: 2    rOPINIRole (REQUIP) 0.5 mg tablet, TAKE 1 TABLET NIGHTLY AS NEEDED, Disp: 90 Tab, Rfl: 2    verapamil ER (VERELAN PM) 300 mg capsule, Take 1 Cap by mouth nightly. (Patient taking differently: Take 300 mg by mouth daily.), Disp: 90 Cap, Rfl: 3    valsartan-hydroCHLOROthiazide (DIOVAN-HCT) 160-25 mg per tablet, Take 1 Tab by mouth daily. , Disp: 90 Tab, Rfl: 3    omeprazole (PRILOSEC) 20 mg capsule, Take 1 Cap by mouth daily. , Disp: 90 Cap, Rfl: 3    Cholecalciferol, Vitamin D3, (VITAMIN D3) 2,000 unit cap capsule, Take 2,000 Units by mouth daily.  EVERY OTHER DAY, Disp: , Rfl:             Date Last Reviewed:  6/5/2017   Complexity Level : Expanded review of therapy/medical records (1-2):  MODERATE COMPLEXITY   ASSESSMENT OF OCCUPATIONAL PERFORMANCE:   Skin Integrity:          Healed scar LLE from recent L TKA  Sensation:         intact  Palpation:          Mild edema noted LLE from mid thigh to ankle - since therapy began 15.5cm in LLE limb size (mid thigh to foot)  ROM:          WFL LLE except 125 knee flexion from recent L TKA    Edema/Girth:  1+    Left Right    Initial Most Recent Initial Most Recent   Upper  Extremity           Lower  Extremity  310.5cm  Foot to upper thigh  295cm  Foot to upper thigh           Physical Skills Involved:  1. Edema/girth  2. Limb heaviness Cognitive Skills Affected (resulting in the inability to perform in a timely and safe manner):  1. Psychosocial Skills Affected:  1. Habits  2. Routines and Behaviors   Number of elements that affect the Plan of Care: 3-5:  MODERATE COMPLEXITY   CLINICAL DECISION MAKING:   Outcome Measure: Tool Used: Lymphedema Life Impact Scale   Score:  Initial: 28 Most Recent: N/A   Interpretation of Score: The Lymphedema Life Impact Scale (LLIS) is a validated instrument that measures the physical, functional, and psychosocial concerns pertinent to patients with extremity lymphedema. The Scale's questionnaire is administered to patients to gauge impairments, activity limitations, and participation restrictions resulting from their lymphedema. Score 0 1-13 14-26 27-40 41-54 55-67 68   Modifier CH CI CJ CK CL CM CN     ?  Other PT/OT Primary Functional Limitations:     - CURRENT STATUS: CK - 40%-59% impaired, limited or restricted    - GOAL STATUS: CJ - 20%-39% impaired, limited or restricted    - D/C STATUS:  N/A       Assessment process, impact of co-morbidities, assessment modification\need for assistance, and selection of interventions: Analytical Complexity:MODERATE COMPLEXITY   TREATMENT:   (In addition to Assessment/Re-Assessment sessions the following treatments were rendered)    Pre-treatment Symptoms/Complaints:  LLE lymphedema post L TKA - patient has no new complaints. She feels functional use of LLE is improving. Pain: Initial:   Pain Intensity 1: 4  Post Session:  1:5     OT eval completed followed by patient education for: lymphedema signs/symptoms, treatment guidelines and compression options. Treatment/Session Assessment:    · Response to Treatment:  Patient tolerated treatment well with no complications. . Ritchie Jet will be used for compression. She is responding to MLD and compression as evidenced by 15.5cm loss in LLE limb size. BLE's are now equal in size. Patient will now be seen prn if any issues with LLE lymphedema arises - no new issues arose. · Compliance with Program/Exercises: good to date  Recommendations/Intent for next treatment session: Discontinue patient from therapy.      Jessy Jin, OT

## 2018-02-19 PROBLEM — Z00.00 ROUTINE GENERAL MEDICAL EXAMINATION AT A HEALTH CARE FACILITY: Status: ACTIVE | Noted: 2018-02-19

## 2018-02-19 PROBLEM — F51.01 PRIMARY INSOMNIA: Status: ACTIVE | Noted: 2018-02-19

## 2018-02-19 PROBLEM — M25.50 ARTHRALGIA: Status: ACTIVE | Noted: 2018-02-19

## 2018-02-19 PROBLEM — M79.10 MYALGIA: Status: ACTIVE | Noted: 2018-02-19

## 2018-06-04 PROBLEM — G89.29 CHRONIC BILATERAL LOW BACK PAIN WITHOUT SCIATICA: Status: ACTIVE | Noted: 2018-06-04

## 2018-06-04 PROBLEM — E78.00 PURE HYPERCHOLESTEROLEMIA: Status: ACTIVE | Noted: 2018-06-04

## 2018-06-04 PROBLEM — M54.50 CHRONIC BILATERAL LOW BACK PAIN WITHOUT SCIATICA: Status: ACTIVE | Noted: 2018-06-04

## 2018-06-07 ENCOUNTER — HOSPITAL ENCOUNTER (OUTPATIENT)
Dept: GENERAL RADIOLOGY | Age: 63
Discharge: HOME | End: 2018-06-07
Attending: PHYSICAL MEDICINE & REHABILITATION
Payer: COMMERCIAL

## 2018-06-07 DIAGNOSIS — M54.5 BILATERAL LOW BACK PAIN, UNSPECIFIED CHRONICITY, WITH SCIATICA PRESENCE UNSPECIFIED: ICD-10-CM

## 2018-06-07 PROCEDURE — 72110 X-RAY EXAM L-2 SPINE 4/>VWS: CPT

## 2018-09-13 PROBLEM — Z88.0 PENICILLIN ALLERGY: Status: ACTIVE | Noted: 2018-09-13

## 2018-09-13 PROBLEM — Z23 ENCOUNTER FOR IMMUNIZATION: Status: ACTIVE | Noted: 2018-09-13

## 2019-02-15 PROBLEM — R31.29 MICROSCOPIC HEMATURIA: Status: ACTIVE | Noted: 2019-02-15

## 2019-09-24 PROBLEM — Z00.00 ROUTINE GENERAL MEDICAL EXAMINATION AT A HEALTH CARE FACILITY: Status: RESOLVED | Noted: 2018-02-19 | Resolved: 2019-09-24

## 2019-11-12 NOTE — PROGRESS NOTES
Assisting RN Note: CT reports that they are on the way to get patient.   [facility] at [facility]   [address], [address]  [phone]   [phone]    Milo Redding  : 1955       OUTPATIENT PHYSICAL THERAPY:Daily Note 17   ICD-10: Treatment Diagnosis:   R26.2 Difficulty in walking, not elsewhere classified     M25.662 Stiffness of left knee, not elsewhere classified     M25.562 Pain in left knee     Precautions/Allergies:   Ambien [zolpidem]; Augmentin [amoxicillin-pot clavulanate]; Codeine; Contrave [naltrexone-bupropion]; Crestor [rosuvastatin]; Macrobid [nitrofurantoin monohyd/m-cryst]; Tape [adhesive]; Topamax [topiramate]; and Zocor [simvastatin]   Fall Risk Score: 1 (? 5 = High Risk)  MD Orders: Eval and treat MEDICAL/REFERRING DIAGNOSIS:  Presence of left artificial knee joint [Z96.652]   DATE OF ONSET:   REFERRING PHYSICIAN: Diego Schwarz., *  RETURN PHYSICIAN APPOINTMENT: 2 weeks      INITIAL ASSESSMENT:  Ms. Vivi Arredondo presents with decreased ROM of L knee, weakness of both LE's, painin R knee and fair gait post L TKA on 17. PT will progress from rolling walker to straight cane. Work to increase ROM and strength to enable return to prior activity level. PROBLEM LIST (Impacting functional limitations):  1. Decreased Strength  2. Decreased Ambulation Ability/Technique  3. Increased Pain  4. Increased Fatigue  5. Decreased Flexibility/Joint Mobility  6. Edema/Girth INTERVENTIONS PLANNED:  1. Home Exercise Program (HEP)  2. Manual Therapy  3. Range of Motion (ROM)  4. Therapeutic Activites  5. Therapeutic Exercise/Strengthening   TREATMENT PLAN:  Effective Dates: 17 TO 17. Frequency/Duration: 2 times a week for 12 weeks and upon reassessment,  will adjust frequency and duration as progress indicates. GOALS: (Goals have been discussed and agreed upon with patient.)  Short-Term Functional Goals: Time Frame: 4 weeks  1.  Establish independent HEP with no cueing to increase ROM and strength  MET  2. Increase L knee ROM to 0-125 to increase ease of sitting and ambulation  MET  3. Independent gait with straight cane in home and community  MET  4. Increase strength to enable initiation of reciprocal pattern on stairs. .MET  Discharge Goals: Time Frame: 12 weeks   1. Improve score on LEFS by 9 points to enable prolonged sitting, standing and ambulation  ONGOING  2. Independent gait without assistive device in home and community   MET  3. Increase LE strength 1/2 to 1 grade to enable reciprocal pattern on stairs. ONGOING  4. Able to ambulate 20 min with minimal to no increase in pain  ONGOING  Rehabilitation Potential For Stated Goals: Good  Regarding Marce Salinas's therapy, I certify that the treatment plan above will be carried out by a therapist or under their direction. Thank you for this referral,  Candido Drew, PT     Referring Physician Signature: Daniel Tomlinson., *              Date                    The information in this section was collected on 5/16/17 (except where otherwise noted). HISTORY:   History of Present Injury/Illness (Reason for Referral):  Pt is post L TKA on 4/21/17. Pt initially injured her L knee in 2009 secondary to a fall. She had an arthroscopy for MMT. Reports that she never had complete relief of pain after surgery and that it has become progressively worse. She had Home PT for 8 visits until yesterday. She presents with decreased ROM of L knee, weakness of both LE's and fair gait. She is referred to outpatient PT for ROM and strengthening of L knee and LE as well as gait training. Past Medical History/Comorbidities:   Ms. 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 She also has no past medical history of Adverse effect of anesthesia; Aneurysm ([address] Arrhythmia; Asthma; Autoimmune disease ([address] CAD (coronary artery disease); Cancer ([address] Chronic obstructive pulmonary disease ([address] Coagulation disorder ([address] Diabetes ([address] Difficult intubation; Endocarditis; Heart failure ([address] Ill-defined condition; Liver disease; Malignant hyperthermia due to anesthesia; Pseudocholinesterase deficiency; Psychiatric disorder; PUD (peptic ulcer disease); Rheumatic fever; Seizures ([facility] Stroke [facility]); or Thromboembolus ([address] Ms. Anna Mackey  has a past surgical history that includes breast augmentation (1990); knee arthroscopy (Left, 10/2012); sancho and bso (2009); tonsil and adenoidectomy (1960); bunionectomy (Bilateral, 1980); orthopaedic (Bilateral, 2010); hip replacement (Right, 11/2016); and knee replacement (Left, 04/01/2017). 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 Wants to be able to walk without pain and be able to travel  Dominant Side:         RIGHT  Current Medications:       Current Outpatient Prescriptions:     levothyroxine (SYNTHROID) 125 mcg tablet, TAKE 1 TABLET DAILY BEFORE BREAKFAST FOR HYPOTHYROIDISM, Disp: 90 Tab, Rfl: 3    zolpidem (AMBIEN) 10 mg tablet, Take 1 Tab by mouth nightly as needed for Sleep.  Max Daily Amount: 10 mg., Disp: 30 Tab, Rfl: 5    atorvastatin (LIPITOR) 40 mg tablet, TAKE 1 TABLET NIGHTLY, Disp: 90 Tab, Rfl: 2    rOPINIRole (REQUIP) 0.5 mg tablet, TAKE 1 TABLET NIGHTLY AS NEEDED, Disp: 90 Tab, Rfl: 2    verapamil ER (VERELAN PM) 300 mg capsule, Take 1 Cap by mouth nightly. (Patient taking differently: Take 300 mg by mouth daily.), Disp: 90 Cap, Rfl: 3    valsartan-hydroCHLOROthiazide (DIOVAN-HCT) 160-25 mg per tablet, Take 1 Tab by mouth daily. , Disp: 90 Tab, Rfl: 3    omeprazole (PRILOSEC) 20 mg capsule, Take 1 Cap by mouth daily. , Disp: 90 Cap, Rfl: 3    Cholecalciferol, Vitamin D3, (VITAMIN D3) 2,000 unit cap capsule, Take 2,000 Units by mouth daily. EVERY OTHER DAY, Disp: , Rfl:    Date Last Reviewed:  7/11/2017     Number of Personal Factors/Comorbidities that affect the Plan of Care: 1-2: MODERATE COMPLEXITY   EXAMINATION:   Observation/Orthostatic Postural Assessment:          Pt to PT without assistive device. Increased gait speed and step length. Improved heel toe gait. Mild limp to L. .  Slight limp to L. Has seen OT for edema and now controlling with surgi-. Less tight HS. In 90/90, HS are 70. Mild edema noted   Palpation:          Pain along medial patella and back of knee. Less intense  Functional Mobility:         Gait/Ambulation:  Independent without assistive device. Stairs: Able to do single step. Difficulty with reciprocal though improving with use of railing. ROM:     L  Knee -2 to 125   R knee  0-130                                    Strength:     Knee ext  L4-/5,  R 4/5    Knee et  L 4/5,  R 4+/5         Hip flex  L 4+/5, R 4+/5    Hip abd  L 4-/5,  R 4-/5    Hip ext   L 4/5,  R 4/5              Neurological Screen: Intact to light touch  Balance:  Good    Body Structures Involved:  1. Bones  2. Joints  3. Muscles  4. Ligaments Body Functions Affected:  1. Sensory/Pain  2. Neuromusculoskeletal  3. Movement Related Activities and Participation Affected:  1. Learning and Applying Knowledge  2. General Tasks and Demands  3. Mobility  4. Domestic Life  5.  Community, Social and [place]   Number of elements (examined above) that affect the Plan of Care: 3: MODERATE COMPLEXITY   CLINICAL PRESENTATION:   Presentation: Evolving clinical presentation with changing clinical characteristics: MODERATE COMPLEXITY   CLINICAL DECISION MAKING:   Outcome Measure: Tool Used: Lower Extremity Functional Scale (LEFS)  Score:  Initial: 32/80 Most Recent: 38/80 (Date: 6/19/17 )   Interpretation of Score: 20 questions each scored on a 5 point scale with 0 representing \"extreme difficulty or unable to perform\" and 4 representing \"no difficulty\". The lower the score, the greater the functional disability. 80/80 represents no disability. Minimal detectable change is 9 points. Score 80 79-63 62-48 47-32 31-16 15-1 0   Modifier CH CI CJ CK CL CM CN     ? Mobility - Walking and Moving Around:     - CURRENT STATUS: CK - 40%-59% impaired, limited or restricted    - GOAL STATUS: CK - 40%-59% impaired, limited or restricted    - D/C STATUS:  ---------------To be determined---------------    Medical Necessity:   · Patient is expected to demonstrate progress in strength, range of motion, balance and gait and decreased pain to increase independence with with home and community activities. Reason for Services/Other Comments:  · Patient continues to require skilled intervention due to decreased ROM and strength of L LE as well as increased pain and fair gait. Use of outcome tool(s) and clinical judgement create a POC that gives a: Clear prediction of patient's progress: LOW COMPLEXITY            TREATMENT:   (In addition to Assessment/Re-Assessment sessions the following treatments were rendered)  Pre-treatment Symptoms/Complaints:   Pt reports still having some discomfort along the outside of her knee and across the top but it is less intense. .   Pain: Initial:r  Pain Intensity 1: 3  Post Session:  2     THERAPEUTIC EXERCISE: (30 minutes):  Exercises per grid below to improve mobility, strength and gait and pain.   Required moderate verbal and manual cues to promote proper body alignment, promote proper body posture and promote proper body mechanics. Progressed resistance, range and repetitions as indicated. Performed  QS (quadriceps sets),standing hip abd, SAQ's (short arc quadriceps),and  LAQ's (long arc quadriceps) correcting technique as needed    HS and HC stretch. Seated HS curls at 25# x 4 sets of 10. Performed  cable for hip flex and abd x 2 sets of 20 at 17# and hip ext at 32# x 2 sets of 20. Still has discomfort with R hip when she lifts it int forward flexion. Reports no pain when walking. Performed standing forward flexion of hip, as well as forward flex R hip and slightly ER. Performed figure 4 stretch. Worked on 6 in  step up and over with L LE x 15 then step up and over with 2 steps to work on reciprocal pattern. PT had difficulty with reciprocal pattern. Worked on gait without assistive device. Worked on heel toe gait. Good heel toe gait pattern. Instructed in sit to stand with hands on knees then with hand hold assist x 2 sets  10. Pain distal of patella due to increased stress on knee. NuStep x 10 min at level 4. Wall ball squats with hand held assistance x 15. kinesiotape of quad with knee in flex. Manual Therapy ( 30 min  ): Patella and patella tendon mobs. Soft tissue work to [employer] and HS. PROM for knee flex and extn. Sitting knee flex to 128. Passive ext to neutral with overpressure. .  Pt with discomfort of lateral knee and R hip. Still present but less intense. Therapeutic Modalities:    HEP: As directed. Treatment/Session Assessment:  Pt is post L TKA on 4/21/17. She presented with decreased ROM of L knee and decreased LE strength. Pain is a limiting factor but now less intense and able to increase activity level. Most of the knee discomfort is the back of the knee laterally. Encouraged to stretch HS well. Still increased discomfort with sit to stand and stairs.   Taping is helping and tried strip on quads today.    Good improvement with ROM. Needs to work to increase quad strength to increase ease of sit to stand and stairs. Tolerating strengthening exercises better and able to do more reps. Work to increase strength  to enable return to prior activity level including walking for exercise. Discussed walking at home for about 10 min and continue other home exercises as directed. · Response to Treatment:  Gait with straight cane and SBA. Increased knee morion. · Compliance with Program/Exercises: Doing exercises at home. · Recommendations/Intent for next treatment session: \"Next visit will focus on aggressive ROM and strengtheing of L knee and LE. \".   Total Treatment Duration:  PT Patient Time In/Time Out  Time In: 0100  Time Out: 0200  Treatment number  16  Renate Sahu, PT,

## 2019-12-12 ENCOUNTER — HOSPITAL ENCOUNTER (OUTPATIENT)
Dept: LAB | Age: 64
Discharge: HOME | End: 2019-12-12
Payer: COMMERCIAL

## 2019-12-12 DIAGNOSIS — M10.079 ACUTE IDIOPATHIC GOUT INVOLVING TOE, UNSPECIFIED LATERALITY: ICD-10-CM

## 2019-12-12 LAB — URATE SERPL-MCNC: 7.1 MG/DL (ref 2.6–6)

## 2019-12-12 PROCEDURE — 84550 ASSAY OF BLOOD/URIC ACID: CPT

## 2019-12-12 PROCEDURE — 36415 COLL VENOUS BLD VENIPUNCTURE: CPT

## 2020-01-21 PROBLEM — M1A.09X0 IDIOPATHIC CHRONIC GOUT OF MULTIPLE SITES WITHOUT TOPHUS: Status: ACTIVE | Noted: 2020-01-21

## 2020-07-02 PROBLEM — Z00.00 ROUTINE PHYSICAL EXAMINATION: Status: ACTIVE | Noted: 2020-07-02

## 2020-07-02 PROBLEM — M10.272 ACUTE DRUG-INDUCED GOUT INVOLVING TOE OF LEFT FOOT: Status: ACTIVE | Noted: 2020-07-02

## 2020-07-02 PROBLEM — F51.01 PRIMARY INSOMNIA: Status: ACTIVE | Noted: 2020-07-02

## 2020-08-01 PROBLEM — Z00.00 PHYSICAL EXAM: Status: RESOLVED | Noted: 2020-07-02 | Resolved: 2020-08-01

## 2020-12-09 PROBLEM — R10.32 BILATERAL LOWER ABDOMINAL DISCOMFORT: Status: ACTIVE | Noted: 2020-12-09

## 2020-12-09 PROBLEM — R10.31 BILATERAL LOWER ABDOMINAL DISCOMFORT: Status: ACTIVE | Noted: 2020-12-09

## 2020-12-09 PROBLEM — R35.0 URINARY FREQUENCY: Status: ACTIVE | Noted: 2020-12-09

## 2020-12-10 PROBLEM — D05.10 DUCTAL CARCINOMA IN SITU (DCIS) OF BREAST: Status: ACTIVE | Noted: 2020-12-10

## 2020-12-11 PROBLEM — G89.29 CHRONIC BILATERAL LOWER ABDOMINAL PAIN: Status: ACTIVE | Noted: 2020-12-11

## 2020-12-11 PROBLEM — R10.31 CHRONIC BILATERAL LOWER ABDOMINAL PAIN: Status: ACTIVE | Noted: 2020-12-11

## 2020-12-11 PROBLEM — R10.32 CHRONIC BILATERAL LOWER ABDOMINAL PAIN: Status: ACTIVE | Noted: 2020-12-11

## 2020-12-15 ENCOUNTER — HOSPITAL ENCOUNTER (OUTPATIENT)
Dept: ULTRASOUND IMAGING | Age: 65
Discharge: HOME | End: 2020-12-15
Attending: INTERNAL MEDICINE
Payer: MEDICARE

## 2020-12-15 DIAGNOSIS — R10.31 CHRONIC BILATERAL LOWER ABDOMINAL PAIN: ICD-10-CM

## 2020-12-15 DIAGNOSIS — R10.32 CHRONIC BILATERAL LOWER ABDOMINAL PAIN: ICD-10-CM

## 2020-12-15 DIAGNOSIS — G89.29 CHRONIC BILATERAL LOWER ABDOMINAL PAIN: ICD-10-CM

## 2020-12-15 DIAGNOSIS — R35.0 INCREASED URINARY FREQUENCY: ICD-10-CM

## 2020-12-15 PROCEDURE — 76700 US EXAM ABDOM COMPLETE: CPT

## 2020-12-15 NOTE — PROGRESS NOTES
Call pt and tell her that test (lab and or Xray) is normal< US looks pretty good, is the new med helping with her urinary frequency?

## 2020-12-15 NOTE — PROGRESS NOTES
Spoke with pt and interested in the Myrbetriq. Which dose? Also states that she continues to have lower left pain in her pelvic area (states it is below the area that US was done on). States it is a sharp pain that comes and goes.

## 2020-12-17 NOTE — H&P (VIEW-ONLY)
[address] [facility], [address] [facility] 
[phone] History and Physical  
Brenda Jang    Admit date: (Not on file) MRN: [id number]     : 1955     Age: 72 y.o. Attending Physician: Helga Knox MD 
 
HPI: Brenda Jang is a 72 y.o. female who had recent mammogram at [facility] that led to further evaluation and biopsy of the left breast.  Pt has no prior breast history Pt has no family history of breast cancer Documentation checklist  (yes/no/na) 1. Was patient presented at interdisciplinary breast conference pre-op? Yes 2. For mastectomy patients---Was plastic surgery consultation offered/obtained pre-op  No lumpectomy 3. Is patient having sentinel node excision? (If no, and pt had invasive carcinoma, please explain)  No- dcis 4. Was pt referred for genetic counseling?   no 
 
5. What was the genetic risk screening score? <10 Family History 10 pts 1st degree relative or 2 second degree relatives meeting above criteria (on the same side of the family) 10 pts 3rd degree relative with breast cancer and 2 or more close blood relatives with breast cancer (1-< or = 50 yrs) 9 pts  Close male relative with breast cancer at any age 10 pts Family member with known BRCA1 or BRCA2 mutation 7 pts Close relative with ovarian cancer at any age 7 pts Ashkenazi Jehovah's witness ancestry 7 pts  Specific  populations ([place], [place], [place]) Definitions: 
1st degree relative: parent, sister/brother, child 2nd degree relative:  grandparent, aunt/uncle, half-sibling, niece/nephew 3rd degree relative: great grandparent, great-uncle/aunt, cousin Close relative: first, second, or 3rd degree relative Past Medical History:  
Diagnosis Date  Chronic insomnia  GERD (gastroesophageal reflux disease)   
 controlled with med  HLD (hyperlipidemia)  Osteopenia  Sleep apnea uses CPAP - will bring DOS  Status post total left knee replacement 4/21/2017  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 Current Outpatient Medications Medication Sig  
 mirabegron ER (Myrbetriq) 50 mg ER tablet Take 1 Tab by mouth daily.  verapamil ER (VERELAN PM) 300 mg capsule TAKE 1 CAPSULE NIGHTLY  hydrALAZINE (APRESOLINE) 50 mg tablet Take 1 Tab by mouth three (3) times daily.  atorvastatin (LIPITOR) 40 mg tablet Take 1 Tab by mouth daily.  levothyroxine (Synthroid) 125 mcg tablet Take 1 Tab by mouth Daily (before breakfast).  omeprazole (PRILOSEC) 20 mg capsule TAKE 1 CAPSULE DAILY  rOPINIRole (REQUIP) 0.5 mg tablet Take 1 Tab by mouth nightly.  allopurinoL (ZYLOPRIM) 300 mg tablet Take 1 Tab by mouth daily.  irbesartan (AVAPRO) 300 mg tablet Take 1 Tab by mouth daily.  Cholecalciferol, Vitamin D3, (VITAMIN D3) 2,000 unit cap capsule Take 2,000 Units by mouth every other day. No current facility-administered medications for this visit. ALERGIES:  Ambien [zolpidem], Augmentin [amoxicillin-pot clavulanate], Codeine, Contrave [naltrexone-bupropion], Crestor [rosuvastatin], Ditropan [oxybutynin chloride], Hydralazine, Macrobid [nitrofurantoin monohyd/m-cryst], Penicillins, Tape [adhesive], Thiazides, Topamax [topiramate], Trazodone, and Zocor [simvastatin] Social  
Social History Tobacco Use  Smoking status: Never Smoker  Smokeless tobacco: Never Used Substance Use Topics  Alcohol use: No  
 Drug use: No  
 
Family History Problem Relation Age of Onset  Hypertension Father  Sleep Apnea Father  Cancer Father   
     prostate, pancreatic  Diabetes Father   
     glucose intolerance  Elevated Lipids Sister  Hypertension Sister  Stroke Mother  Dementia Mother  Heart Disease Mother CHF  Diabetes Paternal Aunt Past Surgical History:  
Procedure Laterality Date  HX BREAST AUGMENTATION  1990  
 HX BUNIONECTOMY Bilateral 1980  HX HIP REPLACEMENT Right 11/2016  HX KNEE ARTHROSCOPY Left 10/2012  HX KNEE REPLACEMENT Left 04/01/2017 Dr Zane Rodriguez  HX ORTHOPAEDIC Bilateral 2010  
 neuroma's removed from feet  HX PHYLLIS AND BSO  2009  
 r/t large fibroids; Dr. Lizz Johnson [address] ROS: The patient has no difficulty with chest pain or shortness of breath. No fever or chills. Comprehensive review of systems was otherwise unremarkable except as noted above. Physical Exam:  
General: Alert oriented cooperative patient in no acute distress. Eyes:Sclera are clear. Resp: No JVD. Breathing is  non-labored. CV: RRR. Breast: normal clinical exam  
Axillary: normal  
Abd: soft non-tender and non-distended without peritoneal signs. Extremities: unremarkable. Neuro:  No focal signs CBC:  
Lab Results Component Value Date/Time WBC 7.5 07/02/2020 09:46 AM  
 HGB 13.5 07/02/2020 09:46 AM  
 HCT 38.9 07/02/2020 09:46 AM  
 PLATELET 993 36/94/8416 09:46 AM  
 MCV 87 07/02/2020 09:46 AM  
 
 
[facility] Results (most recent):reviewed from [facility] Results from Abstract encounter on 12/16/20 [facility] MAMMO LT DX INCL CAD MRI Results (most recent):none PET Results (most recent):none No results found for this or any previous visit. Assessment/Plan:  Ivis Portillo is a 72 y.o. female who has newly diagnosed small volume left breast DCIS . We discussed in detail the findings on imaging and pathology. We discussed treatment options including surgery, oncology and radiation therapy. I recommended the following Wire localized left breast lumpectomy Patient to be presented at [facility] breast conference.   
 
Signed:  Bonita Amos MD 
97/51/3725  1:56 PM

## 2020-12-23 ENCOUNTER — HOSPITAL ENCOUNTER (OUTPATIENT)
Dept: CT IMAGING | Age: 65
Discharge: HOME | End: 2020-12-23
Attending: INTERNAL MEDICINE
Payer: MEDICARE

## 2020-12-23 DIAGNOSIS — R10.32 LEFT LOWER QUADRANT ABDOMINAL PAIN: ICD-10-CM

## 2020-12-23 LAB — CREAT BLD-MCNC: 1 MG/DL (ref 0.8–1.5)

## 2020-12-23 PROCEDURE — 74011000636 HC RX REV CODE- 636: Performed by: INTERNAL MEDICINE

## 2020-12-23 PROCEDURE — 82565 ASSAY OF CREATININE: CPT

## 2020-12-23 PROCEDURE — 74177 CT ABD & PELVIS W/CONTRAST: CPT

## 2020-12-23 PROCEDURE — 74011000258 HC RX REV CODE- 258: Performed by: INTERNAL MEDICINE

## 2020-12-23 RX ORDER — SODIUM CHLORIDE 0.9 % (FLUSH) 0.9 %
10 SYRINGE (ML) INJECTION
Status: COMPLETED | OUTPATIENT
Start: 2020-12-23 | End: 2020-12-23

## 2020-12-23 RX ADMIN — DIATRIZOATE MEGLUMINE AND DIATRIZOATE SODIUM 15 ML: 660; 100 LIQUID ORAL; RECTAL at 10:26

## 2020-12-23 RX ADMIN — Medication 10 ML: at 10:27

## 2020-12-23 RX ADMIN — IOPAMIDOL 100 ML: 755 INJECTION, SOLUTION INTRAVENOUS at 10:26

## 2020-12-23 RX ADMIN — SODIUM CHLORIDE 100 ML: 900 INJECTION, SOLUTION INTRAVENOUS at 10:27

## 2020-12-23 NOTE — PROGRESS NOTES
Pt notified of results and verbalized understanding. Pt still has pain, but thinks it must just be her back acting up. Pt will call back if pain persists or worsens.

## 2020-12-31 VITALS — WEIGHT: 293 LBS | HEIGHT: 63 IN | BODY MASS INDEX: 51.91 KG/M2

## 2020-12-31 NOTE — PERIOP NOTES
Patient verified name and . Order for consent IS found in EHR and matches case posting; patient verifies procedure. Type 1b surgery, Phone assessment complete. Orders were received. Labs per surgeon: none  Labs per anesthesia protocol: none      Patient answered medical/surgical history questions at their best of ability. All prior to admission medications documented in [facility]. Patient instructed to take the following medications the day of surgery according to anesthesia guidelines with a small sip of water: lipitor, hydralazine, synthroid, prilosec Hold all vitamins 7 days prior to surgery and NSAIDS 5 days prior to surgery. Prescription meds to hold:vit d    Patient instructed on the following:    > a negative Covid swab result is required to proceed with surgery;  appointments are made by the surgeon office and test should be collected 7 days prior to surgery. The testing center is located at the [address] [place] [address].   > 1 visitor allowed at this time. >Arrive at [facility], time of arrival to be called the day before by 1700  >NPO after midnight including gum, mints, and ice chips  >Responsible adult must drive patient to the hospital, stay during surgery, and patient will need supervision 24 hours after anesthesia  >Use antibacterial soap in shower the night before surgery and on the morning of surgery  >All piercings must be removed prior to arrival.    >Leave all valuables (money and jewelry) at home but bring insurance card and ID on       DOS. >Do not wear make-up, nail polish, lotions, cologne, perfumes, powders, or oil on skin.

## 2021-01-04 ENCOUNTER — HOSPITAL ENCOUNTER (OUTPATIENT)
Dept: SURGERY | Age: 66
Discharge: HOME | End: 2021-01-04

## 2021-01-07 ENCOUNTER — ANESTHESIA EVENT (OUTPATIENT)
Dept: SURGERY | Age: 66
End: 2021-01-07
Payer: MEDICARE

## 2021-01-08 ENCOUNTER — ANESTHESIA (OUTPATIENT)
Dept: SURGERY | Age: 66
End: 2021-01-08
Payer: MEDICARE

## 2021-01-08 ENCOUNTER — APPOINTMENT (OUTPATIENT)
Dept: MAMMOGRAPHY | Age: 66
End: 2021-01-08
Attending: SURGERY
Payer: MEDICARE

## 2021-01-08 ENCOUNTER — HOSPITAL ENCOUNTER (OUTPATIENT)
Age: 66
Setting detail: OUTPATIENT SURGERY
Discharge: HOME | End: 2021-01-08
Attending: SURGERY | Admitting: SURGERY
Payer: MEDICARE

## 2021-01-08 VITALS
BODY MASS INDEX: 51.91 KG/M2 | OXYGEN SATURATION: 94 % | WEIGHT: 293 LBS | HEART RATE: 81 BPM | TEMPERATURE: 98.2 F | RESPIRATION RATE: 18 BRPM | DIASTOLIC BLOOD PRESSURE: 79 MMHG | SYSTOLIC BLOOD PRESSURE: 139 MMHG | HEIGHT: 63 IN

## 2021-01-08 DIAGNOSIS — N63.0 BREAST MASS: ICD-10-CM

## 2021-01-08 DIAGNOSIS — D05.12 DUCTAL CARCINOMA IN SITU (DCIS) OF LEFT BREAST: ICD-10-CM

## 2021-01-08 PROCEDURE — 77030040361 HC SLV COMPR DVT MDII -B: Performed by: SURGERY

## 2021-01-08 PROCEDURE — 74011000250 HC RX REV CODE- 250: Performed by: REGISTERED NURSE

## 2021-01-08 PROCEDURE — 2709999900 HC NON-CHARGEABLE SUPPLY: Performed by: SURGERY

## 2021-01-08 PROCEDURE — 77030010509 HC AIRWY LMA MSK TELE -A: Performed by: ANESTHESIOLOGY

## 2021-01-08 PROCEDURE — 76010000138 HC OR TIME 0.5 TO 1 HR: Performed by: SURGERY

## 2021-01-08 PROCEDURE — 74011000250 HC RX REV CODE- 250: Performed by: SURGERY

## 2021-01-08 PROCEDURE — 74011250636 HC RX REV CODE- 250/636: Performed by: NURSE ANESTHETIST, CERTIFIED REGISTERED

## 2021-01-08 PROCEDURE — 88307 TISSUE EXAM BY PATHOLOGIST: CPT

## 2021-01-08 PROCEDURE — 76210000006 HC OR PH I REC 0.5 TO 1 HR: Performed by: SURGERY

## 2021-01-08 PROCEDURE — 19301 PARTIAL MASTECTOMY: CPT | Performed by: SURGERY

## 2021-01-08 PROCEDURE — A4648 IMPLANTABLE TISSUE MARKER: HCPCS

## 2021-01-08 PROCEDURE — 76210000020 HC REC RM PH II FIRST 0.5 HR: Performed by: SURGERY

## 2021-01-08 PROCEDURE — 77030002996 HC SUT SLK J&J -A: Performed by: SURGERY

## 2021-01-08 PROCEDURE — 74011250636 HC RX REV CODE- 250/636: Performed by: REGISTERED NURSE

## 2021-01-08 PROCEDURE — 74011000250 HC RX REV CODE- 250: Performed by: ANESTHESIOLOGY

## 2021-01-08 PROCEDURE — 77030031139 HC SUT VCRL2 J&J -A: Performed by: SURGERY

## 2021-01-08 PROCEDURE — 74011250636 HC RX REV CODE- 250/636: Performed by: SURGERY

## 2021-01-08 PROCEDURE — 88305 TISSUE EXAM BY PATHOLOGIST: CPT

## 2021-01-08 PROCEDURE — 77030002933 HC SUT MCRYL J&J -A: Performed by: SURGERY

## 2021-01-08 PROCEDURE — 74011250636 HC RX REV CODE- 250/636: Performed by: ANESTHESIOLOGY

## 2021-01-08 PROCEDURE — 74011000250 HC RX REV CODE- 250: Performed by: NURSE ANESTHETIST, CERTIFIED REGISTERED

## 2021-01-08 PROCEDURE — 19281 PERQ DEVICE BREAST 1ST IMAG: CPT

## 2021-01-08 PROCEDURE — 76060000032 HC ANESTHESIA 0.5 TO 1 HR: Performed by: SURGERY

## 2021-01-08 RX ORDER — ONDANSETRON 2 MG/ML
INJECTION INTRAMUSCULAR; INTRAVENOUS AS NEEDED
Status: DISCONTINUED | OUTPATIENT
Start: 2021-01-08 | End: 2021-01-08 | Stop reason: HOSPADM

## 2021-01-08 RX ORDER — SODIUM CHLORIDE, SODIUM LACTATE, POTASSIUM CHLORIDE, CALCIUM CHLORIDE 600; 310; 30; 20 MG/100ML; MG/100ML; MG/100ML; MG/100ML
75 INJECTION, SOLUTION INTRAVENOUS CONTINUOUS
Status: DISCONTINUED | OUTPATIENT
Start: 2021-01-08 | End: 2021-01-08 | Stop reason: HOSPADM

## 2021-01-08 RX ORDER — CEFAZOLIN SODIUM/WATER 2 G/20 ML
2 SYRINGE (ML) INTRAVENOUS ONCE
Status: DISCONTINUED | OUTPATIENT
Start: 2021-01-08 | End: 2021-01-08

## 2021-01-08 RX ORDER — MIDAZOLAM HYDROCHLORIDE 1 MG/ML
2 INJECTION, SOLUTION INTRAMUSCULAR; INTRAVENOUS
Status: COMPLETED | OUTPATIENT
Start: 2021-01-08 | End: 2021-01-08

## 2021-01-08 RX ORDER — EPHEDRINE SULFATE/0.9% NACL/PF 50 MG/5 ML
SYRINGE (ML) INTRAVENOUS AS NEEDED
Status: DISCONTINUED | OUTPATIENT
Start: 2021-01-08 | End: 2021-01-08 | Stop reason: HOSPADM

## 2021-01-08 RX ORDER — FENTANYL CITRATE 50 UG/ML
100 INJECTION, SOLUTION INTRAMUSCULAR; INTRAVENOUS ONCE
Status: DISCONTINUED | OUTPATIENT
Start: 2021-01-08 | End: 2021-01-08 | Stop reason: HOSPADM

## 2021-01-08 RX ORDER — SODIUM CHLORIDE 0.9 % (FLUSH) 0.9 %
5-40 SYRINGE (ML) INJECTION EVERY 8 HOURS
Status: DISCONTINUED | OUTPATIENT
Start: 2021-01-08 | End: 2021-01-08 | Stop reason: HOSPADM

## 2021-01-08 RX ORDER — HYDROMORPHONE HYDROCHLORIDE 2 MG/ML
0.5 INJECTION, SOLUTION INTRAMUSCULAR; INTRAVENOUS; SUBCUTANEOUS
Status: DISCONTINUED | OUTPATIENT
Start: 2021-01-08 | End: 2021-01-08 | Stop reason: HOSPADM

## 2021-01-08 RX ORDER — HYDROCODONE BITARTRATE AND ACETAMINOPHEN 5; 325 MG/1; MG/1
2 TABLET ORAL AS NEEDED
Status: DISCONTINUED | OUTPATIENT
Start: 2021-01-08 | End: 2021-01-08 | Stop reason: HOSPADM

## 2021-01-08 RX ORDER — PROPOFOL 10 MG/ML
INJECTION, EMULSION INTRAVENOUS AS NEEDED
Status: DISCONTINUED | OUTPATIENT
Start: 2021-01-08 | End: 2021-01-08 | Stop reason: HOSPADM

## 2021-01-08 RX ORDER — SODIUM CHLORIDE 0.9 % (FLUSH) 0.9 %
5-40 SYRINGE (ML) INJECTION AS NEEDED
Status: DISCONTINUED | OUTPATIENT
Start: 2021-01-08 | End: 2021-01-08 | Stop reason: HOSPADM

## 2021-01-08 RX ORDER — OXYCODONE HYDROCHLORIDE 5 MG/1
5 TABLET ORAL
Status: DISCONTINUED | OUTPATIENT
Start: 2021-01-08 | End: 2021-01-08 | Stop reason: HOSPADM

## 2021-01-08 RX ORDER — LIDOCAINE HYDROCHLORIDE 10 MG/ML
5 INJECTION INFILTRATION; PERINEURAL
Status: COMPLETED | OUTPATIENT
Start: 2021-01-08 | End: 2021-01-08

## 2021-01-08 RX ORDER — LIDOCAINE HYDROCHLORIDE 10 MG/ML
INJECTION INFILTRATION; PERINEURAL AS NEEDED
Status: DISCONTINUED | OUTPATIENT
Start: 2021-01-08 | End: 2021-01-08 | Stop reason: HOSPADM

## 2021-01-08 RX ORDER — LIDOCAINE HYDROCHLORIDE 10 MG/ML
0.1 INJECTION INFILTRATION; PERINEURAL AS NEEDED
Status: DISCONTINUED | OUTPATIENT
Start: 2021-01-08 | End: 2021-01-08 | Stop reason: HOSPADM

## 2021-01-08 RX ORDER — FENTANYL CITRATE 50 UG/ML
INJECTION, SOLUTION INTRAMUSCULAR; INTRAVENOUS AS NEEDED
Status: DISCONTINUED | OUTPATIENT
Start: 2021-01-08 | End: 2021-01-08 | Stop reason: HOSPADM

## 2021-01-08 RX ORDER — DEXAMETHASONE SODIUM PHOSPHATE 4 MG/ML
INJECTION, SOLUTION INTRA-ARTICULAR; INTRALESIONAL; INTRAMUSCULAR; INTRAVENOUS; SOFT TISSUE AS NEEDED
Status: DISCONTINUED | OUTPATIENT
Start: 2021-01-08 | End: 2021-01-08 | Stop reason: HOSPADM

## 2021-01-08 RX ORDER — LIDOCAINE HYDROCHLORIDE 20 MG/ML
INJECTION, SOLUTION EPIDURAL; INFILTRATION; INTRACAUDAL; PERINEURAL AS NEEDED
Status: DISCONTINUED | OUTPATIENT
Start: 2021-01-08 | End: 2021-01-08 | Stop reason: HOSPADM

## 2021-01-08 RX ADMIN — FENTANYL CITRATE 25 MCG: 50 INJECTION INTRAMUSCULAR; INTRAVENOUS at 11:53

## 2021-01-08 RX ADMIN — SODIUM CHLORIDE, SODIUM LACTATE, POTASSIUM CHLORIDE, AND CALCIUM CHLORIDE 75 ML/HR: 600; 310; 30; 20 INJECTION, SOLUTION INTRAVENOUS at 09:37

## 2021-01-08 RX ADMIN — LIDOCAINE HYDROCHLORIDE 100 MG: 20 INJECTION, SOLUTION EPIDURAL; INFILTRATION; INTRACAUDAL; PERINEURAL at 11:40

## 2021-01-08 RX ADMIN — DEXAMETHASONE SODIUM PHOSPHATE 5 MG: 4 INJECTION, SOLUTION INTRAMUSCULAR; INTRAVENOUS at 11:48

## 2021-01-08 RX ADMIN — ONDANSETRON 4 MG: 2 INJECTION INTRAMUSCULAR; INTRAVENOUS at 12:09

## 2021-01-08 RX ADMIN — LIDOCAINE HYDROCHLORIDE 5 ML: 10 INJECTION, SOLUTION INFILTRATION; PERINEURAL at 10:15

## 2021-01-08 RX ADMIN — LIDOCAINE HYDROCHLORIDE 0.1 ML: 10 INJECTION, SOLUTION INFILTRATION; PERINEURAL at 09:32

## 2021-01-08 RX ADMIN — MIDAZOLAM 2 MG: 1 INJECTION INTRAMUSCULAR; INTRAVENOUS at 11:28

## 2021-01-08 RX ADMIN — Medication 10 MG: at 12:07

## 2021-01-08 RX ADMIN — FENTANYL CITRATE 50 MCG: 50 INJECTION INTRAMUSCULAR; INTRAVENOUS at 11:51

## 2021-01-08 RX ADMIN — CEFAZOLIN 3 G: 1 INJECTION, POWDER, FOR SOLUTION INTRAVENOUS at 11:34

## 2021-01-08 RX ADMIN — PROPOFOL 200 MG: 10 INJECTION, EMULSION INTRAVENOUS at 11:40

## 2021-01-08 NOTE — DISCHARGE INSTRUCTIONS
Ice packs as needed for comfort    Tylenol as needed for pain     May shower on Sunday    After general anesthesia or intravenous sedation, for 24 hours or while taking prescription Narcotics:  · Limit your activities  · A responsible adult needs to be with you for the next 24 hours  · Do not drive and operate hazardous machinery  · Do not make important personal or business decisions  · Do not drink alcoholic beverages  · If you have not urinated within 8 hours after discharge, and you are experiencing discomfort from urinary retention, please go to the nearest ED. · If you have sleep apnea and have a CPAP machine, please use it for all naps and sleeping. · Please use caution when taking narcotics and any of your home medications that may cause drowsiness. *  Please give a list of your current medications to your Primary Care Provider. *  Please update this list whenever your medications are discontinued, doses are      changed, or new medications (including over-the-counter products) are added. *  Please carry medication information at all times in case of emergency situations. These are general instructions for a healthy lifestyle:  No smoking/ No tobacco products/ Avoid exposure to second hand smoke  Surgeon General's Warning:  Quitting smoking now greatly reduces serious risk to your health. Obesity, smoking, and sedentary lifestyle greatly increases your risk for illness  A healthy diet, regular physical exercise & weight monitoring are important for maintaining a healthy lifestyle    You may be retaining fluid if you have a history of heart failure or if you experience any of the following symptoms:  Weight gain of 3 pounds or more overnight or 5 pounds in a week, increased swelling in our hands or feet or shortness of breath while lying flat in bed. Please call your doctor as soon as you notice any of these symptoms; do not wait until your next office visit.

## 2021-01-08 NOTE — INTERVAL H&P NOTE
Update History & Physical 
 
The Patient's History and Physical of December 17, 2020 was reviewed with the patient and I examined the patient. There was no change. The surgical site was confirmed by the patient and me. Plan:  The risk, benefits, expected outcome, and alternative to the recommended procedure have been discussed with the patient. Patient understands and wants to proceed with the procedure.  
 
Electronically signed by Romana Andes, MD on 7/7/5524 at 10:37 AM

## 2021-01-08 NOTE — ANESTHESIA PREPROCEDURE EVALUATION
Anesthetic History     PONV          Review of Systems / Medical History  Patient summary reviewed and pertinent labs reviewed    Pulmonary        Sleep apnea: CPAP           Neuro/Psych   Within defined limits           Cardiovascular    Hypertension: well controlled              Exercise tolerance: >4 METS     GI/Hepatic/Renal     GERD: well controlled           Endo/Other      Hypothyroidism: well controlled  Morbid obesity and arthritis     Other Findings              Physical Exam    Airway  Mallampati: III  TM Distance: 4 - 6 cm  Neck ROM: normal range of motion   Mouth opening: Diminished (comment)     Cardiovascular  Regular rate and rhythm,  S1 and S2 normal,  no murmur, click, rub, or gallop             Dental         Pulmonary  Breath sounds clear to auscultation               Abdominal  GI exam deferred       Other Findings            Anesthetic Plan    ASA: 3  Anesthesia type: general          Induction: Intravenous  Anesthetic plan and risks discussed with: Patient

## 2021-01-08 NOTE — ANESTHESIA POSTPROCEDURE EVALUATION
Procedure(s):  LEFT BREAST NEEDLE LOCALIZED LUMPECTOMY PREOP 0800/ LOC 0930/ SURGERY 1130.     general    Anesthesia Post Evaluation      Multimodal analgesia: multimodal analgesia used between 6 hours prior to anesthesia start to PACU discharge  Patient location during evaluation: PACU  Patient participation: complete - patient participated  Level of consciousness: awake and alert  Pain management: adequate  Airway patency: patent  Anesthetic complications: no  Cardiovascular status: acceptable  Respiratory status: acceptable, spontaneous ventilation and nonlabored ventilation  Hydration status: acceptable  Post anesthesia nausea and vomiting:  none      INITIAL Post-op Vital signs:   Vitals Value Taken Time   /79 01/08/21 1245   Temp 36.8 °C (98.2 °F) 01/08/21 1220   Pulse 81 01/08/21 1245   Resp 18 01/08/21 1245   SpO2 94 % 01/08/21 1245

## 2021-01-08 NOTE — OP NOTES
Operative note    Preprocedure diagnosis:DCIS left breast    Postoperative diagnosis: Same    Procedure performed: Wire localized lumpectomy    Surgeon: Romario Martinez M.D. Estimated blood loss: Less than 10 cc    Complications: None    Drains: None    Indications: This patient is a 72-year-old  Female with a diagnosis of small volume DCIS left breast .  Risks benefits and alternatives to different surgical options were clearly discussed with the patient in the office. The patient elected to proceed with the above procedures and gave appropriate consent. Procedure in detail:  The patient underwent wire localization of the breast lesion prior to surgery in the radiology suite. She was then taken to the operating room where she underwent general anesthetic without difficulty. The breast was prepped and draped in routine sterile fashion. Timeout was performed identifying the patient procedure and laterality. IV antibiotics were given upon induction of anesthetic. Skin incision was then created with a #15 blade following the skin lines of Fay and careful dissection down the localization wire was performed with electrocautery dissection. A generous specimen was taken around the portion of the wire containing the tissue in question. A generous specimen was taken to include the biopsy clip at its center. Once removed specimen was marked for orientation with a long suture lateral, short suture superior, and a double suture at the deep margin. Specimen radiograph was performed in the room which showed removal of the proper tissue, the biopsy clip and wire with good margins. A second superficial/anterior margin was take and marked with suture for orientation. Hemostasis within the cavity was assured with electrocautery. Wound was irrigated with warm saline and closed in 2 layers using 3-0 interrupted deep dermal Vicryl suture and 4-0 subcuticular Monocryl suture.   Steri-Strip Dressing Was Applied to the Incision. Patient Was Awakened and Taken to Recovery Room in Good Condition at Completion of the Procedure.

## 2021-02-12 PROBLEM — Z80.0 FAMILY HISTORY OF COLON CANCER: Status: ACTIVE | Noted: 2021-02-12

## 2021-02-12 NOTE — PROGRESS NOTES
Patient: Ivis Portillo MRN: [id number]  SSN: [id number]    YOB: 1955  Age: 72 y.o. Sex: female      Other Providers:  Dr. Cecile Shaw, Dr. 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 female who I am seeing at the request of Dr. Cecile Shaw. Ms. Whitney Paulson was in her usual state of health until 11/23/2020 at which time she underwent bilateral screening mammogram. This demonstrated an asymmetry in the retroareolar region of the left breast located 5.5 cm from the nipple measuring 7mm. There was an additional asymmetry in the left lateral breast located 7cm from the nipple measuring 9mm. There was no irregularity noted in the right breast. The patient does have bilateral retropectoral silicone implants. This led to a diagnostic digital mammogram performed 12/3/2020 which confirmed a 5mm lobulated nodule in the left breast at 12:00 corresponding to the abnormality seen on mammogram. An ultrasound guided core biopsy of the left breast mass at 12:00 4 cm from the nipple demonstrated ductal carcinoma in situ, low grade, cribriform and papillary type, ER positive (96%), DE positive (92%). She was evaluated by Dr. Osker Boeck and underwent a lumpectomy on 1/8/2021. Final pathology demonstrated 4mm intermediate grade DCIS without evidence of necrosis, the closest margin 3mm from the specimen. Currently, she reports that she has recovered well from surgery. She denies any residual soreness in the left breast or decreased range of motion and only endorses some pain in the left arm due to her recent first Covid vaccination. She has had no recent fevers, chills, or night sweats.     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 PAST SURGICAL HISTORY:   Past Surgical History:   Procedure Laterality Date    HX BREAST AUGMENTATION  1990    HX BREAST BIOPSY N/A 1/8/2021    LEFT BREAST NEEDLE LOCALIZED LUMPECTOMY PREOP 0800/ LOC 0930/ SURGERY 1130 performed by Marysol Moore MD at [address] [place] [address] HX BUNIONECTOMY Bilateral 1980    HX HIP REPLACEMENT Right 11/2016    HX KNEE ARTHROSCOPY Left 10/2012    HX KNEE REPLACEMENT Left 04/01/2017    Dr Patricia Beth ORTHOPAEDIC Bilateral 2010    neuroma's removed from feet    HX PHYLLIS AND BSO  2009    r/t large fibroids; Dr. Nazario Davison:     Current Outpatient Medications:     verapamil ER (VERELAN PM) 300 mg capsule, TAKE 1 CAPSULE NIGHTLY, Disp: 90 Cap, Rfl: 4    hydrALAZINE (APRESOLINE) 50 mg tablet, Take 1 Tab by mouth three (3) times daily. (Patient taking differently: Take 50 mg by mouth three (3) times daily. Take / use AM day of surgery  per anesthesia protocols.), Disp: 270 Tab, Rfl: 4    atorvastatin (LIPITOR) 40 mg tablet, Take 1 Tab by mouth daily. (Patient taking differently: Take 40 mg by mouth daily. Take / use AM day of surgery  per anesthesia protocols.), Disp: 90 Tab, Rfl: 4    levothyroxine (Synthroid) 125 mcg tablet, Take 1 Tab by mouth Daily (before breakfast). (Patient taking differently: Take 125 mcg by mouth Daily (before breakfast).  Take / use AM day of surgery  per anesthesia protocols.), Disp: 90 Tab, Rfl: 4    omeprazole (PRILOSEC) 20 mg capsule, TAKE 1 CAPSULE DAILY (Patient taking differently: TAKE 1 CAPSULE DAILY  Take / use AM day of surgery  per anesthesia protocols.), Disp: 90 Cap, Rfl: 3    rOPINIRole (REQUIP) 0.5 mg tablet, Take 1 Tab by mouth nightly., Disp: 90 Tab, Rfl: 4    allopurinoL (ZYLOPRIM) 300 mg tablet, Take 1 Tab by mouth daily. (Patient taking differently: Take 300 mg by mouth every evening.), Disp: 90 Tab, Rfl: 4    irbesartan (AVAPRO) 300 mg tablet, Take 1 Tab by mouth daily.  (Patient taking differently: Take 300 mg by mouth every evening.), Disp: 90 Tab, Rfl: 4    Cholecalciferol, Vitamin D3, (VITAMIN D3) 2,000 unit cap capsule, Take 2,000 Units by mouth every other day., Disp: , Rfl:     ALLERGIES:   Allergies   Allergen Reactions    Ambien [Zolpidem] Other (comments)     Periods of forgetfulness    Augmentin [Amoxicillin-Pot Clavulanate] Itching     Itching in mouth and ears    Codeine Nausea Only    Contrave [Naltrexone-Bupropion] Other (comments)     lethargy    Crestor [Rosuvastatin] Swelling    Ditropan [Oxybutynin Chloride] Other (comments)     Made her voiding worse    Hydralazine Other (comments)     diarrhea    Macrobid [Nitrofurantoin Monohyd/M-Cryst] Itching      itching under arm    Myrbetriq [Mirabegron] Other (comments)     ineffective    Penicillins Itching    Tape [Adhesive] Other (comments)     Little bruises    Thiazides Other (comments)     GOUT    Topamax [Topiramate] Other (comments)     Felt weird    Trazodone Other (comments)     ineffective    Zocor [Simvastatin] Myalgia       SOCIAL HISTORY:   Social History     Socioeconomic History    Marital status:      Spouse name: Not on file    Number of children: Not on file    Years of education: Not on file    Highest education level: Not on file   Occupational History    Not on file   Social Needs    Financial resource strain: Not on file    Food insecurity     Worry: Not on file     Inability: Not on file    Transportation needs     Medical: Not on file     Non-medical: Not on file   Tobacco Use    Smoking status: Never Smoker    Smokeless tobacco: Never Used Substance and Sexual Activity    Alcohol use: No    Drug use: No    Sexual activity: Not on file   Lifestyle    Physical activity     Days per week: Not on file     Minutes per session: Not on file    Stress: Not on file   Relationships    Social connections     Talks on phone: Not on file     Gets together: Not on file     Attends Mosque service: Not on file     Active member of club or organization: Not on file     Attends meetings of clubs or organizations: Not on file     Relationship status: Not on file    Intimate partner violence     Fear of current or ex partner: Not on file     Emotionally abused: Not on file     Physically abused: Not on file     Forced sexual activity: Not on file   Other Topics Concern     Service Not Asked    Blood Transfusions Not Asked    Caffeine Concern Not Asked    Occupational Exposure Not Asked   [place] Areas Hazards Not Asked    Sleep Concern Not Asked    Stress Concern Not Asked    Weight Concern Not Asked    Special Diet Not Asked    Back Care Not Asked    Exercise Yes     Comment: walks most mornings 1.5 mile    Bike Helmet Not Asked   [address] Not Asked    Self-Exams Not Asked   Social History Narrative    , walking about a mile and a half in the mornings     FAMILY HISTORY:   Family History   Problem Relation Age of Onset    Hypertension Father     Sleep Apnea Father     Cancer Father         prostate, pancreatic    Diabetes Father         glucose intolerance    Elevated Lipids Sister     Hypertension Sister     Stroke Mother     Dementia Mother     Heart Disease Mother         CHF    Diabetes Paternal Aunt        REVIEW OF SYSTEMS:   A full 12-point review of systems was completed and was negative unless noted in the history of present illness.     PHYSICAL EXAMINATION:   ECOG Performance status 0  VITAL SIGNS:   Visit Vitals  BP (!) 160/88 (BP 1 Location: Left upper arm, BP Patient Position: Sitting)   Pulse 76   Temp (!) 96.3 °F (35.7 °C)   Resp 16   Wt 136.1 kg (300 lb)   SpO2 97%   BMI 53.14 kg/m²        General: well developed/nourished adult Female in no acute distress; appears stated age  [de-identified]: normocephalic, atraumatic; EOMI  Neck: supple with full ROM; no cervical lymphadenopathy  Cardiovascular: normal S1 and S2, RRR, no murmurs  Respiratory: normal inspiratory effort, no audible wheezes  Extremities: no cyanosis, clubbing, or edema  Musculoskeletal: mobility intact x4; normal ROM in all joints  Skin: no skin lesions identified  Neuro: AOx3; sensation intact x 4; CNII-XII grossly intact  Psych: appropriate affect, insight, and judgement  GI: abdomen soft, non-distended  : not indicated   Breast: Breasts symmetric bilaterally, pendulous. Left breast lumpectomy incision well healed. No skin changes or nipple discharge. No palpable mass in the right breast. No palpable mass in the left breast.    PATHOLOGY:    I personally reviewed the pathology reports as documented in the HPI. LABORATORY:   Lab Results   Component Value Date/Time    Sodium 140 02/05/2021 09:51 AM    Potassium 4.1 02/05/2021 09:51 AM    Chloride 103 02/05/2021 09:51 AM    CO2 23 02/05/2021 09:51 AM    Anion gap 7 04/22/2017 05:57 AM    Glucose 94 02/05/2021 09:51 AM    BUN 16 02/05/2021 09:51 AM    Creatinine 0.86 02/05/2021 09:51 AM    GFR est AA 82 02/05/2021 09:51 AM    GFR est non-AA 71 02/05/2021 09:51 AM    Calcium 9.3 02/05/2021 09:51 AM    Albumin 4.3 02/05/2021 09:51 AM    Protein, total 7.1 02/05/2021 09:51 AM    A-G Ratio 1.5 02/05/2021 09:51 AM    ALT (SGPT) 27 02/05/2021 09:51 AM     Lab Results   Component Value Date/Time    WBC 7.5 07/02/2020 09:46 AM    HGB 13.5 07/02/2020 09:46 AM    HCT 38.9 07/02/2020 09:46 AM    PLATELET 130 10/53/5737 09:46 AM     RADIOLOGY:    I personally reviewed the imaging and agree with the results as documented in the HPI.     IMPRESSION:  Alondra Gill is a 72 y.o. female with recently diagnosed DCIS of the left breast, 4mm in size, intermediate grade, ER/RI positive, who presents for discussion of radiation therapy. I had a long discussion with Jolie Yuan regarding treatment options, specifically external beam radiation to decrease the risk of recurrence of DCIS and invasive breast cancer. I also discussed the ongoing Prelude DCIS registry study. I reviewed in detail the anticipated acute and late toxicities of radiation therapy as well as the logistics of treatment including prone treatment positioning to minimize skin reaction, dose to the heart, and avoid her implant, although I discussed that this could require removal due to contracture from radiation in the future. Jolie Yuan had the opportunity to ask questions which appeared to be answered to their satisfaction and have elected to proceed with treatment. PLAN:    1) Consented patient for treatment with external beam radiation after discussing risk, benefits, and side effects from treatment.   2) Reviewed available research treatment and cancer care protocols for which patient may be eligible, patient met with research coordinator for Prelude DCIS    3) CT simulation to be scheduled      Willem Merino MD   February 16, 2021

## 2021-02-13 PROBLEM — Z91.89 AT RISK FOR BONE DENSITY LOSS: Status: ACTIVE | Noted: 2021-02-13

## 2021-02-13 PROBLEM — Z79.811 AROMATASE INHIBITOR USE: Status: ACTIVE | Noted: 2021-02-13

## 2021-02-16 ENCOUNTER — HOSPITAL ENCOUNTER (OUTPATIENT)
Dept: RADIATION ONCOLOGY | Age: 66
Discharge: HOME | End: 2021-02-16
Payer: MEDICARE

## 2021-02-16 VITALS
HEART RATE: 76 BPM | OXYGEN SATURATION: 97 % | BODY MASS INDEX: 53.14 KG/M2 | RESPIRATION RATE: 16 BRPM | WEIGHT: 293 LBS | DIASTOLIC BLOOD PRESSURE: 88 MMHG | SYSTOLIC BLOOD PRESSURE: 160 MMHG | TEMPERATURE: 96.3 F

## 2021-02-16 PROCEDURE — 99211 OFF/OP EST MAY X REQ PHY/QHP: CPT

## 2021-02-16 NOTE — NURSE NAVIGATOR
Consult left breast DCIS. Lumpectomy 21. F/u Dr. Nico Garcia on 3-19-21. Pt is  2/ para 2. Started menses at around 15 yoa. Periods ended prior to Hysterectomy in . Pt has history of oral contraceptive use in her 30's. No history of HRT. CONSENTS SIGNED FOR RT LEFT BREAST. CT SIM SCHEDULED. APT GIVEN TO PT.  Skin care sheet given and explained to pt. Research present for consult. Pt consented for DCISion RT.     Daniel Recinos, [place]

## 2021-02-23 ENCOUNTER — HOSPITAL ENCOUNTER (OUTPATIENT)
Dept: RADIATION ONCOLOGY | Age: 66
Discharge: HOME | End: 2021-02-23
Payer: MEDICARE

## 2021-02-23 PROCEDURE — 77290 THER RAD SIMULAJ FIELD CPLX: CPT

## 2021-02-23 PROCEDURE — 77332 RADIATION TREATMENT AID(S): CPT

## 2021-02-26 ENCOUNTER — HOSPITAL ENCOUNTER (OUTPATIENT)
Dept: RADIATION ONCOLOGY | Age: 66
Discharge: HOME | End: 2021-02-26
Payer: MEDICARE

## 2021-02-26 PROCEDURE — 77295 3-D RADIOTHERAPY PLAN: CPT

## 2021-02-26 PROCEDURE — 77300 RADIATION THERAPY DOSE PLAN: CPT

## 2021-02-26 PROCEDURE — 77334 RADIATION TREATMENT AID(S): CPT

## 2021-03-01 ENCOUNTER — HOSPITAL ENCOUNTER (OUTPATIENT)
Dept: MAMMOGRAPHY | Age: 66
Discharge: HOME | End: 2021-03-01
Attending: INTERNAL MEDICINE
Payer: MEDICARE

## 2021-03-01 DIAGNOSIS — Z79.811 AROMATASE INHIBITOR USE: ICD-10-CM

## 2021-03-01 PROCEDURE — 77080 DXA BONE DENSITY AXIAL: CPT

## 2021-03-15 ENCOUNTER — HOSPITAL ENCOUNTER (OUTPATIENT)
Dept: RADIATION ONCOLOGY | Age: 66
Discharge: HOME | End: 2021-03-15
Payer: MEDICARE

## 2021-03-15 PROCEDURE — 77280 THER RAD SIMULAJ FIELD SMPL: CPT

## 2021-03-15 PROCEDURE — 77412 RADIATION TX DELIVERY LVL 3: CPT

## 2021-03-16 ENCOUNTER — HOSPITAL ENCOUNTER (OUTPATIENT)
Dept: RADIATION ONCOLOGY | Age: 66
Discharge: HOME | End: 2021-03-16
Payer: MEDICARE

## 2021-03-16 PROCEDURE — 77387 GUIDANCE FOR RADJ TX DLVR: CPT

## 2021-03-16 PROCEDURE — 77412 RADIATION TX DELIVERY LVL 3: CPT

## 2021-03-17 ENCOUNTER — HOSPITAL ENCOUNTER (OUTPATIENT)
Dept: RADIATION ONCOLOGY | Age: 66
Discharge: HOME | End: 2021-03-17
Payer: MEDICARE

## 2021-03-17 PROCEDURE — 77412 RADIATION TX DELIVERY LVL 3: CPT

## 2021-03-17 PROCEDURE — 77387 GUIDANCE FOR RADJ TX DLVR: CPT

## 2021-03-18 ENCOUNTER — HOSPITAL ENCOUNTER (OUTPATIENT)
Dept: RADIATION ONCOLOGY | Age: 66
Discharge: HOME | End: 2021-03-18
Payer: MEDICARE

## 2021-03-18 PROCEDURE — 77412 RADIATION TX DELIVERY LVL 3: CPT

## 2021-03-18 PROCEDURE — 77387 GUIDANCE FOR RADJ TX DLVR: CPT

## 2021-03-19 ENCOUNTER — HOSPITAL ENCOUNTER (OUTPATIENT)
Dept: RADIATION ONCOLOGY | Age: 66
Discharge: HOME | End: 2021-03-19
Payer: MEDICARE

## 2021-03-19 VITALS
RESPIRATION RATE: 16 BRPM | BODY MASS INDEX: 53.69 KG/M2 | HEART RATE: 73 BPM | WEIGHT: 293 LBS | DIASTOLIC BLOOD PRESSURE: 77 MMHG | OXYGEN SATURATION: 95 % | SYSTOLIC BLOOD PRESSURE: 150 MMHG | TEMPERATURE: 98 F

## 2021-03-19 PROCEDURE — 77336 RADIATION PHYSICS CONSULT: CPT

## 2021-03-19 PROCEDURE — 77387 GUIDANCE FOR RADJ TX DLVR: CPT

## 2021-03-19 PROCEDURE — 77412 RADIATION TX DELIVERY LVL 3: CPT

## 2021-03-19 NOTE — PROGRESS NOTES
Patient: Ritesh Haywood MRN: [id number]  SSN: [id number]    YOB: 1955  Age: 72 y.o. Sex: female      DIAGNOSIS:  DCIS     TREATMENT SITE:  L breast    DOSE and FRACTIONATION:  1355/4005    INTERVAL HISTORY:  Ritesh Haywood is a 72 y.o. female being treated for Left breast DCIS, intermediate grade. Week 1:  No complaints. OBJECTIVE:  NAD  Visit Vitals  BP (!) 150/77 (BP 1 Location: Left upper arm, BP Patient Position: Sitting)   Pulse 73   Temp 98 °F (36.7 °C)   Resp 16   Wt 137.5 kg (303 lb 1.6 oz)   SpO2 95%   BMI 53.69 kg/m²       Lab Results   Component Value Date/Time    Sodium 140 02/05/2021 09:51 AM    Potassium 4.1 02/05/2021 09:51 AM    Chloride 103 02/05/2021 09:51 AM    CO2 23 02/05/2021 09:51 AM    Anion gap 7 04/22/2017 05:57 AM    Glucose 94 02/05/2021 09:51 AM    BUN 16 02/05/2021 09:51 AM    Creatinine 0.86 02/05/2021 09:51 AM    GFR est AA 82 02/05/2021 09:51 AM    GFR est non-AA 71 02/05/2021 09:51 AM    Calcium 9.3 02/05/2021 09:51 AM    Albumin 4.3 02/05/2021 09:51 AM    Protein, total 7.1 02/05/2021 09:51 AM    A-G Ratio 1.5 02/05/2021 09:51 AM    ALT (SGPT) 27 02/05/2021 09:51 AM     Lab Results   Component Value Date/Time    WBC 7.5 07/02/2020 09:46 AM    HGB 13.5 07/02/2020 09:46 AM    HCT 38.9 07/02/2020 09:46 AM    PLATELET 190 73/01/5805 09:46 AM       ASSESSMENT and PLAN:  Ritesh Haywood is tolerating radiation as anticipated for the current dose and fraction. We will continue on as planned with another treatment visit anticipated next week.         Amandeep Sanchez MD   March 19, 2021

## 2021-03-22 ENCOUNTER — HOSPITAL ENCOUNTER (OUTPATIENT)
Dept: RADIATION ONCOLOGY | Age: 66
Discharge: HOME | End: 2021-03-22
Payer: MEDICARE

## 2021-03-22 PROCEDURE — 77412 RADIATION TX DELIVERY LVL 3: CPT

## 2021-03-22 PROCEDURE — 77387 GUIDANCE FOR RADJ TX DLVR: CPT

## 2021-03-23 ENCOUNTER — HOSPITAL ENCOUNTER (OUTPATIENT)
Dept: RADIATION ONCOLOGY | Age: 66
Discharge: HOME | End: 2021-03-23
Payer: MEDICARE

## 2021-03-23 PROCEDURE — 77387 GUIDANCE FOR RADJ TX DLVR: CPT

## 2021-03-23 PROCEDURE — 77412 RADIATION TX DELIVERY LVL 3: CPT

## 2021-03-24 ENCOUNTER — HOSPITAL ENCOUNTER (OUTPATIENT)
Dept: RADIATION ONCOLOGY | Age: 66
Discharge: HOME | End: 2021-03-24
Payer: MEDICARE

## 2021-03-24 PROCEDURE — 77387 GUIDANCE FOR RADJ TX DLVR: CPT

## 2021-03-24 PROCEDURE — 77412 RADIATION TX DELIVERY LVL 3: CPT

## 2021-03-25 ENCOUNTER — HOSPITAL ENCOUNTER (OUTPATIENT)
Dept: RADIATION ONCOLOGY | Age: 66
Discharge: HOME | End: 2021-03-25
Payer: MEDICARE

## 2021-03-25 PROCEDURE — 77412 RADIATION TX DELIVERY LVL 3: CPT

## 2021-03-25 PROCEDURE — 77387 GUIDANCE FOR RADJ TX DLVR: CPT

## 2021-03-26 ENCOUNTER — HOSPITAL ENCOUNTER (OUTPATIENT)
Dept: RADIATION ONCOLOGY | Age: 66
Discharge: HOME | End: 2021-03-26
Payer: MEDICARE

## 2021-03-26 VITALS
OXYGEN SATURATION: 95 % | BODY MASS INDEX: 53.32 KG/M2 | DIASTOLIC BLOOD PRESSURE: 91 MMHG | WEIGHT: 293 LBS | HEART RATE: 72 BPM | SYSTOLIC BLOOD PRESSURE: 148 MMHG | TEMPERATURE: 97.5 F | RESPIRATION RATE: 16 BRPM

## 2021-03-26 PROCEDURE — 77387 GUIDANCE FOR RADJ TX DLVR: CPT

## 2021-03-26 PROCEDURE — 77412 RADIATION TX DELIVERY LVL 3: CPT

## 2021-03-26 PROCEDURE — 77336 RADIATION PHYSICS CONSULT: CPT
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
## 2021-03-29 ENCOUNTER — HOSPITAL ENCOUNTER (OUTPATIENT)
Dept: RADIATION ONCOLOGY | Age: 66
Discharge: HOME | End: 2021-03-29
Payer: MEDICARE

## 2021-03-29 PROCEDURE — 77387 GUIDANCE FOR RADJ TX DLVR: CPT

## 2021-03-29 PROCEDURE — 77412 RADIATION TX DELIVERY LVL 3: CPT

## 2021-03-30 ENCOUNTER — HOSPITAL ENCOUNTER (OUTPATIENT)
Dept: RADIATION ONCOLOGY | Age: 66
Discharge: HOME | End: 2021-03-30
Payer: MEDICARE

## 2021-03-30 DIAGNOSIS — Z17.1 MALIGNANT NEOPLASM OF LEFT BREAST IN FEMALE, ESTROGEN RECEPTOR NEGATIVE, UNSPECIFIED SITE OF BREAST (HCC): Primary | ICD-10-CM

## 2021-03-30 DIAGNOSIS — N64.9 DISORDER OF BREAST, UNSPECIFIED: ICD-10-CM

## 2021-03-30 DIAGNOSIS — C50.912 MALIGNANT NEOPLASM OF LEFT BREAST IN FEMALE, ESTROGEN RECEPTOR NEGATIVE, UNSPECIFIED SITE OF BREAST (HCC): Primary | ICD-10-CM

## 2021-03-30 PROCEDURE — 77412 RADIATION TX DELIVERY LVL 3: CPT

## 2021-03-30 PROCEDURE — 77387 GUIDANCE FOR RADJ TX DLVR: CPT

## 2021-03-31 ENCOUNTER — HOSPITAL ENCOUNTER (OUTPATIENT)
Dept: RADIATION ONCOLOGY | Age: 66
Discharge: HOME | End: 2021-03-31
Payer: MEDICARE

## 2021-03-31 PROCEDURE — 77412 RADIATION TX DELIVERY LVL 3: CPT

## 2021-03-31 PROCEDURE — 77387 GUIDANCE FOR RADJ TX DLVR: CPT

## 2021-04-01 ENCOUNTER — HOSPITAL ENCOUNTER (OUTPATIENT)
Dept: RADIATION ONCOLOGY | Age: 66
Discharge: HOME | End: 2021-04-01
Payer: MEDICARE

## 2021-04-01 PROCEDURE — 77387 GUIDANCE FOR RADJ TX DLVR: CPT

## 2021-04-01 PROCEDURE — 77412 RADIATION TX DELIVERY LVL 3: CPT

## 2021-04-02 ENCOUNTER — HOSPITAL ENCOUNTER (OUTPATIENT)
Dept: RADIATION ONCOLOGY | Age: 66
Discharge: HOME | End: 2021-04-02
Payer: MEDICARE

## 2021-04-02 VITALS
HEART RATE: 77 BPM | TEMPERATURE: 96 F | DIASTOLIC BLOOD PRESSURE: 84 MMHG | SYSTOLIC BLOOD PRESSURE: 149 MMHG | RESPIRATION RATE: 16 BRPM | BODY MASS INDEX: 53.16 KG/M2 | WEIGHT: 293 LBS | OXYGEN SATURATION: 96 %

## 2021-04-02 DIAGNOSIS — D05.12 DUCTAL CARCINOMA IN SITU (DCIS) OF LEFT BREAST: Primary | ICD-10-CM

## 2021-04-02 PROCEDURE — 77336 RADIATION PHYSICS CONSULT: CPT

## 2021-04-02 PROCEDURE — 77387 GUIDANCE FOR RADJ TX DLVR: CPT

## 2021-04-02 PROCEDURE — 77412 RADIATION TX DELIVERY LVL 3: CPT
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
## 2021-04-12 ENCOUNTER — TELEPHONE (OUTPATIENT)
Dept: CASE MANAGEMENT | Age: 66
End: 2021-04-12

## 2021-04-12 NOTE — TELEPHONE ENCOUNTER
Pt called. States she finished RT about 1.5 weeks ago. She is c/o rash under treated breast that itches, stings and burns. She denies open skin. I instructed her to get some over the counter yeast infection medicine such as Lotrimin. She will consult Pharmacy. I told her to keep area as dry as possible. I will discuss with Dr. Dawit Desouza tomorrow and call pt with any changes. Pt will call if skin gets worse and otc med is not helping.     Lisa Colbert RN

## 2021-04-13 ENCOUNTER — TELEPHONE (OUTPATIENT)
Dept: CASE MANAGEMENT | Age: 66
End: 2021-04-13

## 2021-04-13 ENCOUNTER — HOSPITAL ENCOUNTER (OUTPATIENT)
Dept: RADIATION ONCOLOGY | Age: 66
Discharge: HOME | End: 2021-04-13
Payer: MEDICARE

## 2021-04-13 RX ORDER — LIDOCAINE HYDROCHLORIDE 30 MG/G
CREAM TOPICAL
Qty: 85 G | Refills: 0 | Status: SHIPPED | OUTPATIENT
Start: 2021-04-13 | End: 2021-07-08

## 2021-04-13 RX ORDER — HYDROCORTISONE 25 MG/G
CREAM TOPICAL
Qty: 30 G | Refills: 0 | Status: SHIPPED | OUTPATIENT
Start: 2021-04-13 | End: 2021-07-08

## 2021-04-13 NOTE — TELEPHONE ENCOUNTER
Spoke with Dr. Romero Dumont regarding pt's skin issues. I called pt to inform her that Dr. Rmoero Dumont escribed Hydrocortisone and Lidocaine topicals. She requested to come in for skin check. Pt arrived to Radiation for skin check. Area under left breast is red with a few red bumps scattered. She has tiny tears throughout crease under left breast that are dry. I instructed her to use hydrocortisone cream on crease and bumps. She will use lidocaine with Acquaphor to painful areas. She is stopping otc yeast infection med. She is using dove soap without friction. She will call if skin integrity does not improve. Pt has f/u apt.      Carole Polanco RN

## 2021-04-16 ENCOUNTER — HOSPITAL ENCOUNTER (OUTPATIENT)
Dept: LAB | Age: 66
Discharge: HOME | End: 2021-04-16
Payer: MEDICARE

## 2021-04-16 DIAGNOSIS — D05.10 DUCTAL CARCINOMA IN SITU (DCIS) OF BREAST, UNSPECIFIED LATERALITY: ICD-10-CM

## 2021-04-16 LAB
ALBUMIN SERPL-MCNC: 3.8 G/DL (ref 3.2–4.6)
ALBUMIN/GLOB SERPL: 1 {RATIO} (ref 1.2–3.5)
ALP SERPL-CCNC: 102 U/L (ref 50–136)
ALT SERPL-CCNC: 31 U/L (ref 12–65)
ANION GAP SERPL CALC-SCNC: 6 MMOL/L (ref 7–16)
AST SERPL-CCNC: 22 U/L (ref 15–37)
BASOPHILS # BLD: 0 K/UL (ref 0–0.2)
BASOPHILS NFR BLD: 1 % (ref 0–2)
BILIRUB SERPL-MCNC: 0.8 MG/DL (ref 0.2–1.1)
BUN SERPL-MCNC: 20 MG/DL (ref 8–23)
CALCIUM SERPL-MCNC: 8.6 MG/DL (ref 8.3–10.4)
CHLORIDE SERPL-SCNC: 109 MMOL/L (ref 98–107)
CO2 SERPL-SCNC: 25 MMOL/L (ref 21–32)
CREAT SERPL-MCNC: 1 MG/DL (ref 0.6–1)
DIFFERENTIAL METHOD BLD: ABNORMAL
EOSINOPHIL # BLD: 0.2 K/UL (ref 0–0.8)
EOSINOPHIL NFR BLD: 3 % (ref 0.5–7.8)
ERYTHROCYTE [DISTWIDTH] IN BLOOD BY AUTOMATED COUNT: 14.1 % (ref 11.9–14.6)
GLOBULIN SER CALC-MCNC: 3.9 G/DL (ref 2.3–3.5)
GLUCOSE SERPL-MCNC: 87 MG/DL (ref 65–100)
HCT VFR BLD AUTO: 41.5 % (ref 35.8–46.3)
HGB BLD-MCNC: 13.3 G/DL (ref 11.7–15.4)
IMM GRANULOCYTES # BLD AUTO: 0 K/UL (ref 0–0.5)
IMM GRANULOCYTES NFR BLD AUTO: 0 % (ref 0–5)
LYMPHOCYTES # BLD: 1.1 K/UL (ref 0.5–4.6)
LYMPHOCYTES NFR BLD: 18 % (ref 13–44)
MCH RBC QN AUTO: 29.2 PG (ref 26.1–32.9)
MCHC RBC AUTO-ENTMCNC: 32 G/DL (ref 31.4–35)
MCV RBC AUTO: 91.2 FL (ref 79.6–97.8)
MONOCYTES # BLD: 0.5 K/UL (ref 0.1–1.3)
MONOCYTES NFR BLD: 9 % (ref 4–12)
NEUTS SEG # BLD: 4.1 K/UL (ref 1.7–8.2)
NEUTS SEG NFR BLD: 70 % (ref 43–78)
NRBC # BLD: 0 K/UL (ref 0–0.2)
PLATELET # BLD AUTO: 107 K/UL (ref 150–450)
PMV BLD AUTO: 11.9 FL (ref 9.4–12.3)
POTASSIUM SERPL-SCNC: 3.7 MMOL/L (ref 3.5–5.1)
PROT SERPL-MCNC: 7.7 G/DL (ref 6.3–8.2)
RBC # BLD AUTO: 4.55 M/UL (ref 4.05–5.2)
SODIUM SERPL-SCNC: 140 MMOL/L (ref 136–145)
WBC # BLD AUTO: 5.9 K/UL (ref 4.3–11.1)

## 2021-04-16 PROCEDURE — 80053 COMPREHEN METABOLIC PANEL: CPT

## 2021-04-16 PROCEDURE — 85025 COMPLETE CBC W/AUTO DIFF WBC: CPT

## 2021-04-16 PROCEDURE — 36415 COLL VENOUS BLD VENIPUNCTURE: CPT

## 2021-05-28 ENCOUNTER — HOSPITAL ENCOUNTER (OUTPATIENT)
Dept: LAB | Age: 66
Discharge: HOME | End: 2021-05-28
Payer: MEDICARE

## 2021-05-28 DIAGNOSIS — E34.9 ELEVATED SERUM HCG IN FEMALE, NOT PREGNANT: ICD-10-CM

## 2021-05-28 DIAGNOSIS — D05.10 DUCTAL CARCINOMA IN SITU (DCIS) OF BREAST, UNSPECIFIED LATERALITY: ICD-10-CM

## 2021-05-28 PROBLEM — Z11.59 ENCOUNTER FOR SCREENING FOR OTHER VIRAL DISEASES: Status: ACTIVE | Noted: 2021-05-28

## 2021-05-28 PROBLEM — D69.6 THROMBOCYTOPENIA (HCC): Status: ACTIVE | Noted: 2021-05-28

## 2021-05-28 LAB
ALBUMIN SERPL-MCNC: 3.8 G/DL (ref 3.2–4.6)
ALBUMIN/GLOB SERPL: 0.9 {RATIO} (ref 1.2–3.5)
ALP SERPL-CCNC: 110 U/L (ref 50–136)
ALT SERPL-CCNC: 43 U/L (ref 12–65)
ANION GAP SERPL CALC-SCNC: 6 MMOL/L (ref 7–16)
AST SERPL-CCNC: 30 U/L (ref 15–37)
BASOPHILS # BLD: 0 K/UL (ref 0–0.2)
BASOPHILS NFR BLD: 1 % (ref 0–2)
BILIRUB SERPL-MCNC: 0.8 MG/DL (ref 0.2–1.1)
BUN SERPL-MCNC: 21 MG/DL (ref 8–23)
CALCIUM SERPL-MCNC: 9.6 MG/DL (ref 8.3–10.4)
CHLORIDE SERPL-SCNC: 108 MMOL/L (ref 98–107)
CO2 SERPL-SCNC: 26 MMOL/L (ref 21–32)
CREAT SERPL-MCNC: 1 MG/DL (ref 0.6–1)
DIFFERENTIAL METHOD BLD: ABNORMAL
EOSINOPHIL # BLD: 0.1 K/UL (ref 0–0.8)
EOSINOPHIL NFR BLD: 3 % (ref 0.5–7.8)
ERYTHROCYTE [DISTWIDTH] IN BLOOD BY AUTOMATED COUNT: 14 % (ref 11.9–14.6)
GLOBULIN SER CALC-MCNC: 4.1 G/DL (ref 2.3–3.5)
GLUCOSE SERPL-MCNC: 94 MG/DL (ref 65–100)
HCG SERPL QL: NEGATIVE
HCG SERPL-ACNC: 2 MIU/ML (ref 0–6)
HCT VFR BLD AUTO: 39.7 % (ref 35.8–46.3)
HGB BLD-MCNC: 12.9 G/DL (ref 11.7–15.4)
IMM GRANULOCYTES # BLD AUTO: 0 K/UL (ref 0–0.5)
IMM GRANULOCYTES NFR BLD AUTO: 0 % (ref 0–5)
LYMPHOCYTES # BLD: 1.2 K/UL (ref 0.5–4.6)
LYMPHOCYTES NFR BLD: 22 % (ref 13–44)
MCH RBC QN AUTO: 29.1 PG (ref 26.1–32.9)
MCHC RBC AUTO-ENTMCNC: 32.5 G/DL (ref 31.4–35)
MCV RBC AUTO: 89.4 FL (ref 79.6–97.8)
MONOCYTES # BLD: 0.5 K/UL (ref 0.1–1.3)
MONOCYTES NFR BLD: 9 % (ref 4–12)
NEUTS SEG # BLD: 3.5 K/UL (ref 1.7–8.2)
NEUTS SEG NFR BLD: 65 % (ref 43–78)
NRBC # BLD: 0 K/UL (ref 0–0.2)
PLATELET # BLD AUTO: 74 K/UL (ref 150–450)
PMV BLD AUTO: 12.8 FL (ref 9.4–12.3)
POTASSIUM SERPL-SCNC: 3.9 MMOL/L (ref 3.5–5.1)
PROT SERPL-MCNC: 7.9 G/DL (ref 6.3–8.2)
RBC # BLD AUTO: 4.44 M/UL (ref 4.05–5.2)
SODIUM SERPL-SCNC: 140 MMOL/L (ref 136–145)
WBC # BLD AUTO: 5.3 K/UL (ref 4.3–11.1)

## 2021-05-28 PROCEDURE — 84702 CHORIONIC GONADOTROPIN TEST: CPT

## 2021-05-28 PROCEDURE — 80053 COMPREHEN METABOLIC PANEL: CPT

## 2021-05-28 PROCEDURE — 84703 CHORIONIC GONADOTROPIN ASSAY: CPT

## 2021-05-28 PROCEDURE — 36415 COLL VENOUS BLD VENIPUNCTURE: CPT

## 2021-05-28 PROCEDURE — 85025 COMPLETE CBC W/AUTO DIFF WBC: CPT

## 2021-06-16 ENCOUNTER — HOSPITAL ENCOUNTER (OUTPATIENT)
Dept: SLEEP MEDICINE | Age: 66
Discharge: HOME | End: 2021-06-16
Payer: MEDICARE

## 2021-06-16 PROCEDURE — 95810 POLYSOM 6/> YRS 4/> PARAM: CPT

## 2021-06-28 ENCOUNTER — HOSPITAL ENCOUNTER (OUTPATIENT)
Dept: LAB | Age: 66
Discharge: HOME | End: 2021-06-28
Payer: MEDICARE

## 2021-06-28 DIAGNOSIS — D05.10 DUCTAL CARCINOMA IN SITU (DCIS) OF BREAST, UNSPECIFIED LATERALITY: ICD-10-CM

## 2021-06-28 DIAGNOSIS — D69.6 THROMBOCYTOPENIA (HCC): ICD-10-CM

## 2021-06-28 DIAGNOSIS — Z01.89 ENCOUNTER FOR OTHER SPECIFIED SPECIAL EXAMINATIONS: ICD-10-CM

## 2021-06-28 DIAGNOSIS — Z11.59 ENCOUNTER FOR SCREENING FOR OTHER VIRAL DISEASES: ICD-10-CM

## 2021-06-28 LAB
ALBUMIN SERPL-MCNC: 3.5 G/DL (ref 3.2–4.6)
ALBUMIN/GLOB SERPL: 0.9 {RATIO} (ref 1.2–3.5)
ALP SERPL-CCNC: 107 U/L (ref 50–136)
ALT SERPL-CCNC: 46 U/L (ref 12–65)
ANION GAP SERPL CALC-SCNC: 7 MMOL/L (ref 7–16)
AST SERPL-CCNC: 22 U/L (ref 15–37)
BASOPHILS # BLD: 0 K/UL (ref 0–0.2)
BASOPHILS NFR BLD: 0 % (ref 0–2)
BILIRUB SERPL-MCNC: 0.8 MG/DL (ref 0.2–1.1)
BUN SERPL-MCNC: 28 MG/DL (ref 8–23)
CALCIUM SERPL-MCNC: 9 MG/DL (ref 8.3–10.4)
CHLORIDE SERPL-SCNC: 106 MMOL/L (ref 98–107)
CO2 SERPL-SCNC: 26 MMOL/L (ref 21–32)
CREAT SERPL-MCNC: 1 MG/DL (ref 0.6–1)
DIFFERENTIAL METHOD BLD: ABNORMAL
EOSINOPHIL # BLD: 0.1 K/UL (ref 0–0.8)
EOSINOPHIL NFR BLD: 1 % (ref 0.5–7.8)
ERYTHROCYTE [DISTWIDTH] IN BLOOD BY AUTOMATED COUNT: 14.4 % (ref 11.9–14.6)
FOLATE SERPL-MCNC: 14.7 NG/ML (ref 3.1–17.5)
GLOBULIN SER CALC-MCNC: 3.9 G/DL (ref 2.3–3.5)
GLUCOSE SERPL-MCNC: 82 MG/DL (ref 65–100)
HAV IGM SER QL: NONREACTIVE
HBV CORE IGM SER QL: NONREACTIVE
HBV SURFACE AB SERPL IA-ACNC: 139.53 MIU/ML
HBV SURFACE AG SER QL: NONREACTIVE
HCT VFR BLD AUTO: 40.7 % (ref 35.8–46.3)
HCV AB SER QL: NONREACTIVE
HGB BLD-MCNC: 13.2 G/DL (ref 11.7–15.4)
IMM GRANULOCYTES # BLD AUTO: 0.2 K/UL (ref 0–0.5)
IMM GRANULOCYTES NFR BLD AUTO: 1 % (ref 0–5)
LYMPHOCYTES # BLD: 1.7 K/UL (ref 0.5–4.6)
LYMPHOCYTES NFR BLD: 15 % (ref 13–44)
MCH RBC QN AUTO: 29.4 PG (ref 26.1–32.9)
MCHC RBC AUTO-ENTMCNC: 32.4 G/DL (ref 31.4–35)
MCV RBC AUTO: 90.6 FL (ref 79.6–97.8)
MONOCYTES # BLD: 0.8 K/UL (ref 0.1–1.3)
MONOCYTES NFR BLD: 7 % (ref 4–12)
NEUTS SEG # BLD: 8.3 K/UL (ref 1.7–8.2)
NEUTS SEG NFR BLD: 75 % (ref 43–78)
NRBC # BLD: 0 K/UL (ref 0–0.2)
PLATELET # BLD AUTO: 106 K/UL (ref 150–450)
PMV BLD AUTO: 12.8 FL (ref 9.4–12.3)
POTASSIUM SERPL-SCNC: 3.7 MMOL/L (ref 3.5–5.1)
PROT SERPL-MCNC: 7.4 G/DL (ref 6.3–8.2)
RBC # BLD AUTO: 4.49 M/UL (ref 4.05–5.2)
SODIUM SERPL-SCNC: 139 MMOL/L (ref 136–145)
VIT B12 SERPL-MCNC: 399 PG/ML (ref 193–986)
WBC # BLD AUTO: 11 K/UL (ref 4.3–11.1)

## 2021-06-28 PROCEDURE — 82607 VITAMIN B-12: CPT

## 2021-06-28 PROCEDURE — 36415 COLL VENOUS BLD VENIPUNCTURE: CPT

## 2021-06-28 PROCEDURE — 80053 COMPREHEN METABOLIC PANEL: CPT

## 2021-06-28 PROCEDURE — 80074 ACUTE HEPATITIS PANEL: CPT

## 2021-06-28 PROCEDURE — 86706 HEP B SURFACE ANTIBODY: CPT

## 2021-06-28 PROCEDURE — 85025 COMPLETE CBC W/AUTO DIFF WBC: CPT

## 2021-06-28 PROCEDURE — 82746 ASSAY OF FOLIC ACID SERUM: CPT

## 2021-06-29 LAB — PATH REV BLD -IMP: NORMAL

## 2021-07-07 ENCOUNTER — HOSPITAL ENCOUNTER (OUTPATIENT)
Dept: SLEEP MEDICINE | Age: 66
Discharge: HOME | End: 2021-07-07
Payer: MEDICARE

## 2021-07-07 PROCEDURE — 95811 POLYSOM 6/>YRS CPAP 4/> PARM: CPT

## 2021-07-15 PROBLEM — G47.00 INSOMNIA: Status: ACTIVE | Noted: 2020-07-02

## 2021-07-15 PROBLEM — Z92.29 HISTORY OF HEPATITIS B VACCINATION: Status: ACTIVE | Noted: 2021-07-15

## 2021-07-15 PROBLEM — Z86.69 HX OF MIGRAINES: Status: ACTIVE | Noted: 2021-07-15

## 2021-07-16 PROBLEM — R09.02 HYPOXEMIA: Status: ACTIVE | Noted: 2021-07-16

## 2021-07-16 PROBLEM — Z72.821 INADEQUATE SLEEP HYGIENE: Status: ACTIVE | Noted: 2021-07-16

## 2021-07-16 PROBLEM — G47.33 OSA (OBSTRUCTIVE SLEEP APNEA): Status: ACTIVE | Noted: 2017-04-21

## 2021-08-04 ENCOUNTER — ANESTHESIA EVENT (OUTPATIENT)
Dept: ENDOSCOPY | Age: 66
End: 2021-08-04
Payer: MEDICARE

## 2021-08-04 RX ORDER — MIDAZOLAM HYDROCHLORIDE 1 MG/ML
2 INJECTION, SOLUTION INTRAMUSCULAR; INTRAVENOUS
Status: CANCELLED | OUTPATIENT
Start: 2021-08-04 | End: 2021-08-05

## 2021-08-04 RX ORDER — NALOXONE HYDROCHLORIDE 0.4 MG/ML
0.1 INJECTION, SOLUTION INTRAMUSCULAR; INTRAVENOUS; SUBCUTANEOUS AS NEEDED
Status: CANCELLED | OUTPATIENT
Start: 2021-08-04

## 2021-08-04 RX ORDER — OXYCODONE HYDROCHLORIDE 5 MG/1
10 TABLET ORAL
Status: CANCELLED | OUTPATIENT
Start: 2021-08-04 | End: 2021-08-05

## 2021-08-04 RX ORDER — DIPHENHYDRAMINE HYDROCHLORIDE 50 MG/ML
12.5 INJECTION, SOLUTION INTRAMUSCULAR; INTRAVENOUS
Status: CANCELLED | OUTPATIENT
Start: 2021-08-04 | End: 2021-08-05

## 2021-08-04 RX ORDER — HYDROMORPHONE HYDROCHLORIDE 1 MG/ML
0.5 INJECTION, SOLUTION INTRAMUSCULAR; INTRAVENOUS; SUBCUTANEOUS
Status: CANCELLED | OUTPATIENT
Start: 2021-08-04

## 2021-08-04 RX ORDER — FENTANYL CITRATE 50 UG/ML
100 INJECTION, SOLUTION INTRAMUSCULAR; INTRAVENOUS ONCE
Status: CANCELLED | OUTPATIENT
Start: 2021-08-04 | End: 2021-08-04

## 2021-08-04 RX ORDER — LIDOCAINE HYDROCHLORIDE 10 MG/ML
0.1 INJECTION INFILTRATION; PERINEURAL AS NEEDED
Status: CANCELLED | OUTPATIENT
Start: 2021-08-04

## 2021-08-04 RX ORDER — MIDAZOLAM HYDROCHLORIDE 1 MG/ML
2 INJECTION, SOLUTION INTRAMUSCULAR; INTRAVENOUS ONCE
Status: CANCELLED | OUTPATIENT
Start: 2021-08-04 | End: 2021-08-04

## 2021-08-04 RX ORDER — OXYCODONE HYDROCHLORIDE 5 MG/1
5 TABLET ORAL
Status: CANCELLED | OUTPATIENT
Start: 2021-08-04 | End: 2021-08-05

## 2021-08-04 RX ORDER — SODIUM CHLORIDE, SODIUM LACTATE, POTASSIUM CHLORIDE, CALCIUM CHLORIDE 600; 310; 30; 20 MG/100ML; MG/100ML; MG/100ML; MG/100ML
100 INJECTION, SOLUTION INTRAVENOUS CONTINUOUS
Status: CANCELLED | OUTPATIENT
Start: 2021-08-04 | End: 2021-08-05

## 2021-08-04 RX ORDER — SODIUM CHLORIDE, SODIUM LACTATE, POTASSIUM CHLORIDE, CALCIUM CHLORIDE 600; 310; 30; 20 MG/100ML; MG/100ML; MG/100ML; MG/100ML
100 INJECTION, SOLUTION INTRAVENOUS CONTINUOUS
Status: CANCELLED | OUTPATIENT
Start: 2021-08-04

## 2021-08-04 RX ORDER — ALBUTEROL SULFATE 0.83 MG/ML
2.5 SOLUTION RESPIRATORY (INHALATION) AS NEEDED
Status: CANCELLED | OUTPATIENT
Start: 2021-08-04

## 2021-08-04 RX ORDER — ONDANSETRON 2 MG/ML
4 INJECTION INTRAMUSCULAR; INTRAVENOUS ONCE
Status: CANCELLED | OUTPATIENT
Start: 2021-08-04 | End: 2021-08-04

## 2021-08-05 ENCOUNTER — ANESTHESIA (OUTPATIENT)
Dept: ENDOSCOPY | Age: 66
End: 2021-08-05
Payer: MEDICARE

## 2021-08-05 ENCOUNTER — HOSPITAL ENCOUNTER (OUTPATIENT)
Age: 66
Setting detail: OUTPATIENT SURGERY
Discharge: HOME | End: 2021-08-05
Attending: INTERNAL MEDICINE | Admitting: INTERNAL MEDICINE
Payer: MEDICARE

## 2021-08-05 VITALS
OXYGEN SATURATION: 95 % | DIASTOLIC BLOOD PRESSURE: 80 MMHG | HEART RATE: 66 BPM | SYSTOLIC BLOOD PRESSURE: 149 MMHG | RESPIRATION RATE: 18 BRPM | TEMPERATURE: 98.4 F

## 2021-08-05 PROCEDURE — 74011250636 HC RX REV CODE- 250/636: Performed by: REGISTERED NURSE

## 2021-08-05 PROCEDURE — 76060000032 HC ANESTHESIA 0.5 TO 1 HR: Performed by: INTERNAL MEDICINE

## 2021-08-05 PROCEDURE — 88305 TISSUE EXAM BY PATHOLOGIST: CPT

## 2021-08-05 PROCEDURE — 88312 SPECIAL STAINS GROUP 1: CPT

## 2021-08-05 PROCEDURE — 74011000250 HC RX REV CODE- 250: Performed by: REGISTERED NURSE

## 2021-08-05 PROCEDURE — 77030021593 HC FCPS BIOP ENDOSC BSC -A: Performed by: INTERNAL MEDICINE

## 2021-08-05 PROCEDURE — 2709999900 HC NON-CHARGEABLE SUPPLY: Performed by: INTERNAL MEDICINE

## 2021-08-05 PROCEDURE — 76040000026: Performed by: INTERNAL MEDICINE

## 2021-08-05 RX ORDER — SODIUM CHLORIDE, SODIUM LACTATE, POTASSIUM CHLORIDE, CALCIUM CHLORIDE 600; 310; 30; 20 MG/100ML; MG/100ML; MG/100ML; MG/100ML
INJECTION, SOLUTION INTRAVENOUS
Status: DISCONTINUED | OUTPATIENT
Start: 2021-08-05 | End: 2021-08-05 | Stop reason: HOSPADM

## 2021-08-05 RX ORDER — PROPOFOL 10 MG/ML
INJECTION, EMULSION INTRAVENOUS
Status: DISCONTINUED | OUTPATIENT
Start: 2021-08-05 | End: 2021-08-05 | Stop reason: HOSPADM

## 2021-08-05 RX ORDER — LIDOCAINE HYDROCHLORIDE 20 MG/ML
INJECTION, SOLUTION EPIDURAL; INFILTRATION; INTRACAUDAL; PERINEURAL AS NEEDED
Status: DISCONTINUED | OUTPATIENT
Start: 2021-08-05 | End: 2021-08-05 | Stop reason: HOSPADM

## 2021-08-05 RX ORDER — PROPOFOL 10 MG/ML
INJECTION, EMULSION INTRAVENOUS AS NEEDED
Status: DISCONTINUED | OUTPATIENT
Start: 2021-08-05 | End: 2021-08-05 | Stop reason: HOSPADM

## 2021-08-05 RX ADMIN — LIDOCAINE HYDROCHLORIDE 100 MG: 20 INJECTION, SOLUTION EPIDURAL; INFILTRATION; INTRACAUDAL; PERINEURAL at 11:35

## 2021-08-05 RX ADMIN — PROPOFOL 140 MCG/KG/MIN: 10 INJECTION, EMULSION INTRAVENOUS at 11:35

## 2021-08-05 RX ADMIN — PROPOFOL 50 MG: 10 INJECTION, EMULSION INTRAVENOUS at 11:35

## 2021-08-05 RX ADMIN — SODIUM CHLORIDE, SODIUM LACTATE, POTASSIUM CHLORIDE, AND CALCIUM CHLORIDE: 600; 310; 30; 20 INJECTION, SOLUTION INTRAVENOUS at 11:27

## 2021-08-05 NOTE — PROCEDURES
PROCEDURE: Colonoscopy    ENDOSCOPIST: Ryland Palacios M.D. PREOPERATIVE DIAGNOSIS: Screening, FH of CRC    POSTOPERATIVE DIAGNOSIS: Diverticulosis    SEDATION: MAC    INSTRUMENT: [id number]    DESCRIPTION OF PROCEDURE:  After informed consent was obtained the patient was placed in the left lateral decubitus position. Intravenous sedation was administered as above. After adequate sedation had been achieved, digital rectal examination was performed. The colonoscope was then inserted through the anus and followed the course of the rectum and colon to the cecum, which was confirmed by visualization of the ileocecal valve and appendiceal orifice. The colonoscope was withdrawn from that point, performing a careful survey of the mucosa. Retroflexion was performed in the rectum. FINDINGS: Good prep    Colon: Diverticulosis noted in descending and sigmoid    Rectum: Normal    Estimated Blood Loss:  0 cc-min    IMPRESSION: No polyps      PLAN: Repeat 5 years. F/U PRN    STEVEN Neil MD

## 2021-08-05 NOTE — ANESTHESIA PREPROCEDURE EVALUATION
Relevant Problems   RESPIRATORY SYSTEM   (+) LUCAS (obstructive sleep apnea)      CARDIOVASCULAR   (+) Essential hypertension      GASTROINTESTINAL   (+) GERD (gastroesophageal reflux disease)      RENAL FAILURE   (+) Chronic renal insufficiency, stage 2 (mild)      ENDOCRINE   (+) Acquired hypothyroidism   (+) Acute drug-induced gout involving toe of left foot   (+) Idiopathic chronic gout of multiple sites without tophus       Anesthetic History               Review of Systems / Medical History  Patient summary reviewed, nursing notes reviewed and pertinent labs reviewed    Pulmonary        Sleep apnea: CPAP           Neuro/Psych              Cardiovascular    Hypertension: well controlled              Exercise tolerance: >4 METS     GI/Hepatic/Renal     GERD           Endo/Other      Hypothyroidism: well controlled       Other Findings              Physical Exam    Airway  Mallampati: III  TM Distance: 4 - 6 cm  Neck ROM: normal range of motion   Mouth opening: Diminished (comment)     Cardiovascular  Regular rate and rhythm,  S1 and S2 normal,  no murmur, click, rub, or gallop             Dental  No notable dental hx       Pulmonary  Breath sounds clear to auscultation               Abdominal         Other Findings            Anesthetic Plan    ASA: 3  Anesthesia type: total IV anesthesia          Induction: Intravenous  Anesthetic plan and risks discussed with: Patient

## 2021-08-05 NOTE — PROCEDURES
PROCEDURE: Esophagogastroduodenoscopy with biopsy    ENDOSCOPIST: Noah Seay M.D. PREOPERATIVE DIAGNOSIS: Abdominal pain    POSTOPERATIVE DIAGNOSIS: hiatal hernia, fundic gland polyps    INSTRUMENTS: [id number]    SEDATION: MAC    DESCRIPTION: After informed consent was obtained, the patient was taken to the endoscopy suite and placed in the left lateral decubitus position. Intravenous sedation was administered by the Anesthesia provider. After adequate sedation had been achieved the endoscope was inserted over the tongue and through the posterior pharynx under direct visualization down the esophagus to the stomach and into the second portion of the duodenum. The endoscopic was withdrawn from that point, performing a careful survey of the mucosa. Retroflexion was performed in the gastric fundus. FINDINGS:  Esophagus: Normal    Stomach: Small hiatal hernia. Scattered fundic gland polyps; representative biopsies obtained. Duodenum: Normal    Estimated blood loss:  0 cc-minimal    IMPRESSION: Pain resolved with increased dose of PPI. Most likely cause was increased GERD related to severe weight gain    PLAN: Proceed to colonoscopy      STEVEN Prabhakar MD

## 2021-08-05 NOTE — H&P
Gastroenterology Outpatient History and Physical    Patient: Bowen Dey    Physician: Fernando Herndon MD    Vital Signs: Blood pressure (!) 169/90, pulse 94, temperature 98.4 °F (36.9 °C), resp. rate 18, SpO2 94 %, not currently breastfeeding. Allergies: Allergies   Allergen Reactions    Ambien [Zolpidem] Other (comments)     Periods of forgetfulness    Augmentin [Amoxicillin-Pot Clavulanate] Itching     Itching in mouth and ears    Codeine Nausea Only    Contrave [Naltrexone-Bupropion] Other (comments)     lethargy    Crestor [Rosuvastatin] Swelling    Ditropan [Oxybutynin Chloride] Other (comments)     Made her voiding worse    Hydralazine Other (comments)     states not allergic to medication. Is presently taking medication    Macrobid [Nitrofurantoin Monohyd/M-Cryst] Itching      itching under arm    Myrbetriq [Mirabegron] Other (comments)     ineffective    Penicillins Itching    Tape [Adhesive] Other (comments)     Little bruises    Thiazides Other (comments)     GOUT    Topamax [Topiramate] Other (comments)     Felt weird    Trazodone Other (comments)     ineffective    Zocor [Simvastatin] Myalgia       Chief Complaint: Epigastric pain, CRC screening    History of Present Illness: Burning pain.   FH of CRC    Justification for Procedure: Evaluation and high risk screening    History:  Past Medical History:   Diagnosis Date    Cancer ([facility] [address] 01/2021    left breast cancer, radiation completed, lumpectomy    Chronic insomnia     GERD (gastroesophageal reflux disease)     controlled with med    Gout     HLD (hyperlipidemia)     Hypertension     controlled with meds    Nausea & vomiting     nausea with some procedures, zofran relieves    Osteopenia     Restless leg     Sleep apnea     uses CPAP at hs    Status post total left knee replacement 4/21/2017    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    Thyroid disease     managed with med      Past Surgical History:   Procedure Laterality Date    HX BREAST AUGMENTATION  1990    HX BREAST BIOPSY N/A 1/8/2021    LEFT BREAST NEEDLE LOCALIZED LUMPECTOMY PREOP 0800/ LOC 0930/ SURGERY 1130 performed by Deborah Medrano MD at  [facility] HX BREAST LUMPECTOMY Left 01/2021    HX BUNIONECTOMY Bilateral 1980    HX HIP REPLACEMENT Right 11/2016    HX HIP REPLACEMENT Right     HX KNEE ARTHROSCOPY Left 10/2012    HX KNEE REPLACEMENT Left 04/01/2017    Dr Parrish [facility]TH[facility]EDIC Bilateral 2010    neuroma's removed from feet    HX PHYLLIS AND BSO  2009    r/t large fibroids; Dr. Monico Bennett      Social History     Socioeconomic History    Marital status:      Spouse name: Nanette Sims Number of children: 3    Years of education: Not on file    Highest education level: Not on file   Tobacco Use    Smoking status: Never Smoker    Smokeless tobacco: Never Used   Vaping Use    Vaping Use: Never used   Substance and Sexual Activity    Alcohol use: No    Drug use: No   Other Topics Concern    Exercise Yes     Comment: walks most mornings 1.5 mile   Social History Narrative    , walking about a mile and a half in the mornings    She has no pets. Currently drinks no caffeine. She is a retired teacher for  through second grade. Social Determinants of Health     Financial Resource Strain:     Difficulty of Paying Living Expenses:    Food Insecurity:     Worried About Running Out of Food in the Last Year:     [address] in the Last Year:    Transportation Needs:     Lack of Transportation (Medical):      Lack of Transportation (Non-Medical):    Physical Activity: Sufficiently Active    Days of Exercise per Week: 3 days    Minutes of Exercise per Session: 60 min   Stress:     Feeling of Stress :    Social Connections:     Frequency of Communication with Friends and Family:     Frequency of Social Gatherings with Friends and Family:     Attends Druze Services:     Active Member of Clubs or Organizations:     Attends Club or Organization Meetings:     Marital Status:       Family History   Problem Relation Age of Onset    Hypertension Father     Sleep Apnea Father     Cancer Father         prostate, pancreatic    Diabetes Father         glucose intolerance    Elevated Lipids Sister     Hypertension Sister     Stroke Mother     Dementia Mother     Heart Disease Mother         CHF    Diabetes Paternal Aunt        Medications:   Prior to Admission medications    Medication Sig Start Date End Date Taking? Authorizing Provider   omeprazole (PRILOSEC) 40 mg capsule Take 1 Capsule by mouth daily. 7/16/21  Yes Anjelica Reed MD   atorvastatin (LIPITOR) 40 mg tablet Take 1 Tablet by mouth daily. 7/15/21  Yes Anjelica Reed MD   hydrALAZINE (APRESOLINE) 50 mg tablet Take 1 Tab by mouth three (3) times daily. 11/4/20  Yes Anjelica Reed MD   levothyroxine (Synthroid) 125 mcg tablet Take 1 Tab by mouth Daily (before breakfast). 11/4/20  Yes Anjelica Reed MD   eszopiclone Jen Rocael) 2 mg tablet Take 1 Tablet by mouth nightly. Max Daily Amount: 2 mg. 7/15/21   Anjelica Reed MD   irbesartan (AVAPRO) 300 mg tablet Take 1 Tablet by mouth daily. Patient taking differently: Take 300 mg by mouth nightly. 7/15/21   Anjelica Reed MD   letrozole [facility]) 2.5 mg tablet Take 1 Tablet by mouth daily. Indications: hormone receptor positive postmenopausal early breast cancer  Patient taking differently: Take 2.5 mg by mouth nightly. Indications: hormone receptor positive postmenopausal early breast cancer 5/28/21   Juan Antonio Flores MD   acetaminophen (TylenoL) 325 mg tablet Take  by mouth every four (4) hours as needed for Pain. Provider, Historical   verapamil ER (VERELAN PM) 300 mg capsule TAKE 1 CAPSULE NIGHTLY 11/4/20   Anjelica Reed MD   rOPINIRole (REQUIP) 0.5 mg tablet Take 1 Tab by mouth nightly.  11/4/20   Anjelica Reed MD   allopurinoL (ZYLOPRIM) 300 mg tablet Take 1 Tab by mouth daily. Patient taking differently: Take 300 mg by mouth every evening. 11/4/20   Edi Sahu MD   Cholecalciferol, Vitamin D3, (VITAMIN D3) 2,000 unit cap capsule Take 4,000 Units by mouth daily. Provider, Historical       Physical Exam:   General: no distress   HEENT: Head: Normocephalic, no lesions, without obvious abnormality.    Heart: regular rate and rhythm, S1, S2 normal, no murmur, click, rub or gallop   Lungs: chest clear, no wheezing, rales, normal symmetric air entry   Abdominal: Bowel sounds are normal, liver is not enlarged, spleen is not enlarged   Neurological: Grossly normal   Extremities: extremities normal, atraumatic, no cyanosis or edema     Findings/Diagnosis: Abdominal pain, CRC screening    Plan of Care/Planned Procedure: EGD and colonoscopy

## 2021-08-05 NOTE — DISCHARGE INSTRUCTIONS
Gastrointestinal Esophagogastroduodenoscopy (EGD) - Upper Exam Discharge Instructions    1. Call Dr. Dr Johnnette Primrose at [phone] for any problems or questions. 2. Contact the doctor's office for follow up appointment as directed. 3. Medication may cause drowsiness for several hours, therefore:  · Do not drive or operate machinery for remainder of the day. · No alcohol today. · Do not make any important or legal decisions for 24 hours. · Do not sign any legal documents for 24 hours. 5. Ordinarily, you may resume regular diet and activity after exam unless otherwise              specified by your physician. 6. For mild soreness in your throat you may use Cepacol throat lozenges or warm               salt-water gargles as needed. Gastrointestinal Colonoscopy/Flexible Sigmoidoscopy - Lower Exam Discharge Instructions  1. Call Jacob Aquino at [phone] for any problems or questions. 2. Contact the doctors office for follow up appointment as directed  3. Medication may cause drowsiness for several hours, therefore:  · Do not drive or operate machinery for reminder of the day. · No alcohol today. · Do not make any important or legal decisions for 24 hours. · Do not sign any legal documents for 24 hours. 4. Ordinarily, you may resume regular diet and activity after exam unless otherwise specified by your physician. 5. Because of air put into your colon during exam, you may experience some abdominal distension, relieved by the passage of gas, for several hours. 6. Contact your physician if you have any of the following:  · Excessive amount of bleeding - large amount when having a bowel movement. Occasional specks of blood with bowel movement would not be unusual.  · Severe abdominal pain  · Fever or Chills  7.  Polyp Removal - follow these additional instructions  · Clear liquid diet for the next meal (jell-o, broth, clear drinks)  · Soft diet for 24 hours, then resume regular diet   · Take Metamucil - 1 tablespoon in juice every morning for 3 days  · No Aspirin, Advil, Aleve, Nuprin, Ibuprofen, or medications that contain these drugs for 2 weeks. Any additional instructions:    Follow up as needed  Repeat colonoscopy in 5 years

## 2021-08-05 NOTE — ANESTHESIA POSTPROCEDURE EVALUATION
Procedure(s):  COLONOSCOPY/ 52  ESOPHAGOGASTRODUODENOSCOPY (EGD)  ESOPHAGOGASTRODUODENAL (EGD) BIOPSY. total IV anesthesia    Anesthesia Post Evaluation      Multimodal analgesia: multimodal analgesia used between 6 hours prior to anesthesia start to PACU discharge  Patient location during evaluation: PACU  Patient participation: complete - patient participated  Level of consciousness: awake and awake and alert  Pain management: adequate  Airway patency: patent  Anesthetic complications: no  Cardiovascular status: acceptable  Respiratory status: acceptable  Hydration status: acceptable  Post anesthesia nausea and vomiting:  controlled      INITIAL Post-op Vital signs:   Vitals Value Taken Time   /80 08/05/21 1253   Temp 36.9 °C (98.4 °F) 08/05/21 1216   Pulse 66 08/05/21 1253   Resp 18 08/05/21 1234   SpO2 95 % 08/05/21 1253   Vitals shown include unvalidated device data.

## 2021-08-27 ENCOUNTER — HOSPITAL ENCOUNTER (OUTPATIENT)
Dept: LAB | Age: 66
Discharge: HOME | End: 2021-08-27
Payer: MEDICARE

## 2021-08-27 DIAGNOSIS — D69.6 THROMBOCYTOPENIA (HCC): ICD-10-CM

## 2021-08-27 LAB
ALBUMIN SERPL-MCNC: 3.4 G/DL (ref 3.2–4.6)
ALBUMIN/GLOB SERPL: 0.9 {RATIO} (ref 1.2–3.5)
ALP SERPL-CCNC: 104 U/L (ref 50–136)
ALT SERPL-CCNC: 34 U/L (ref 12–65)
ANION GAP SERPL CALC-SCNC: 5 MMOL/L (ref 7–16)
AST SERPL-CCNC: 24 U/L (ref 15–37)
BASOPHILS # BLD: 0 K/UL (ref 0–0.2)
BASOPHILS NFR BLD: 1 % (ref 0–2)
BILIRUB SERPL-MCNC: 0.7 MG/DL (ref 0.2–1.1)
BUN SERPL-MCNC: 22 MG/DL (ref 8–23)
CALCIUM SERPL-MCNC: 9.7 MG/DL (ref 8.3–10.4)
CHLORIDE SERPL-SCNC: 108 MMOL/L (ref 98–107)
CO2 SERPL-SCNC: 28 MMOL/L (ref 21–32)
CREAT SERPL-MCNC: 1 MG/DL (ref 0.6–1)
DIFFERENTIAL METHOD BLD: ABNORMAL
EOSINOPHIL # BLD: 0.1 K/UL (ref 0–0.8)
EOSINOPHIL NFR BLD: 2 % (ref 0.5–7.8)
ERYTHROCYTE [DISTWIDTH] IN BLOOD BY AUTOMATED COUNT: 13.6 % (ref 11.9–14.6)
GLOBULIN SER CALC-MCNC: 3.8 G/DL (ref 2.3–3.5)
GLUCOSE SERPL-MCNC: 93 MG/DL (ref 65–100)
HCT VFR BLD AUTO: 38.7 % (ref 35.8–46.3)
HGB BLD-MCNC: 12.2 G/DL (ref 11.7–15.4)
IMM GRANULOCYTES # BLD AUTO: 0 K/UL (ref 0–0.5)
IMM GRANULOCYTES NFR BLD AUTO: 0 % (ref 0–5)
LYMPHOCYTES # BLD: 1.2 K/UL (ref 0.5–4.6)
LYMPHOCYTES NFR BLD: 25 % (ref 13–44)
MCH RBC QN AUTO: 28.8 PG (ref 26.1–32.9)
MCHC RBC AUTO-ENTMCNC: 31.5 G/DL (ref 31.4–35)
MCV RBC AUTO: 91.5 FL (ref 79.6–97.8)
MONOCYTES # BLD: 0.4 K/UL (ref 0.1–1.3)
MONOCYTES NFR BLD: 9 % (ref 4–12)
NEUTS SEG # BLD: 3.1 K/UL (ref 1.7–8.2)
NEUTS SEG NFR BLD: 63 % (ref 43–78)
NRBC # BLD: 0 K/UL (ref 0–0.2)
PLATELET # BLD AUTO: 133 K/UL (ref 150–450)
PMV BLD AUTO: 12.1 FL (ref 9.4–12.3)
POTASSIUM SERPL-SCNC: 4 MMOL/L (ref 3.5–5.1)
PROT SERPL-MCNC: 7.2 G/DL (ref 6.3–8.2)
RBC # BLD AUTO: 4.23 M/UL (ref 4.05–5.2)
SODIUM SERPL-SCNC: 141 MMOL/L (ref 136–145)
WBC # BLD AUTO: 5 K/UL (ref 4.3–11.1)

## 2021-08-27 PROCEDURE — 36415 COLL VENOUS BLD VENIPUNCTURE: CPT

## 2021-08-27 PROCEDURE — 80053 COMPREHEN METABOLIC PANEL: CPT

## 2021-08-27 PROCEDURE — 85025 COMPLETE CBC W/AUTO DIFF WBC: CPT

## 2021-10-01 ENCOUNTER — HOSPITAL ENCOUNTER (OUTPATIENT)
Dept: MAMMOGRAPHY | Age: 66
Discharge: HOME | End: 2021-10-01
Attending: RADIOLOGY
Payer: MEDICARE

## 2021-10-01 DIAGNOSIS — C50.912 MALIGNANT NEOPLASM OF LEFT BREAST IN FEMALE, ESTROGEN RECEPTOR NEGATIVE, UNSPECIFIED SITE OF BREAST (HCC): ICD-10-CM

## 2021-10-01 DIAGNOSIS — N64.9 DISORDER OF BREAST, UNSPECIFIED: ICD-10-CM

## 2021-10-01 DIAGNOSIS — Z17.1 MALIGNANT NEOPLASM OF LEFT BREAST IN FEMALE, ESTROGEN RECEPTOR NEGATIVE, UNSPECIFIED SITE OF BREAST (HCC): ICD-10-CM

## 2021-10-01 DIAGNOSIS — D05.12 DUCTAL CARCINOMA IN SITU (DCIS) OF LEFT BREAST: ICD-10-CM

## 2021-10-01 PROCEDURE — 77066 DX MAMMO INCL CAD BI: CPT

## 2021-10-13 ENCOUNTER — HOSPITAL ENCOUNTER (OUTPATIENT)
Dept: RADIATION ONCOLOGY | Age: 66
Discharge: HOME | End: 2021-10-13
Payer: MEDICARE

## 2021-10-13 VITALS
WEIGHT: 293 LBS | SYSTOLIC BLOOD PRESSURE: 120 MMHG | OXYGEN SATURATION: 96 % | TEMPERATURE: 98.1 F | BODY MASS INDEX: 50.77 KG/M2 | DIASTOLIC BLOOD PRESSURE: 76 MMHG | HEART RATE: 78 BPM

## 2021-10-13 PROCEDURE — 99211 OFF/OP EST MAY X REQ PHY/QHP: CPT

## 2021-10-13 NOTE — PROGRESS NOTES
Patient: Zion Rodríguez MRN: [id number]  SSN: [id number]    YOB: 1955  Age: 77 y.o. Sex: female      Other Providers:  Dr. Otf Lopez, Dr. 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 female who I am seeing at the request of Dr. Otf Lopez. Ms. Colten Healy was in her usual state of health until 11/23/2020 at which time she underwent bilateral screening mammogram. This demonstrated an asymmetry in the retroareolar region of the left breast located 5.5 cm from the nipple measuring 7mm. There was an additional asymmetry in the left lateral breast located 7cm from the nipple measuring 9mm. There was no irregularity noted in the right breast. The patient does have bilateral retropectoral silicone implants. This led to a diagnostic digital mammogram performed 12/3/2020 which confirmed a 5mm lobulated nodule in the left breast at 12:00 corresponding to the abnormality seen on mammogram. An ultrasound guided core biopsy of the left breast mass at 12:00 4 cm from the nipple demonstrated ductal carcinoma in situ, low grade, cribriform and papillary type, ER positive (96%), [place] positive (92%). She was evaluated by Dr. Kiko Nieto and underwent a lumpectomy on 1/8/2021. Final pathology demonstrated 4mm intermediate grade DCIS without evidence of necrosis, the closest margin 3mm from the specimen. INTERVAL HISTORY:  Since completion of radiation therapy Ms. Colten Healy has started endocrine therapy with letrozole which she has tolerated well. She denies hot flashes and fatigue. She denies any residual skin changes in the left breast and has no swelling, pain, or difficulties with range of motion in the left arm. Her energy level is at baseline.  A mammogram 10/1/21 demonstrates treatment related changes without other worrisome findings in either breast.    PAST MEDICAL HISTORY:    Past Medical History:   Diagnosis Date    Breast cancer ([facility] [address] 01/08/2021    DCIS S/P L LUMPECTOMY    Cancer ([facility] 01/2021    left breast cancer, radiation completed, lumpectomy    Chronic insomnia     GERD (gastroesophageal reflux disease)     controlled with med    Gout     HLD (hyperlipidemia)     Hypertension     controlled with meds    Menopause     Nausea & vomiting     nausea with some procedures, zofran relieves    Osteopenia     Radiation therapy complication     Restless leg     Sleep apnea     uses CPAP at hs    Status post total left knee replacement 4/21/2017    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    Thyroid disease     managed with med     PAST SURGICAL HISTORY:   Past Surgical History:   Procedure Laterality Date    COLONOSCOPY  8/5/2021         COLONOSCOPY N/A 8/5/2021    COLONOSCOPY/ 52 performed by Alok Freire MD at [address] EGD  8/5/2021         HX BREAST AUGMENTATION  1990    HX BREAST BIOPSY N/A 1/8/2021    DCIS-LEFT BREAST NDL LOCALIZED LUMPECTOMY PREOP 0800/ LOC 0930/ SURGERY 1130 performed by Nevaeh Mcneill MD at [address] HX BREAST LUMPECTOMY Left 01/2021    HX BUNIONECTOMY Bilateral 1980    HX HIP REPLACEMENT Right 11/2016    HX HIP REPLACEMENT Right     HX KNEE ARTHROSCOPY Left 10/2012    HX KNEE REPLACEMENT Left 04/01/2017    Dr Goldstein [place] [facility] Bilateral 2010    neuroma's removed from feet    HX PHYLLIS AND BSO  2009    r/t large fibroids; Dr. Gee Bridegroom:     Current Outpatient Medications:     omeprazole (PRILOSEC) 40 mg capsule, Take 1 Capsule by mouth daily. , Disp: 90 Capsule, Rfl: 3    eszopiclone (Lunesta) 2 mg tablet, Take 1 Tablet by mouth nightly.  Max Daily Amount: 2 mg., Disp: 90 Tablet, Rfl: 1   atorvastatin (LIPITOR) 40 mg tablet, Take 1 Tablet by mouth daily. , Disp: 90 Tablet, Rfl: 4    irbesartan (AVAPRO) 300 mg tablet, Take 1 Tablet by mouth daily. (Patient taking differently: Take 300 mg by mouth nightly.), Disp: 90 Tablet, Rfl: 4    letrozole (FEMARA) 2.5 mg tablet, Take 1 Tablet by mouth daily. Indications: hormone receptor positive postmenopausal early breast cancer (Patient taking differently: Take 2.5 mg by mouth nightly. Indications: hormone receptor positive postmenopausal early breast cancer), Disp: 90 Tablet, Rfl: 3    acetaminophen (TylenoL) 325 mg tablet, Take  by mouth every four (4) hours as needed for Pain., Disp: , Rfl:     verapamil ER (VERELAN PM) 300 mg capsule, TAKE 1 CAPSULE NIGHTLY, Disp: 90 Cap, Rfl: 4    hydrALAZINE (APRESOLINE) 50 mg tablet, Take 1 Tab by mouth three (3) times daily. , Disp: 270 Tab, Rfl: 4    levothyroxine (Synthroid) 125 mcg tablet, Take 1 Tab by mouth Daily (before breakfast). , Disp: 90 Tab, Rfl: 4    rOPINIRole (REQUIP) 0.5 mg tablet, Take 1 Tab by mouth nightly., Disp: 90 Tab, Rfl: 4    allopurinoL (ZYLOPRIM) 300 mg tablet, Take 1 Tab by mouth daily. (Patient taking differently: Take 300 mg by mouth every evening.), Disp: 90 Tab, Rfl: 4    Cholecalciferol, Vitamin D3, (VITAMIN D3) 2,000 unit cap capsule, Take 4,000 Units by mouth daily. , Disp: , Rfl:     ALLERGIES:   Allergies   Allergen Reactions    Ambien [Zolpidem] Other (comments)     Periods of forgetfulness    Augmentin [Amoxicillin-Pot Clavulanate] Itching     Itching in mouth and ears    Codeine Nausea Only    Contrave [Naltrexone-Bupropion] Other (comments)     lethargy    Crestor [Rosuvastatin] Swelling    Ditropan [Oxybutynin Chloride] Other (comments)     Made her voiding worse    Hydralazine Other (comments)     states not allergic to medication.  Is presently taking medication    Macrobid [Nitrofurantoin Monohyd/M-Cryst] Itching      itching under arm    Myrbetriq [Mirabegron] Other (comments)     ineffective    Penicillins Itching    Tape [Adhesive] Other (comments)     Little bruises    Thiazides Other (comments)     GOUT    Topamax [Topiramate] Other (comments)     Felt weird    Trazodone Other (comments)     ineffective    Zocor [Simvastatin] Myalgia       SOCIAL HISTORY:   Social History     Socioeconomic History    Marital status:      Spouse name: Didier Marshall Number of children: 3    Years of education: Not on file    Highest education level: Not on file   Occupational History    Not on file   Tobacco Use    Smoking status: Never Smoker    Smokeless tobacco: Never Used   Vaping Use    Vaping Use: Never used   Substance and Sexual Activity    Alcohol use: No    Drug use: No    Sexual activity: Not on file   Other Topics Concern     Service Not Asked    Blood Transfusions Not Asked    Caffeine Concern Not Asked    Occupational Exposure Not Asked    Hobby Hazards Not Asked    Sleep Concern Not Asked    Stress Concern Not Asked    Weight Concern Not Asked    Special Diet Not Asked    Back Care Not Asked    Exercise Yes     Comment: walks most mornings 1.5 mile    Bike Helmet Not Asked    Seat Belt Not Asked    Self-Exams Not Asked   Social History Narrative    , walking about a mile and a half in the mornings    She has no pets. Currently drinks no caffeine. She is a retired teacher for  through second grade. Social Determinants of Health     Financial Resource Strain:     Difficulty of Paying Living Expenses:    Food Insecurity:     Worried About Running Out of Food in the Last Year:     [address] in the Last Year:    Transportation Needs:     Lack of Transportation (Medical):      Lack of Transportation (Non-Medical):    Physical Activity: Sufficiently Active    Days of Exercise per Week: 3 days    Minutes of Exercise per Session: 60 min   Stress:     Feeling of Stress :    Social Connections:     Frequency of Communication with Friends and Family:     Frequency of Social Gatherings with Friends and Family:     Attends Quaker Services:     Active Member of Clubs or Organizations:     Attends Club or Organization Meetings:     Marital Status:    Intimate Partner Violence:     Fear of Current or Ex-Partner:     Emotionally Abused:     Physically Abused:     Sexually Abused:      FAMILY HISTORY:   Family History   Problem Relation Age of Onset    Hypertension Father     Sleep Apnea Father     Cancer Father         prostate, pancreatic    Diabetes Father         glucose intolerance    Elevated Lipids Sister     Hypertension Sister     Stroke Mother     Dementia Mother     Heart Disease Mother         CHF    Diabetes Paternal Aunt        REVIEW OF SYSTEMS:   A full 12-point review of systems was completed and was negative unless noted in the history of present illness. PHYSICAL EXAMINATION:   ECOG Performance status 0  VITAL SIGNS:   Visit Vitals  /76   Pulse 78   Temp 98.1 °F (36.7 °C)   Wt 134.2 kg (295 lb 12.8 oz)   SpO2 96%   BMI 50.77 kg/m²     General: well developed/nourished adult Female in no acute distress; appears stated age  [de-identified]: normocephalic, atraumatic; EOMI  Neck: supple with full ROMRespiratory: normal inspiratory effort, no audible wheezes  Extremities: no cyanosis, clubbing, or edema  Musculoskeletal: mobility intact x4; normal ROM in all joints  Skin: no skin lesions identified  Neuro: AOx3; sensation intact x 4; CNII-XII grossly intact  Psych: appropriate affect, insight, and judgement  GI: abdomen soft, non-distended  Breast: Breasts symmetric bilaterally,. Left breast lumpectomy incision well healed. No skin changes or nipple discharge. No palpable mass in the right breast. No palpable mass in the left breast.    PATHOLOGY:    No interval pathology.     LABORATORY:   Lab Results   Component Value Date/Time    Sodium 141 08/27/2021 09:17 AM    Potassium 4.0 08/27/2021 09:17 AM    Chloride 108 (H) 08/27/2021 09:17 AM    CO2 28 08/27/2021 09:17 AM    Anion gap 5 (L) 08/27/2021 09:17 AM    Glucose 93 08/27/2021 09:17 AM    BUN 22 08/27/2021 09:17 AM    Creatinine 1.00 08/27/2021 09:17 AM    GFR est AA >60 08/27/2021 09:17 AM    GFR est non-AA 59 (L) 08/27/2021 09:17 AM    Calcium 9.7 08/27/2021 09:17 AM    Albumin 3.4 08/27/2021 09:17 AM    Protein, total 7.2 08/27/2021 09:17 AM    Globulin 3.8 (H) 08/27/2021 09:17 AM    A-G Ratio 0.9 (L) 08/27/2021 09:17 AM    ALT (SGPT) 34 08/27/2021 09:17 AM     Lab Results   Component Value Date/Time    WBC 5.0 08/27/2021 09:17 AM    HGB 12.2 08/27/2021 09:17 AM    HCT 38.7 08/27/2021 09:17 AM    PLATELET 678 (L) 25/72/8494 09:17 AM     RADIOLOGY:    Mammogram 10/1/21 reviewed as per HPI. IMPRESSION:  Gail Burgess is a 77 y.o. female with recently diagnosed DCIS of the left breast, 4mm in size, intermediate grade, ER/[place] positive, s/p radiation therapy without evidence of residual disease or significant residual toxicities from radiation therapy.     PLAN:    1) Possible toxicities of radiation reviewed, discussed follow up including 6 months for approximately 2 years then yearly  2) Follow up with Dr. Angeline Mireles 12/1/21  3) Follow up with radiation oncology 4/14/22  4) Repeat mammogram 10/22    Oksana Melton MD   October 13, 2021

## 2021-10-13 NOTE — PROGRESS NOTES
Pt is here today for FUP post RT to left breast DCIS which ended 4/2/2021. Pt is s/p a left lumpectomy on 1/8/21. She is followed in Med Onc by Dr. Victor Manuel Tamez and is taking Femara as ordered. The 10/1/21 Mammogram was negative.   Pt will return in 6 months for FUP with an NP.

## 2021-12-01 ENCOUNTER — HOSPITAL ENCOUNTER (OUTPATIENT)
Dept: LAB | Age: 66
Discharge: HOME | End: 2021-12-01
Payer: MEDICARE

## 2021-12-01 DIAGNOSIS — D69.6 THROMBOCYTOPENIA (HCC): ICD-10-CM

## 2021-12-01 LAB
ALBUMIN SERPL-MCNC: 3.7 G/DL (ref 3.2–4.6)
ALBUMIN/GLOB SERPL: 0.9 {RATIO} (ref 1.2–3.5)
ALP SERPL-CCNC: 103 U/L (ref 50–136)
ALT SERPL-CCNC: 39 U/L (ref 12–65)
ANION GAP SERPL CALC-SCNC: 6 MMOL/L (ref 7–16)
AST SERPL-CCNC: 29 U/L (ref 15–37)
BASOPHILS # BLD: 0 K/UL (ref 0–0.2)
BASOPHILS NFR BLD: 1 % (ref 0–2)
BILIRUB SERPL-MCNC: 0.8 MG/DL (ref 0.2–1.1)
BUN SERPL-MCNC: 22 MG/DL (ref 8–23)
CALCIUM SERPL-MCNC: 9.4 MG/DL (ref 8.3–10.4)
CHLORIDE SERPL-SCNC: 106 MMOL/L (ref 98–107)
CO2 SERPL-SCNC: 26 MMOL/L (ref 21–32)
CREAT SERPL-MCNC: 1 MG/DL (ref 0.6–1)
DIFFERENTIAL METHOD BLD: NORMAL
EOSINOPHIL # BLD: 0.1 K/UL (ref 0–0.8)
EOSINOPHIL NFR BLD: 2 % (ref 0.5–7.8)
ERYTHROCYTE [DISTWIDTH] IN BLOOD BY AUTOMATED COUNT: 13.9 % (ref 11.9–14.6)
GLOBULIN SER CALC-MCNC: 4.1 G/DL (ref 2.3–3.5)
GLUCOSE SERPL-MCNC: 100 MG/DL (ref 65–100)
HCT VFR BLD AUTO: 39.7 % (ref 35.8–46.3)
HGB BLD-MCNC: 12.8 G/DL (ref 11.7–15.4)
IMM GRANULOCYTES # BLD AUTO: 0 K/UL (ref 0–0.5)
IMM GRANULOCYTES NFR BLD AUTO: 1 % (ref 0–5)
LYMPHOCYTES # BLD: 1.6 K/UL (ref 0.5–4.6)
LYMPHOCYTES NFR BLD: 25 % (ref 13–44)
MCH RBC QN AUTO: 29 PG (ref 26.1–32.9)
MCHC RBC AUTO-ENTMCNC: 32.2 G/DL (ref 31.4–35)
MCV RBC AUTO: 89.8 FL (ref 79.6–97.8)
MONOCYTES # BLD: 0.6 K/UL (ref 0.1–1.3)
MONOCYTES NFR BLD: 9 % (ref 4–12)
NEUTS SEG # BLD: 3.9 K/UL (ref 1.7–8.2)
NEUTS SEG NFR BLD: 63 % (ref 43–78)
NRBC # BLD: 0 K/UL (ref 0–0.2)
PLATELET # BLD AUTO: 186 K/UL (ref 150–450)
PMV BLD AUTO: 10.6 FL (ref 9.4–12.3)
POTASSIUM SERPL-SCNC: 3.6 MMOL/L (ref 3.5–5.1)
PROT SERPL-MCNC: 7.8 G/DL (ref 6.3–8.2)
RBC # BLD AUTO: 4.42 M/UL (ref 4.05–5.2)
SODIUM SERPL-SCNC: 138 MMOL/L (ref 136–145)
WBC # BLD AUTO: 6.1 K/UL (ref 4.3–11.1)

## 2021-12-01 PROCEDURE — 36415 COLL VENOUS BLD VENIPUNCTURE: CPT

## 2021-12-01 PROCEDURE — 85025 COMPLETE CBC W/AUTO DIFF WBC: CPT

## 2021-12-01 PROCEDURE — 80053 COMPREHEN METABOLIC PANEL: CPT

## 2022-03-07 ENCOUNTER — HOSPITAL ENCOUNTER (OUTPATIENT)
Dept: LAB | Age: 67
Discharge: HOME | End: 2022-03-07
Payer: MEDICARE

## 2022-03-07 DIAGNOSIS — D69.6 THROMBOCYTOPENIA (HCC): ICD-10-CM

## 2022-03-07 LAB
ALBUMIN SERPL-MCNC: 4 G/DL (ref 3.2–4.6)
ALBUMIN/GLOB SERPL: 1.1 {RATIO} (ref 1.2–3.5)
ALP SERPL-CCNC: 120 U/L (ref 50–136)
ALT SERPL-CCNC: 33 U/L (ref 12–65)
ANION GAP SERPL CALC-SCNC: 6 MMOL/L (ref 7–16)
AST SERPL-CCNC: 25 U/L (ref 15–37)
BASOPHILS # BLD: 0 K/UL (ref 0–0.2)
BASOPHILS NFR BLD: 1 % (ref 0–2)
BILIRUB SERPL-MCNC: 0.6 MG/DL (ref 0.2–1.1)
BUN SERPL-MCNC: 22 MG/DL (ref 8–23)
CALCIUM SERPL-MCNC: 9.8 MG/DL (ref 8.3–10.4)
CHLORIDE SERPL-SCNC: 104 MMOL/L (ref 98–107)
CO2 SERPL-SCNC: 26 MMOL/L (ref 21–32)
CREAT SERPL-MCNC: 1 MG/DL (ref 0.6–1)
DIFFERENTIAL METHOD BLD: NORMAL
EOSINOPHIL # BLD: 0.1 K/UL (ref 0–0.8)
EOSINOPHIL NFR BLD: 2 % (ref 0.5–7.8)
ERYTHROCYTE [DISTWIDTH] IN BLOOD BY AUTOMATED COUNT: 13.6 % (ref 11.9–14.6)
GLOBULIN SER CALC-MCNC: 3.8 G/DL (ref 2.3–3.5)
GLUCOSE SERPL-MCNC: 89 MG/DL (ref 65–100)
HCT VFR BLD AUTO: 39.9 % (ref 35.8–46.3)
HGB BLD-MCNC: 13 G/DL (ref 11.7–15.4)
IMM GRANULOCYTES # BLD AUTO: 0 K/UL (ref 0–0.5)
IMM GRANULOCYTES NFR BLD AUTO: 1 % (ref 0–5)
LYMPHOCYTES # BLD: 2.2 K/UL (ref 0.5–4.6)
LYMPHOCYTES NFR BLD: 27 % (ref 13–44)
MCH RBC QN AUTO: 29.1 PG (ref 26.1–32.9)
MCHC RBC AUTO-ENTMCNC: 32.6 G/DL (ref 31.4–35)
MCV RBC AUTO: 89.3 FL (ref 79.6–97.8)
MONOCYTES # BLD: 0.6 K/UL (ref 0.1–1.3)
MONOCYTES NFR BLD: 7 % (ref 4–12)
NEUTS SEG # BLD: 5.2 K/UL (ref 1.7–8.2)
NEUTS SEG NFR BLD: 64 % (ref 43–78)
NRBC # BLD: 0 K/UL (ref 0–0.2)
PLATELET # BLD AUTO: 193 K/UL (ref 150–450)
PMV BLD AUTO: 10.5 FL (ref 9.4–12.3)
POTASSIUM SERPL-SCNC: 3.8 MMOL/L (ref 3.5–5.1)
PROT SERPL-MCNC: 7.8 G/DL (ref 6.3–8.2)
RBC # BLD AUTO: 4.47 M/UL (ref 4.05–5.2)
SODIUM SERPL-SCNC: 136 MMOL/L (ref 136–145)
WBC # BLD AUTO: 8.1 K/UL (ref 4.3–11.1)

## 2022-03-07 PROCEDURE — 85025 COMPLETE CBC W/AUTO DIFF WBC: CPT

## 2022-03-07 PROCEDURE — 36415 COLL VENOUS BLD VENIPUNCTURE: CPT

## 2022-03-07 PROCEDURE — 80053 COMPREHEN METABOLIC PANEL: CPT

## 2022-03-18 PROBLEM — M54.50 CHRONIC BILATERAL LOW BACK PAIN WITHOUT SCIATICA: Status: ACTIVE | Noted: 2018-06-04

## 2022-03-18 PROBLEM — Z72.821 INADEQUATE SLEEP HYGIENE: Status: ACTIVE | Noted: 2021-07-16

## 2022-03-18 PROBLEM — Z92.29 HISTORY OF HEPATITIS B VACCINATION: Status: ACTIVE | Noted: 2021-07-15

## 2022-03-18 PROBLEM — R31.29 MICROSCOPIC HEMATURIA: Status: ACTIVE | Noted: 2019-02-15

## 2022-03-18 PROBLEM — Z80.0 FAMILY HISTORY OF COLON CANCER: Status: ACTIVE | Noted: 2021-02-12

## 2022-03-18 PROBLEM — Z96.652 STATUS POST TOTAL LEFT KNEE REPLACEMENT: Status: ACTIVE | Noted: 2017-04-21

## 2022-03-18 PROBLEM — R35.0 INCREASED URINARY FREQUENCY: Status: ACTIVE | Noted: 2020-12-09

## 2022-03-18 PROBLEM — G89.29 CHRONIC BILATERAL LOW BACK PAIN WITHOUT SCIATICA: Status: ACTIVE | Noted: 2018-06-04

## 2022-03-18 PROBLEM — R10.32 BILATERAL LOWER ABDOMINAL DISCOMFORT: Status: ACTIVE | Noted: 2020-12-09

## 2022-03-18 PROBLEM — R10.31 BILATERAL LOWER ABDOMINAL DISCOMFORT: Status: ACTIVE | Noted: 2020-12-09

## 2022-03-18 PROBLEM — G47.33 OSA (OBSTRUCTIVE SLEEP APNEA): Status: ACTIVE | Noted: 2017-04-21

## 2022-03-19 PROBLEM — Z88.0 PENICILLIN ALLERGY: Status: ACTIVE | Noted: 2018-09-13

## 2022-03-19 PROBLEM — Z11.59 ENCOUNTER FOR SCREENING FOR OTHER VIRAL DISEASES: Status: ACTIVE | Noted: 2021-05-28

## 2022-03-19 PROBLEM — R79.89 ELEVATED SERUM HCG: Status: ACTIVE | Noted: 2021-05-28

## 2022-03-19 PROBLEM — R09.02 HYPOXEMIA: Status: ACTIVE | Noted: 2021-07-16

## 2022-03-19 PROBLEM — R10.31 CHRONIC BILATERAL LOWER ABDOMINAL PAIN: Status: ACTIVE | Noted: 2020-12-11

## 2022-03-19 PROBLEM — D05.10 DUCTAL CARCINOMA IN SITU (DCIS) OF BREAST: Status: ACTIVE | Noted: 2020-12-10

## 2022-03-19 PROBLEM — E34.9 ELEVATED SERUM HCG IN FEMALE, NOT PREGNANT: Status: ACTIVE | Noted: 2021-05-28

## 2022-03-19 PROBLEM — M25.50 ARTHRALGIA: Status: ACTIVE | Noted: 2018-02-19

## 2022-03-19 PROBLEM — M79.10 MYALGIA: Status: ACTIVE | Noted: 2018-02-19

## 2022-03-19 PROBLEM — G89.29 CHRONIC BILATERAL LOWER ABDOMINAL PAIN: Status: ACTIVE | Noted: 2020-12-11

## 2022-03-19 PROBLEM — Z91.89 AT RISK FOR BONE DENSITY LOSS: Status: ACTIVE | Noted: 2021-02-13

## 2022-03-19 PROBLEM — E34.9 ELEVATED SERUM HCG: Status: ACTIVE | Noted: 2021-05-28

## 2022-03-19 PROBLEM — D69.6 THROMBOCYTOPENIA (HCC): Status: ACTIVE | Noted: 2021-05-28

## 2022-03-19 PROBLEM — M10.272 ACUTE DRUG-INDUCED GOUT INVOLVING TOE OF LEFT FOOT: Status: ACTIVE | Noted: 2020-07-02

## 2022-03-19 PROBLEM — R79.89 ELEVATED SERUM HCG IN FEMALE, NOT PREGNANT: Status: ACTIVE | Noted: 2021-05-28

## 2022-03-19 PROBLEM — R10.32 CHRONIC BILATERAL LOWER ABDOMINAL PAIN: Status: ACTIVE | Noted: 2020-12-11

## 2022-03-19 PROBLEM — Z79.811 AROMATASE INHIBITOR USE: Status: ACTIVE | Noted: 2021-02-13

## 2022-03-19 PROBLEM — Z86.69 HX OF MIGRAINES: Status: ACTIVE | Noted: 2021-07-15

## 2022-03-20 PROBLEM — M1A.09X0 IDIOPATHIC CHRONIC GOUT OF MULTIPLE SITES WITHOUT TOPHUS: Status: ACTIVE | Noted: 2020-01-21

## 2022-03-20 PROBLEM — Z23 ENCOUNTER FOR IMMUNIZATION: Status: ACTIVE | Noted: 2018-09-13

## 2022-03-20 PROBLEM — G47.00 INSOMNIA: Status: ACTIVE | Noted: 2020-07-02

## 2022-04-14 ENCOUNTER — HOSPITAL ENCOUNTER (OUTPATIENT)
Dept: RADIATION ONCOLOGY | Age: 67
Discharge: HOME | End: 2022-04-14
Payer: MEDICARE

## 2022-04-14 VITALS
TEMPERATURE: 98.6 F | DIASTOLIC BLOOD PRESSURE: 97 MMHG | HEART RATE: 81 BPM | SYSTOLIC BLOOD PRESSURE: 164 MMHG | OXYGEN SATURATION: 97 % | WEIGHT: 293 LBS | BODY MASS INDEX: 52.82 KG/M2

## 2022-04-14 PROCEDURE — 99211 OFF/OP EST MAY X REQ PHY/QHP: CPT

## 2022-04-14 RX ORDER — CHOLECALCIFEROL (VITAMIN D3) 125 MCG
CAPSULE ORAL
COMMUNITY

## 2022-04-14 NOTE — PROGRESS NOTES
Patient: Diamond Hope MRN: [id number]  SSN: [id number]    YOB: 1955  Age: 77 y.o. Sex: female      Other Providers:  Dr. Eusebio Curry, Dr. Rico Mo    DIAGNOSIS: Left breast DCIS, intermediate grade, 4mm in size, ER/DC positive    PREVIOUS TREATMENT:  Lumpectomy on 1/8/2021     TREATMENT DATES:  3/24-4/2     ANATOMIC SITE: left breast     DOSE:  4005/4005, 15/15     HISTORY OF PRESENT ILLNESS:  Diamond Hope is a 77 y.o. female who I am seeing at the request of Dr. Eusebio Curry. Ms. Di Dennison was in her usual state of health until 11/23/2020 at which time she underwent bilateral screening mammogram. This demonstrated an asymmetry in the retroareolar region of the left breast located 5.5 cm from the nipple measuring 7mm. There was an additional asymmetry in the left lateral breast located 7cm from the nipple measuring 9mm. There was no irregularity noted in the right breast. The patient does have bilateral retropectoral silicone implants. This led to a diagnostic digital mammogram performed 12/3/2020 which confirmed a 5mm lobulated nodule in the left breast at 12:00 corresponding to the abnormality seen on mammogram. An ultrasound guided core biopsy of the left breast mass at 12:00 4 cm from the nipple demonstrated ductal carcinoma in situ, low grade, cribriform and papillary type, ER positive (96%), DC positive (92%). She was evaluated by Dr. Rico Mo and underwent a lumpectomy on 1/8/2021. Final pathology demonstrated 4mm intermediate grade DCIS without evidence of necrosis, the closest margin 3mm from the specimen. INTERVAL HISTORY:  Since completion of radiation therapy Ms. Di Dennison has started endocrine therapy with letrozole which she has tolerated well. She denies hot flashes and fatigue. She denies any residual skin changes in the left breast and has no swelling, pain, or difficulties with range of motion in the left arm. Her energy level is at baseline.  A mammogram 10/1/21 demonstrates treatment related changes without other worrisome findings in either breast.    4/14/22 Here today for follow up. She has been doing okay since last seen. She has no breast concerns or changes. No difficulty with ROM. No difficulty with swallowing. Pulled her back while stuffing Easter eggs yesterday.      PAST MEDICAL HISTORY:    Past Medical History:   Diagnosis Date    Breast cancer ([facility] [address] 01/08/2021    DCIS S/P L LUMPECTOMY    Cancer ([facility] [address] 01/2021    left breast cancer, radiation completed, lumpectomy    Chronic insomnia     GERD (gastroesophageal reflux disease)     controlled with med    Gout     HLD (hyperlipidemia)     Hypertension     controlled with meds    Menopause     Nausea & vomiting     nausea with some procedures, zofran relieves    Osteopenia     Radiation therapy complication     Restless leg     Sleep apnea     uses CPAP at hs    Status post total left knee replacement 4/21/2017    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    Thyroid disease     managed with med     PAST SURGICAL HISTORY:   Past Surgical History:   Procedure Laterality Date    COLONOSCOPY  8/5/2021         COLONOSCOPY N/A 8/5/2021    COLONOSCOPY/ 52 performed by Rick Kasper MD at [address] [place] [address] EGD  8/5/2021         HX BREAST AUGMENTATION  1990    HX BREAST BIOPSY N/A 1/8/2021    DCIS-LEFT BREAST NDL LOCALIZED LUMPECTOMY PREOP 0800/ LOC 0930/ SURGERY 1130 performed by Skyler Phelps MD at [address] [place] [address] HX BREAST LUMPECTOMY Left 01/2021    HX BREAST LUMPECTOMY  01/2021    HX BUNIONECTOMY Bilateral 1980    HX HIP REPLACEMENT Right 11/2016    HX HIP REPLACEMENT Right     HX KNEE ARTHROSCOPY Left 10/2012    HX KNEE REPLACEMENT Left 04/01/2017    Dr Melly Mendez    HX ORTHOPAEDIC Bilateral 2010    neuroma's removed from feet    HX PHYLLIS AND BSO  2009    r/t large fibroids; Dr. Raul Moody: Current Outpatient Medications:     naproxen sodium (Aleve) 220 mg cap, Take  by mouth., Disp: , Rfl:     hydrALAZINE (APRESOLINE) 50 mg tablet, Take 1 Tablet by mouth three (3) times daily. , Disp: 270 Tablet, Rfl: 3    omeprazole (PRILOSEC) 40 mg capsule, Take 1 Capsule by mouth daily. , Disp: 90 Capsule, Rfl: 3    eszopiclone (Lunesta) 2 mg tablet, Take 1 Tablet by mouth nightly. Max Daily Amount: 2 mg., Disp: 90 Tablet, Rfl: 1    verapamil ER (VERELAN PM) 300 mg capsule, TAKE 1 CAPSULE NIGHTLY, Disp: 90 Capsule, Rfl: 3    levothyroxine (Synthroid) 125 mcg tablet, Take 1 Tablet by mouth Daily (before breakfast). , Disp: 90 Tablet, Rfl: 3    rOPINIRole (REQUIP) 0.5 mg tablet, Take 1 Tablet by mouth nightly., Disp: 90 Tablet, Rfl: 3    allopurinoL (ZYLOPRIM) 300 mg tablet, Take 1 Tablet by mouth daily. , Disp: 90 Tablet, Rfl: 3    atorvastatin (LIPITOR) 40 mg tablet, Take 1 Tablet by mouth daily. , Disp: 90 Tablet, Rfl: 4    irbesartan (AVAPRO) 300 mg tablet, Take 1 Tablet by mouth daily. , Disp: 90 Tablet, Rfl: 4    letrozole (FEMARA) 2.5 mg tablet, Take 1 Tablet by mouth daily. Indications: hormone receptor positive postmenopausal early breast cancer, Disp: 90 Tablet, Rfl: 3    Cholecalciferol, Vitamin D3, (VITAMIN D3) 2,000 unit cap capsule, Take 4,000 Units by mouth daily. , Disp: , Rfl:     ALLERGIES:   Allergies   Allergen Reactions    Ambien [Zolpidem] Other (comments)     Periods of forgetfulness    Augmentin [Amoxicillin-Pot Clavulanate] Itching     Itching in mouth and ears    Codeine Nausea Only    Contrave [Naltrexone-Bupropion] Other (comments)     lethargy    Crestor [Rosuvastatin] Swelling    Ditropan [Oxybutynin Chloride] Other (comments)     Made her voiding worse    Hydralazine Other (comments)     states not allergic to medication.  Is presently taking medication    Macrobid [Nitrofurantoin Monohyd/M-Cryst] Itching      itching under arm    Myrbetriq [Mirabegron] Other (comments) ineffective    Penicillins Itching    Tape [Adhesive] Other (comments)     Little bruises    Thiazides Other (comments)     GOUT    Topamax [Topiramate] Other (comments)     Felt weird    Trazodone Other (comments)     ineffective    Zocor [Simvastatin] Myalgia       SOCIAL HISTORY:   Social History     Socioeconomic History    Marital status:      Spouse name: Russ Diggs Number of children: 3    Years of education: Not on file    Highest education level: Not on file   Occupational History    Not on file   Tobacco Use    Smoking status: Never Smoker    Smokeless tobacco: Never Used   Vaping Use    Vaping Use: Never used   Substance and Sexual Activity    Alcohol use: No    Drug use: No    Sexual activity: Not on file   Other Topics Concern     Service Not Asked    Blood Transfusions Not Asked    Caffeine Concern Not Asked    Occupational Exposure Not Asked    Hobby Hazards Not Asked    Sleep Concern Not Asked    Stress Concern Not Asked    Weight Concern Not Asked    Special Diet Not Asked    Back Care Not Asked    Exercise Yes     Comment: walks most mornings 1.5 mile    Bike Helmet Not Asked    Seat Belt Not Asked    Self-Exams Not Asked   Social History Narrative    , walking about a mile and a half in the mornings    She quit with the machines and then stopped walking        She has no pets. Currently drinks no caffeine. She is a retired teacher for  through second grade. Social Determinants of Health     Financial Resource Strain:     Difficulty of Paying Living Expenses: Not on file   Food Insecurity:     Worried About Running Out of Food in the Last Year: Not on file    You of Food in the Last Year: Not on file   Transportation Needs:     Lack of Transportation (Medical): Not on file    Lack of Transportation (Non-Medical):  Not on file   Physical Activity: Sufficiently Active    Days of Exercise per Week: 3 days    Minutes of Exercise per Session: 60 min   Stress:     Feeling of Stress : Not on file   Social Connections:     Frequency of Communication with Friends and Family: Not on file    Frequency of Social Gatherings with Friends and Family: Not on file    Attends Confucianism Services: Not on file    Active Member of Clubs or Organizations: Not on file    Attends Club or Organization Meetings: Not on file    Marital Status: Not on file   Intimate Partner Violence:     Fear of Current or Ex-Partner: Not on file    Emotionally Abused: Not on file    Physically Abused: Not on file    Sexually Abused: Not on file   Housing Stability:     Unable to Pay for Housing in the Last Year: Not on file    Number of [place] in the Last Year: Not on file    Unstable Housing in the Last Year: Not on file     FAMILY HISTORY:   Family History   Problem Relation Age of Onset    Hypertension Father     Sleep Apnea Father     Cancer Father         prostate, pancreatic    Diabetes Father         glucose intolerance    Elevated Lipids Sister     Hypertension Sister     Stroke Mother     Dementia Mother     Heart Disease Mother         CHF    Diabetes Paternal Aunt        REVIEW OF SYSTEMS:   A full 12-point review of systems was completed and was negative unless noted in the history of present illness.     PHYSICAL EXAMINATION:   ECOG Performance status 0  VITAL SIGNS:   Visit Vitals  BP (!) 164/97 (BP 1 Location: Right lower arm, BP Patient Position: Sitting)   Pulse 81   Temp 98.6 °F (37 °C)   Wt 307 lb 11.2 oz (139.6 kg)   SpO2 97%   BMI 52.82 kg/m²     General: well developed/nourished adult Female in no acute distress; appears stated age  HEENT: normocephalic, atraumatic; EOMI  Neck: supple with full ROMRespiratory: normal inspiratory effort, no audible wheezes  Extremities: no cyanosis, clubbing, or edema  Musculoskeletal: mobility intact x4; normal ROM in all joints  Skin: no skin lesions identified  Neuro: AOx3; sensation intact x 4; CNII-XII grossly intact  Psych: appropriate affect, insight, and judgement  GI: abdomen soft, non-distended  Breast: deferred   PATHOLOGY:    No interval pathology. LABORATORY:   Lab Results   Component Value Date/Time    Sodium 136 03/07/2022 02:09 PM    Potassium 3.8 03/07/2022 02:09 PM    Chloride 104 03/07/2022 02:09 PM    CO2 26 03/07/2022 02:09 PM    Anion gap 6 (L) 03/07/2022 02:09 PM    Glucose 89 03/07/2022 02:09 PM    BUN 22 03/07/2022 02:09 PM    Creatinine 1.00 03/07/2022 02:09 PM    GFR est AA >60 03/07/2022 02:09 PM    GFR est non-AA 59 (L) 03/07/2022 02:09 PM    Calcium 9.8 03/07/2022 02:09 PM    Albumin 4.0 03/07/2022 02:09 PM    Protein, total 7.8 03/07/2022 02:09 PM    Globulin 3.8 (H) 03/07/2022 02:09 PM    A-G Ratio 1.1 (L) 03/07/2022 02:09 PM    ALT (SGPT) 33 03/07/2022 02:09 PM     Lab Results   Component Value Date/Time    WBC 8.1 03/07/2022 02:09 PM    HGB 13.0 03/07/2022 02:09 PM    HCT 39.9 03/07/2022 02:09 PM    PLATELET 037 49/73/8371 02:09 PM     RADIOLOGY:    Mammogram 10/1/21 reviewed as per HPI. IMPRESSION:  Areli Hargrove is a 77 y.o. female with recently diagnosed DCIS of the left breast, 4mm in size, intermediate grade, ER/AL positive, s/p radiation therapy without evidence of residual disease or significant residual toxicities from radiation therapy.     PLAN:    1) Possible toxicities of radiation reviewed, discussed follow up including 6 months for approximately 2 years then yearly  2) Follow up with Dr. Anel Lea in 3-4 months   3) Follow up with radiation oncology in 6 months   4) Repeat mammogram 10/22          Rodolfo Villalobos, [address] [facility]/Radiation Oncology   [address] [place] [address]  Office : [phone]  Fax : [phone]

## 2022-04-14 NOTE — PROGRESS NOTES
Follow up left breast DCIS. Lumpectomy 1-8-21. RT end 4-2-21. Taking Letrozole. Last mammogram was 10-1-21. Pt states she was leaning over stuffing Easter eggs and pulled back. She is not taking anything for pain. Order to be placed for mammogram to be completed prior to 6 month follow up.      Deonte Nova RN

## 2022-05-06 DIAGNOSIS — D05.92 CARCINOMA IN SITU OF LEFT BREAST, UNSPECIFIED TYPE: Primary | ICD-10-CM

## 2022-07-19 RX ORDER — LETROZOLE 2.5 MG/1
TABLET, FILM COATED ORAL
Qty: 90 TABLET | Refills: 3 | Status: SHIPPED | OUTPATIENT
Start: 2022-07-19 | End: 2022-10-19 | Stop reason: SINTOL

## 2022-07-19 RX ORDER — IRBESARTAN 300 MG/1
TABLET ORAL
Qty: 90 TABLET | Refills: 3 | Status: SHIPPED | OUTPATIENT
Start: 2022-07-19

## 2022-07-25 ENCOUNTER — OFFICE VISIT (OUTPATIENT)
Dept: INTERNAL MEDICINE CLINIC | Facility: CLINIC | Age: 67
End: 2022-07-25
Payer: MEDICARE

## 2022-07-25 VITALS
SYSTOLIC BLOOD PRESSURE: 144 MMHG | WEIGHT: 293 LBS | DIASTOLIC BLOOD PRESSURE: 84 MMHG | HEIGHT: 64 IN | BODY MASS INDEX: 50.02 KG/M2

## 2022-07-25 DIAGNOSIS — I10 ESSENTIAL HYPERTENSION: ICD-10-CM

## 2022-07-25 DIAGNOSIS — E78.5 HYPERLIPIDEMIA, UNSPECIFIED HYPERLIPIDEMIA TYPE: ICD-10-CM

## 2022-07-25 DIAGNOSIS — Z00.00 MEDICARE ANNUAL WELLNESS VISIT, SUBSEQUENT: Primary | ICD-10-CM

## 2022-07-25 DIAGNOSIS — E03.9 ACQUIRED HYPOTHYROIDISM: ICD-10-CM

## 2022-07-25 DIAGNOSIS — N18.30 STAGE 3 CHRONIC KIDNEY DISEASE, UNSPECIFIED WHETHER STAGE 3A OR 3B CKD (HCC): ICD-10-CM

## 2022-07-25 DIAGNOSIS — E55.9 VITAMIN D DEFICIENCY: ICD-10-CM

## 2022-07-25 DIAGNOSIS — K21.9 GASTROESOPHAGEAL REFLUX DISEASE, UNSPECIFIED WHETHER ESOPHAGITIS PRESENT: ICD-10-CM

## 2022-07-25 DIAGNOSIS — G47.00 INSOMNIA, UNSPECIFIED TYPE: ICD-10-CM

## 2022-07-25 DIAGNOSIS — Z23 NEED FOR PROPHYLACTIC VACCINATION AGAINST STREPTOCOCCUS PNEUMONIAE (PNEUMOCOCCUS): ICD-10-CM

## 2022-07-25 DIAGNOSIS — C50.912 MALIGNANT NEOPLASM OF LEFT FEMALE BREAST, UNSPECIFIED ESTROGEN RECEPTOR STATUS, UNSPECIFIED SITE OF BREAST (HCC): ICD-10-CM

## 2022-07-25 DIAGNOSIS — D05.10 DUCTAL CARCINOMA IN SITU (DCIS) OF BREAST, UNSPECIFIED LATERALITY: ICD-10-CM

## 2022-07-25 PROBLEM — R31.29 MICROSCOPIC HEMATURIA: Status: ACTIVE | Noted: 2019-02-15

## 2022-07-25 LAB
25(OH)D3 SERPL-MCNC: 45.7 NG/ML (ref 30–100)
ALBUMIN SERPL-MCNC: 3.9 G/DL (ref 3.2–4.6)
ALBUMIN/GLOB SERPL: 1.2 {RATIO} (ref 1.2–3.5)
ALP SERPL-CCNC: 120 U/L (ref 50–136)
ALT SERPL-CCNC: 36 U/L (ref 12–65)
ANION GAP SERPL CALC-SCNC: 4 MMOL/L (ref 7–16)
AST SERPL-CCNC: 29 U/L (ref 15–37)
BASOPHILS # BLD: 0 K/UL (ref 0–0.2)
BASOPHILS NFR BLD: 1 % (ref 0–2)
BILIRUB SERPL-MCNC: 1.1 MG/DL (ref 0.2–1.1)
BUN SERPL-MCNC: 19 MG/DL (ref 8–23)
CALCIUM SERPL-MCNC: 9.5 MG/DL (ref 8.3–10.4)
CHLORIDE SERPL-SCNC: 107 MMOL/L (ref 98–107)
CHOLEST SERPL-MCNC: 178 MG/DL
CO2 SERPL-SCNC: 29 MMOL/L (ref 21–32)
CREAT SERPL-MCNC: 1 MG/DL (ref 0.6–1)
DIFFERENTIAL METHOD BLD: NORMAL
EOSINOPHIL # BLD: 0.1 K/UL (ref 0–0.8)
EOSINOPHIL NFR BLD: 3 % (ref 0.5–7.8)
ERYTHROCYTE [DISTWIDTH] IN BLOOD BY AUTOMATED COUNT: 13.5 % (ref 11.9–14.6)
GLOBULIN SER CALC-MCNC: 3.3 G/DL (ref 2.3–3.5)
GLUCOSE SERPL-MCNC: 100 MG/DL (ref 65–100)
HCT VFR BLD AUTO: 40.2 % (ref 35.8–46.3)
HDLC SERPL-MCNC: 42 MG/DL (ref 40–60)
HDLC SERPL: 4.2 {RATIO}
HGB BLD-MCNC: 12.8 G/DL (ref 11.7–15.4)
IMM GRANULOCYTES # BLD AUTO: 0 K/UL (ref 0–0.5)
IMM GRANULOCYTES NFR BLD AUTO: 0 % (ref 0–5)
LDLC SERPL CALC-MCNC: 86.8 MG/DL
LYMPHOCYTES # BLD: 1.4 K/UL (ref 0.5–4.6)
LYMPHOCYTES NFR BLD: 29 % (ref 13–44)
MCH RBC QN AUTO: 29.7 PG (ref 26.1–32.9)
MCHC RBC AUTO-ENTMCNC: 31.8 G/DL (ref 31.4–35)
MCV RBC AUTO: 93.3 FL (ref 79.6–97.8)
MONOCYTES # BLD: 0.4 K/UL (ref 0.1–1.3)
MONOCYTES NFR BLD: 8 % (ref 4–12)
NEUTS SEG # BLD: 2.9 K/UL (ref 1.7–8.2)
NEUTS SEG NFR BLD: 59 % (ref 43–78)
NRBC # BLD: 0 K/UL (ref 0–0.2)
PLATELET # BLD AUTO: 179 K/UL (ref 150–450)
PMV BLD AUTO: 10.8 FL (ref 9.4–12.3)
POTASSIUM SERPL-SCNC: 3.7 MMOL/L (ref 3.5–5.1)
PROT SERPL-MCNC: 7.2 G/DL (ref 6.3–8.2)
RBC # BLD AUTO: 4.31 M/UL (ref 4.05–5.2)
SODIUM SERPL-SCNC: 140 MMOL/L (ref 136–145)
TRIGL SERPL-MCNC: 246 MG/DL (ref 35–150)
TSH, 3RD GENERATION: 0.48 UIU/ML (ref 0.36–3.74)
VLDLC SERPL CALC-MCNC: 49.2 MG/DL (ref 6–23)
WBC # BLD AUTO: 4.8 K/UL (ref 4.3–11.1)

## 2022-07-25 PROCEDURE — G0439 PPPS, SUBSEQ VISIT: HCPCS | Performed by: NURSE PRACTITIONER

## 2022-07-25 PROCEDURE — 3017F COLORECTAL CA SCREEN DOC REV: CPT | Performed by: NURSE PRACTITIONER

## 2022-07-25 PROCEDURE — 90677 PCV20 VACCINE IM: CPT | Performed by: NURSE PRACTITIONER

## 2022-07-25 PROCEDURE — 1123F ACP DISCUSS/DSCN MKR DOCD: CPT | Performed by: NURSE PRACTITIONER

## 2022-07-25 RX ORDER — BETAMETHASONE DIPROPIONATE 0.5 MG/G
CREAM TOPICAL
COMMUNITY
Start: 2022-06-30

## 2022-07-25 RX ORDER — ESZOPICLONE 2 MG/1
2 TABLET, FILM COATED ORAL NIGHTLY
Qty: 90 TABLET | Refills: 1 | Status: SHIPPED | OUTPATIENT
Start: 2022-07-25 | End: 2022-10-23

## 2022-07-25 RX ORDER — HYDRALAZINE HYDROCHLORIDE 50 MG/1
50 TABLET, FILM COATED ORAL 3 TIMES DAILY
Qty: 270 TABLET | Refills: 3 | Status: SHIPPED | OUTPATIENT
Start: 2022-07-25

## 2022-07-25 ASSESSMENT — LIFESTYLE VARIABLES: HOW OFTEN DO YOU HAVE A DRINK CONTAINING ALCOHOL: NEVER

## 2022-07-25 ASSESSMENT — PATIENT HEALTH QUESTIONNAIRE - PHQ9
1. LITTLE INTEREST OR PLEASURE IN DOING THINGS: 0
SUM OF ALL RESPONSES TO PHQ QUESTIONS 1-9: 0
SUM OF ALL RESPONSES TO PHQ9 QUESTIONS 1 & 2: 0
2. FEELING DOWN, DEPRESSED OR HOPELESS: 0

## 2022-07-25 NOTE — PROGRESS NOTES
Medicare Annual Wellness Visit    Camilla Patel is here for Medicare AWV (Pt here for AWE part one and she is fasting ) and Medication Refill    Assessment & Plan   Medicare annual wellness visit, subsequent  Discussed BMI and healthy weight and diet, weight bearing exercise, smoking avoidance, sun protection and medication compliance. Reviewed appropriate health maintenance screening. The patient was counseled regarding regular monthly SBE, screening procedures and recommended schedules for immunizations, mammography, Pap smears, GI hemoccult testing, colonoscopy and BMD.    Mammo due in October. DEXA normal - due 2026. Colonoscopy up to date. Prevnar 20 today, otherwise immunizations up to date. ACP on file    Essential hypertension  -     hydrALAZINE (APRESOLINE) 50 MG tablet; Take 1 tablet by mouth in the morning and 1 tablet at noon and 1 tablet before bedtime. , Disp-270 tablet, R-3Normal  -     Urinalysis with Reflex to Culture; Future  Stage 3 chronic kidney disease, unspecified whether stage 3a or 3b CKD (HCC)  -     CBC with Auto Differential; Future  -     Comprehensive Metabolic Panel; Future  Gastroesophageal reflux disease, unspecified whether esophagitis present  Acquired hypothyroidism  -     TSH; Future  Hyperlipidemia, unspecified hyperlipidemia type  -     Lipid Panel; Future  Vitamin D deficiency  -     Vitamin D 25 Hydroxy; Future  Ductal carcinoma in situ (DCIS) of breast, unspecified laterality  Malignant neoplasm of left female breast, unspecified estrogen receptor status, unspecified [facility]  Need for prophylactic vaccination against Streptococcus pneumoniae (pneumococcus)  -     Pneumococcal Conjugate PCV20, PF (Prevnar 20)  Insomnia, unspecified type  -     eszopiclone (LUNESTA) 2 MG TABS; Take 1 tablet by mouth nightly for 90 days. , Disp-90 tablet, R-1Normal    Recommendations for Preventive Services Due: see orders and patient instructions/AVS.  Recommended screening schedule for the next 5-10 years is provided to the patient in written form: see Patient Instructions/AVS.     Return for Medicare Annual Wellness Visit in 1 year. Subjective   The following acute and/or chronic problems were also addressed today:    Patient's complete Health Risk Assessment and screening values have been reviewed and are found in Flowsheets. The following problems were reviewed today and where indicated follow up appointments were made and/or referrals ordered. Positive Risk Factor Screenings with Interventions:             General Health and ACP:  General  In general, how would you say your health is?: Very Good  In the past 7 days, have you experienced any of the following: New or Increased Pain, New or Increased Fatigue, Loneliness, Social Isolation, Stress or Anger?: No  Do you get the social and emotional support that you need? :Yes  Do you have a Living Will?: Yes    Advance Directives       Power of  Living Will ACP-Advance Directive ACP-Power of     Not on File Filed on 05/19/17 Filed Not on File          General Health Risk Interventions:  Reports adequate social support (error on original form)    Health Habits/Nutrition:  Physical Activity: Insufficiently Active    Days of Exercise per Week: 1 day    Minutes of Exercise per Session: 10 min     Have you lost any weight without trying in the past 3 months?: No  Body mass index: (!) 52.86  Have you seen the dentist within the past year?: Yes  Health Habits/Nutrition Interventions:  Inadequate physical activity:  patient is not ready to increase his/her physical activity level at this time             Objective   Vitals:    07/25/22 0902   BP: (!) 144/84   Weight: (!) 308 lb (139.7 kg)   Height: 5' 4\" (1.626 m)      Body mass index is 52.87 kg/m².              Allergies   Allergen Reactions    Amoxicillin-Pot Clavulanate Itching     Itching in mouth and ears    Codeine Nausea Only    Hydralazine Other (See Comments) states not allergic to medication. Is presently taking medication    Mirabegron Other (See Comments)     ineffective    Naltrexone-Bupropion Hcl Er Other (See Comments)     lethargy    Nitrofurantoin Itching      itching under arm    Oxybutynin Chloride Other (See Comments)     Made her voiding worse    Penicillins Itching    Rosuvastatin Swelling    Simvastatin Other (See Comments)    Topiramate Other (See Comments)     Felt weird    Trazodone Other (See Comments)     ineffective    Zolpidem Other (See Comments)     Periods of forgetfulness     Prior to Visit Medications    Medication Sig Taking? Authorizing Provider   eszopiclone (LUNESTA) 2 MG TABS Take 1 tablet by mouth nightly for 90 days. Yes NIA Galeana CNP   hydrALAZINE (APRESOLINE) 50 MG tablet Take 1 tablet by mouth in the morning and 1 tablet at noon and 1 tablet before bedtime. Yes NIA Galeana CNP   letrozole ([address] [facility]) 2.5 MG tablet TAKE 1 TABLET DAILY FOR HORMONE RECEPTOR POSITIVE POSTMENOPAUSAL EARLY BREAST CANCER  Patient taking differently: Take 2.5 mg by mouth in the morning. Yes Deangelo Hernadez MD   irbesartan (AVAPRO) 300 MG tablet TAKE 1 TABLET DAILY  Patient taking differently: Take 300 mg by mouth in the morning.  Yes Kasandra Agrawal MD   allopurinol (ZYLOPRIM) 300 MG tablet Take 300 mg by mouth daily Yes Ar Automatic Reconciliation   atorvastatin (LIPITOR) 40 MG tablet Take 40 mg by mouth daily Yes Ar Automatic Reconciliation   Cholecalciferol 50 MCG (2000 UT) CAPS Take 4,000 Units by mouth daily Yes Ar Automatic Reconciliation   levothyroxine (SYNTHROID) 125 MCG tablet Take 125 mcg by mouth every morning (before breakfast) Yes Ar Automatic Reconciliation   Naproxen Sodium 220 MG CAPS Take 220 mg by mouth daily as needed Yes Ar Automatic Reconciliation   omeprazole (PRILOSEC) 40 MG delayed release capsule Take 40 mg by mouth daily Yes Ar Automatic Reconciliation   rOPINIRole (REQUIP) 0.5 MG tablet Take 0.5 mg by mouth in the morning.  Yes Ar Automatic Reconciliation   Verapamil 300 MG CP24 Take 300 capsules by mouth at bedtime Yes Ar Automatic Reconciliation   betamethasone dipropionate 0.05 % cream APPLY TO ARM TWICE DAILY  Historical Provider, MD Merchant (Including outside providers/suppliers regularly involved in providing care):   Patient Care Team:  Gabino Parr MD as PCP - Paula Álvarez MD as PCP - [facility] Empaneled Provider     Reviewed and updated this visit:  Tobacco  Allergies  Meds  Problems  Med Hx  Surg Hx  Soc Hx  Fam Hx

## 2022-07-26 ENCOUNTER — TELEPHONE (OUTPATIENT)
Dept: ONCOLOGY | Age: 67
End: 2022-07-26

## 2022-07-26 LAB
APPEARANCE UR: CLEAR
BACTERIA URNS QL MICRO: NEGATIVE /HPF
BILIRUB UR QL: NEGATIVE
CASTS URNS QL MICRO: ABNORMAL /LPF
COLOR UR: ABNORMAL
EPI CELLS #/AREA URNS HPF: ABNORMAL /HPF
GLUCOSE UR STRIP.AUTO-MCNC: NEGATIVE MG/DL
HGB UR QL STRIP: NEGATIVE
KETONES UR QL STRIP.AUTO: NEGATIVE MG/DL
LEUKOCYTE ESTERASE UR QL STRIP.AUTO: ABNORMAL
MUCOUS THREADS URNS QL MICRO: 0 /LPF
NITRITE UR QL STRIP.AUTO: NEGATIVE
PH UR STRIP: 6.5 [PH] (ref 5–9)
PROT UR STRIP-MCNC: ABNORMAL MG/DL
RBC #/AREA URNS HPF: ABNORMAL /HPF
SP GR UR REFRACTOMETRY: 1.01 (ref 1–1.02)
URINE CULTURE IF INDICATED: ABNORMAL
UROBILINOGEN UR QL STRIP.AUTO: 0.2 EU/DL (ref 0.2–1)
WBC URNS QL MICRO: ABNORMAL /HPF

## 2022-07-26 NOTE — TELEPHONE ENCOUNTER
Chart reviewed. Patient last seen 3/2022.  Message sent to  Osito Prado to please set patient up an appointment with Dr. Jackie Alford with labs  Appointment made for october

## 2022-07-30 PROBLEM — N18.30 STAGE 3 CHRONIC KIDNEY DISEASE (HCC): Status: RESOLVED | Noted: 2022-07-25 | Resolved: 2022-07-30

## 2022-07-30 PROBLEM — C50.912 MALIGNANT NEOPLASM OF LEFT FEMALE BREAST, UNSPECIFIED ESTROGEN RECEPTOR STATUS, UNSPECIFIED SITE OF BREAST (HCC): Status: RESOLVED | Noted: 2022-07-25 | Resolved: 2022-07-30

## 2022-08-01 ENCOUNTER — OFFICE VISIT (OUTPATIENT)
Dept: INTERNAL MEDICINE CLINIC | Facility: CLINIC | Age: 67
End: 2022-08-01
Payer: MEDICARE

## 2022-08-01 VITALS
OXYGEN SATURATION: 97 % | BODY MASS INDEX: 50.02 KG/M2 | SYSTOLIC BLOOD PRESSURE: 136 MMHG | DIASTOLIC BLOOD PRESSURE: 74 MMHG | WEIGHT: 293 LBS | HEIGHT: 64 IN | HEART RATE: 88 BPM

## 2022-08-01 DIAGNOSIS — I10 ESSENTIAL HYPERTENSION: Primary | ICD-10-CM

## 2022-08-01 DIAGNOSIS — G25.81 RESTLESS LEGS: ICD-10-CM

## 2022-08-01 DIAGNOSIS — E03.9 ACQUIRED HYPOTHYROIDISM: ICD-10-CM

## 2022-08-01 DIAGNOSIS — M1A.00X0 IDIOPATHIC CHRONIC GOUT WITHOUT TOPHUS, UNSPECIFIED SITE: ICD-10-CM

## 2022-08-01 DIAGNOSIS — D69.6 THROMBOCYTOPENIA (HCC): ICD-10-CM

## 2022-08-01 DIAGNOSIS — K21.9 GASTROESOPHAGEAL REFLUX DISEASE, UNSPECIFIED WHETHER ESOPHAGITIS PRESENT: ICD-10-CM

## 2022-08-01 DIAGNOSIS — E34.9 ELEVATED SERUM HCG IN FEMALE, NOT PREGNANT: ICD-10-CM

## 2022-08-01 DIAGNOSIS — E34.9 ELEVATED SERUM HCG: ICD-10-CM

## 2022-08-01 DIAGNOSIS — E78.5 HYPERLIPIDEMIA, UNSPECIFIED HYPERLIPIDEMIA TYPE: ICD-10-CM

## 2022-08-01 DIAGNOSIS — M79.672 BILATERAL FOOT PAIN: ICD-10-CM

## 2022-08-01 DIAGNOSIS — M79.671 BILATERAL FOOT PAIN: ICD-10-CM

## 2022-08-01 DIAGNOSIS — E55.9 VITAMIN D DEFICIENCY: ICD-10-CM

## 2022-08-01 DIAGNOSIS — Z92.29 HISTORY OF HEPATITIS B VACCINATION: ICD-10-CM

## 2022-08-01 PROCEDURE — G8428 CUR MEDS NOT DOCUMENT: HCPCS | Performed by: INTERNAL MEDICINE

## 2022-08-01 PROCEDURE — G8399 PT W/DXA RESULTS DOCUMENT: HCPCS | Performed by: INTERNAL MEDICINE

## 2022-08-01 PROCEDURE — 1123F ACP DISCUSS/DSCN MKR DOCD: CPT | Performed by: INTERNAL MEDICINE

## 2022-08-01 PROCEDURE — G9899 SCRN MAM PERF RSLTS DOC: HCPCS | Performed by: INTERNAL MEDICINE

## 2022-08-01 PROCEDURE — 1090F PRES/ABSN URINE INCON ASSESS: CPT | Performed by: INTERNAL MEDICINE

## 2022-08-01 PROCEDURE — 99214 OFFICE O/P EST MOD 30 MIN: CPT | Performed by: INTERNAL MEDICINE

## 2022-08-01 PROCEDURE — G8417 CALC BMI ABV UP PARAM F/U: HCPCS | Performed by: INTERNAL MEDICINE

## 2022-08-01 PROCEDURE — 1036F TOBACCO NON-USER: CPT | Performed by: INTERNAL MEDICINE

## 2022-08-01 PROCEDURE — 3017F COLORECTAL CA SCREEN DOC REV: CPT | Performed by: INTERNAL MEDICINE

## 2022-08-01 RX ORDER — ROPINIROLE 0.5 MG/1
0.5 TABLET, FILM COATED ORAL DAILY
Qty: 90 TABLET | Refills: 3 | Status: SHIPPED | OUTPATIENT
Start: 2022-08-01

## 2022-08-01 RX ORDER — OMEPRAZOLE 40 MG/1
40 CAPSULE, DELAYED RELEASE ORAL DAILY
Qty: 90 CAPSULE | Refills: 3 | Status: SHIPPED | OUTPATIENT
Start: 2022-08-01

## 2022-08-01 RX ORDER — ATORVASTATIN CALCIUM 40 MG/1
40 TABLET, FILM COATED ORAL DAILY
Qty: 90 TABLET | Refills: 3 | Status: SHIPPED | OUTPATIENT
Start: 2022-08-01

## 2022-08-01 RX ORDER — ALLOPURINOL 300 MG/1
300 TABLET ORAL DAILY
Qty: 90 TABLET | Refills: 3 | Status: SHIPPED | OUTPATIENT
Start: 2022-08-01

## 2022-08-01 RX ORDER — LEVOTHYROXINE SODIUM 0.12 MG/1
125 TABLET ORAL
Qty: 90 TABLET | Refills: 3 | Status: SHIPPED | OUTPATIENT
Start: 2022-08-01

## 2022-08-01 RX ORDER — VERAPAMIL HYDROCHLORIDE 300 MG/1
300 CAPSULE, EXTENDED RELEASE ORAL NIGHTLY
Qty: 90 CAPSULE | Refills: 3 | Status: SHIPPED | OUTPATIENT
Start: 2022-08-01

## 2022-08-01 SDOH — HEALTH STABILITY: PHYSICAL HEALTH: ON AVERAGE, HOW MANY MINUTES DO YOU ENGAGE IN EXERCISE AT THIS LEVEL?: 0 MIN

## 2022-08-01 SDOH — HEALTH STABILITY: PHYSICAL HEALTH

## 2022-08-01 NOTE — PROGRESS NOTES
Comments)     ineffective    Naltrexone-Bupropion Hcl Er Other (See Comments)     lethargy    Nitrofurantoin Itching      itching under arm    Oxybutynin Chloride Other (See Comments)     Made her voiding worse    Penicillins Itching    Rosuvastatin Swelling    Simvastatin Other (See Comments)    Topiramate Other (See Comments)     Felt weird    Trazodone Other (See Comments)     ineffective    Zolpidem Other (See Comments)     Periods of forgetfulness     Patient Active Problem List   Diagnosis    Hyperlipidemia    Status post total replacement of right hip    Inadequate sleep hygiene    Increased urinary frequency    Morbid obesity with BMI of 45.0-49.9, adult ([facility]    Family history of colon cancer    PATRICK (obstructive sleep apnea)    Chronic renal insufficiency, stage 2 (mild)    Bilateral lower abdominal discomfort    Vitamin D deficiency    History of hepatitis B vaccination    Microscopic hematuria    Status post total left knee replacement    Chronic bilateral low back pain without sciatica    Osteopenia    Elevated serum hCG in female, not pregnant    Encounter for screening for other viral diseases    Vasomotor rhinitis    Acute drug-induced gout involving toe of left foot    Thrombocytopenia (HCC)    Restless legs    Chronic bilateral lower abdominal pain    Gastroesophageal reflux disease    Arthralgia    Hx of migraines    Hypoxemia    Acquired hypothyroidism    Allergic rhinitis due to allergen    Chronic insomnia    Elevated serum hCG    At risk for bone density loss    Aromatase inhibitor use    Ductal carcinoma in situ (DCIS) of breast    Penicillin allergy    Chronic pain of left knee    Myalgia    Essential hypertension    Idiopathic chronic gout of multiple sites without tophus    Insomnia    Allergic rhinitis due to pollen    Chronic maxillary sinusitis    Dysfunction of right eustachian tube         Review of Systems   Musculoskeletal:  Positive for arthralgias.         Foot pain OBJECTIVE:  /74 (Site: Left Upper Arm, Position: Sitting, Cuff Size: Large Adult)   Pulse 88   Ht 5' 4\" (1.626 m)   Wt (!) 306 lb (138.8 kg)   SpO2 97%   BMI 52.52 kg/m²      Physical Exam  Constitutional:       Appearance: Normal appearance. HENT:      Head: Normocephalic. Right Ear: Tympanic membrane normal.      Left Ear: Tympanic membrane normal.      Mouth/Throat:      Mouth: Mucous membranes are moist.      Pharynx: Oropharynx is clear. Eyes:      Extraocular Movements: Extraocular movements intact. Pupils: Pupils are equal, round, and reactive to light. Neck:      Thyroid: No thyromegaly. Vascular: No carotid bruit. Cardiovascular:      Rate and Rhythm: Normal rate and regular rhythm. Pulses: Normal pulses. Heart sounds: No murmur heard. Pulmonary:      Effort: Pulmonary effort is normal.      Breath sounds: Normal breath sounds. Chest:   Breasts:     Right: No mass, axillary adenopathy or supraclavicular adenopathy. Left: No mass, axillary adenopathy or supraclavicular adenopathy. Comments: Scar left lateral breast  Abdominal:      General: Abdomen is flat. Bowel sounds are normal.      Palpations: Abdomen is soft. There is no hepatomegaly or splenomegaly. Musculoskeletal:         General: No swelling. Cervical back: Normal range of motion. Right lower leg: No edema. Left lower leg: No edema. Lymphadenopathy:      Upper Body:      Right upper body: No supraclavicular or axillary adenopathy. Left upper body: No supraclavicular or axillary adenopathy. Skin:     General: Skin is warm and dry. Neurological:      General: No focal deficit present. Mental Status: She is alert and oriented to person, place, and time.       Gait: Gait normal.      Deep Tendon Reflexes: Reflexes normal.   Psychiatric:         Mood and Affect: Mood normal.         Behavior: Behavior normal.        Medical problems and test results were reviewed with the patient today.      Recent Results (from the past 672 hour(s))   Urinalysis with Reflex to Culture    Collection Time: 07/25/22  9:54 AM    Specimen: Urine   Result Value Ref Range    Color, UA YELLOW/STRAW      Appearance CLEAR      Specific Gravity, UA 1.012 1.001 - 1.023      pH, Urine 6.5 5.0 - 9.0      Protein, UA TRACE (A) Negative mg/dL    Glucose, UA Negative mg/dL    Ketones, Urine Negative Negative mg/dL    Bilirubin Urine Negative Negative      Blood, Urine Negative Negative      Urobilinogen, Urine 0.2 0.2 - 1.0 EU/dL    Nitrite, Urine Negative Negative      Leukocyte Esterase, Urine TRACE (A) Negative      Urine Culture if Indicated CULTURE NOT INDICATED BY UA RESULT      WBC, UA 0-4 (A) NORMAL /hpf    RBC, UA 0-5 (A) NORMAL /hpf    BACTERIA, URINE Negative (A) NORMAL /hpf    Epithelial Cells UA 0-5 (A) NORMAL /hpf    Casts 0-2 (A) NORMAL /lpf    Mucus, UA 0 0 /lpf   Vitamin D 25 Hydroxy    Collection Time: 07/25/22  9:54 AM   Result Value Ref Range    Vit D, 25-Hydroxy 45.7 30.0 - 100.0 ng/mL   TSH    Collection Time: 07/25/22  9:54 AM   Result Value Ref Range    TSH, 3RD GENERATION 0.484 0.358 - 3.740 uIU/mL   Lipid Panel    Collection Time: 07/25/22  9:54 AM   Result Value Ref Range    Cholesterol, Total 178 <200 MG/DL    Triglycerides 246 (H) 35 - 150 MG/DL    HDL 42 40 - 60 MG/DL    LDL Calculated 86.8 <100 MG/DL    VLDL Cholesterol Calculated 49.2 (H) 6.0 - 23.0 MG/DL    Chol/HDL Ratio 4.2     Comprehensive Metabolic Panel    Collection Time: 07/25/22  9:54 AM   Result Value Ref Range    Sodium 140 136 - 145 mmol/L    Potassium 3.7 3.5 - 5.1 mmol/L    Chloride 107 98 - 107 mmol/L    CO2 29 21 - 32 mmol/L    Anion Gap 4 (L) 7 - 16 mmol/L    Glucose 100 65 - 100 mg/dL    BUN 19 8 - 23 MG/DL    Creatinine 1.00 0.6 - 1.0 MG/DL    GFR African American >60 >60 ml/min/1.73m2    GFR Non- 59 (L) >60 ml/min/1.73m2    Calcium 9.5 8.3 - 10.4 MG/DL    Total Bilirubin 1.1 0.2 - 1.1 MG/DL    ALT 36 12 - 65 U/L    AST 29 15 - 37 U/L    Alk Phosphatase 120 50 - 136 U/L    Total Protein 7.2 6.3 - 8.2 g/dL    Albumin 3.9 3.2 - 4.6 g/dL    Globulin 3.3 2.3 - 3.5 g/dL    Albumin/Globulin Ratio 1.2 1.2 - 3.5     CBC with Auto Differential    Collection Time: 07/25/22  9:54 AM   Result Value Ref Range    WBC 4.8 4.3 - 11.1 K/uL    RBC 4.31 4.05 - 5.2 M/uL    Hemoglobin 12.8 11.7 - 15.4 g/dL    Hematocrit 40.2 35.8 - 46.3 %    MCV 93.3 79.6 - 97.8 FL    MCH 29.7 26.1 - 32.9 PG    MCHC 31.8 31.4 - 35.0 g/dL    RDW 13.5 11.9 - 14.6 %    Platelets 987 595 - 915 K/uL    MPV 10.8 9.4 - 12.3 FL    nRBC 0.00 0.0 - 0.2 K/uL    Differential Type AUTOMATED      Seg Neutrophils 59 43 - 78 %    Lymphocytes 29 13 - 44 %    Monocytes 8 4.0 - 12.0 %    Eosinophils % 3 0.5 - 7.8 %    Basophils 1 0.0 - 2.0 %    Immature Granulocytes 0 0.0 - 5.0 %    Segs Absolute 2.9 1.7 - 8.2 K/UL    Absolute Lymph # 1.4 0.5 - 4.6 K/UL    Absolute Mono # 0.4 0.1 - 1.3 K/UL    Absolute Eos # 0.1 0.0 - 0.8 K/UL    Basophils Absolute 0.0 0.0 - 0.2 K/UL    Absolute Immature Granulocyte 0.0 0.0 - 0.5 K/UL         ASSESSMENT and PLAN    1. Essential hypertension  The following orders have not been finalized: -     Verapamil 300 MG CP24  2. Vitamin D deficiency  3. Acquired hypothyroidism  The following orders have not been finalized:  -     levothyroxine (SYNTHROID) 125 MCG tablet  4. Hyperlipidemia, unspecified hyperlipidemia type  The following orders have not been finalized:  -     atorvastatin (LIPITOR) 40 MG tablet  5. Stage 3 chronic kidney disease, unspecified whether stage 3a or 3b CKD ([facility] [address]  6. Gastroesophageal reflux disease, unspecified whether esophagitis present  The following orders have not been finalized:  -     omeprazole (PRILOSEC) 40 MG delayed release capsule  7. Chronic renal insufficiency, stage 2 (mild)  8. History of hepatitis B vaccination  9. Elevated serum hCG in female, not pregnant  10.  [facility]  11. Elevated serum hCG  12. Restless legs  The following orders have not been finalized:  -     rOPINIRole (REQUIP) 0.5 MG tablet  13.  Acute drug-induced gout involving toe of left foot  The following orders have not been finalized:  -     allopurinol (ZYLOPRIM) 300 MG tablet       She will work on more exercise with water aerobics   Refer to podiatry

## 2022-08-05 ENCOUNTER — TELEPHONE (OUTPATIENT)
Dept: ONCOLOGY | Age: 67
End: 2022-08-05

## 2022-08-05 DIAGNOSIS — D05.12 DUCTAL CARCINOMA IN SITU (DCIS) OF LEFT BREAST: Primary | ICD-10-CM

## 2022-08-05 DIAGNOSIS — Z12.31 SCREENING MAMMOGRAM FOR HIGH-RISK PATIENT: ICD-10-CM

## 2022-08-05 NOTE — TELEPHONE ENCOUNTER
Pt called requesting the mammogram orders be changed. Stated do to the order being put in as a diagnostic insurance wont cover for it.

## 2022-10-05 ENCOUNTER — HOSPITAL ENCOUNTER (OUTPATIENT)
Dept: MAMMOGRAPHY | Age: 67
Discharge: HOME | End: 2022-10-08
Payer: MEDICARE

## 2022-10-05 DIAGNOSIS — Z12.31 SCREENING MAMMOGRAM FOR HIGH-RISK PATIENT: ICD-10-CM

## 2022-10-05 DIAGNOSIS — D05.12 DUCTAL CARCINOMA IN SITU (DCIS) OF LEFT BREAST: ICD-10-CM

## 2022-10-05 PROCEDURE — 77063 BREAST TOMOSYNTHESIS BI: CPT

## 2022-10-13 DIAGNOSIS — Z79.811 LONG TERM (CURRENT) USE OF AROMATASE INHIBITORS: Primary | ICD-10-CM

## 2022-10-13 NOTE — PROGRESS NOTES
[address] [place] [address] Hematology and Oncology: Established patient - follow up     Chief Complaint   Patient presents with    Follow-up     Reason for Referral: Ductal carcinoma in situ  (DCIS) of left breast   Referring Provider:  Angela Person MD   Primary Care Provider: Osito Heller MD   Family History of Cancer/Hematologic Disorders: Family history significant for father with prostate and pancreatic cancer. Presenting Symptoms: Abnormal screening mammogram     History of Present Illness:  Ms. Mamie Velazquez is a 79 y.o. female who presents today for follow up regarding left breast DCIS. The past medical history is significant for sleep apnea, osteopenia, chronic insomnia, HLD,  GERD, several orthopedic surgeries, DELLA/BSO, adenoidectomy, tonsillectomy, and breast augmentation. She initially presented for a routine bilateral screening mammogram on 11/23/20 which identified asymmetries in the left breast.  Further evaluation with  digital diagnostic left breast mammogram and left breast ultrasound on 12/3/20 confirmed a 5-6 mm solid lesion at the 12:00 position in the left breast.  US guided bx of left breast from 12/8/20 c/w ER 96%/NH 92% positive, low grade, cribriform and papillary  type, ductal carcinoma in situ with microcalcifications focally present, associated with DCIS. She was referred to sx and saw Dr Gris Hayes on 64/38/76. She was assessed with signs and symptoms consistent with small volume left breast DCIS and recommended  for wire localized left breast lumpectomy. Patient opted to proceed with surgical recommendations, and on 1/8/21, she underwent wire localized lumpectomy. Pathology from the left breast lumpectomy revealed ductal carcinoma in situ, intermediate grade,  cribriform and micropapillary types, 4 mm in greatest dimension, margins uninvolved, within 3 mm of the lateral margin; changes consistent with prior biopsy site, including fat necrosis; and fibrocystic mastopathy.   The left breast superficial/anterior  revealed benign fibroadipose breast tissue. Ms. Robyn Hayden is recovering well postoperatively and now presents to [facility], per surgery, for oncology evaluation and treatment of left breast DCIS. S/p XRT       She is here today for follow up on Letrozole. Doing ok but continues to have joint aches and wishes to try alternate AI - will Rx anastrozole and re-assess. Mammo in Oct [facility]. Will p/w mR in ~April. Wt at 290 from 306 - encouraged to continue. DEXA in 3/2023  Cr at 1.1 and she will increase fluids. No s/s of infection. No SOB/CP or resp/GI concerns. Chronological Events:   1990 breast augmentation    2009 hysterectomy    11/23/20             12/3/20 DIAGNOSTIC MAMMOGRAM LEFT BREAST WITH SELENE SYNTHESIS 12/3/20            ULTRASOUND LEFT BREAST 12/3/20            SURGICAL PATHOLOGY REPORT 12/8/20 1/8/21 SURGICAL PATHOLOGY A: \"LEFT BREAST LUMPECTOMY\": DUCTAL CARCINOMA IN SITU, INTERMEDIATE GRADE, CRIBRIFORM AND MICROPAPILLARY TYPES, 4  MM IN GREATEST DIMENSION, MARGINS UNINVOLVED, WITHIN 3 MM OF THE LATERAL MARGIN; CHANGES CONSISTENT WITH PRIOR BIOPSY SITE, INCLUDING FAT NECROSIS; FIBROCYSTIC MASTOPATHY. B: \"LEFT BREAST SUPERFICIAL/ANTERIOR\": BENIGN FIBROADIPOSE BREAST TISSUE.   1/14/21 tumor board presentation    2/5/21 heme/onc consultation    3/1/21 DEXA - normal    3/24-4/2/21 XRT    4/16/21 FU doing well; refer to cards to optimize BP prior to starting AI. Follow-up with cardiology   5/28/2021 here for follow-up and doing well on letrozole. Rx 90-day supply of letrozole. Plan for colonoscopy in August.   8/27/21 FU - here for FU, doing great. Plts improved to 133k--no bleeding. Previous workup for TCP negative. No breast concerns, tolerating Letrozole well. 10/1/21 mammo - New treatment related changes of the left breast as described above. No clearly suspicious changes are seen at the level of the lumpectomy bed.  No evolving worrisome changes are otherwise seen of either breast.   12/1/21 FU on letrozole   3/7/22 FU on Letrozole. Doing okay, worse joint aches but still wishes to c/w letrozole for now, discussed different options as above. MRI post XRT due 10/2022. Mammo also due around this time and will discuss with Dr. Meek Joyner next visit regarding  timing of future imaging. F/u PCP for BP.      10/2022 FU. Will switch to anastrozole to see if SE profile better for her; mammogram [facility] in Oct; plan MR in April, DEXA in March       Family History   Problem Relation Age of Onset    Hypertension Sister     Stroke Mother     Dementia Mother     Heart Disease Mother         CHF    Diabetes Paternal Aunt     Hypertension Father     Sleep Apnea Father     Cancer Father         prostate, pancreatic    Diabetes Father         glucose intolerance    Elevated Lipids Sister       Social History     Socioeconomic History    Marital status:      Spouse name: None    Number of children: None    Years of education: None    Highest education level: None   Tobacco Use    Smoking status: Never    Smokeless tobacco: Never   Vaping Use    Vaping Use: Never used   Substance and Sexual Activity    Alcohol use: No    Drug use: No   Social History Narrative    , walking about a mile and a half in the mornings  She quit with the machines and then stopped walking    She has no pets. Currently drinks no caffeine. She is a retired teacher for  through second grade. Social Determinants of Health     Physical Activity: Inactive    Days of Exercise per Week: 1 day    Minutes of Exercise per Session: 0 min        Review of Systems   Constitutional:  Negative for appetite change, chills, diaphoresis, fatigue, fever and unexpected weight change. HENT:   Negative for hearing loss, mouth sores, nosebleeds, sore throat, trouble swallowing and voice change. Eyes:  Negative for icterus.    Respiratory:  Negative for chest tightness, hemoptysis, shortness of breath and wheezing. Cardiovascular:  Negative for chest pain, leg swelling and palpitations. Gastrointestinal:  Negative for abdominal distention, abdominal pain, blood in stool, constipation, diarrhea, nausea and vomiting. Endocrine: Negative for hot flashes. Genitourinary:  Negative for difficulty urinating, frequency, vaginal bleeding and vaginal discharge. Musculoskeletal:  Positive for arthralgias. Negative for flank pain, gait problem and myalgias. Skin:  Negative for itching, rash and wound. Neurological:  Negative for dizziness, extremity weakness, gait problem, headaches and numbness. Psychiatric/Behavioral:  Negative for confusion and depression. The patient is not nervous/anxious. Allergies   Allergen Reactions    Amoxicillin-Pot Clavulanate Itching     Itching in mouth and ears    Codeine Nausea Only    Hydralazine Other (See Comments)     states not allergic to medication.  Is presently taking medication and tolerating well    Mirabegron Other (See Comments)     ineffective    Naltrexone-Bupropion Hcl Er Other (See Comments)     lethargy    Nitrofurantoin Itching      itching under arm    Oxybutynin Chloride Other (See Comments)     Made her voiding worse    Penicillins Itching    Rosuvastatin Swelling    Simvastatin Other (See Comments)    Topiramate Other (See Comments)     Felt weird    Trazodone Other (See Comments)     ineffective    Zolpidem Other (See Comments)     Periods of forgetfulness     Past Medical History:   Diagnosis Date    Breast cancer ([facility] 01/08/2021    DCIS S/P L LUMPECTOMY    Cancer ([facility] 01/2021    left breast cancer, radiation completed, lumpectomy    Chronic insomnia     GERD (gastroesophageal reflux disease)     controlled with med    Gout     HLD (hyperlipidemia)     Hypertension     controlled with meds    Menopause     Nausea & vomiting     nausea with some procedures, zofran relieves    Osteopenia     Radiation therapy complication     Restless leg     Sleep apnea     uses CPAP at hs    Status post total left knee replacement 4/21/2017    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    Thyroid disease     managed with med     Past Surgical History:   Procedure Laterality Date    BREAST BIOPSY N/A 1/8/2021    DCIS-LEFT BREAST NDL LOCALIZED LUMPECTOMY PREOP 0800/ LOC 0930/ SURGERY 1130 performed by David Sun MD at [address] [facility] LUMPECTOMY Left 01/2021    BREAST LUMPECTOMY  01/2021    BREAST SURGERY  1990    BUNIONECTOMY Bilateral 1980    COLONOSCOPY  8/5/2021         COLONOSCOPY N/A 8/5/2021    COLONOSCOPY/ 52 performed by Oscar Lopez MD at [address] [place] [address] ARTHROSCOPY Left 10/2012    ORTHOPEDIC SURGERY Bilateral 2010    neuroma's removed from feet    DELLA AND BSO (CERVIX REMOVED)  2009    r/t large fibroids; Dr. Huey De La O Right 11/2016    TOTAL HIP ARTHROPLASTY Right     TOTAL KNEE ARTHROPLASTY Left 04/01/2017    Dr Richard Kinney  8/5/2021         US BREAST BIOPSY NEEDLE ADDITIONAL LEFT Left 12/8/2020    US BREAST BIOPSY NEEDLE ADDITIONAL LEFT [facility] AMB HISTORICAL     Current Outpatient Medications   Medication Sig Dispense Refill    anastrozole (ARIMIDEX) 1 MG tablet Take 1 tablet by mouth daily 90 tablet 3    Verapamil 300 MG CP24 Take 300 mg by mouth at bedtime 90 capsule 3    atorvastatin (LIPITOR) 40 MG tablet Take 1 tablet by mouth in the morning. 90 tablet 3    rOPINIRole (REQUIP) 0.5 MG tablet Take 1 tablet by mouth in the morning. 90 tablet 3    omeprazole (PRILOSEC) 40 MG delayed release capsule Take 1 capsule by mouth in the morning. 90 capsule 3    allopurinol (ZYLOPRIM) 300 MG tablet Take 1 tablet by mouth in the morning.  90 tablet 3    levothyroxine (SYNTHROID) 125 MCG tablet Take 1 tablet by mouth every morning (before breakfast) 90 tablet 3 hydrALAZINE (APRESOLINE) 50 MG tablet Take 1 tablet by mouth in the morning and 1 tablet at noon and 1 tablet before bedtime. 270 tablet 3    irbesartan (AVAPRO) 300 MG tablet TAKE 1 TABLET DAILY (Patient taking differently: Take 300 mg by mouth daily) 90 tablet 3    Cholecalciferol 50 MCG (2000 UT) CAPS Take 4,000 Units by mouth daily      Naproxen Sodium 220 MG CAPS Take 220 mg by mouth daily as needed (Patient not taking: Reported on 10/24/2022)      Eszopiclone (LUNESTA PO) Take by mouth      betamethasone dipropionate 0.05 % cream APPLY TO ARM TWICE DAILY (Patient not taking: No sig reported)       No current facility-administered medications for this visit. No flowsheet data found. OBJECTIVE:  /85 (Site: Left Lower Arm, Position: Standing, Cuff Size: Large Adult)   Pulse 92   Temp 98.4 °F (36.9 °C) (Oral)   Resp 16   Ht 5' 4\" (1.626 m)   Wt 290 lb (131.5 kg)   SpO2 93%   BMI 49.78 kg/m²       ECOG PERFORMANCE STATUS - 1- Restricted in physically strenuous activity but ambulatory and able to carry out work of a light or sedentary nature such as light house work, office work. Pain - 0 - No pain/10. None/Minimal pain - not affecting QOL     Fatigue - No flowsheet data found. Distress - No flowsheet data found. Physical Exam  Vitals reviewed. Exam conducted with a chaperone present. Constitutional:       General: She is not in acute distress. Appearance: Normal appearance. She is not ill-appearing or toxic-appearing. HENT:      Head: Normocephalic and atraumatic. Nose: Nose normal.      Mouth/Throat:      Mouth: Mucous membranes are moist.   Eyes:      General: No scleral icterus. Extraocular Movements: Extraocular movements intact. Conjunctiva/sclera: Conjunctivae normal.      Pupils: Pupils are equal, round, and reactive to light. Cardiovascular:      Rate and Rhythm: Normal rate and regular rhythm. Heart sounds: No murmur heard.   Pulmonary: Effort: Pulmonary effort is normal. No respiratory distress. Breath sounds: Normal breath sounds. No wheezing or rales. Abdominal:      General: There is no distension. Palpations: Abdomen is soft. Tenderness: There is no abdominal tenderness. Musculoskeletal:         General: Normal range of motion. Cervical back: Normal range of motion. Right lower leg: No edema. Left lower leg: No edema. Lymphadenopathy:      Cervical: No cervical adenopathy. Upper Body:      Right upper body: No supraclavicular or axillary adenopathy. Left upper body: No supraclavicular or axillary adenopathy. Skin:     General: Skin is warm and dry. Coloration: Skin is not jaundiced or pale. Findings: No rash. Neurological:      General: No focal deficit present. Mental Status: She is alert and oriented to person, place, and time. Gait: Gait normal.   Psychiatric:         Behavior: Behavior normal.         Thought Content:  Thought content normal.        Labs:  Recent Results (from the past 168 hour(s))   Comprehensive Metabolic Panel    Collection Time: 10/28/22  8:05 AM   Result Value Ref Range    Sodium 139 133 - 143 mmol/L    Potassium 3.7 3.5 - 5.1 mmol/L    Chloride 106 101 - 110 mmol/L    CO2 28 21 - 32 mmol/L    Anion Gap 5 2 - 11 mmol/L    Glucose 108 (H) 65 - 100 mg/dL    BUN 21 8 - 23 MG/DL    Creatinine 1.00 0.6 - 1.0 MG/DL    Est, Glom Filt Rate >60 >60 ml/min/1.73m2    Calcium 9.6 8.3 - 10.4 MG/DL    Total Bilirubin 0.7 0.2 - 1.1 MG/DL    ALT 46 12 - 65 U/L    AST 27 15 - 37 U/L    Alk Phosphatase 112 50 - 136 U/L    Total Protein 7.1 6.3 - 8.2 g/dL    Albumin 3.9 3.2 - 4.6 g/dL    Globulin 3.2 2.8 - 4.5 g/dL    Albumin/Globulin Ratio 1.2 0.4 - 1.6     CBC with Auto Differential    Collection Time: 10/28/22  8:05 AM   Result Value Ref Range    WBC 5.7 4.3 - 11.1 K/uL    RBC 4.49 4.05 - 5.2 M/uL    Hemoglobin 13.1 11.7 - 15.4 g/dL    Hematocrit 41.7 35.8 - 46.3 % MCV 92.9 82 - 102 FL    MCH 29.2 26.1 - 32.9 PG    MCHC 31.4 31.4 - 35.0 g/dL    RDW 13.6 11.9 - 14.6 %    Platelets 349 307 - 461 K/uL    MPV 11.1 9.4 - 12.3 FL    nRBC 0.00 0.0 - 0.2 K/uL    Differential Type AUTOMATED      Seg Neutrophils 59 43 - 78 %    Lymphocytes 29 13 - 44 %    Monocytes 8 4.0 - 12.0 %    Eosinophils % 3 0.5 - 7.8 %    Basophils 1 0.0 - 2.0 %    Immature Granulocytes 0 0.0 - 5.0 %    Segs Absolute 3.4 1.7 - 8.2 K/UL    Absolute Lymph # 1.6 0.5 - 4.6 K/UL    Absolute Mono # 0.5 0.1 - 1.3 K/UL    Absolute Eos # 0.2 0.0 - 0.8 K/UL    Basophils Absolute 0.1 0.0 - 0.2 K/UL    Absolute Immature Granulocyte 0.0 0.0 - 0.5 K/UL       Imaging: reviewed      PATHOLOGY:                          ASSESSMENT:     Diagnosis Orders   1. Long term (current) use of aromatase inhibitors  CBC with Auto Differential    Comprehensive Metabolic Panel    DEXA BONE DENSITY AXIAL SKELETON      2. Ductal carcinoma in situ (DCIS) of breast, unspecified laterality        3. At high risk for breast cancer             Ms. Robyn Hayden is here for FU of DCIS      1. DCIS, left breast, intermediate grade, 4mm, ER96/PR92% s/p bx and subsequent lumpectomy - pTis    - curative intent of tx discussed with pt    - s/p XRT 3-4/2021    - 4/2021 started letrozole   - 10/2022 switch to anastrozole     - here today for follow up on Letrozole. Doing ok but continues to have joint aches and wishes to try alternate AI - will Rx anastrozole and re-assess.    - Mammo in Oct [facility]. Post XRT MR due now by dates, but just had mammogram and wishes to spread out her breast imaging. After discussion, she wishes to p/w MRI in April.    - ideal body weight goals - Wt at 290 from 306 - encouraged to continue.    - DEXA in 3/2023    - Cr at 1.1 and she will increase fluids.      - Previously, Educated how to perform SBE monthly; has not been doing these regularly    - HTN - managed by PCP       2.   Surveillance   -H&P every 3-6 months for first 3 years, then every 6-12 months for the next 2 years and then annually. Regular GYN follow-up is recommended. - DEXA q2 yrs; 3/2021 - normal, rec vit D/Ca++, DEXA due 3/2023.     - hx of elevated Hcg - s/p hysterectomy; rechecked and low          RTC 3-5mo or sooner as needed       Lab studies and imaging studies were personally reviewed. Pertinent old records were reviewed. MDM  Number of Diagnoses or Management Options  At high risk for breast cancer: established, improving  Ductal carcinoma in situ (DCIS) of breast, unspecified laterality: established, improving  Long term (current) use of aromatase inhibitors: established, worsening     Amount and/or Complexity of Data Reviewed  Clinical lab tests: ordered and reviewed  Tests in the radiology section of CPT®: ordered and reviewed  Tests in the medicine section of CPT®: ordered  Review and summarize past medical records: yes  Independent visualization of images, tracings, or specimens: yes    Risk of Complications, Morbidity, and/or Mortality  Presenting problems: moderate  Diagnostic procedures: moderate  Management options: moderate        Lab studies and imaging studies were personally reviewed. Pertinent old records were reviewed. Historical:    - Patients with DCIS undergo local treatment with either mastectomy or BCT followed by adjuvant radiation (unless low-risk disease). The decision  to administer endocrine therapy for risk reduction of subsequent cancers depends on choice of local therapy and receptor status. Five years of endocrine therapy is offered to women with ER/IN positive disease who undergo unilateral mastectomy or BCT. Although not routinely utilized, some women with ER/IN negative disease may choose to proceed with endocrine therapy to prevent new ER/IN positive contra/ipsilateral breast cancer. There is no role for adjuvant endocrine therapy for women who undergo  bilateral mastectomies.   Patients found to have invasive or microinvasive disease at time of surgery are managed depending on final staging.     - Reviewed role of endocrine therapy. Will need BP control prior to start of endocrine therapy. Will refer to cardiology for workup (on verapamil, hydralazine and irbesartan) with continued uncontrolled  BP. Risks of CVA/MI reviewed. She knows not to start med until BP well controlled as letrozole can increase her BP further. She VU. - The primary role of systemic therapy in DCIS is risk reduction of invasive breast cancer in ipsi/contralateral breast.  Chemotherapy plays no role in management of DCIS. Tamoxifen is approved  for adjuvant therapy to prevent invasive breast cancer recurrence in women with DCIS, but AI is also an acceptable option. Chemoprevention does not prevent ER negative disease. Side effects include  rise in BP, increased risk of MI/CVA, endometrial cancer, thromboembolic events, fatigue, joint stiffness and arthritis, hot flashes and mood changes to name a few. Printed info given to pt for her reference. -BP under good control per patient; continue to follow-up with cardiology per their recommendations, felt to be white coat syndrome and she kept a log for about 2 months. - today, she's here for FU. Doing well on letrozole and wishes to continue. Joint aches slightly worse than previous. Discussed that we could switch to a different AI if desired, for now she wishes to continue with Letrozole. - plan to eventually transition to mammo/MR staggered q6mo. 3. Thrombocytopenia - new onset - currently resolved    - med review: hydralazine can cause blood dyscrasias, atorvastatin can cause thrombocytopenia, Prilosec can cause blood dyscrasias, Purinol can cause thrombocytopenia, irbesartan can cause thrombocytopenia - pt not to stop any medications on her own.    Can review alternate options with prescribing MDs.     - smear negative previously and no nutritional deficiencies (borderline low Vitamin D, on oral Vitamin D). All questions were asked and answered to the best of my ability. The patient verbalized understanding and agrees with the plan above.             [facility] Sa[facility] Precise) Dominique Eagle, [address] [place] [address] Hematology and Oncology  [address] [place] [address]  Office : [phone]  Fax : [phone]

## 2022-10-19 ENCOUNTER — OFFICE VISIT (OUTPATIENT)
Dept: ONCOLOGY | Age: 67
End: 2022-10-19
Payer: MEDICARE

## 2022-10-19 ENCOUNTER — HOSPITAL ENCOUNTER (OUTPATIENT)
Dept: LAB | Age: 67
Discharge: HOME | End: 2022-10-22
Payer: MEDICARE

## 2022-10-19 VITALS
TEMPERATURE: 98.4 F | HEIGHT: 64 IN | BODY MASS INDEX: 49.51 KG/M2 | SYSTOLIC BLOOD PRESSURE: 139 MMHG | WEIGHT: 290 LBS | HEART RATE: 92 BPM | DIASTOLIC BLOOD PRESSURE: 85 MMHG | RESPIRATION RATE: 16 BRPM | OXYGEN SATURATION: 93 %

## 2022-10-19 DIAGNOSIS — Z91.89 AT HIGH RISK FOR BREAST CANCER: ICD-10-CM

## 2022-10-19 DIAGNOSIS — Z79.811 LONG TERM (CURRENT) USE OF AROMATASE INHIBITORS: Primary | ICD-10-CM

## 2022-10-19 DIAGNOSIS — D05.10 DUCTAL CARCINOMA IN SITU (DCIS) OF BREAST, UNSPECIFIED LATERALITY: ICD-10-CM

## 2022-10-19 DIAGNOSIS — Z79.811 LONG TERM (CURRENT) USE OF AROMATASE INHIBITORS: ICD-10-CM

## 2022-10-19 LAB
ALBUMIN SERPL-MCNC: 4.1 G/DL (ref 3.2–4.6)
ALBUMIN/GLOB SERPL: 1.1 {RATIO} (ref 0.4–1.6)
ALP SERPL-CCNC: 123 U/L (ref 50–136)
ALT SERPL-CCNC: 38 U/L (ref 12–65)
ANION GAP SERPL CALC-SCNC: 6 MMOL/L (ref 2–11)
AST SERPL-CCNC: 22 U/L (ref 15–37)
BASOPHILS # BLD: 0.1 K/UL (ref 0–0.2)
BASOPHILS NFR BLD: 1 % (ref 0–2)
BILIRUB SERPL-MCNC: 0.8 MG/DL (ref 0.2–1.1)
BUN SERPL-MCNC: 21 MG/DL (ref 8–23)
CALCIUM SERPL-MCNC: 10.1 MG/DL (ref 8.3–10.4)
CHLORIDE SERPL-SCNC: 106 MMOL/L (ref 101–110)
CO2 SERPL-SCNC: 27 MMOL/L (ref 21–32)
CREAT SERPL-MCNC: 1.1 MG/DL (ref 0.6–1)
DIFFERENTIAL METHOD BLD: NORMAL
EOSINOPHIL # BLD: 0.1 K/UL (ref 0–0.8)
EOSINOPHIL NFR BLD: 2 % (ref 0.5–7.8)
ERYTHROCYTE [DISTWIDTH] IN BLOOD BY AUTOMATED COUNT: 13.8 % (ref 11.9–14.6)
GLOBULIN SER CALC-MCNC: 3.9 G/DL (ref 2.8–4.5)
GLUCOSE SERPL-MCNC: 97 MG/DL (ref 65–100)
HCT VFR BLD AUTO: 41.4 % (ref 35.8–46.3)
HGB BLD-MCNC: 13.1 G/DL (ref 11.7–15.4)
IMM GRANULOCYTES # BLD AUTO: 0 K/UL (ref 0–0.5)
IMM GRANULOCYTES NFR BLD AUTO: 0 % (ref 0–5)
LYMPHOCYTES # BLD: 1.6 K/UL (ref 0.5–4.6)
LYMPHOCYTES NFR BLD: 23 % (ref 13–44)
MCH RBC QN AUTO: 28.6 PG (ref 26.1–32.9)
MCHC RBC AUTO-ENTMCNC: 31.6 G/DL (ref 31.4–35)
MCV RBC AUTO: 90.4 FL (ref 82–102)
MONOCYTES # BLD: 0.5 K/UL (ref 0.1–1.3)
MONOCYTES NFR BLD: 7 % (ref 4–12)
NEUTS SEG # BLD: 4.8 K/UL (ref 1.7–8.2)
NEUTS SEG NFR BLD: 67 % (ref 43–78)
NRBC # BLD: 0 K/UL (ref 0–0.2)
PLATELET # BLD AUTO: 190 K/UL (ref 150–450)
PMV BLD AUTO: 10.4 FL (ref 9.4–12.3)
POTASSIUM SERPL-SCNC: 4 MMOL/L (ref 3.5–5.1)
PROT SERPL-MCNC: 8 G/DL (ref 6.3–8.2)
RBC # BLD AUTO: 4.58 M/UL (ref 4.05–5.2)
SODIUM SERPL-SCNC: 139 MMOL/L (ref 133–143)
WBC # BLD AUTO: 7.1 K/UL (ref 4.3–11.1)

## 2022-10-19 PROCEDURE — 80053 COMPREHEN METABOLIC PANEL: CPT

## 2022-10-19 PROCEDURE — 3078F DIAST BP <80 MM HG: CPT | Performed by: INTERNAL MEDICINE

## 2022-10-19 PROCEDURE — 3074F SYST BP LT 130 MM HG: CPT | Performed by: INTERNAL MEDICINE

## 2022-10-19 PROCEDURE — 85025 COMPLETE CBC W/AUTO DIFF WBC: CPT

## 2022-10-19 PROCEDURE — 36415 COLL VENOUS BLD VENIPUNCTURE: CPT

## 2022-10-19 PROCEDURE — 1123F ACP DISCUSS/DSCN MKR DOCD: CPT | Performed by: INTERNAL MEDICINE

## 2022-10-19 PROCEDURE — 99214 OFFICE O/P EST MOD 30 MIN: CPT | Performed by: INTERNAL MEDICINE

## 2022-10-19 RX ORDER — ANASTROZOLE 1 MG/1
1 TABLET ORAL DAILY
Qty: 90 TABLET | Refills: 3 | Status: SHIPPED | OUTPATIENT
Start: 2022-10-19

## 2022-10-19 ASSESSMENT — PATIENT HEALTH QUESTIONNAIRE - PHQ9
1. LITTLE INTEREST OR PLEASURE IN DOING THINGS: 0
SUM OF ALL RESPONSES TO PHQ QUESTIONS 1-9: 0
2. FEELING DOWN, DEPRESSED OR HOPELESS: 0
SUM OF ALL RESPONSES TO PHQ9 QUESTIONS 1 & 2: 0

## 2022-10-19 NOTE — PATIENT INSTRUCTIONS
Patient Instructions from Today's Visit    Reason for Visit:  Follow up visit for breast cancer      Plan:    Can switch to anastrozole (Arimidex) due to joint aches. Hold the letrozole for 1 week prior to starting. Can do MRI in 6 months. Dexa bone density scan due in March.     Follow Up:  - 4 months    Recent Lab Results:  Hospital Outpatient Visit on 10/19/2022   Component Date Value Ref Range Status    WBC 10/19/2022 7.1  4.3 - 11.1 K/uL Final    RBC 10/19/2022 4.58  4.05 - 5.2 M/uL Final    Hemoglobin 10/19/2022 13.1  11.7 - 15.4 g/dL Final    Hematocrit 10/19/2022 41.4  35.8 - 46.3 % Final    MCV 10/19/2022 90.4  82.0 - 102.0 FL Final    MCH 10/19/2022 28.6  26.1 - 32.9 PG Final    MCHC 10/19/2022 31.6  31.4 - 35.0 g/dL Final    RDW 10/19/2022 13.8  11.9 - 14.6 % Final    Platelets 18/19/2295 190  150 - 450 K/uL Final    MPV 10/19/2022 10.4  9.4 - 12.3 FL Final    nRBC 10/19/2022 0.00  0.0 - 0.2 K/uL Final    **Note: Absolute NRBC parameter is now reported with Hemogram**    Differential Type 10/19/2022 AUTOMATED    Final    Seg Neutrophils 10/19/2022 67  43 - 78 % Final    Lymphocytes 10/19/2022 23  13 - 44 % Final    Monocytes 10/19/2022 7  4.0 - 12.0 % Final    Eosinophils % 10/19/2022 2  0.5 - 7.8 % Final    Basophils 10/19/2022 1  0.0 - 2.0 % Final    Immature Granulocytes 10/19/2022 0  0.0 - 5.0 % Final    Segs Absolute 10/19/2022 4.8  1.7 - 8.2 K/UL Final    Absolute Lymph # 10/19/2022 1.6  0.5 - 4.6 K/UL Final    Absolute Mono # 10/19/2022 0.5  0.1 - 1.3 K/UL Final    Absolute Eos # 10/19/2022 0.1  0.0 - 0.8 K/UL Final    Basophils Absolute 10/19/2022 0.1  0.0 - 0.2 K/UL Final    Absolute Immature Granulocyte 10/19/2022 0.0  0.0 - 0.5 K/UL Final    Sodium 10/19/2022 139  133 - 143 mmol/L Final    Potassium 10/19/2022 4.0  3.5 - 5.1 mmol/L Final    Chloride 10/19/2022 106  101 - 110 mmol/L Final    CO2 10/19/2022 27  21 - 32 mmol/L Final    Anion Gap 10/19/2022 6  2 - 11 mmol/L Final    Glucose 10/19/2022 97  65 - 100 mg/dL Final    BUN 10/19/2022 21  8 - 23 MG/DL Final    Creatinine 10/19/2022 1.10 (A)  0.6 - 1.0 MG/DL Final    EstPerla Filt Rate 10/19/2022 55 (A)  >60 ml/min/1.73m2 Final    Comment:      Pediatric calculator link: Gem.at. org/professionals/kdoqi/gfr_calculatorped       Effective Oct 3, 2022       These results are not intended for use in patients <25years of age. eGFR results are calculated without a race factor using  the 2021 CKD-EPI equation. Careful clinical correlation is recommended, particularly when comparing to results calculated using previous equations. The CKD-EPI equation is less accurate in patients with extremes of muscle mass, extra-renal metabolism of creatinine, excessive creatine ingestion, or following therapy that affects renal tubular secretion. Calcium 10/19/2022 10.1  8.3 - 10.4 MG/DL Final    Total Bilirubin 10/19/2022 0.8  0.2 - 1.1 MG/DL Final    ALT 10/19/2022 38  12 - 65 U/L Final    AST 10/19/2022 22  15 - 37 U/L Final    Alk Phosphatase 10/19/2022 123  50 - 136 U/L Final    Total Protein 10/19/2022 8.0  6.3 - 8.2 g/dL Final    Albumin 10/19/2022 4.1  3.2 - 4.6 g/dL Final    Globulin 10/19/2022 3.9  2.8 - 4.5 g/dL Final    Albumin/Globulin Ratio 10/19/2022 1.1  0.4 - 1.6   Final        Treatment Summary has been discussed and given to patient: n/a        -------------------------------------------------------------------------------------------------------------------  Please call our office at [phone] if you have any  of the following symptoms:   Fever of 100.5 or greater  Chills  Shortness of breath  Swelling or pain in one leg    After office hours an answering service is available and will contact a provider for emergencies or if you are experiencing any of the above symptoms. Patient did express an interest in My Chart.   My Chart log in information explained on the after visit summary printout at the check-out amie.    Sarah Liu RN

## 2022-10-20 ENCOUNTER — APPOINTMENT (OUTPATIENT)
Dept: RADIATION ONCOLOGY | Age: 67
End: 2022-10-20

## 2022-10-24 ENCOUNTER — OFFICE VISIT (OUTPATIENT)
Dept: ORTHOPEDIC SURGERY | Age: 67
End: 2022-10-24
Payer: MEDICARE

## 2022-10-24 VITALS — BODY MASS INDEX: 48.65 KG/M2 | WEIGHT: 285 LBS | HEIGHT: 64 IN

## 2022-10-24 DIAGNOSIS — M79.641 BILATERAL HAND PAIN: Primary | ICD-10-CM

## 2022-10-24 DIAGNOSIS — M18.0 PRIMARY ARTHROSIS OF FIRST CARPOMETACARPAL JOINTS, BILATERAL: ICD-10-CM

## 2022-10-24 DIAGNOSIS — M79.642 BILATERAL HAND PAIN: Primary | ICD-10-CM

## 2022-10-24 DIAGNOSIS — M65.4 DE QUERVAIN'S TENOSYNOVITIS, RIGHT: ICD-10-CM

## 2022-10-24 RX ORDER — ESZOPICLONE 2 MG/1
1 TABLET, FILM COATED ORAL NIGHTLY
COMMUNITY
End: 2022-11-04 | Stop reason: SDUPTHER

## 2022-10-24 NOTE — LETTER
DME Patient Authorization Form    Name: Dottie Petersen  : 1955  MRN: [id number]   Age: 79 y.o. Gender: female  Delivery Address: [facility]     Diagnosis:     ICD-10-CM    1. Bilateral hand pain  M79.641 XR HAND BILATERAL MINIMUM 3 VW    M79.642       2. Primary arthrosis of first carpometacarpal joints, bilateral  M18.0 CMC Splint ()      3. De Quervain's tenosynovitis, right  M65.4 [place] HFO WITHOUT JOINTS PRE OTS           Requested DME:  DeQ Hand Brace- ($132.00) X 1 - right  CMC Splint- ($95.00) X 1 - left        Clinical Notes:     **Indicates non-covered items by insurance. Payment expected on date of service. Electronically signed by  Provider: Joyce Olmstead MD__Date: 10/24/2022                            [facility] Tax ID # [id number]        Durable Medical Equipment and/or Orthotics Patient Consent     I understand that my physician has prescribed this medical supply as part of my treatment plan as a matter of Medical Necessity.  I understand that I have a choice in where I receive my prescribed orthopedic supplies and/or services.  I authorize [facility] to furnish this service/product and to provide my insurance carrier with any information requested in order to process for payment.  I instruct my insurance carrier to pay [facility] directly for these services/products.  I understand that my insurance carrier may deny payment for this supply because it is a non-covered item, deemed not medically necessary or considered experimental.   I understand that any cost not covered by my insurance carrier will be solely my financial responsibility.  I have received the Supplier Standards and have reviewed them.    I have received the prescribed item and have been fully instructed on the proper use of the above services/products.    ______ (Patient Initials) I understand that all DME items are non-returnable after being dispensed. Items still in sealed packaging may be returned up to 14 days after purchasing. [address] will replace items that are defective.    ______ (Patient Initials) I understand that Timur Crockett will not file a claim with my insurance carrier for this service/product and I am waiving my right to file a claim on my own for this service/product with my insurance company as this item is NON-COVERED (Denoted by the **) by my Insurance company/policy. ______ (Patient Initials) I understand that I am responsible to bring my equipment to the hospital for any surgery. ______________________________________________  ________________________  Patient / Dalton Holland            Thank you for considering [address] Your physician has prescribed specific medical equipment or devices for your home use. The following describes your rights and responsibilities as our customer. Right to Choose Providers: You have a choice regarding which company supplies your home medical equipment and devices, and to consult your physician in this decision. You may choose a medical supply store, a home medical equipment provider, or a specialist such as [facility]. [facility] will coordinate with your physician to provide the medical equipment or devices prescribed for your home use. Right to Service:  You have the right to considerate, respectful and nondiscriminatory care. You have the right to receive accurate and easily understood information about your health care. If you speak a foreign language, or don't understand the discussions, assistance will be provided to allow you to make informed health care decisions.   You have the right to know your treatment options and to participate in decisions about your care, including the right to accept or refuse treatment. You have the right to expect a reasonable response to your requests for treatment or service. You have the right to talk in confidence with health care providers and to have your health care information protected. You have the right to receive an explanation of your bill. You have the right to complain about the service or product you receive. Patient Responsibilities:  Please provide complete and accurate information about your health insurance benefits and make arrangements for the timely payment of your bill. [facility] will, if possible, assume responsibility for billing your insurance (Medicare, Medicaid and commercial) for the prescribed equipment or devices. If your policy does not cover the prescribed product, or only covers a portion of the bill, you are responsible for any remaining balance. Return and Exchange Policy:  [facility] will honor published  Warranties for products. [facility] will accept returns or exchanges within 14 days from the date of receipt, providin) the product must be in new condition; 2) receipt as required; and 3) used disposable and hygiene products may only be returned due to a defective product. Note: Refunds will be issued in a timely manner, please allow 4-6 weeks for processing. Complaint Procedures and DME Consumer Protection Resources:  [facility] values you as a customer, and is committed to resolving patient concerns. This commitment includes understanding and documenting your concerns, conducting a review of internal procedures, and providing you with an explanation and resolution to your concerns. Should you have any questions about our services or billing process, please contact our office at (practice phone number).   If we are unable to resolve the concern, you have the right to direct comments to the office of Consumer Protection, in the [address] or the [address] [place] [address] office, without fear of repercussion. DMEPOS SUPPLIER STANDARDS    A supplier must be in compliance with all applicable Federal and [facility] and regulatory requirements. A supplier must provide complete and accurate information on the DMEPOS supplier application. Any changes to this information must be reported to the [facility] within 30 days. An authorized individual (one whose signature is binding) must sign the application for billing privileges. A supplier must fill orders from its own inventory, or must contract with other companies for the purchase of items necessary to fill the order. A supplier may not contract with any entity that is currently excluded from the Medicare program, any [facility] program, or from any other Federal procurement or Nonprocurement programs. A supplier must advise beneficiaries that they may rent or purchase inexpensive or routinely purchased durable medical equipment, and of the purchase option for capped rental equipment. A supplier must notify beneficiaries of warranty coverage and honor all warranties under applicable State Law, and repair or replace free of charge Medicare covered items that are under warranty. A supplier must maintain a physical facility on an appropriate site. A supplier must permit CMS, or its agents to conduct on-site inspections to ascertain the supplier's compliance with these standards. The supplier location must be accessible to beneficiaries during reasonable business hours, and must maintain a visible sign and posted hours of operation. A supplier must maintain a primary business telephone listed under the name of the business in a [employer] or a toll free number available through directory assistance. The exclusive use of a beeper, answering machine or cell phone is prohibited.   A supplier must have comprehensive liability insurance in the amount of at least $300,000 that covers both the supplier's place of business and all customers and employees of the supplier. If the supplier manufactures its own items, this insurance must also cover product liability and completed operations. A supplier must agree not to initiate telephone contact with beneficiaries, with a few exceptions allowed. This standard prohibits suppliers from calling beneficiaries in order to solicit new business. A supplier is responsible for delivery and must instruct beneficiaries on use of Medicare covered items, and maintain proof of delivery. A supplier must answer questions, and respond to complaints of the beneficiaries, and maintain documentation of such contacts. A supplier must maintain and replace at no charge or repair directly, or through a service contract with another company, Medicare covered items it has rented to beneficiaries. A supplier must accept returns of substandard (less than full quality for the particular item) or unsuitable items (inappropriate for the beneficiary at the time it was fitted and rented or sold) from beneficiaries. A supplier must disclose these supplier standards to each beneficiary to whom it supplies a Medicare-covered item. A supplier must disclose to the government any person having ownership, financial, or control interest in the supplier. A supplier must not convey or reassign a supplier number; i.e., the supplier may not sell or allow another entity to use its Medicare billing number. A supplier must have a complaint resolution protocol established to address beneficiary complaints that relate to these standards. A record of these complaints must be maintained at the physical facility. Complaint records must include: the name, address, telephone number and health insurance claim number of the beneficiary, a summary of the complaint, and any action taken to resolve it.   A supplier must agree to furnish CMS any information required by the Medicare statute and implementing regulations. A supplier of DMEPOS and other items and services must be accredited by a CMS-approved accreditation organization in order to receive and retain a supplier billing number. The accreditation must indicate the specific products and services, for which the supplier is accredited in order for the supplier to receive payment for those specific products and services. A DMEPOS supplier must notify their accreditation organization when a new DMEPOS location is opened. The accreditation organization may accredit the new supplier location for three months after it is operational without requiring a new site visit. All DMEPOS supplier locations, whether owned or subcontracted, must meet the [employer] and be separately accredited in order to bill Medicare. An accredited supplier may be denied enrollment or their enrollment may be revoked, if CMS determines that they are not in compliance with the DMEPOS quality standards. A DMEPOS supplier must disclose upon enrollment all products and services, including the addition of new product lines for which they are seeking accreditation. If a new product line is added after enrollment, the DMEPOS supplier will be responsible for notifying the accrediting body of the new product so that the DMEPOS supplier can be re-surveyed and accredited for these new products. Must meet the surety bond requirements specified in 42 C. F.R. 424.57(c). Implementation date- May 4, 2009. A supplier must obtain oxygen from a state-licensed oxygen supplier. A supplier must maintain ordering and referring documentation consistent with provisions found in 42 C. F.R. 424.516(f). DMEPOS suppliers are prohibited from sharing a practice location with certain other Medicare providers and suppliers. DMEPOS suppliers must remain open to the public for a minimum of 30 hours per week with certain exceptions.

## 2022-10-24 NOTE — PROGRESS NOTES
Orthopaedic Hand Surgery Note    Name: Lukas Carrington  YOB: 1955  Gender: female  MRN: [id number]    CC: New patient referred for hand pain      HPI: Patient is a 79 y.o. female with a chief complaint of bilateral thumb pain and weakness. The patient complains of increased pain with activities involving pinch and  (ex/ opening jars, turning car key, buttons). The symptoms have been present for years, but the right side has gotten significantly worse recently. . Treatment to date has included cortisone injections at the [address] [place] [address] years ago, which did not help at all. ROS/Meds/PSH/PMH/FH/SH: I personally reviewed the patients standard intake form. Pertinents are discussed in the HPI    Physical Examination:  Musculoskeletal:   Examination of the bilateral upper extremity demonstrates normal sensation in median, ulnar and radial distribution, cap refill in all fingers < 5 seconds, negative carpal tunnel compression and Phalen test.  Mild prominence and instability of the base of first metacarpal. CMC grind is positive for pain and crepitus. Thumb MCP joint hyperextends 20 degrees. No pain at the radial styloid on the left, there is tenderness at the styloid and positive Finkelstein's on the right. Imaging / Electrodiagnostic Tests:     Hand XR: AP, Lateral, Oblique and Thumb CMC joint     Clinical Indication:  1. Bilateral hand pain    2. Primary arthrosis of first carpometacarpal joints, bilateral    3. De Quervain's tenosynovitis, right           Report: AP, lateral, oblique and thumb CMC joint x-ray of the bilateral hand demonstrates severe joint space narrowing, subluxation and osteophytic changes of the trapezium consistent with osteoarthritis of the thumb CMC joint. Impression: Thumb CMC joint osteoarthritis as noted above     Margaret Lorenzo MD         Assessment:     ICD-10-CM    1. Bilateral hand pain  M79.641 XR HAND BILATERAL MINIMUM 3 VW    M79.642       2.

## 2022-10-24 NOTE — PROGRESS NOTES
Patient was fit for a CMC splint for patients left hand/joint. I demonstrated that the thumb slides into the opening and Velcro on the dorsal side of the hand. The strap continues around the thumb and Velcro's again on the ventral side of hand or palm, and then continues through the thumb and first finger to Velcro again on the dorsal side of hand. The patient was also prescribed and fitted with a deQ hand orthosis for the right hand. Patient read and signed documenting they understand and agree to [facility]'s current DME return policy.

## 2022-10-28 DIAGNOSIS — Z79.811 LONG TERM (CURRENT) USE OF AROMATASE INHIBITORS: ICD-10-CM

## 2022-10-28 LAB
ALBUMIN SERPL-MCNC: 3.9 G/DL (ref 3.2–4.6)
ALBUMIN/GLOB SERPL: 1.2 {RATIO} (ref 0.4–1.6)
ALP SERPL-CCNC: 112 U/L (ref 50–136)
ALT SERPL-CCNC: 46 U/L (ref 12–65)
ANION GAP SERPL CALC-SCNC: 5 MMOL/L (ref 2–11)
AST SERPL-CCNC: 27 U/L (ref 15–37)
BASOPHILS # BLD: 0.1 K/UL (ref 0–0.2)
BASOPHILS NFR BLD: 1 % (ref 0–2)
BILIRUB SERPL-MCNC: 0.7 MG/DL (ref 0.2–1.1)
BUN SERPL-MCNC: 21 MG/DL (ref 8–23)
CALCIUM SERPL-MCNC: 9.6 MG/DL (ref 8.3–10.4)
CHLORIDE SERPL-SCNC: 106 MMOL/L (ref 101–110)
CO2 SERPL-SCNC: 28 MMOL/L (ref 21–32)
CREAT SERPL-MCNC: 1 MG/DL (ref 0.6–1)
DIFFERENTIAL METHOD BLD: NORMAL
EOSINOPHIL # BLD: 0.2 K/UL (ref 0–0.8)
EOSINOPHIL NFR BLD: 3 % (ref 0.5–7.8)
ERYTHROCYTE [DISTWIDTH] IN BLOOD BY AUTOMATED COUNT: 13.6 % (ref 11.9–14.6)
GLOBULIN SER CALC-MCNC: 3.2 G/DL (ref 2.8–4.5)
GLUCOSE SERPL-MCNC: 108 MG/DL (ref 65–100)
HCT VFR BLD AUTO: 41.7 % (ref 35.8–46.3)
HGB BLD-MCNC: 13.1 G/DL (ref 11.7–15.4)
IMM GRANULOCYTES # BLD AUTO: 0 K/UL (ref 0–0.5)
IMM GRANULOCYTES NFR BLD AUTO: 0 % (ref 0–5)
LYMPHOCYTES # BLD: 1.6 K/UL (ref 0.5–4.6)
LYMPHOCYTES NFR BLD: 29 % (ref 13–44)
MCH RBC QN AUTO: 29.2 PG (ref 26.1–32.9)
MCHC RBC AUTO-ENTMCNC: 31.4 G/DL (ref 31.4–35)
MCV RBC AUTO: 92.9 FL (ref 82–102)
MONOCYTES # BLD: 0.5 K/UL (ref 0.1–1.3)
MONOCYTES NFR BLD: 8 % (ref 4–12)
NEUTS SEG # BLD: 3.4 K/UL (ref 1.7–8.2)
NEUTS SEG NFR BLD: 59 % (ref 43–78)
NRBC # BLD: 0 K/UL (ref 0–0.2)
PLATELET # BLD AUTO: 193 K/UL (ref 150–450)
PMV BLD AUTO: 11.1 FL (ref 9.4–12.3)
POTASSIUM SERPL-SCNC: 3.7 MMOL/L (ref 3.5–5.1)
PROT SERPL-MCNC: 7.1 G/DL (ref 6.3–8.2)
RBC # BLD AUTO: 4.49 M/UL (ref 4.05–5.2)
SODIUM SERPL-SCNC: 139 MMOL/L (ref 133–143)
WBC # BLD AUTO: 5.7 K/UL (ref 4.3–11.1)

## 2022-10-31 ENCOUNTER — TELEPHONE (OUTPATIENT)
Dept: ONCOLOGY | Age: 67
End: 2022-10-31

## 2022-10-31 DIAGNOSIS — D05.12 DUCTAL CARCINOMA IN SITU (DCIS) OF LEFT BREAST: Primary | ICD-10-CM

## 2022-10-31 NOTE — TELEPHONE ENCOUNTER
Called back and spoke to lion in radiology scheduling. States the MRI order triggered an ABN so they are looking for another order. New order placed.  ABN trigger removed

## 2022-10-31 NOTE — TELEPHONE ENCOUNTER
Ghanshyam Messina from [facility] called stating that the MRI order from Dr. Marimar Wisdom did not meet medical necessity. She asked for a new order to be sent.

## 2022-11-01 ASSESSMENT — ENCOUNTER SYMPTOMS
ABDOMINAL DISTENTION: 0
CONSTIPATION: 0
NAUSEA: 0
VOMITING: 0
BLOOD IN STOOL: 0
ABDOMINAL PAIN: 0
CHEST TIGHTNESS: 0
VOICE CHANGE: 0
SHORTNESS OF BREATH: 0
HEMOPTYSIS: 0
SCLERAL ICTERUS: 0
WHEEZING: 0
SORE THROAT: 0
DIARRHEA: 0
TROUBLE SWALLOWING: 0

## 2022-11-04 ENCOUNTER — OFFICE VISIT (OUTPATIENT)
Dept: INTERNAL MEDICINE CLINIC | Facility: CLINIC | Age: 67
End: 2022-11-04
Payer: MEDICARE

## 2022-11-04 VITALS
SYSTOLIC BLOOD PRESSURE: 133 MMHG | BODY MASS INDEX: 49.17 KG/M2 | HEIGHT: 64 IN | DIASTOLIC BLOOD PRESSURE: 88 MMHG | WEIGHT: 288 LBS

## 2022-11-04 DIAGNOSIS — R73.9 HYPERGLYCEMIA: ICD-10-CM

## 2022-11-04 DIAGNOSIS — I10 ESSENTIAL HYPERTENSION: Primary | ICD-10-CM

## 2022-11-04 DIAGNOSIS — N18.30 STAGE 3 CHRONIC KIDNEY DISEASE, UNSPECIFIED WHETHER STAGE 3A OR 3B CKD (HCC): ICD-10-CM

## 2022-11-04 DIAGNOSIS — M1A.00X0 IDIOPATHIC CHRONIC GOUT WITHOUT TOPHUS, UNSPECIFIED SITE: ICD-10-CM

## 2022-11-04 DIAGNOSIS — E03.9 ACQUIRED HYPOTHYROIDISM: ICD-10-CM

## 2022-11-04 DIAGNOSIS — C50.912 MALIGNANT NEOPLASM OF LEFT FEMALE BREAST, UNSPECIFIED ESTROGEN RECEPTOR STATUS, UNSPECIFIED SITE OF BREAST (HCC): ICD-10-CM

## 2022-11-04 DIAGNOSIS — G47.00 INSOMNIA, UNSPECIFIED TYPE: ICD-10-CM

## 2022-11-04 DIAGNOSIS — E55.9 VITAMIN D DEFICIENCY: ICD-10-CM

## 2022-11-04 DIAGNOSIS — E66.01 MORBID OBESITY (HCC): ICD-10-CM

## 2022-11-04 DIAGNOSIS — D69.6 THROMBOCYTOPENIA (HCC): ICD-10-CM

## 2022-11-04 DIAGNOSIS — E78.5 HYPERLIPIDEMIA, UNSPECIFIED HYPERLIPIDEMIA TYPE: ICD-10-CM

## 2022-11-04 DIAGNOSIS — Z23 NEED FOR VACCINATION: ICD-10-CM

## 2022-11-04 PROCEDURE — G8484 FLU IMMUNIZE NO ADMIN: HCPCS | Performed by: INTERNAL MEDICINE

## 2022-11-04 PROCEDURE — 3078F DIAST BP <80 MM HG: CPT | Performed by: INTERNAL MEDICINE

## 2022-11-04 PROCEDURE — G9899 SCRN MAM PERF RSLTS DOC: HCPCS | Performed by: INTERNAL MEDICINE

## 2022-11-04 PROCEDURE — G8417 CALC BMI ABV UP PARAM F/U: HCPCS | Performed by: INTERNAL MEDICINE

## 2022-11-04 PROCEDURE — 3074F SYST BP LT 130 MM HG: CPT | Performed by: INTERNAL MEDICINE

## 2022-11-04 PROCEDURE — 1036F TOBACCO NON-USER: CPT | Performed by: INTERNAL MEDICINE

## 2022-11-04 PROCEDURE — 99214 OFFICE O/P EST MOD 30 MIN: CPT | Performed by: INTERNAL MEDICINE

## 2022-11-04 PROCEDURE — 3017F COLORECTAL CA SCREEN DOC REV: CPT | Performed by: INTERNAL MEDICINE

## 2022-11-04 PROCEDURE — G8427 DOCREV CUR MEDS BY ELIG CLIN: HCPCS | Performed by: INTERNAL MEDICINE

## 2022-11-04 PROCEDURE — G0008 ADMIN INFLUENZA VIRUS VAC: HCPCS | Performed by: INTERNAL MEDICINE

## 2022-11-04 PROCEDURE — 1123F ACP DISCUSS/DSCN MKR DOCD: CPT | Performed by: INTERNAL MEDICINE

## 2022-11-04 PROCEDURE — 1090F PRES/ABSN URINE INCON ASSESS: CPT | Performed by: INTERNAL MEDICINE

## 2022-11-04 PROCEDURE — 90694 VACC AIIV4 NO PRSRV 0.5ML IM: CPT | Performed by: INTERNAL MEDICINE

## 2022-11-04 PROCEDURE — G8399 PT W/DXA RESULTS DOCUMENT: HCPCS | Performed by: INTERNAL MEDICINE

## 2022-11-04 RX ORDER — ESZOPICLONE 2 MG/1
2 TABLET, FILM COATED ORAL NIGHTLY
Qty: 90 TABLET | Refills: 3 | Status: SHIPPED | OUTPATIENT
Start: 2022-11-04 | End: 2023-11-04

## 2022-11-04 SDOH — HEALTH STABILITY: PHYSICAL HEALTH: ON AVERAGE, HOW MANY MINUTES DO YOU ENGAGE IN EXERCISE AT THIS LEVEL?: 10 MIN

## 2022-11-04 SDOH — HEALTH STABILITY: PHYSICAL HEALTH: ON AVERAGE, HOW MANY DAYS PER WEEK DO YOU ENGAGE IN MODERATE TO STRENUOUS EXERCISE (LIKE A BRISK WALK)?: 3 DAYS

## 2022-11-04 ASSESSMENT — PATIENT HEALTH QUESTIONNAIRE - PHQ9
2. FEELING DOWN, DEPRESSED OR HOPELESS: 0
SUM OF ALL RESPONSES TO PHQ QUESTIONS 1-9: 0
SUM OF ALL RESPONSES TO PHQ9 QUESTIONS 1 & 2: 0
SUM OF ALL RESPONSES TO PHQ QUESTIONS 1-9: 0
SUM OF ALL RESPONSES TO PHQ QUESTIONS 1-9: 0
1. LITTLE INTEREST OR PLEASURE IN DOING THINGS: 0
SUM OF ALL RESPONSES TO PHQ QUESTIONS 1-9: 0

## 2022-11-04 NOTE — PROGRESS NOTES
HPI    Past Medical History, Past Surgical History, Family history, Social History, and Medications were all reviewed with the patient today and updated as necessary. Current Outpatient Medications   Medication Sig Dispense Refill    eszopiclone (LUNESTA) 2 MG TABS Take 1 tablet by mouth at bedtime. 90 tablet 3    anastrozole (ARIMIDEX) 1 MG tablet Take 1 tablet by mouth daily 90 tablet 3    Verapamil 300 MG CP24 Take 300 mg by mouth at bedtime 90 capsule 3    atorvastatin (LIPITOR) 40 MG tablet Take 1 tablet by mouth in the morning. 90 tablet 3    rOPINIRole (REQUIP) 0.5 MG tablet Take 1 tablet by mouth in the morning. 90 tablet 3    omeprazole (PRILOSEC) 40 MG delayed release capsule Take 1 capsule by mouth in the morning. 90 capsule 3    allopurinol (ZYLOPRIM) 300 MG tablet Take 1 tablet by mouth in the morning. 90 tablet 3    levothyroxine (SYNTHROID) 125 MCG tablet Take 1 tablet by mouth every morning (before breakfast) 90 tablet 3    hydrALAZINE (APRESOLINE) 50 MG tablet Take 1 tablet by mouth in the morning and 1 tablet at noon and 1 tablet before bedtime. 270 tablet 3    irbesartan (AVAPRO) 300 MG tablet TAKE 1 TABLET DAILY (Patient taking differently: Take 300 mg by mouth daily) 90 tablet 3    Cholecalciferol 50 MCG (2000 UT) CAPS Take 4,000 Units by mouth daily      betamethasone dipropionate 0.05 % cream APPLY TO ARM TWICE DAILY (Patient not taking: No sig reported)      Naproxen Sodium 220 MG CAPS Take 220 mg by mouth daily as needed (Patient not taking: No sig reported)       No current facility-administered medications for this visit. Allergies   Allergen Reactions    Amoxicillin-Pot Clavulanate Itching     Itching in mouth and ears    Codeine Nausea Only    Hydralazine Other (See Comments)     states not allergic to medication.  Is presently taking medication and tolerating well    Mirabegron Other (See Comments)     ineffective    Naltrexone-Bupropion Hcl Er Other (See Comments) lethargy    Nitrofurantoin Itching      itching under arm    Oxybutynin Chloride Other (See Comments)     Made her voiding worse    Penicillins Itching    Rosuvastatin Swelling    Simvastatin Other (See Comments)    Topiramate Other (See Comments)     Felt weird    Trazodone Other (See Comments)     ineffective    Zolpidem Other (See Comments)     Periods of forgetfulness     Patient Active Problem List   Diagnosis    Hyperlipidemia    Status post total replacement of right hip    Inadequate sleep hygiene    Increased urinary frequency    Morbid obesity ([address]    Family history of colon cancer    PATRICK (obstructive sleep apnea)    Chronic renal insufficiency, stage 2 (mild)    Bilateral lower abdominal discomfort    Vitamin D deficiency    History of hepatitis B vaccination    Microscopic hematuria    Status post total left knee replacement    Chronic bilateral low back pain without sciatica    Osteopenia    Elevated serum hCG in female, not pregnant    Encounter for screening for other viral diseases    Vasomotor rhinitis    Acute drug-induced gout involving toe of left foot    Thrombocytopenia (HCC)    Restless legs    Chronic bilateral lower abdominal pain    Gastroesophageal reflux disease    Arthralgia    Hx of migraines    Hypoxemia    Acquired hypothyroidism    Allergic rhinitis due to allergen    Chronic insomnia    Elevated serum hCG    At risk for bone density loss    Aromatase inhibitor use    Ductal carcinoma in situ (DCIS) of breast    Penicillin allergy    Chronic pain of left knee    Myalgia    Essential hypertension    Idiopathic chronic gout without tophus    Insomnia    Allergic rhinitis due to pollen    Chronic maxillary sinusitis    Dysfunction of right eustachian tube    Malignant neoplasm of left female breast (HCC)    Stage 3 chronic kidney disease (HCC)    Bilateral foot pain    Hyperglycemia    Need for vaccination         Review of Systems      OBJECTIVE:  /88   Ht 5' 4\" (1.626 m)   Wt 288 lb (130.6 kg)   BMI 49.44 kg/m²      Physical Exam     Medical problems and test results were reviewed with the patient today.      Recent Results (from the past 672 hour(s))   CBC with Auto Differential    Collection Time: 10/19/22  2:33 PM   Result Value Ref Range    WBC 7.1 4.3 - 11.1 K/uL    RBC 4.58 4.05 - 5.2 M/uL    Hemoglobin 13.1 11.7 - 15.4 g/dL    Hematocrit 41.4 35.8 - 46.3 %    MCV 90.4 82.0 - 102.0 FL    MCH 28.6 26.1 - 32.9 PG    MCHC 31.6 31.4 - 35.0 g/dL    RDW 13.8 11.9 - 14.6 %    Platelets 215 048 - 187 K/uL    MPV 10.4 9.4 - 12.3 FL    nRBC 0.00 0.0 - 0.2 K/uL    Differential Type AUTOMATED      Seg Neutrophils 67 43 - 78 %    Lymphocytes 23 13 - 44 %    Monocytes 7 4.0 - 12.0 %    Eosinophils % 2 0.5 - 7.8 %    Basophils 1 0.0 - 2.0 %    Immature Granulocytes 0 0.0 - 5.0 %    Segs Absolute 4.8 1.7 - 8.2 K/UL    Absolute Lymph # 1.6 0.5 - 4.6 K/UL    Absolute Mono # 0.5 0.1 - 1.3 K/UL    Absolute Eos # 0.1 0.0 - 0.8 K/UL    Basophils Absolute 0.1 0.0 - 0.2 K/UL    Absolute Immature Granulocyte 0.0 0.0 - 0.5 K/UL   Comprehensive Metabolic Panel    Collection Time: 10/19/22  2:33 PM   Result Value Ref Range    Sodium 139 133 - 143 mmol/L    Potassium 4.0 3.5 - 5.1 mmol/L    Chloride 106 101 - 110 mmol/L    CO2 27 21 - 32 mmol/L    Anion Gap 6 2 - 11 mmol/L    Glucose 97 65 - 100 mg/dL    BUN 21 8 - 23 MG/DL    Creatinine 1.10 (H) 0.6 - 1.0 MG/DL    Est, Glom Filt Rate 55 (L) >60 ml/min/1.73m2    Calcium 10.1 8.3 - 10.4 MG/DL    Total Bilirubin 0.8 0.2 - 1.1 MG/DL    ALT 38 12 - 65 U/L    AST 22 15 - 37 U/L    Alk Phosphatase 123 50 - 136 U/L    Total Protein 8.0 6.3 - 8.2 g/dL    Albumin 4.1 3.2 - 4.6 g/dL    Globulin 3.9 2.8 - 4.5 g/dL    Albumin/Globulin Ratio 1.1 0.4 - 1.6     Comprehensive Metabolic Panel    Collection Time: 10/28/22  8:05 AM   Result Value Ref Range    Sodium 139 133 - 143 mmol/L    Potassium 3.7 3.5 - 5.1 mmol/L    Chloride 106 101 - 110 mmol/L    CO2 28 21 - 32 mmol/L Anion Gap 5 2 - 11 mmol/L    Glucose 108 (H) 65 - 100 mg/dL    BUN 21 8 - 23 MG/DL    Creatinine 1.00 0.6 - 1.0 MG/DL    Est, Glom Filt Rate >60 >60 ml/min/1.73m2    Calcium 9.6 8.3 - 10.4 MG/DL    Total Bilirubin 0.7 0.2 - 1.1 MG/DL    ALT 46 12 - 65 U/L    AST 27 15 - 37 U/L    Alk Phosphatase 112 50 - 136 U/L    Total Protein 7.1 6.3 - 8.2 g/dL    Albumin 3.9 3.2 - 4.6 g/dL    Globulin 3.2 2.8 - 4.5 g/dL    Albumin/Globulin Ratio 1.2 0.4 - 1.6     CBC with Auto Differential    Collection Time: 10/28/22  8:05 AM   Result Value Ref Range    WBC 5.7 4.3 - 11.1 K/uL    RBC 4.49 4.05 - 5.2 M/uL    Hemoglobin 13.1 11.7 - 15.4 g/dL    Hematocrit 41.7 35.8 - 46.3 %    MCV 92.9 82 - 102 FL    MCH 29.2 26.1 - 32.9 PG    MCHC 31.4 31.4 - 35.0 g/dL    RDW 13.6 11.9 - 14.6 %    Platelets 555 330 - 723 K/uL    MPV 11.1 9.4 - 12.3 FL    nRBC 0.00 0.0 - 0.2 K/uL    Differential Type AUTOMATED      Seg Neutrophils 59 43 - 78 %    Lymphocytes 29 13 - 44 %    Monocytes 8 4.0 - 12.0 %    Eosinophils % 3 0.5 - 7.8 %    Basophils 1 0.0 - 2.0 %    Immature Granulocytes 0 0.0 - 5.0 %    Segs Absolute 3.4 1.7 - 8.2 K/UL    Absolute Lymph # 1.6 0.5 - 4.6 K/UL    Absolute Mono # 0.5 0.1 - 1.3 K/UL    Absolute Eos # 0.2 0.0 - 0.8 K/UL    Basophils Absolute 0.1 0.0 - 0.2 K/UL    Absolute Immature Granulocyte 0.0 0.0 - 0.5 K/UL         ASSESSMENT and PLAN    1. Essential hypertension  2. Stage 3 chronic kidney disease, unspecified whether stage 3a or 3b CKD ([facility]  3. Acquired hypothyroidism  4. Vitamin D deficiency  5. Thrombocytopenia ([facility]  6. Hyperlipidemia, unspecified hyperlipidemia type  -     Lipid Panel; Future  -     ALT; Future  7. Malignant neoplasm of left female breast, unspecified estrogen receptor status, unspecified site of breast ([facility]  8. Idiopathic chronic gout without tophus, unspecified site  9. Insomnia, unspecified type  -     eszopiclone (LUNESTA) 2 MG TABS; Take 1 tablet by mouth at bedtime. , Disp-90 tablet, R-3Normal  10. Need for vaccination  -     Influenza, FLUAD, (age 72 y+), IM, PF, 0.5 mL  11. Hyperglycemia  -     Hemoglobin A1C; Future  12. Morbid obesity ([address]       She has not been exercising as she had hoped.  Long discussion re exercise goals, encouraged rowing as an exercise that would be kind to her knees    Will check lab before next ov, needs lipids and A1C

## 2023-02-03 DIAGNOSIS — R73.9 HYPERGLYCEMIA: ICD-10-CM

## 2023-02-03 DIAGNOSIS — E78.5 HYPERLIPIDEMIA, UNSPECIFIED HYPERLIPIDEMIA TYPE: ICD-10-CM

## 2023-02-03 LAB
ALT SERPL-CCNC: 39 U/L (ref 12–65)
CHOLEST SERPL-MCNC: 171 MG/DL
EST. AVERAGE GLUCOSE BLD GHB EST-MCNC: 114 MG/DL
HBA1C MFR BLD: 5.6 % (ref 4.8–5.6)
HDLC SERPL-MCNC: 51 MG/DL (ref 40–60)
HDLC SERPL: 3.4
LDLC SERPL CALC-MCNC: 92.6 MG/DL
TRIGL SERPL-MCNC: 137 MG/DL (ref 35–150)
VLDLC SERPL CALC-MCNC: 27.4 MG/DL (ref 6–23)

## 2023-02-09 SDOH — ECONOMIC STABILITY: FOOD INSECURITY: WITHIN THE PAST 12 MONTHS, YOU WORRIED THAT YOUR FOOD WOULD RUN OUT BEFORE YOU GOT MONEY TO BUY MORE.: NEVER TRUE

## 2023-02-09 SDOH — ECONOMIC STABILITY: HOUSING INSECURITY
IN THE LAST 12 MONTHS, WAS THERE A TIME WHEN YOU DID NOT HAVE A STEADY PLACE TO SLEEP OR SLEPT IN A SHELTER (INCLUDING NOW)?: NO

## 2023-02-09 SDOH — ECONOMIC STABILITY: TRANSPORTATION INSECURITY
IN THE PAST 12 MONTHS, HAS LACK OF TRANSPORTATION KEPT YOU FROM MEETINGS, WORK, OR FROM GETTING THINGS NEEDED FOR DAILY LIVING?: NO

## 2023-02-09 SDOH — ECONOMIC STABILITY: INCOME INSECURITY: HOW HARD IS IT FOR YOU TO PAY FOR THE VERY BASICS LIKE FOOD, HOUSING, MEDICAL CARE, AND HEATING?: NOT HARD AT ALL

## 2023-02-09 SDOH — ECONOMIC STABILITY: FOOD INSECURITY: WITHIN THE PAST 12 MONTHS, THE FOOD YOU BOUGHT JUST DIDN'T LAST AND YOU DIDN'T HAVE MONEY TO GET MORE.: NEVER TRUE

## 2023-02-10 ENCOUNTER — TELEPHONE (OUTPATIENT)
Dept: INTERNAL MEDICINE CLINIC | Facility: CLINIC | Age: 68
End: 2023-02-10

## 2023-02-10 ENCOUNTER — OFFICE VISIT (OUTPATIENT)
Dept: INTERNAL MEDICINE CLINIC | Facility: CLINIC | Age: 68
End: 2023-02-10
Payer: MEDICARE

## 2023-02-10 VITALS
DIASTOLIC BLOOD PRESSURE: 86 MMHG | BODY MASS INDEX: 46.95 KG/M2 | WEIGHT: 275 LBS | HEIGHT: 64 IN | SYSTOLIC BLOOD PRESSURE: 132 MMHG

## 2023-02-10 DIAGNOSIS — K21.9 GASTROESOPHAGEAL REFLUX DISEASE, UNSPECIFIED WHETHER ESOPHAGITIS PRESENT: ICD-10-CM

## 2023-02-10 DIAGNOSIS — R73.9 HYPERGLYCEMIA: ICD-10-CM

## 2023-02-10 DIAGNOSIS — E78.5 HYPERLIPIDEMIA, UNSPECIFIED HYPERLIPIDEMIA TYPE: ICD-10-CM

## 2023-02-10 DIAGNOSIS — D69.6 THROMBOCYTOPENIA (HCC): ICD-10-CM

## 2023-02-10 DIAGNOSIS — I10 ESSENTIAL HYPERTENSION: Primary | ICD-10-CM

## 2023-02-10 DIAGNOSIS — G47.00 INSOMNIA, UNSPECIFIED TYPE: ICD-10-CM

## 2023-02-10 DIAGNOSIS — N18.30 STAGE 3 CHRONIC KIDNEY DISEASE, UNSPECIFIED WHETHER STAGE 3A OR 3B CKD (HCC): ICD-10-CM

## 2023-02-10 DIAGNOSIS — E03.9 ACQUIRED HYPOTHYROIDISM: ICD-10-CM

## 2023-02-10 DIAGNOSIS — E66.01 MORBID OBESITY (HCC): ICD-10-CM

## 2023-02-10 PROCEDURE — G8484 FLU IMMUNIZE NO ADMIN: HCPCS | Performed by: INTERNAL MEDICINE

## 2023-02-10 PROCEDURE — G8417 CALC BMI ABV UP PARAM F/U: HCPCS | Performed by: INTERNAL MEDICINE

## 2023-02-10 PROCEDURE — G8399 PT W/DXA RESULTS DOCUMENT: HCPCS | Performed by: INTERNAL MEDICINE

## 2023-02-10 PROCEDURE — 1123F ACP DISCUSS/DSCN MKR DOCD: CPT | Performed by: INTERNAL MEDICINE

## 2023-02-10 PROCEDURE — 3079F DIAST BP 80-89 MM HG: CPT | Performed by: INTERNAL MEDICINE

## 2023-02-10 PROCEDURE — 3017F COLORECTAL CA SCREEN DOC REV: CPT | Performed by: INTERNAL MEDICINE

## 2023-02-10 PROCEDURE — 1036F TOBACCO NON-USER: CPT | Performed by: INTERNAL MEDICINE

## 2023-02-10 PROCEDURE — 3075F SYST BP GE 130 - 139MM HG: CPT | Performed by: INTERNAL MEDICINE

## 2023-02-10 PROCEDURE — 1090F PRES/ABSN URINE INCON ASSESS: CPT | Performed by: INTERNAL MEDICINE

## 2023-02-10 PROCEDURE — 99214 OFFICE O/P EST MOD 30 MIN: CPT | Performed by: INTERNAL MEDICINE

## 2023-02-10 PROCEDURE — G8427 DOCREV CUR MEDS BY ELIG CLIN: HCPCS | Performed by: INTERNAL MEDICINE

## 2023-02-10 RX ORDER — OMEPRAZOLE 40 MG/1
40 CAPSULE, DELAYED RELEASE ORAL DAILY
Qty: 90 CAPSULE | Refills: 3 | Status: SHIPPED | OUTPATIENT
Start: 2023-02-10

## 2023-02-10 RX ORDER — ATORVASTATIN CALCIUM 40 MG/1
40 TABLET, FILM COATED ORAL DAILY
Qty: 90 TABLET | Refills: 3 | Status: SHIPPED | OUTPATIENT
Start: 2023-02-10

## 2023-02-10 RX ORDER — ESZOPICLONE 2 MG/1
2 TABLET, FILM COATED ORAL NIGHTLY
Qty: 90 TABLET | Refills: 3 | Status: SHIPPED | OUTPATIENT
Start: 2023-02-10 | End: 2024-02-10

## 2023-02-10 RX ORDER — IRBESARTAN 300 MG/1
300 TABLET ORAL DAILY
Qty: 90 TABLET | Refills: 3 | Status: SHIPPED | OUTPATIENT
Start: 2023-02-10

## 2023-02-10 SDOH — HEALTH STABILITY: PHYSICAL HEALTH: ON AVERAGE, HOW MANY DAYS PER WEEK DO YOU ENGAGE IN MODERATE TO STRENUOUS EXERCISE (LIKE A BRISK WALK)?: 3 DAYS

## 2023-02-10 ASSESSMENT — PATIENT HEALTH QUESTIONNAIRE - PHQ9
SUM OF ALL RESPONSES TO PHQ QUESTIONS 1-9: 0
SUM OF ALL RESPONSES TO PHQ9 QUESTIONS 1 & 2: 0
SUM OF ALL RESPONSES TO PHQ QUESTIONS 1-9: 0
2. FEELING DOWN, DEPRESSED OR HOPELESS: 0
1. LITTLE INTEREST OR PLEASURE IN DOING THINGS: 0

## 2023-02-10 NOTE — PROGRESS NOTES
She is working some on her recumbent bike but not using it as much as not going to the gym on a regular basis. Her feet continue to be a problem and seeing podiatry now        Past Medical History, Past Surgical History, Family history, Social History, and Medications were all reviewed with the patient today and updated as necessary. Current Outpatient Medications   Medication Sig Dispense Refill    eszopiclone (LUNESTA) 2 MG TABS Take 1 tablet by mouth at bedtime. 90 tablet 3    anastrozole (ARIMIDEX) 1 MG tablet Take 1 tablet by mouth daily 90 tablet 3    Verapamil 300 MG CP24 Take 300 mg by mouth at bedtime 90 capsule 3    atorvastatin (LIPITOR) 40 MG tablet Take 1 tablet by mouth in the morning. 90 tablet 3    rOPINIRole (REQUIP) 0.5 MG tablet Take 1 tablet by mouth in the morning. 90 tablet 3    omeprazole (PRILOSEC) 40 MG delayed release capsule Take 1 capsule by mouth in the morning. 90 capsule 3    allopurinol (ZYLOPRIM) 300 MG tablet Take 1 tablet by mouth in the morning. 90 tablet 3    levothyroxine (SYNTHROID) 125 MCG tablet Take 1 tablet by mouth every morning (before breakfast) 90 tablet 3    betamethasone dipropionate 0.05 % cream APPLY TO ARM TWICE DAILY      hydrALAZINE (APRESOLINE) 50 MG tablet Take 1 tablet by mouth in the morning and 1 tablet at noon and 1 tablet before bedtime. 270 tablet 3    irbesartan (AVAPRO) 300 MG tablet TAKE 1 TABLET DAILY (Patient taking differently: Take 300 mg by mouth daily) 90 tablet 3    Cholecalciferol 50 MCG (2000 UT) CAPS Take 4,000 Units by mouth daily      Naproxen Sodium 220 MG CAPS Take 220 mg by mouth daily as needed       No current facility-administered medications for this visit. Allergies   Allergen Reactions    Amoxicillin-Pot Clavulanate Itching     Itching in mouth and ears    Codeine Nausea Only    Hydralazine Other (See Comments)     states not allergic to medication.  Is presently taking medication and tolerating well    Mirabegron Other (See Comments)     ineffective    Naltrexone-Bupropion Hcl Er Other (See Comments)     lethargy    Nitrofurantoin Itching      itching under arm    Oxybutynin Chloride Other (See Comments)     Made her voiding worse    Penicillins Itching    Rosuvastatin Swelling    Simvastatin Other (See Comments)    Topiramate Other (See Comments)     Felt weird    Trazodone Other (See Comments)     ineffective    Zolpidem Other (See Comments)     Periods of forgetfulness     Patient Active Problem List   Diagnosis    Hyperlipidemia    Status post total replacement of right hip    Inadequate sleep hygiene    Increased urinary frequency    Morbid obesity ([address]    Family history of colon cancer    PATRICK (obstructive sleep apnea)    Chronic renal insufficiency, stage 2 (mild)    Bilateral lower abdominal discomfort    Vitamin D deficiency    History of hepatitis B vaccination    Microscopic hematuria    Status post total left knee replacement    Chronic bilateral low back pain without sciatica    Osteopenia    Elevated serum hCG in female, not pregnant    Encounter for screening for other viral diseases    Vasomotor rhinitis    Acute drug-induced gout involving toe of left foot    Thrombocytopenia (HCC)    Restless legs    Chronic bilateral lower abdominal pain    Gastroesophageal reflux disease    Arthralgia    Hx of migraines    Hypoxemia    Acquired hypothyroidism    Allergic rhinitis due to allergen    Chronic insomnia    Elevated serum hCG    At risk for bone density loss    Aromatase inhibitor use    Ductal carcinoma in situ (DCIS) of breast    Penicillin allergy    Chronic pain of left knee    Myalgia    Essential hypertension    Idiopathic chronic gout without tophus    Insomnia    Allergic rhinitis due to pollen    Chronic maxillary sinusitis    Dysfunction of right eustachian tube    Malignant neoplasm of left female breast (HCC)    Stage 3 chronic kidney disease (HCC)    Bilateral foot pain    Hyperglycemia    Need for vaccination         Review of Systems   Constitutional:  Negative for unexpected weight change. Musculoskeletal:  Positive for arthralgias. Psychiatric/Behavioral:  Negative for sleep disturbance. OBJECTIVE:  /86   Ht 5' 4\" (1.626 m)   Wt 275 lb (124.7 kg)   BMI 47.20 kg/m²      Physical Exam  Constitutional:       Appearance: Normal appearance. She is obese. Neurological:      Mental Status: She is alert. Psychiatric:         Mood and Affect: Mood normal.         Behavior: Behavior normal.        Medical problems and test results were reviewed with the patient today. Recent Results (from the past 672 hour(s))   Lipid Panel    Collection Time: 02/03/23  9:29 AM   Result Value Ref Range    Cholesterol, Total 171 <200 MG/DL    Triglycerides 137 35 - 150 MG/DL    HDL 51 40 - 60 MG/DL    LDL Calculated 92.6 <100 MG/DL    VLDL Cholesterol Calculated 27.4 (H) 6.0 - 23.0 MG/DL    Chol/HDL Ratio 3.4     Hemoglobin A1C    Collection Time: 02/03/23  9:29 AM   Result Value Ref Range    Hemoglobin A1C 5.6 4.8 - 5.6 %    eAG 114 mg/dL   ALT    Collection Time: 02/03/23  9:29 AM   Result Value Ref Range    ALT 39 12 - 65 U/L         ASSESSMENT and PLAN    1. Essential hypertension  The following orders have not been finalized:  -     irbesartan (AVAPRO) 300 MG tablet  2. Thrombocytopenia (HCC)  3. Stage 3 chronic kidney disease, unspecified whether stage 3a or 3b CKD ([facility]  4. Gastroesophageal reflux disease, unspecified whether esophagitis present  The following orders have not been finalized:  -     omeprazole (PRILOSEC) 40 MG delayed release capsule  5. Insomnia, unspecified type  The following orders have not been finalized:  -     eszopiclone (LUNESTA) 2 MG TABS  6. Hyperlipidemia, unspecified hyperlipidemia type  The following orders have not been finalized:  -     atorvastatin (LIPITOR) 40 MG tablet  7.  Morbid obesity ([facility]       She will work on more exercise and recumbent bike is her favorite. Numbers have been stable   Weight continues to go down! A1C normal  More than 50% of this 25 minute office visit was spent counseling the patient about exercise and weight   Return in about 24 weeks (around 7/28/2023).

## 2023-02-10 NOTE — TELEPHONE ENCOUNTER
----- Message from [place] sent at 2/10/2023 10:53 AM EST -----  Subject: Referral Request    Reason for referral request? Pt is calling to request labs for her to get   done a week before she comes in for her F/U. Provider patient wants to be referred to(if known):     Provider Phone Number(if known):     Additional Information for Provider?   ---------------------------------------------------------------------------  --------------  [address] [facility]    [phone]; OK to leave message on voicemail  ---------------------------------------------------------------------------  --------------

## 2023-03-02 ENCOUNTER — HOSPITAL ENCOUNTER (OUTPATIENT)
Dept: MAMMOGRAPHY | Age: 68
Discharge: HOME | End: 2023-03-02
Payer: MEDICARE

## 2023-03-02 DIAGNOSIS — Z79.811 LONG TERM (CURRENT) USE OF AROMATASE INHIBITORS: ICD-10-CM

## 2023-03-02 PROCEDURE — 77080 DXA BONE DENSITY AXIAL: CPT

## 2023-04-19 ENCOUNTER — HOSPITAL ENCOUNTER (OUTPATIENT)
Dept: MRI IMAGING | Age: 68
Discharge: HOME | End: 2023-04-22
Payer: MEDICARE

## 2023-04-19 DIAGNOSIS — D05.12 DUCTAL CARCINOMA IN SITU (DCIS) OF LEFT BREAST: ICD-10-CM

## 2023-04-19 PROCEDURE — 6360000004 HC RX CONTRAST MEDICATION: Performed by: INTERNAL MEDICINE

## 2023-04-19 PROCEDURE — C8908 MRI W/O FOL W/CONT, BREAST,: HCPCS

## 2023-04-19 PROCEDURE — A9579 GAD-BASE MR CONTRAST NOS,1ML: HCPCS | Performed by: INTERNAL MEDICINE

## 2023-04-19 RX ADMIN — GADOTERIDOL 25 ML: 279.3 INJECTION, SOLUTION INTRAVENOUS at 09:52

## 2023-04-25 DIAGNOSIS — D05.12 DUCTAL CARCINOMA IN SITU (DCIS) OF LEFT BREAST: Primary | ICD-10-CM

## 2023-04-27 ENCOUNTER — OFFICE VISIT (OUTPATIENT)
Dept: ONCOLOGY | Age: 68
End: 2023-04-27
Payer: MEDICARE

## 2023-04-27 ENCOUNTER — HOSPITAL ENCOUNTER (OUTPATIENT)
Dept: LAB | Age: 68
Discharge: HOME | End: 2023-04-27
Payer: MEDICARE

## 2023-04-27 VITALS
OXYGEN SATURATION: 96 % | HEIGHT: 64 IN | BODY MASS INDEX: 46.44 KG/M2 | TEMPERATURE: 98.7 F | DIASTOLIC BLOOD PRESSURE: 91 MMHG | WEIGHT: 272 LBS | HEART RATE: 68 BPM | RESPIRATION RATE: 14 BRPM | SYSTOLIC BLOOD PRESSURE: 154 MMHG

## 2023-04-27 DIAGNOSIS — M85.80 OSTEOPENIA, UNSPECIFIED LOCATION: ICD-10-CM

## 2023-04-27 DIAGNOSIS — Z79.811 LONG TERM (CURRENT) USE OF AROMATASE INHIBITORS: ICD-10-CM

## 2023-04-27 DIAGNOSIS — N89.8 VAGINAL DRYNESS: ICD-10-CM

## 2023-04-27 DIAGNOSIS — M25.50 ARTHRALGIA, UNSPECIFIED JOINT: ICD-10-CM

## 2023-04-27 DIAGNOSIS — D05.12 DUCTAL CARCINOMA IN SITU (DCIS) OF LEFT BREAST: Primary | ICD-10-CM

## 2023-04-27 DIAGNOSIS — D05.12 DUCTAL CARCINOMA IN SITU (DCIS) OF LEFT BREAST: ICD-10-CM

## 2023-04-27 LAB
ALBUMIN SERPL-MCNC: 3.8 G/DL (ref 3.2–4.6)
ALBUMIN/GLOB SERPL: 1 (ref 0.4–1.6)
ALP SERPL-CCNC: 132 U/L (ref 50–136)
ALT SERPL-CCNC: 34 U/L (ref 12–65)
ANION GAP SERPL CALC-SCNC: 4 MMOL/L (ref 2–11)
AST SERPL-CCNC: 26 U/L (ref 15–37)
BASOPHILS # BLD: 0.1 K/UL (ref 0–0.2)
BASOPHILS NFR BLD: 1 % (ref 0–2)
BILIRUB SERPL-MCNC: 0.7 MG/DL (ref 0.2–1.1)
BUN SERPL-MCNC: 17 MG/DL (ref 8–23)
CALCIUM SERPL-MCNC: 9.8 MG/DL (ref 8.3–10.4)
CHLORIDE SERPL-SCNC: 112 MMOL/L (ref 101–110)
CO2 SERPL-SCNC: 25 MMOL/L (ref 21–32)
CREAT SERPL-MCNC: 1 MG/DL (ref 0.6–1)
DIFFERENTIAL METHOD BLD: ABNORMAL
EOSINOPHIL # BLD: 0.2 K/UL (ref 0–0.8)
EOSINOPHIL NFR BLD: 3 % (ref 0.5–7.8)
ERYTHROCYTE [DISTWIDTH] IN BLOOD BY AUTOMATED COUNT: 13.9 % (ref 11.9–14.6)
GLOBULIN SER CALC-MCNC: 4 G/DL (ref 2.8–4.5)
GLUCOSE SERPL-MCNC: 99 MG/DL (ref 65–100)
HCT VFR BLD AUTO: 40.5 % (ref 35.8–46.3)
HGB BLD-MCNC: 12.5 G/DL (ref 11.7–15.4)
IMM GRANULOCYTES # BLD AUTO: 0 K/UL (ref 0–0.5)
IMM GRANULOCYTES NFR BLD AUTO: 0 % (ref 0–5)
LYMPHOCYTES # BLD: 1.3 K/UL (ref 0.5–4.6)
LYMPHOCYTES NFR BLD: 23 % (ref 13–44)
MCH RBC QN AUTO: 27.9 PG (ref 26.1–32.9)
MCHC RBC AUTO-ENTMCNC: 30.9 G/DL (ref 31.4–35)
MCV RBC AUTO: 90.4 FL (ref 82–102)
MONOCYTES # BLD: 0.4 K/UL (ref 0.1–1.3)
MONOCYTES NFR BLD: 7 % (ref 4–12)
NEUTS SEG # BLD: 3.7 K/UL (ref 1.7–8.2)
NEUTS SEG NFR BLD: 65 % (ref 43–78)
NRBC # BLD: 0 K/UL (ref 0–0.2)
PLATELET # BLD AUTO: 186 K/UL (ref 150–450)
PMV BLD AUTO: 10.1 FL (ref 9.4–12.3)
POTASSIUM SERPL-SCNC: 4 MMOL/L (ref 3.5–5.1)
PROT SERPL-MCNC: 7.8 G/DL (ref 6.3–8.2)
RBC # BLD AUTO: 4.48 M/UL (ref 4.05–5.2)
SODIUM SERPL-SCNC: 141 MMOL/L (ref 133–143)
WBC # BLD AUTO: 5.7 K/UL (ref 4.3–11.1)

## 2023-04-27 PROCEDURE — 1123F ACP DISCUSS/DSCN MKR DOCD: CPT | Performed by: NURSE PRACTITIONER

## 2023-04-27 PROCEDURE — 36415 COLL VENOUS BLD VENIPUNCTURE: CPT

## 2023-04-27 PROCEDURE — G8417 CALC BMI ABV UP PARAM F/U: HCPCS | Performed by: NURSE PRACTITIONER

## 2023-04-27 PROCEDURE — 3080F DIAST BP >= 90 MM HG: CPT | Performed by: NURSE PRACTITIONER

## 2023-04-27 PROCEDURE — 3077F SYST BP >= 140 MM HG: CPT | Performed by: NURSE PRACTITIONER

## 2023-04-27 PROCEDURE — 80053 COMPREHEN METABOLIC PANEL: CPT

## 2023-04-27 PROCEDURE — 1090F PRES/ABSN URINE INCON ASSESS: CPT | Performed by: NURSE PRACTITIONER

## 2023-04-27 PROCEDURE — G8399 PT W/DXA RESULTS DOCUMENT: HCPCS | Performed by: NURSE PRACTITIONER

## 2023-04-27 PROCEDURE — 3017F COLORECTAL CA SCREEN DOC REV: CPT | Performed by: NURSE PRACTITIONER

## 2023-04-27 PROCEDURE — 99214 OFFICE O/P EST MOD 30 MIN: CPT | Performed by: NURSE PRACTITIONER

## 2023-04-27 PROCEDURE — G8427 DOCREV CUR MEDS BY ELIG CLIN: HCPCS | Performed by: NURSE PRACTITIONER

## 2023-04-27 PROCEDURE — 85025 COMPLETE CBC W/AUTO DIFF WBC: CPT

## 2023-04-27 PROCEDURE — 1036F TOBACCO NON-USER: CPT | Performed by: NURSE PRACTITIONER

## 2023-04-27 RX ORDER — ANASTROZOLE 1 MG/1
1 TABLET ORAL DAILY
Qty: 90 TABLET | Refills: 3 | Status: SHIPPED | OUTPATIENT
Start: 2023-04-27

## 2023-04-27 ASSESSMENT — ENCOUNTER SYMPTOMS
VOICE CHANGE: 0
SORE THROAT: 0
BLOOD IN STOOL: 0
SHORTNESS OF BREATH: 0
VOMITING: 0
SCLERAL ICTERUS: 0
HEMOPTYSIS: 0
ABDOMINAL PAIN: 0
WHEEZING: 0
DIARRHEA: 0
TROUBLE SWALLOWING: 0
ABDOMINAL DISTENTION: 0
NAUSEA: 0
CHEST TIGHTNESS: 0
CONSTIPATION: 0

## 2023-04-27 ASSESSMENT — PATIENT HEALTH QUESTIONNAIRE - PHQ9
1. LITTLE INTEREST OR PLEASURE IN DOING THINGS: 0
SUM OF ALL RESPONSES TO PHQ QUESTIONS 1-9: 0
SUM OF ALL RESPONSES TO PHQ9 QUESTIONS 1 & 2: 0
SUM OF ALL RESPONSES TO PHQ QUESTIONS 1-9: 0
SUM OF ALL RESPONSES TO PHQ QUESTIONS 1-9: 0
2. FEELING DOWN, DEPRESSED OR HOPELESS: 0
SUM OF ALL RESPONSES TO PHQ QUESTIONS 1-9: 0

## 2023-04-27 NOTE — PATIENT INSTRUCTIONS
- Tart Cherry Juice Extract Pills for joint aches. - Replens or Coby [place] for vaginal dryness. Revaree (found on [address]) for vaginal dryness. - Get dental clearance for Prolia - will start at your next apt.

## 2023-04-27 NOTE — PROGRESS NOTES
[facility] Hematology and Oncology: Established patient - follow up     Chief Complaint   Patient presents with    Follow-up     Reason for Referral: Ductal carcinoma in situ  (DCIS) of left breast   Referring Provider:  Errol Akhtar MD   Primary Care Provider: Kwasi Juarez MD   Family History of Cancer/Hematologic Disorders: Family history significant for father with prostate and pancreatic cancer. Presenting Symptoms: Abnormal screening mammogram     History of Present Illness:  Ms. Gladystine Bence is a 79 y.o. female who presents today for follow up regarding left breast DCIS. The past medical history is significant for sleep apnea, osteopenia, chronic insomnia, HLD,  GERD, several orthopedic surgeries, DELLA/BSO, adenoidectomy, tonsillectomy, and breast augmentation. She initially presented for a routine bilateral screening mammogram on 11/23/20 which identified asymmetries in the left breast.  Further evaluation with  digital diagnostic left breast mammogram and left breast ultrasound on 12/3/20 confirmed a 5-6 mm solid lesion at the 12:00 position in the left breast.  US guided bx of left breast from 12/8/20 c/w ER 96%/NM 92% positive, low grade, cribriform and papillary  type, ductal carcinoma in situ with microcalcifications focally present, associated with DCIS. She was referred to sx and saw Dr James Yarbrough on 42/31/46. She was assessed with signs and symptoms consistent with small volume left breast DCIS and recommended  for wire localized left breast lumpectomy. Patient opted to proceed with surgical recommendations, and on 1/8/21, she underwent wire localized lumpectomy. Pathology from the left breast lumpectomy revealed ductal carcinoma in situ, intermediate grade,  cribriform and micropapillary types, 4 mm in greatest dimension, margins uninvolved, within 3 mm of the lateral margin; changes consistent with prior biopsy site, including fat necrosis; and fibrocystic mastopathy.   The left breast

## 2023-07-20 SDOH — HEALTH STABILITY: PHYSICAL HEALTH: ON AVERAGE, HOW MANY MINUTES DO YOU ENGAGE IN EXERCISE AT THIS LEVEL?: 60 MIN

## 2023-07-20 SDOH — HEALTH STABILITY: PHYSICAL HEALTH: ON AVERAGE, HOW MANY DAYS PER WEEK DO YOU ENGAGE IN MODERATE TO STRENUOUS EXERCISE (LIKE A BRISK WALK)?: 3 DAYS

## 2023-07-20 ASSESSMENT — PATIENT HEALTH QUESTIONNAIRE - PHQ9
SUM OF ALL RESPONSES TO PHQ QUESTIONS 1-9: 0
SUM OF ALL RESPONSES TO PHQ QUESTIONS 1-9: 0
SUM OF ALL RESPONSES TO PHQ9 QUESTIONS 1 & 2: 0
1. LITTLE INTEREST OR PLEASURE IN DOING THINGS: 0
SUM OF ALL RESPONSES TO PHQ QUESTIONS 1-9: 0
2. FEELING DOWN, DEPRESSED OR HOPELESS: 0
SUM OF ALL RESPONSES TO PHQ QUESTIONS 1-9: 0

## 2023-07-20 ASSESSMENT — LIFESTYLE VARIABLES
HOW OFTEN DO YOU HAVE A DRINK CONTAINING ALCOHOL: NEVER
HOW OFTEN DO YOU HAVE A DRINK CONTAINING ALCOHOL: 1
HOW MANY STANDARD DRINKS CONTAINING ALCOHOL DO YOU HAVE ON A TYPICAL DAY: PATIENT DOES NOT DRINK
HOW MANY STANDARD DRINKS CONTAINING ALCOHOL DO YOU HAVE ON A TYPICAL DAY: 0
HOW OFTEN DO YOU HAVE SIX OR MORE DRINKS ON ONE OCCASION: 1

## 2023-07-21 DIAGNOSIS — R73.9 HYPERGLYCEMIA: ICD-10-CM

## 2023-07-21 DIAGNOSIS — D69.6 THROMBOCYTOPENIA (HCC): ICD-10-CM

## 2023-07-21 DIAGNOSIS — I10 ESSENTIAL HYPERTENSION: ICD-10-CM

## 2023-07-21 DIAGNOSIS — E78.5 HYPERLIPIDEMIA, UNSPECIFIED HYPERLIPIDEMIA TYPE: ICD-10-CM

## 2023-07-21 LAB
ALBUMIN SERPL-MCNC: 3.9 G/DL (ref 3.2–4.6)
ALBUMIN/GLOB SERPL: 1.1 (ref 0.4–1.6)
ALP SERPL-CCNC: 124 U/L (ref 50–136)
ALT SERPL-CCNC: 37 U/L (ref 12–65)
ANION GAP SERPL CALC-SCNC: 6 MMOL/L (ref 2–11)
AST SERPL-CCNC: 33 U/L (ref 15–37)
BASOPHILS # BLD: 0 K/UL (ref 0–0.2)
BASOPHILS NFR BLD: 1 % (ref 0–2)
BILIRUB SERPL-MCNC: 0.9 MG/DL (ref 0.2–1.1)
BUN SERPL-MCNC: 18 MG/DL (ref 8–23)
CALCIUM SERPL-MCNC: 9.5 MG/DL (ref 8.3–10.4)
CHLORIDE SERPL-SCNC: 108 MMOL/L (ref 101–110)
CHOLEST SERPL-MCNC: 166 MG/DL
CO2 SERPL-SCNC: 27 MMOL/L (ref 21–32)
CREAT SERPL-MCNC: 1.1 MG/DL (ref 0.6–1)
DIFFERENTIAL METHOD BLD: NORMAL
EOSINOPHIL # BLD: 0.1 K/UL (ref 0–0.8)
EOSINOPHIL NFR BLD: 2 % (ref 0.5–7.8)
ERYTHROCYTE [DISTWIDTH] IN BLOOD BY AUTOMATED COUNT: 13.9 % (ref 11.9–14.6)
GLOBULIN SER CALC-MCNC: 3.5 G/DL (ref 2.8–4.5)
GLUCOSE SERPL-MCNC: 92 MG/DL (ref 65–100)
HCT VFR BLD AUTO: 41 % (ref 35.8–46.3)
HDLC SERPL-MCNC: 52 MG/DL (ref 40–60)
HDLC SERPL: 3.2
HGB BLD-MCNC: 13 G/DL (ref 11.7–15.4)
IMM GRANULOCYTES # BLD AUTO: 0 K/UL (ref 0–0.5)
IMM GRANULOCYTES NFR BLD AUTO: 0 % (ref 0–5)
LDLC SERPL CALC-MCNC: 87.8 MG/DL
LYMPHOCYTES # BLD: 1.5 K/UL (ref 0.5–4.6)
LYMPHOCYTES NFR BLD: 27 % (ref 13–44)
MCH RBC QN AUTO: 29.3 PG (ref 26.1–32.9)
MCHC RBC AUTO-ENTMCNC: 31.7 G/DL (ref 31.4–35)
MCV RBC AUTO: 92.3 FL (ref 82–102)
MONOCYTES # BLD: 0.4 K/UL (ref 0.1–1.3)
MONOCYTES NFR BLD: 7 % (ref 4–12)
NEUTS SEG # BLD: 3.5 K/UL (ref 1.7–8.2)
NEUTS SEG NFR BLD: 63 % (ref 43–78)
NRBC # BLD: 0 K/UL (ref 0–0.2)
PLATELET # BLD AUTO: 185 K/UL (ref 150–450)
PMV BLD AUTO: 10.5 FL (ref 9.4–12.3)
POTASSIUM SERPL-SCNC: 4.2 MMOL/L (ref 3.5–5.1)
PROT SERPL-MCNC: 7.4 G/DL (ref 6.3–8.2)
RBC # BLD AUTO: 4.44 M/UL (ref 4.05–5.2)
SODIUM SERPL-SCNC: 141 MMOL/L (ref 133–143)
TRIGL SERPL-MCNC: 131 MG/DL (ref 35–150)
TSH, 3RD GENERATION: 0.56 UIU/ML (ref 0.36–3.74)
VLDLC SERPL CALC-MCNC: 26.2 MG/DL (ref 6–23)
WBC # BLD AUTO: 5.5 K/UL (ref 4.3–11.1)

## 2023-07-22 LAB
EST. AVERAGE GLUCOSE BLD GHB EST-MCNC: 120 MG/DL
HBA1C MFR BLD: 5.8 % (ref 4.8–5.6)

## 2023-07-25 DIAGNOSIS — D05.12 DUCTAL CARCINOMA IN SITU (DCIS) OF LEFT BREAST: Primary | ICD-10-CM

## 2023-07-25 ASSESSMENT — ENCOUNTER SYMPTOMS
ABDOMINAL PAIN: 0
VOMITING: 0
VOICE CHANGE: 0
TROUBLE SWALLOWING: 0
BLOOD IN STOOL: 0
SCLERAL ICTERUS: 0
DIARRHEA: 0
CONSTIPATION: 0
WHEEZING: 0
CHEST TIGHTNESS: 0
NAUSEA: 0
SHORTNESS OF BREATH: 0
HEMOPTYSIS: 0
ABDOMINAL DISTENTION: 0
SORE THROAT: 0

## 2023-07-25 NOTE — PROGRESS NOTES
letrozole and wishes to continue. Joint aches slightly worse than previous. Discussed that we could switch to a different AI if desired, for now she wishes to continue with Letrozole. - plan to eventually transition to mammo/MR staggered q6mo. 3. Thrombocytopenia - new onset - currently resolved    - med review: hydralazine can cause blood dyscrasias, atorvastatin can cause thrombocytopenia, Prilosec can cause blood dyscrasias, Purinol can cause thrombocytopenia, irbesartan can cause thrombocytopenia - pt not to stop any medications on her own. Can review alternate options with prescribing MDs.     - smear negative previously and no nutritional deficiencies (borderline low Vitamin D, on oral Vitamin D). All questions were asked and answered to the best of my ability. The patient verbalized understanding and agrees with the plan above.           Avi Bustillo MD  [facility] Hematology and Oncology  [address]  Office : [phone]  Fax : [phone]

## 2023-07-27 ENCOUNTER — HOSPITAL ENCOUNTER (OUTPATIENT)
Dept: INFUSION THERAPY | Age: 68
Discharge: HOME | End: 2023-07-27
Payer: MEDICARE

## 2023-07-27 ENCOUNTER — OFFICE VISIT (OUTPATIENT)
Dept: ONCOLOGY | Age: 68
End: 2023-07-27
Payer: MEDICARE

## 2023-07-27 ENCOUNTER — HOSPITAL ENCOUNTER (OUTPATIENT)
Dept: LAB | Age: 68
Discharge: HOME | End: 2023-07-27
Payer: MEDICARE

## 2023-07-27 VITALS
SYSTOLIC BLOOD PRESSURE: 151 MMHG | BODY MASS INDEX: 48.14 KG/M2 | OXYGEN SATURATION: 98 % | RESPIRATION RATE: 18 BRPM | DIASTOLIC BLOOD PRESSURE: 97 MMHG | TEMPERATURE: 98 F | WEIGHT: 282 LBS | HEART RATE: 70 BPM | HEIGHT: 64 IN

## 2023-07-27 DIAGNOSIS — Z79.899 HIGH RISK MEDICATION USE: ICD-10-CM

## 2023-07-27 DIAGNOSIS — Z79.899 HIGH RISK MEDICATION USE: Primary | ICD-10-CM

## 2023-07-27 DIAGNOSIS — Z12.31 ENCOUNTER FOR SCREENING MAMMOGRAM FOR MALIGNANT NEOPLASM OF BREAST: Primary | ICD-10-CM

## 2023-07-27 DIAGNOSIS — D05.12 DUCTAL CARCINOMA IN SITU (DCIS) OF LEFT BREAST: ICD-10-CM

## 2023-07-27 DIAGNOSIS — M85.80 OSTEOPENIA, UNSPECIFIED LOCATION: Primary | ICD-10-CM

## 2023-07-27 LAB
ALBUMIN SERPL-MCNC: 3.9 G/DL (ref 3.2–4.6)
ALBUMIN/GLOB SERPL: 1 (ref 0.4–1.6)
ALP SERPL-CCNC: 111 U/L (ref 50–136)
ALT SERPL-CCNC: 40 U/L (ref 12–65)
ANION GAP SERPL CALC-SCNC: 8 MMOL/L (ref 2–11)
AST SERPL-CCNC: 29 U/L (ref 15–37)
BASOPHILS # BLD: 0.1 K/UL (ref 0–0.2)
BASOPHILS NFR BLD: 1 % (ref 0–2)
BILIRUB SERPL-MCNC: 0.9 MG/DL (ref 0.2–1.1)
BUN SERPL-MCNC: 18 MG/DL (ref 8–23)
CALCIUM SERPL-MCNC: 9.8 MG/DL (ref 8.3–10.4)
CHLORIDE SERPL-SCNC: 106 MMOL/L (ref 101–110)
CO2 SERPL-SCNC: 26 MMOL/L (ref 21–32)
CREAT SERPL-MCNC: 1 MG/DL (ref 0.6–1)
DIFFERENTIAL METHOD BLD: NORMAL
EOSINOPHIL # BLD: 0.1 K/UL (ref 0–0.8)
EOSINOPHIL NFR BLD: 2 % (ref 0.5–7.8)
ERYTHROCYTE [DISTWIDTH] IN BLOOD BY AUTOMATED COUNT: 13.9 % (ref 11.9–14.6)
GLOBULIN SER CALC-MCNC: 3.9 G/DL (ref 2.8–4.5)
GLUCOSE SERPL-MCNC: 101 MG/DL (ref 65–100)
HCT VFR BLD AUTO: 40.7 % (ref 35.8–46.3)
HGB BLD-MCNC: 13 G/DL (ref 11.7–15.4)
IMM GRANULOCYTES # BLD AUTO: 0 K/UL (ref 0–0.5)
IMM GRANULOCYTES NFR BLD AUTO: 0 % (ref 0–5)
LYMPHOCYTES # BLD: 1.6 K/UL (ref 0.5–4.6)
LYMPHOCYTES NFR BLD: 26 % (ref 13–44)
MAGNESIUM SERPL-MCNC: 2 MG/DL (ref 1.8–2.4)
MCH RBC QN AUTO: 28.8 PG (ref 26.1–32.9)
MCHC RBC AUTO-ENTMCNC: 31.9 G/DL (ref 31.4–35)
MCV RBC AUTO: 90.2 FL (ref 82–102)
MONOCYTES # BLD: 0.5 K/UL (ref 0.1–1.3)
MONOCYTES NFR BLD: 7 % (ref 4–12)
NEUTS SEG # BLD: 4.1 K/UL (ref 1.7–8.2)
NEUTS SEG NFR BLD: 64 % (ref 43–78)
NRBC # BLD: 0 K/UL (ref 0–0.2)
PHOSPHATE SERPL-MCNC: 2.9 MG/DL (ref 2.3–3.7)
PLATELET # BLD AUTO: 195 K/UL (ref 150–450)
PMV BLD AUTO: 10.1 FL (ref 9.4–12.3)
POTASSIUM SERPL-SCNC: 3.9 MMOL/L (ref 3.5–5.1)
PROT SERPL-MCNC: 7.8 G/DL (ref 6.3–8.2)
RBC # BLD AUTO: 4.51 M/UL (ref 4.05–5.2)
SODIUM SERPL-SCNC: 140 MMOL/L (ref 133–143)
WBC # BLD AUTO: 6.3 K/UL (ref 4.3–11.1)

## 2023-07-27 PROCEDURE — G8399 PT W/DXA RESULTS DOCUMENT: HCPCS | Performed by: INTERNAL MEDICINE

## 2023-07-27 PROCEDURE — 1090F PRES/ABSN URINE INCON ASSESS: CPT | Performed by: INTERNAL MEDICINE

## 2023-07-27 PROCEDURE — 80053 COMPREHEN METABOLIC PANEL: CPT

## 2023-07-27 PROCEDURE — 3017F COLORECTAL CA SCREEN DOC REV: CPT | Performed by: INTERNAL MEDICINE

## 2023-07-27 PROCEDURE — 83735 ASSAY OF MAGNESIUM: CPT

## 2023-07-27 PROCEDURE — 99214 OFFICE O/P EST MOD 30 MIN: CPT | Performed by: INTERNAL MEDICINE

## 2023-07-27 PROCEDURE — 3078F DIAST BP <80 MM HG: CPT | Performed by: INTERNAL MEDICINE

## 2023-07-27 PROCEDURE — G8417 CALC BMI ABV UP PARAM F/U: HCPCS | Performed by: INTERNAL MEDICINE

## 2023-07-27 PROCEDURE — 96372 THER/PROPH/DIAG INJ SC/IM: CPT

## 2023-07-27 PROCEDURE — G8427 DOCREV CUR MEDS BY ELIG CLIN: HCPCS | Performed by: INTERNAL MEDICINE

## 2023-07-27 PROCEDURE — 85025 COMPLETE CBC W/AUTO DIFF WBC: CPT

## 2023-07-27 PROCEDURE — 6360000002 HC RX W HCPCS: Performed by: INTERNAL MEDICINE

## 2023-07-27 PROCEDURE — 3074F SYST BP LT 130 MM HG: CPT | Performed by: INTERNAL MEDICINE

## 2023-07-27 PROCEDURE — 1036F TOBACCO NON-USER: CPT | Performed by: INTERNAL MEDICINE

## 2023-07-27 PROCEDURE — 1123F ACP DISCUSS/DSCN MKR DOCD: CPT | Performed by: INTERNAL MEDICINE

## 2023-07-27 PROCEDURE — 84100 ASSAY OF PHOSPHORUS: CPT

## 2023-07-27 PROCEDURE — 36415 COLL VENOUS BLD VENIPUNCTURE: CPT

## 2023-07-27 RX ORDER — EPINEPHRINE 1 MG/ML
0.3 INJECTION, SOLUTION, CONCENTRATE INTRAVENOUS PRN
Status: CANCELLED | OUTPATIENT
Start: 2023-07-27

## 2023-07-27 RX ORDER — ONDANSETRON 2 MG/ML
8 INJECTION INTRAMUSCULAR; INTRAVENOUS
OUTPATIENT
Start: 2024-01-25

## 2023-07-27 RX ORDER — ALBUTEROL SULFATE 90 UG/1
4 AEROSOL, METERED RESPIRATORY (INHALATION) PRN
Status: CANCELLED | OUTPATIENT
Start: 2023-07-27

## 2023-07-27 RX ORDER — SODIUM CHLORIDE 9 MG/ML
INJECTION, SOLUTION INTRAVENOUS CONTINUOUS
Status: CANCELLED | OUTPATIENT
Start: 2023-07-27

## 2023-07-27 RX ORDER — ACETAMINOPHEN 325 MG/1
650 TABLET ORAL
Status: CANCELLED | OUTPATIENT
Start: 2023-07-27

## 2023-07-27 RX ORDER — DIPHENHYDRAMINE HYDROCHLORIDE 50 MG/ML
50 INJECTION INTRAMUSCULAR; INTRAVENOUS
OUTPATIENT
Start: 2024-01-25

## 2023-07-27 RX ORDER — ALBUTEROL SULFATE 90 UG/1
4 AEROSOL, METERED RESPIRATORY (INHALATION) PRN
OUTPATIENT
Start: 2024-01-25

## 2023-07-27 RX ORDER — DIPHENHYDRAMINE HYDROCHLORIDE 50 MG/ML
50 INJECTION INTRAMUSCULAR; INTRAVENOUS
Status: CANCELLED | OUTPATIENT
Start: 2023-07-27

## 2023-07-27 RX ORDER — EPINEPHRINE 1 MG/ML
0.3 INJECTION, SOLUTION, CONCENTRATE INTRAVENOUS PRN
OUTPATIENT
Start: 2024-01-25

## 2023-07-27 RX ORDER — FAMOTIDINE 10 MG/ML
20 INJECTION, SOLUTION INTRAVENOUS
Status: CANCELLED | OUTPATIENT
Start: 2023-07-27

## 2023-07-27 RX ORDER — ACETAMINOPHEN 325 MG/1
650 TABLET ORAL
OUTPATIENT
Start: 2024-01-25

## 2023-07-27 RX ORDER — SODIUM CHLORIDE 9 MG/ML
INJECTION, SOLUTION INTRAVENOUS CONTINUOUS
OUTPATIENT
Start: 2024-01-25

## 2023-07-27 RX ORDER — ONDANSETRON 2 MG/ML
8 INJECTION INTRAMUSCULAR; INTRAVENOUS
Status: CANCELLED | OUTPATIENT
Start: 2023-07-27

## 2023-07-27 RX ADMIN — DENOSUMAB 60 MG: 60 INJECTION SUBCUTANEOUS at 12:13

## 2023-07-27 ASSESSMENT — PATIENT HEALTH QUESTIONNAIRE - PHQ9
SUM OF ALL RESPONSES TO PHQ QUESTIONS 1-9: 0
SUM OF ALL RESPONSES TO PHQ9 QUESTIONS 1 & 2: 0
SUM OF ALL RESPONSES TO PHQ QUESTIONS 1-9: 0
2. FEELING DOWN, DEPRESSED OR HOPELESS: 0
SUM OF ALL RESPONSES TO PHQ QUESTIONS 1-9: 0
SUM OF ALL RESPONSES TO PHQ QUESTIONS 1-9: 0
1. LITTLE INTEREST OR PLEASURE IN DOING THINGS: 0

## 2023-07-27 NOTE — PROGRESS NOTES
Arrived to the [address] [place] [address]. [facility]. Prolia injection completed. Provided education on side effects of Prolia    Patient instructed to report any side affects to ordering provider. Patient tolerated well. Any issues or concerns during appointment: none. Patient aware of next infusion appointment on 1/11/2024 (date) at 80 (time). Discharged home ambulatory.

## 2023-07-27 NOTE — PATIENT INSTRUCTIONS
Patient Instructions from Today's Visit    Reason for Visit:  Follow Up    Diagnosis Information:  [email or web address]/. net/about-us/asco-answers-patient-education-materials/vdxm-lvaehsm-hmsg-sheets    Plan:  Labs reviewed. Symptoms reviewed. Mammogram due in October. You can call to schedule this at [phone]. If you are having bone pain after your injection today, start claritin tomorrow. Follow Up:  About 4 months. Recent Lab Results:  Hospital Outpatient Visit on 07/27/2023   Component Date Value Ref Range Status    Sodium 07/27/2023 140  133 - 143 mmol/L Final    Potassium 07/27/2023 3.9  3.5 - 5.1 mmol/L Final    Chloride 07/27/2023 106  101 - 110 mmol/L Final    CO2 07/27/2023 26  21 - 32 mmol/L Final    Anion Gap 07/27/2023 8  2 - 11 mmol/L Final    Glucose 07/27/2023 101 (H)  65 - 100 mg/dL Final    BUN 07/27/2023 18  8 - 23 MG/DL Final    Creatinine 07/27/2023 1.00  0.6 - 1.0 MG/DL Final    Est, Glom Filt Rate 07/27/2023 >60  >60 ml/min/1.73m2 Final    Comment:    Pediatric calculator link: [employer]. org/professionals/kdoqi/gfr_calculatorped     These results are not intended for use in patients <25years of age. eGFR results are calculated without a race factor using  the 2021 CKD-EPI equation. Careful clinical correlation is recommended, particularly when comparing to results calculated using previous equations. The CKD-EPI equation is less accurate in patients with extremes of muscle mass, extra-renal metabolism of creatinine, excessive creatine ingestion, or following therapy that affects renal tubular secretion.       Calcium 07/27/2023 9.8  8.3 - 10.4 MG/DL Final    Total Bilirubin 07/27/2023 0.9  0.2 - 1.1 MG/DL Final    ALT 07/27/2023 40  12 - 65 U/L Final    AST 07/27/2023 29  15 - 37 U/L Final    Alk Phosphatase 07/27/2023 111  50 - 136 U/L Final    Total Protein 07/27/2023 7.8  6.3 - 8.2 g/dL Final    Albumin 07/27/2023 3.9  3.2 - 4.6 g/dL Final    Globulin 07/27/2023

## 2023-07-28 ENCOUNTER — OFFICE VISIT (OUTPATIENT)
Dept: INTERNAL MEDICINE CLINIC | Facility: CLINIC | Age: 68
End: 2023-07-28

## 2023-07-28 VITALS
SYSTOLIC BLOOD PRESSURE: 128 MMHG | DIASTOLIC BLOOD PRESSURE: 72 MMHG | WEIGHT: 282 LBS | BODY MASS INDEX: 48.14 KG/M2 | HEIGHT: 64 IN

## 2023-07-28 DIAGNOSIS — M1A.00X0 IDIOPATHIC CHRONIC GOUT WITHOUT TOPHUS, UNSPECIFIED SITE: ICD-10-CM

## 2023-07-28 DIAGNOSIS — M85.80 OSTEOPENIA, UNSPECIFIED LOCATION: ICD-10-CM

## 2023-07-28 DIAGNOSIS — Z00.00 ROUTINE PHYSICAL EXAMINATION: Primary | ICD-10-CM

## 2023-07-28 DIAGNOSIS — G25.81 RESTLESS LEGS: ICD-10-CM

## 2023-07-28 DIAGNOSIS — E03.9 ACQUIRED HYPOTHYROIDISM: ICD-10-CM

## 2023-07-28 DIAGNOSIS — R73.9 HYPERGLYCEMIA: ICD-10-CM

## 2023-07-28 DIAGNOSIS — D05.10 DUCTAL CARCINOMA IN SITU (DCIS) OF BREAST, UNSPECIFIED LATERALITY: ICD-10-CM

## 2023-07-28 DIAGNOSIS — D69.6 THROMBOCYTOPENIA (HCC): ICD-10-CM

## 2023-07-28 DIAGNOSIS — E34.9 ELEVATED SERUM HCG IN FEMALE, NOT PREGNANT: ICD-10-CM

## 2023-07-28 DIAGNOSIS — N18.30 STAGE 3 CHRONIC KIDNEY DISEASE, UNSPECIFIED WHETHER STAGE 3A OR 3B CKD (HCC): ICD-10-CM

## 2023-07-28 DIAGNOSIS — G47.00 INSOMNIA, UNSPECIFIED TYPE: ICD-10-CM

## 2023-07-28 DIAGNOSIS — I10 ESSENTIAL HYPERTENSION: ICD-10-CM

## 2023-07-28 DIAGNOSIS — E78.5 HYPERLIPIDEMIA, UNSPECIFIED HYPERLIPIDEMIA TYPE: ICD-10-CM

## 2023-07-28 DIAGNOSIS — E66.01 MORBID OBESITY (HCC): ICD-10-CM

## 2023-07-28 PROBLEM — R09.02 HYPOXEMIA: Status: RESOLVED | Noted: 2021-07-16 | Resolved: 2023-07-28

## 2023-07-28 RX ORDER — ALLOPURINOL 300 MG/1
300 TABLET ORAL DAILY
Qty: 90 TABLET | Refills: 3 | Status: SHIPPED | OUTPATIENT
Start: 2023-07-28

## 2023-07-28 RX ORDER — VERAPAMIL HYDROCHLORIDE 300 MG/1
300 CAPSULE, EXTENDED RELEASE ORAL NIGHTLY
Qty: 90 CAPSULE | Refills: 3 | Status: SHIPPED | OUTPATIENT
Start: 2023-07-28

## 2023-07-28 RX ORDER — HYDRALAZINE HYDROCHLORIDE 50 MG/1
50 TABLET, FILM COATED ORAL 3 TIMES DAILY
Qty: 270 TABLET | Refills: 3 | Status: SHIPPED | OUTPATIENT
Start: 2023-07-28

## 2023-07-28 RX ORDER — LEVOTHYROXINE SODIUM 0.12 MG/1
125 TABLET ORAL
Qty: 90 TABLET | Refills: 3 | Status: SHIPPED | OUTPATIENT
Start: 2023-07-28

## 2023-07-28 RX ORDER — ROPINIROLE 0.5 MG/1
0.5 TABLET, FILM COATED ORAL DAILY
Qty: 90 TABLET | Refills: 3 | Status: SHIPPED | OUTPATIENT
Start: 2023-07-28

## 2023-07-28 RX ORDER — ESZOPICLONE 2 MG/1
2 TABLET, FILM COATED ORAL NIGHTLY
Qty: 90 TABLET | Refills: 1 | Status: SHIPPED | OUTPATIENT
Start: 2023-07-28 | End: 2024-07-27

## 2023-07-28 SDOH — HEALTH STABILITY: PHYSICAL HEALTH: ON AVERAGE, HOW MANY DAYS PER WEEK DO YOU ENGAGE IN MODERATE TO STRENUOUS EXERCISE (LIKE A BRISK WALK)?: 3 DAYS

## 2023-07-28 SDOH — HEALTH STABILITY: PHYSICAL HEALTH: ON AVERAGE, HOW MANY MINUTES DO YOU ENGAGE IN EXERCISE AT THIS LEVEL?: 60 MIN

## 2023-07-28 NOTE — PROGRESS NOTES
Oncology)  Roya Devries MD (Pulmonary Disease)     Reviewed and updated this visit:  Tobacco  Allergies  Meds  Problems  Med Hx  Surg Hx  Soc Hx  Fam Hx

## 2023-07-28 NOTE — PATIENT INSTRUCTIONS
including lifestyle, illnesses that may run in your family, and various assessments and screenings as appropriate. After reviewing your medical record and screening and assessments performed today your provider may have ordered immunizations, labs, imaging, and/or referrals for you. A list of these orders (if applicable) as well as your Preventive Care list are included within your After Visit Summary for your review. Other Preventive Recommendations:    A preventive eye exam performed by an eye specialist is recommended every 1-2 years to screen for glaucoma; cataracts, macular degeneration, and other eye disorders. A preventive dental visit is recommended every 6 months. Try to get at least 150 minutes of exercise per week or 10,000 steps per day on a pedometer . Order or download the FREE \"Exercise & Physical Activity: Your Everyday Guide\" from [employer]. Call 8-381.543.9806 or search [employer] online. You need 1946-0569 mg of calcium and 3422-0492 IU of vitamin D per day. It is possible to meet your calcium requirement with diet alone, but a vitamin D supplement is usually necessary to meet this goal.  When exposed to the sun, use a sunscreen that protects against both UVA and UVB radiation with an SPF of 30 or greater. Reapply every 2 to 3 hours or after sweating, drying off with a towel, or swimming. Always wear a seat belt when traveling in a car. Always wear a helmet when riding a bicycle or motorcycle.

## 2023-09-18 DIAGNOSIS — K21.9 GASTROESOPHAGEAL REFLUX DISEASE, UNSPECIFIED WHETHER ESOPHAGITIS PRESENT: ICD-10-CM

## 2023-09-18 RX ORDER — OMEPRAZOLE 40 MG/1
40 CAPSULE, DELAYED RELEASE ORAL DAILY
Qty: 90 CAPSULE | Refills: 3 | Status: SHIPPED | OUTPATIENT
Start: 2023-09-18

## 2023-09-19 DIAGNOSIS — I10 ESSENTIAL HYPERTENSION: ICD-10-CM

## 2023-09-19 RX ORDER — IRBESARTAN 300 MG/1
300 TABLET ORAL DAILY
Qty: 90 TABLET | Refills: 3 | Status: SHIPPED | OUTPATIENT
Start: 2023-09-19

## 2023-10-09 DIAGNOSIS — E78.5 HYPERLIPIDEMIA, UNSPECIFIED HYPERLIPIDEMIA TYPE: ICD-10-CM

## 2023-10-09 RX ORDER — ATORVASTATIN CALCIUM 40 MG/1
40 TABLET, FILM COATED ORAL DAILY
Qty: 90 TABLET | Refills: 3 | Status: SHIPPED | OUTPATIENT
Start: 2023-10-09

## 2023-11-06 ENCOUNTER — HOSPITAL ENCOUNTER (OUTPATIENT)
Dept: MAMMOGRAPHY | Age: 68
Discharge: HOME | End: 2023-11-09
Attending: INTERNAL MEDICINE
Payer: MEDICARE

## 2023-11-06 DIAGNOSIS — Z12.31 ENCOUNTER FOR SCREENING MAMMOGRAM FOR MALIGNANT NEOPLASM OF BREAST: ICD-10-CM

## 2023-11-06 PROCEDURE — 77063 BREAST TOMOSYNTHESIS BI: CPT

## 2023-11-15 DIAGNOSIS — D05.12 DUCTAL CARCINOMA IN SITU (DCIS) OF LEFT BREAST: Primary | ICD-10-CM

## 2023-11-16 ENCOUNTER — HOSPITAL ENCOUNTER (OUTPATIENT)
Dept: LAB | Age: 68
Discharge: HOME | End: 2023-11-16
Payer: MEDICARE

## 2023-11-16 ENCOUNTER — OFFICE VISIT (OUTPATIENT)
Dept: ONCOLOGY | Age: 68
End: 2023-11-16
Payer: MEDICARE

## 2023-11-16 VITALS
RESPIRATION RATE: 18 BRPM | SYSTOLIC BLOOD PRESSURE: 185 MMHG | HEART RATE: 64 BPM | BODY MASS INDEX: 50.02 KG/M2 | DIASTOLIC BLOOD PRESSURE: 82 MMHG | HEIGHT: 64 IN | WEIGHT: 293 LBS | TEMPERATURE: 98.1 F

## 2023-11-16 DIAGNOSIS — M25.50 AROMATASE INHIBITOR-ASSOCIATED ARTHRALGIA: ICD-10-CM

## 2023-11-16 DIAGNOSIS — D05.12 DUCTAL CARCINOMA IN SITU (DCIS) OF LEFT BREAST: ICD-10-CM

## 2023-11-16 DIAGNOSIS — D05.12 DUCTAL CARCINOMA IN SITU (DCIS) OF LEFT BREAST: Primary | ICD-10-CM

## 2023-11-16 DIAGNOSIS — M85.80 OSTEOPENIA, UNSPECIFIED LOCATION: ICD-10-CM

## 2023-11-16 DIAGNOSIS — Z79.811 LONG TERM (CURRENT) USE OF AROMATASE INHIBITORS: ICD-10-CM

## 2023-11-16 DIAGNOSIS — T45.1X5A AROMATASE INHIBITOR-ASSOCIATED ARTHRALGIA: ICD-10-CM

## 2023-11-16 LAB
ALBUMIN SERPL-MCNC: 3.9 G/DL (ref 3.2–4.6)
ALBUMIN/GLOB SERPL: 1.1 (ref 0.4–1.6)
ALP SERPL-CCNC: 86 U/L (ref 50–136)
ALT SERPL-CCNC: 35 U/L (ref 12–65)
ANION GAP SERPL CALC-SCNC: 5 MMOL/L (ref 2–11)
AST SERPL-CCNC: 29 U/L (ref 15–37)
BASOPHILS # BLD: 0.1 K/UL (ref 0–0.2)
BASOPHILS NFR BLD: 1 % (ref 0–2)
BILIRUB SERPL-MCNC: 0.8 MG/DL (ref 0.2–1.1)
BUN SERPL-MCNC: 20 MG/DL (ref 8–23)
CALCIUM SERPL-MCNC: 9.1 MG/DL (ref 8.3–10.4)
CHLORIDE SERPL-SCNC: 109 MMOL/L (ref 101–110)
CO2 SERPL-SCNC: 25 MMOL/L (ref 21–32)
CREAT SERPL-MCNC: 0.9 MG/DL (ref 0.6–1)
DIFFERENTIAL METHOD BLD: NORMAL
EOSINOPHIL # BLD: 0.2 K/UL (ref 0–0.8)
EOSINOPHIL NFR BLD: 3 % (ref 0.5–7.8)
ERYTHROCYTE [DISTWIDTH] IN BLOOD BY AUTOMATED COUNT: 13.9 % (ref 11.9–14.6)
GLOBULIN SER CALC-MCNC: 3.7 G/DL (ref 2.8–4.5)
GLUCOSE SERPL-MCNC: 97 MG/DL (ref 65–100)
HCT VFR BLD AUTO: 40.6 % (ref 35.8–46.3)
HGB BLD-MCNC: 13 G/DL (ref 11.7–15.4)
IMM GRANULOCYTES # BLD AUTO: 0 K/UL (ref 0–0.5)
IMM GRANULOCYTES NFR BLD AUTO: 0 % (ref 0–5)
LYMPHOCYTES # BLD: 1.9 K/UL (ref 0.5–4.6)
LYMPHOCYTES NFR BLD: 27 % (ref 13–44)
MAGNESIUM SERPL-MCNC: 1.8 MG/DL (ref 1.8–2.4)
MCH RBC QN AUTO: 28.9 PG (ref 26.1–32.9)
MCHC RBC AUTO-ENTMCNC: 32 G/DL (ref 31.4–35)
MCV RBC AUTO: 90.2 FL (ref 82–102)
MONOCYTES # BLD: 0.5 K/UL (ref 0.1–1.3)
MONOCYTES NFR BLD: 7 % (ref 4–12)
NEUTS SEG # BLD: 4.2 K/UL (ref 1.7–8.2)
NEUTS SEG NFR BLD: 62 % (ref 43–78)
NRBC # BLD: 0 K/UL (ref 0–0.2)
PHOSPHATE SERPL-MCNC: 3 MG/DL (ref 2.3–3.7)
PLATELET # BLD AUTO: 173 K/UL (ref 150–450)
PMV BLD AUTO: 9.7 FL (ref 9.4–12.3)
POTASSIUM SERPL-SCNC: 3.7 MMOL/L (ref 3.5–5.1)
PROT SERPL-MCNC: 7.6 G/DL (ref 6.3–8.2)
RBC # BLD AUTO: 4.5 M/UL (ref 4.05–5.2)
SODIUM SERPL-SCNC: 139 MMOL/L (ref 133–143)
WBC # BLD AUTO: 6.8 K/UL (ref 4.3–11.1)

## 2023-11-16 PROCEDURE — 99214 OFFICE O/P EST MOD 30 MIN: CPT | Performed by: NURSE PRACTITIONER

## 2023-11-16 PROCEDURE — G8399 PT W/DXA RESULTS DOCUMENT: HCPCS | Performed by: NURSE PRACTITIONER

## 2023-11-16 PROCEDURE — 3017F COLORECTAL CA SCREEN DOC REV: CPT | Performed by: NURSE PRACTITIONER

## 2023-11-16 PROCEDURE — G8427 DOCREV CUR MEDS BY ELIG CLIN: HCPCS | Performed by: NURSE PRACTITIONER

## 2023-11-16 PROCEDURE — 1123F ACP DISCUSS/DSCN MKR DOCD: CPT | Performed by: NURSE PRACTITIONER

## 2023-11-16 PROCEDURE — 3077F SYST BP >= 140 MM HG: CPT | Performed by: NURSE PRACTITIONER

## 2023-11-16 PROCEDURE — 84100 ASSAY OF PHOSPHORUS: CPT

## 2023-11-16 PROCEDURE — 83735 ASSAY OF MAGNESIUM: CPT

## 2023-11-16 PROCEDURE — 36415 COLL VENOUS BLD VENIPUNCTURE: CPT

## 2023-11-16 PROCEDURE — G8484 FLU IMMUNIZE NO ADMIN: HCPCS | Performed by: NURSE PRACTITIONER

## 2023-11-16 PROCEDURE — 80053 COMPREHEN METABOLIC PANEL: CPT

## 2023-11-16 PROCEDURE — 1036F TOBACCO NON-USER: CPT | Performed by: NURSE PRACTITIONER

## 2023-11-16 PROCEDURE — 1090F PRES/ABSN URINE INCON ASSESS: CPT | Performed by: NURSE PRACTITIONER

## 2023-11-16 PROCEDURE — 3079F DIAST BP 80-89 MM HG: CPT | Performed by: NURSE PRACTITIONER

## 2023-11-16 PROCEDURE — G8417 CALC BMI ABV UP PARAM F/U: HCPCS | Performed by: NURSE PRACTITIONER

## 2023-11-16 PROCEDURE — 85025 COMPLETE CBC W/AUTO DIFF WBC: CPT

## 2023-11-16 RX ORDER — DENOSUMAB 60 MG/ML
60 INJECTION SUBCUTANEOUS ONCE
COMMUNITY
End: 2023-11-16

## 2023-11-16 ASSESSMENT — ENCOUNTER SYMPTOMS
VOICE CHANGE: 0
SCLERAL ICTERUS: 0
TROUBLE SWALLOWING: 0
SORE THROAT: 0
BLOOD IN STOOL: 0
WHEEZING: 0
ABDOMINAL PAIN: 0
HEMOPTYSIS: 0
VOMITING: 0
CONSTIPATION: 0
ABDOMINAL DISTENTION: 0
CHEST TIGHTNESS: 0
NAUSEA: 0
SHORTNESS OF BREATH: 0
DIARRHEA: 0

## 2023-11-16 ASSESSMENT — PATIENT HEALTH QUESTIONNAIRE - PHQ9
SUM OF ALL RESPONSES TO PHQ9 QUESTIONS 1 & 2: 0
SUM OF ALL RESPONSES TO PHQ QUESTIONS 1-9: 0
SUM OF ALL RESPONSES TO PHQ QUESTIONS 1-9: 0
2. FEELING DOWN, DEPRESSED OR HOPELESS: 0
SUM OF ALL RESPONSES TO PHQ QUESTIONS 1-9: 0
1. LITTLE INTEREST OR PLEASURE IN DOING THINGS: 0
SUM OF ALL RESPONSES TO PHQ QUESTIONS 1-9: 0

## 2023-11-16 NOTE — PROGRESS NOTES
[facility] Hematology and Oncology: Established patient - follow up     Chief Complaint   Patient presents with    Follow-up     Reason for Referral: Ductal carcinoma in situ  (DCIS) of left breast   Referring Provider:  Pierre Mcgee MD   Primary Care Provider: Noel Sommers MD   Family History of Cancer/Hematologic Disorders: Family history significant for father with prostate and pancreatic cancer. Presenting Symptoms: Abnormal screening mammogram     History of Present Illness:  Ms. Sean Avendaño is a 76 y.o. female who presents today for follow up regarding left breast DCIS. The past medical history is significant for sleep apnea, osteopenia, chronic insomnia, HLD,  GERD, several orthopedic surgeries, DELLA/BSO, adenoidectomy, tonsillectomy, and breast augmentation. She initially presented for a routine bilateral screening mammogram on 11/23/20 which identified asymmetries in the left breast.  Further evaluation with  digital diagnostic left breast mammogram and left breast ultrasound on 12/3/20 confirmed a 5-6 mm solid lesion at the 12:00 position in the left breast.  US guided bx of left breast from 12/8/20 c/w ER 96%/IL 92% positive, low grade, cribriform and papillary  type, ductal carcinoma in situ with microcalcifications focally present, associated with DCIS. She was referred to sx and saw Dr Dilip Gtz on 58/42/55. She was assessed with signs and symptoms consistent with small volume left breast DCIS and recommended  for wire localized left breast lumpectomy. Patient opted to proceed with surgical recommendations, and on 1/8/21, she underwent wire localized lumpectomy. Pathology from the left breast lumpectomy revealed ductal carcinoma in situ, intermediate grade,  cribriform and micropapillary types, 4 mm in greatest dimension, margins uninvolved, within 3 mm of the lateral margin; changes consistent with prior biopsy site, including fat necrosis; and fibrocystic mastopathy.   The left breast

## 2023-12-28 ENCOUNTER — OFFICE VISIT (OUTPATIENT)
Dept: ORTHOPEDIC SURGERY | Age: 68
End: 2023-12-28
Payer: MEDICARE

## 2023-12-28 VITALS — BODY MASS INDEX: 50.02 KG/M2 | WEIGHT: 293 LBS | HEIGHT: 64 IN

## 2023-12-28 DIAGNOSIS — M43.06 LUMBAR SPONDYLOLYSIS: Primary | ICD-10-CM

## 2023-12-28 PROCEDURE — G8484 FLU IMMUNIZE NO ADMIN: HCPCS | Performed by: PHYSICIAN ASSISTANT

## 2023-12-28 PROCEDURE — 1036F TOBACCO NON-USER: CPT | Performed by: PHYSICIAN ASSISTANT

## 2023-12-28 PROCEDURE — 3017F COLORECTAL CA SCREEN DOC REV: CPT | Performed by: PHYSICIAN ASSISTANT

## 2023-12-28 PROCEDURE — 1123F ACP DISCUSS/DSCN MKR DOCD: CPT | Performed by: PHYSICIAN ASSISTANT

## 2023-12-28 PROCEDURE — G8427 DOCREV CUR MEDS BY ELIG CLIN: HCPCS | Performed by: PHYSICIAN ASSISTANT

## 2023-12-28 PROCEDURE — G8417 CALC BMI ABV UP PARAM F/U: HCPCS | Performed by: PHYSICIAN ASSISTANT

## 2023-12-28 PROCEDURE — 1090F PRES/ABSN URINE INCON ASSESS: CPT | Performed by: PHYSICIAN ASSISTANT

## 2023-12-28 PROCEDURE — G8399 PT W/DXA RESULTS DOCUMENT: HCPCS | Performed by: PHYSICIAN ASSISTANT

## 2023-12-28 PROCEDURE — 99204 OFFICE O/P NEW MOD 45 MIN: CPT | Performed by: PHYSICIAN ASSISTANT

## 2023-12-28 RX ORDER — TIZANIDINE 2 MG/1
2 TABLET ORAL 3 TIMES DAILY PRN
Qty: 90 TABLET | Refills: 2 | Status: SHIPPED | OUTPATIENT
Start: 2023-12-28

## 2023-12-28 RX ORDER — METHYLPREDNISOLONE 4 MG/1
TABLET ORAL
Qty: 1 KIT | Refills: 0 | Status: SHIPPED | OUTPATIENT
Start: 2023-12-28

## 2024-01-11 ENCOUNTER — HOSPITAL ENCOUNTER (OUTPATIENT)
Dept: INFUSION THERAPY | Age: 69
End: 2024-01-11

## 2024-01-23 DIAGNOSIS — E78.5 HYPERLIPIDEMIA, UNSPECIFIED HYPERLIPIDEMIA TYPE: ICD-10-CM

## 2024-01-23 DIAGNOSIS — E03.9 ACQUIRED HYPOTHYROIDISM: ICD-10-CM

## 2024-01-23 DIAGNOSIS — R73.9 HYPERGLYCEMIA: ICD-10-CM

## 2024-01-23 LAB
CHOLEST SERPL-MCNC: 188 MG/DL
EST. AVERAGE GLUCOSE BLD GHB EST-MCNC: 117 MG/DL
HBA1C MFR BLD: 5.7 % (ref 4.8–5.6)
HDLC SERPL-MCNC: 49 MG/DL (ref 40–60)
HDLC SERPL: 3.8
LDLC SERPL CALC-MCNC: 106.2 MG/DL
TRIGL SERPL-MCNC: 164 MG/DL (ref 35–150)
TSH, 3RD GENERATION: 0.71 UIU/ML (ref 0.36–3.74)
VLDLC SERPL CALC-MCNC: 32.8 MG/DL (ref 6–23)

## 2024-01-29 ENCOUNTER — HOSPITAL ENCOUNTER (OUTPATIENT)
Dept: LAB | Age: 69
Discharge: HOME | End: 2024-02-01

## 2024-01-29 ENCOUNTER — HOSPITAL ENCOUNTER (OUTPATIENT)
Dept: INFUSION THERAPY | Age: 69
Setting detail: INFUSION SERIES
Discharge: HOME | End: 2024-01-29
Payer: MEDICARE

## 2024-01-29 VITALS
SYSTOLIC BLOOD PRESSURE: 157 MMHG | HEART RATE: 91 BPM | RESPIRATION RATE: 16 BRPM | DIASTOLIC BLOOD PRESSURE: 91 MMHG | TEMPERATURE: 98.6 F | OXYGEN SATURATION: 96 %

## 2024-01-29 DIAGNOSIS — M85.80 OSTEOPENIA, UNSPECIFIED LOCATION: Primary | ICD-10-CM

## 2024-01-29 LAB
ALBUMIN SERPL-MCNC: 4 G/DL (ref 3.2–4.6)
ALBUMIN/GLOB SERPL: 1 (ref 0.4–1.6)
ALP SERPL-CCNC: 77 U/L (ref 50–136)
ALT SERPL-CCNC: 38 U/L (ref 12–65)
ANION GAP SERPL CALC-SCNC: 9 MMOL/L (ref 2–11)
AST SERPL-CCNC: 30 U/L (ref 15–37)
BILIRUB SERPL-MCNC: 0.7 MG/DL (ref 0.2–1.1)
BUN SERPL-MCNC: 20 MG/DL (ref 8–23)
CALCIUM SERPL-MCNC: 9.6 MG/DL (ref 8.3–10.4)
CHLORIDE SERPL-SCNC: 106 MMOL/L (ref 103–113)
CO2 SERPL-SCNC: 25 MMOL/L (ref 21–32)
CREAT SERPL-MCNC: 1 MG/DL (ref 0.6–1)
GLOBULIN SER CALC-MCNC: 3.9 G/DL (ref 2.8–4.5)
GLUCOSE SERPL-MCNC: 101 MG/DL (ref 65–100)
POTASSIUM SERPL-SCNC: 3.7 MMOL/L (ref 3.5–5.1)
PROT SERPL-MCNC: 7.9 G/DL (ref 6.3–8.2)
SODIUM SERPL-SCNC: 140 MMOL/L (ref 136–146)

## 2024-01-29 PROCEDURE — 6360000002 HC RX W HCPCS: Performed by: INTERNAL MEDICINE

## 2024-01-29 PROCEDURE — 96372 THER/PROPH/DIAG INJ SC/IM: CPT

## 2024-01-29 PROCEDURE — 80053 COMPREHEN METABOLIC PANEL: CPT

## 2024-01-29 PROCEDURE — 36415 COLL VENOUS BLD VENIPUNCTURE: CPT

## 2024-01-29 RX ORDER — ALBUTEROL SULFATE 90 UG/1
4 AEROSOL, METERED RESPIRATORY (INHALATION) PRN
OUTPATIENT
Start: 2024-07-22

## 2024-01-29 RX ORDER — ACETAMINOPHEN 325 MG/1
650 TABLET ORAL
OUTPATIENT
Start: 2024-07-22

## 2024-01-29 RX ORDER — SODIUM CHLORIDE 9 MG/ML
INJECTION, SOLUTION INTRAVENOUS CONTINUOUS
OUTPATIENT
Start: 2024-07-22

## 2024-01-29 RX ORDER — DIPHENHYDRAMINE HYDROCHLORIDE 50 MG/ML
50 INJECTION INTRAMUSCULAR; INTRAVENOUS
Status: DISCONTINUED | OUTPATIENT
Start: 2024-01-29 | End: 2024-01-30 | Stop reason: HOSPADM

## 2024-01-29 RX ORDER — ONDANSETRON 2 MG/ML
8 INJECTION INTRAMUSCULAR; INTRAVENOUS
OUTPATIENT
Start: 2024-07-22

## 2024-01-29 RX ORDER — DIPHENHYDRAMINE HYDROCHLORIDE 50 MG/ML
50 INJECTION INTRAMUSCULAR; INTRAVENOUS
OUTPATIENT
Start: 2024-07-22

## 2024-01-29 RX ORDER — EPINEPHRINE 1 MG/ML
0.3 INJECTION, SOLUTION, CONCENTRATE INTRAVENOUS PRN
OUTPATIENT
Start: 2024-07-22

## 2024-01-29 RX ADMIN — DENOSUMAB 60 MG: 60 INJECTION SUBCUTANEOUS at 16:31

## 2024-01-30 ENCOUNTER — OFFICE VISIT (OUTPATIENT)
Dept: INTERNAL MEDICINE CLINIC | Facility: CLINIC | Age: 69
End: 2024-01-30
Payer: MEDICARE

## 2024-01-30 VITALS
DIASTOLIC BLOOD PRESSURE: 86 MMHG | BODY MASS INDEX: 50.02 KG/M2 | HEIGHT: 64 IN | WEIGHT: 293 LBS | HEART RATE: 80 BPM | SYSTOLIC BLOOD PRESSURE: 139 MMHG

## 2024-01-30 DIAGNOSIS — G47.00 INSOMNIA, UNSPECIFIED TYPE: ICD-10-CM

## 2024-01-30 DIAGNOSIS — M1A.09X0 IDIOPATHIC CHRONIC GOUT OF MULTIPLE SITES WITHOUT TOPHUS: ICD-10-CM

## 2024-01-30 DIAGNOSIS — I10 ESSENTIAL HYPERTENSION: ICD-10-CM

## 2024-01-30 DIAGNOSIS — E66.01 MORBID OBESITY (HCC): ICD-10-CM

## 2024-01-30 DIAGNOSIS — C50.912 MALIGNANT NEOPLASM OF LEFT FEMALE BREAST, UNSPECIFIED ESTROGEN RECEPTOR STATUS, UNSPECIFIED SITE OF BREAST (HCC): Primary | ICD-10-CM

## 2024-01-30 DIAGNOSIS — D69.6 THROMBOCYTOPENIA (HCC): ICD-10-CM

## 2024-01-30 DIAGNOSIS — N18.30 STAGE 3 CHRONIC KIDNEY DISEASE, UNSPECIFIED WHETHER STAGE 3A OR 3B CKD (HCC): ICD-10-CM

## 2024-01-30 DIAGNOSIS — Z72.821 INADEQUATE SLEEP HYGIENE: ICD-10-CM

## 2024-01-30 PROCEDURE — G8417 CALC BMI ABV UP PARAM F/U: HCPCS | Performed by: INTERNAL MEDICINE

## 2024-01-30 PROCEDURE — 3075F SYST BP GE 130 - 139MM HG: CPT | Performed by: INTERNAL MEDICINE

## 2024-01-30 PROCEDURE — G8427 DOCREV CUR MEDS BY ELIG CLIN: HCPCS | Performed by: INTERNAL MEDICINE

## 2024-01-30 PROCEDURE — 3017F COLORECTAL CA SCREEN DOC REV: CPT | Performed by: INTERNAL MEDICINE

## 2024-01-30 PROCEDURE — 1090F PRES/ABSN URINE INCON ASSESS: CPT | Performed by: INTERNAL MEDICINE

## 2024-01-30 PROCEDURE — 3079F DIAST BP 80-89 MM HG: CPT | Performed by: INTERNAL MEDICINE

## 2024-01-30 PROCEDURE — 1123F ACP DISCUSS/DSCN MKR DOCD: CPT | Performed by: INTERNAL MEDICINE

## 2024-01-30 PROCEDURE — G8399 PT W/DXA RESULTS DOCUMENT: HCPCS | Performed by: INTERNAL MEDICINE

## 2024-01-30 PROCEDURE — 1036F TOBACCO NON-USER: CPT | Performed by: INTERNAL MEDICINE

## 2024-01-30 PROCEDURE — 99214 OFFICE O/P EST MOD 30 MIN: CPT | Performed by: INTERNAL MEDICINE

## 2024-01-30 PROCEDURE — G8484 FLU IMMUNIZE NO ADMIN: HCPCS | Performed by: INTERNAL MEDICINE

## 2024-01-30 RX ORDER — IBUPROFEN 200 MG
200 TABLET ORAL EVERY 6 HOURS PRN
COMMUNITY

## 2024-01-30 RX ORDER — ESZOPICLONE 2 MG/1
2 TABLET, FILM COATED ORAL NIGHTLY
Qty: 90 TABLET | Refills: 1 | Status: SHIPPED | OUTPATIENT
Start: 2024-01-30 | End: 2025-01-29

## 2024-01-30 ASSESSMENT — ENCOUNTER SYMPTOMS
BACK PAIN: 1
SHORTNESS OF BREATH: 0

## 2024-01-30 ASSESSMENT — PATIENT HEALTH QUESTIONNAIRE - PHQ9
SUM OF ALL RESPONSES TO PHQ QUESTIONS 1-9: 0
SUM OF ALL RESPONSES TO PHQ9 QUESTIONS 1 & 2: 0
SUM OF ALL RESPONSES TO PHQ QUESTIONS 1-9: 0
SUM OF ALL RESPONSES TO PHQ QUESTIONS 1-9: 0
2. FEELING DOWN, DEPRESSED OR HOPELESS: 0
SUM OF ALL RESPONSES TO PHQ QUESTIONS 1-9: 0
1. LITTLE INTEREST OR PLEASURE IN DOING THINGS: 0

## 2024-01-30 NOTE — PROGRESS NOTES
Hr home blood prssures are high. She is not exercising. She is going to PT and they are encouraging her also to be more active. She has gone to the gym at  [facility] in the past.    Hypertension  This is a chronic problem. The current episode started today. The problem is unchanged. The problem is uncontrolled. Pertinent negatives include no palpitations or shortness of breath.       Past Medical History, Past Surgical History, Family history, Social History, and Medications were all reviewed with the patient today and updated as necessary.       Current Outpatient Medications   Medication Sig Dispense Refill    ibuprofen (ADVIL;MOTRIN) 200 MG tablet Take 1 tablet by mouth every 6 hours as needed for Pain      atorvastatin (LIPITOR) 40 MG tablet Take 1 tablet by mouth daily 90 tablet 3    irbesartan (AVAPRO) 300 MG tablet TAKE 1 TABLET DAILY 90 tablet 3    omeprazole (PRILOSEC) 40 MG delayed release capsule Take 1 capsule by mouth daily 90 capsule 3    hydrALAZINE (APRESOLINE) 50 MG tablet Take 1 tablet by mouth 3 times daily 270 tablet 3    eszopiclone (LUNESTA) 2 MG TABS Take 1 tablet by mouth at bedtime. 90 tablet 1    rOPINIRole (REQUIP) 0.5 MG tablet Take 1 tablet by mouth daily 90 tablet 3    levothyroxine (SYNTHROID) 125 MCG tablet Take 1 tablet by mouth every morning (before breakfast) 90 tablet 3    Verapamil (VERELAN) 300 MG CP24 extended release capsule Take 1 capsule by mouth at bedtime 90 capsule 3    allopurinol (ZYLOPRIM) 300 MG tablet Take 1 tablet by mouth daily 90 tablet 3    anastrozole (ARIMIDEX) 1 MG tablet Take 1 tablet by mouth daily 90 tablet 3    Cholecalciferol 50 MCG (2000 UT) CAPS Take 2 capsules by mouth daily      betamethasone dipropionate 0.05 % cream  (Patient not taking: Reported on 11/16/2023)      Naproxen Sodium 220 MG CAPS Take 220 mg by mouth daily as needed       No current facility-administered medications for this visit.     Allergies   Allergen Reactions

## 2024-02-13 ENCOUNTER — OFFICE VISIT (OUTPATIENT)
Dept: ORTHOPEDIC SURGERY | Age: 69
End: 2024-02-13
Payer: MEDICARE

## 2024-02-13 VITALS — BODY MASS INDEX: 50.02 KG/M2 | HEIGHT: 64 IN | WEIGHT: 293 LBS

## 2024-02-13 DIAGNOSIS — M43.06 LUMBAR SPONDYLOLYSIS: Primary | ICD-10-CM

## 2024-02-13 PROCEDURE — G8427 DOCREV CUR MEDS BY ELIG CLIN: HCPCS | Performed by: PHYSICIAN ASSISTANT

## 2024-02-13 PROCEDURE — 1036F TOBACCO NON-USER: CPT | Performed by: PHYSICIAN ASSISTANT

## 2024-02-13 PROCEDURE — G8484 FLU IMMUNIZE NO ADMIN: HCPCS | Performed by: PHYSICIAN ASSISTANT

## 2024-02-13 PROCEDURE — 3017F COLORECTAL CA SCREEN DOC REV: CPT | Performed by: PHYSICIAN ASSISTANT

## 2024-02-13 PROCEDURE — 99213 OFFICE O/P EST LOW 20 MIN: CPT | Performed by: PHYSICIAN ASSISTANT

## 2024-02-13 PROCEDURE — 1123F ACP DISCUSS/DSCN MKR DOCD: CPT | Performed by: PHYSICIAN ASSISTANT

## 2024-02-13 PROCEDURE — 1090F PRES/ABSN URINE INCON ASSESS: CPT | Performed by: PHYSICIAN ASSISTANT

## 2024-02-13 PROCEDURE — G8417 CALC BMI ABV UP PARAM F/U: HCPCS | Performed by: PHYSICIAN ASSISTANT

## 2024-02-13 PROCEDURE — G8399 PT W/DXA RESULTS DOCUMENT: HCPCS | Performed by: PHYSICIAN ASSISTANT

## 2024-02-13 NOTE — PROGRESS NOTES
02/13/24        Name: Whit Dsouza  YOB: 1955  Gender: female  MRN: [id number]    CC: Follow-up       HPI: Whit Dsouza is a 68 y.o. female who returns for Follow-up         Patient turns the office today for follow-up.  I last saw her in the office 12/20/2023.  She has been in physical therapy, completed her medications we previously prescribed.  She is feeling much better.    History was obtained by patient      Meds/PSH/PMH/FH/SH: This information has been reviewed.      ALLERGIES:   Allergies   Allergen Reactions    Amoxicillin-Pot Clavulanate Itching     Itching in mouth and ears    Codeine Nausea Only    Hydralazine Other (See Comments)     states not allergic to medication. Is presently taking medication and tolerating well    Mirabegron Other (See Comments)     ineffective    Naltrexone-Bupropion Hcl Er Other (See Comments)     lethargy    Nitrofurantoin Itching      itching under arm    Oxybutynin Chloride Other (See Comments)     Made her voiding worse    Penicillins Itching    Rosuvastatin Swelling    Shrimp (Diagnostic) Itching     Tingling and itch- inner top lip    Simvastatin Other (See Comments)    Topiramate Other (See Comments)     Felt weird    Trazodone Other (See Comments)     ineffective    Zolpidem Other (See Comments)     Periods of forgetfulness              Physical Examination:            Imaging:         MRI Result (most recent):  MRI BREAST BILATERAL W WO CONTRAST 04/19/2023    Narrative  MRI of the Breasts with and without contrast    CLINICAL INDICATION:  Supplemental screening and surveillance, prior left  lumpectomy and radiation for breast cancer in 2021, subsequent antihormone  therapy. Complex postoperative breast parenchyma. No current breast problems or  known family breast cancer. Patient reports bilateral silicone implants for  greater than 20 years.    COMPARISON: Mammography 10/5/2022    TECHNIQUE: Standard MRI sequences were obtained through

## 2024-02-22 DIAGNOSIS — D05.12 DUCTAL CARCINOMA IN SITU (DCIS) OF LEFT BREAST: Primary | ICD-10-CM

## 2024-02-22 DIAGNOSIS — E55.9 VITAMIN D DEFICIENCY: ICD-10-CM

## 2024-02-22 NOTE — PROGRESS NOTES
[facility] Hematology and Oncology: Established patient - follow up     Chief Complaint   Patient presents with    Follow-up     Reason for Referral: Ductal carcinoma in situ  (DCIS) of left breast   Referring Provider:  Yury Lawrence MD   Primary Care Provider: Stormy Augustin MD   Family History of Cancer/Hematologic Disorders: Family history significant for father with prostate and pancreatic cancer.    Presenting Symptoms: Abnormal screening mammogram     History of Present Illness:  Ms. Dsouza is a 68 y.o. female who presents today for follow up regarding left breast DCIS.  The past medical history is significant for sleep apnea, osteopenia, chronic insomnia, HLD,  GERD, several orthopedic surgeries, DELLA/BSO, adenoidectomy, tonsillectomy, and breast augmentation.  She initially presented for a routine bilateral screening mammogram on 11/23/20 which identified asymmetries in the left breast.  Further evaluation with  digital diagnostic left breast mammogram and left breast ultrasound on 12/3/20 confirmed a 5-6 mm solid lesion at the 12:00 position in the left breast.  US guided bx of left breast from 12/8/20 c/w ER 96%/IL 92% positive, low grade, cribriform and papillary  type, ductal carcinoma in situ with microcalcifications focally present, associated with DCIS.  She was referred to sx and saw Dr Lawrence on 12/17/20.  She was assessed with signs and symptoms consistent with small volume left breast DCIS and recommended  for wire localized left breast lumpectomy.  Patient opted to proceed with surgical recommendations, and on 1/8/21, she underwent wire localized lumpectomy.  Pathology from the left breast lumpectomy revealed ductal carcinoma in situ, intermediate grade,  cribriform and micropapillary types, 4 mm in greatest dimension, margins uninvolved, within 3 mm of the lateral margin; changes consistent with prior biopsy site, including fat necrosis; and fibrocystic mastopathy.  The left breast

## 2024-03-05 ENCOUNTER — OFFICE VISIT (OUTPATIENT)
Dept: ONCOLOGY | Age: 69
End: 2024-03-05
Payer: MEDICARE

## 2024-03-05 ENCOUNTER — HOSPITAL ENCOUNTER (OUTPATIENT)
Dept: LAB | Age: 69
Discharge: HOME | End: 2024-03-08
Payer: MEDICARE

## 2024-03-05 VITALS
TEMPERATURE: 98.6 F | BODY MASS INDEX: 49.85 KG/M2 | WEIGHT: 292 LBS | HEART RATE: 65 BPM | DIASTOLIC BLOOD PRESSURE: 90 MMHG | OXYGEN SATURATION: 95 % | SYSTOLIC BLOOD PRESSURE: 173 MMHG | HEIGHT: 64 IN | RESPIRATION RATE: 18 BRPM

## 2024-03-05 DIAGNOSIS — E55.9 VITAMIN D DEFICIENCY: ICD-10-CM

## 2024-03-05 DIAGNOSIS — Z91.89 AT RISK FOR BONE DENSITY LOSS: ICD-10-CM

## 2024-03-05 DIAGNOSIS — D05.12 DUCTAL CARCINOMA IN SITU (DCIS) OF LEFT BREAST: Primary | ICD-10-CM

## 2024-03-05 DIAGNOSIS — I10 ESSENTIAL HYPERTENSION: ICD-10-CM

## 2024-03-05 DIAGNOSIS — Z79.899 HIGH RISK MEDICATION USE: ICD-10-CM

## 2024-03-05 DIAGNOSIS — Z79.811 LONG TERM (CURRENT) USE OF AROMATASE INHIBITORS: ICD-10-CM

## 2024-03-05 DIAGNOSIS — D05.12 DUCTAL CARCINOMA IN SITU (DCIS) OF LEFT BREAST: ICD-10-CM

## 2024-03-05 LAB
25(OH)D3 SERPL-MCNC: 48.5 NG/ML (ref 30–100)
ALBUMIN SERPL-MCNC: 3.9 G/DL (ref 3.2–4.6)
ALBUMIN/GLOB SERPL: 1 (ref 0.4–1.6)
ALP SERPL-CCNC: 80 U/L (ref 50–136)
ALT SERPL-CCNC: 34 U/L (ref 12–65)
ANION GAP SERPL CALC-SCNC: 5 MMOL/L (ref 2–11)
AST SERPL-CCNC: 26 U/L (ref 15–37)
BASOPHILS # BLD: 0 K/UL (ref 0–0.2)
BASOPHILS NFR BLD: 1 % (ref 0–2)
BILIRUB SERPL-MCNC: 1 MG/DL (ref 0.2–1.1)
BUN SERPL-MCNC: 16 MG/DL (ref 8–23)
CALCIUM SERPL-MCNC: 9.5 MG/DL (ref 8.3–10.4)
CHLORIDE SERPL-SCNC: 108 MMOL/L (ref 103–113)
CO2 SERPL-SCNC: 26 MMOL/L (ref 21–32)
CREAT SERPL-MCNC: 1 MG/DL (ref 0.6–1)
DIFFERENTIAL METHOD BLD: NORMAL
EOSINOPHIL # BLD: 0.1 K/UL (ref 0–0.8)
EOSINOPHIL NFR BLD: 1 % (ref 0.5–7.8)
ERYTHROCYTE [DISTWIDTH] IN BLOOD BY AUTOMATED COUNT: 13.6 % (ref 11.9–14.6)
GLOBULIN SER CALC-MCNC: 3.9 G/DL (ref 2.8–4.5)
GLUCOSE SERPL-MCNC: 102 MG/DL (ref 65–100)
HCT VFR BLD AUTO: 41.9 % (ref 35.8–46.3)
HGB BLD-MCNC: 13.6 G/DL (ref 11.7–15.4)
IMM GRANULOCYTES # BLD AUTO: 0 K/UL (ref 0–0.5)
IMM GRANULOCYTES NFR BLD AUTO: 0 % (ref 0–5)
LYMPHOCYTES # BLD: 1.5 K/UL (ref 0.5–4.6)
LYMPHOCYTES NFR BLD: 25 % (ref 13–44)
MCH RBC QN AUTO: 29.3 PG (ref 26.1–32.9)
MCHC RBC AUTO-ENTMCNC: 32.5 G/DL (ref 31.4–35)
MCV RBC AUTO: 90.3 FL (ref 82–102)
MONOCYTES # BLD: 0.4 K/UL (ref 0.1–1.3)
MONOCYTES NFR BLD: 7 % (ref 4–12)
NEUTS SEG # BLD: 3.8 K/UL (ref 1.7–8.2)
NEUTS SEG NFR BLD: 66 % (ref 43–78)
NRBC # BLD: 0 K/UL (ref 0–0.2)
PLATELET # BLD AUTO: 186 K/UL (ref 150–450)
PMV BLD AUTO: 10 FL (ref 9.4–12.3)
POTASSIUM SERPL-SCNC: 3.8 MMOL/L (ref 3.5–5.1)
PROT SERPL-MCNC: 7.8 G/DL (ref 6.3–8.2)
RBC # BLD AUTO: 4.64 M/UL (ref 4.05–5.2)
SODIUM SERPL-SCNC: 139 MMOL/L (ref 136–146)
WBC # BLD AUTO: 5.9 K/UL (ref 4.3–11.1)

## 2024-03-05 PROCEDURE — G8484 FLU IMMUNIZE NO ADMIN: HCPCS | Performed by: INTERNAL MEDICINE

## 2024-03-05 PROCEDURE — G8417 CALC BMI ABV UP PARAM F/U: HCPCS | Performed by: INTERNAL MEDICINE

## 2024-03-05 PROCEDURE — 3080F DIAST BP >= 90 MM HG: CPT | Performed by: INTERNAL MEDICINE

## 2024-03-05 PROCEDURE — 99214 OFFICE O/P EST MOD 30 MIN: CPT | Performed by: INTERNAL MEDICINE

## 2024-03-05 PROCEDURE — 1123F ACP DISCUSS/DSCN MKR DOCD: CPT | Performed by: INTERNAL MEDICINE

## 2024-03-05 PROCEDURE — 1036F TOBACCO NON-USER: CPT | Performed by: INTERNAL MEDICINE

## 2024-03-05 PROCEDURE — 85025 COMPLETE CBC W/AUTO DIFF WBC: CPT

## 2024-03-05 PROCEDURE — 36415 COLL VENOUS BLD VENIPUNCTURE: CPT

## 2024-03-05 PROCEDURE — G8399 PT W/DXA RESULTS DOCUMENT: HCPCS | Performed by: INTERNAL MEDICINE

## 2024-03-05 PROCEDURE — 82306 VITAMIN D 25 HYDROXY: CPT

## 2024-03-05 PROCEDURE — 80053 COMPREHEN METABOLIC PANEL: CPT

## 2024-03-05 PROCEDURE — 1090F PRES/ABSN URINE INCON ASSESS: CPT | Performed by: INTERNAL MEDICINE

## 2024-03-05 PROCEDURE — G8427 DOCREV CUR MEDS BY ELIG CLIN: HCPCS | Performed by: INTERNAL MEDICINE

## 2024-03-05 PROCEDURE — 3017F COLORECTAL CA SCREEN DOC REV: CPT | Performed by: INTERNAL MEDICINE

## 2024-03-05 PROCEDURE — 3077F SYST BP >= 140 MM HG: CPT | Performed by: INTERNAL MEDICINE

## 2024-03-05 RX ORDER — ANASTROZOLE 1 MG/1
1 TABLET ORAL DAILY
Qty: 90 TABLET | Refills: 3 | Status: SHIPPED | OUTPATIENT
Start: 2024-03-05

## 2024-03-05 NOTE — PROGRESS NOTES
All orders placed during this encounter were verbal orders received from Dr. Moore, read back and verified.

## 2024-04-22 ENCOUNTER — HOSPITAL ENCOUNTER (OUTPATIENT)
Dept: MRI IMAGING | Age: 69
Discharge: HOME | End: 2024-04-25
Payer: MEDICARE

## 2024-04-22 DIAGNOSIS — D05.12 DUCTAL CARCINOMA IN SITU (DCIS) OF LEFT BREAST: ICD-10-CM

## 2024-04-22 PROCEDURE — C8908 MRI W/O FOL W/CONT, BREAST,: HCPCS

## 2024-04-22 PROCEDURE — A9579 GAD-BASE MR CONTRAST NOS,1ML: HCPCS | Performed by: NURSE PRACTITIONER

## 2024-04-22 PROCEDURE — 6360000004 HC RX CONTRAST MEDICATION: Performed by: NURSE PRACTITIONER

## 2024-04-22 RX ADMIN — GADOTERIDOL 28 ML: 279.3 INJECTION, SOLUTION INTRAVENOUS at 08:54

## 2024-07-26 SDOH — HEALTH STABILITY: PHYSICAL HEALTH: ON AVERAGE, HOW MANY MINUTES DO YOU ENGAGE IN EXERCISE AT THIS LEVEL?: 10 MIN

## 2024-07-26 SDOH — HEALTH STABILITY: PHYSICAL HEALTH: ON AVERAGE, HOW MANY DAYS PER WEEK DO YOU ENGAGE IN MODERATE TO STRENUOUS EXERCISE (LIKE A BRISK WALK)?: 1 DAY

## 2024-07-26 ASSESSMENT — PATIENT HEALTH QUESTIONNAIRE - PHQ9
2. FEELING DOWN, DEPRESSED OR HOPELESS: NOT AT ALL
SUM OF ALL RESPONSES TO PHQ QUESTIONS 1-9: 0
1. LITTLE INTEREST OR PLEASURE IN DOING THINGS: NOT AT ALL
SUM OF ALL RESPONSES TO PHQ QUESTIONS 1-9: 0
SUM OF ALL RESPONSES TO PHQ9 QUESTIONS 1 & 2: 0
SUM OF ALL RESPONSES TO PHQ QUESTIONS 1-9: 0
SUM OF ALL RESPONSES TO PHQ QUESTIONS 1-9: 0

## 2024-07-26 ASSESSMENT — LIFESTYLE VARIABLES
HOW MANY STANDARD DRINKS CONTAINING ALCOHOL DO YOU HAVE ON A TYPICAL DAY: 0
HOW OFTEN DO YOU HAVE SIX OR MORE DRINKS ON ONE OCCASION: 1
HOW MANY STANDARD DRINKS CONTAINING ALCOHOL DO YOU HAVE ON A TYPICAL DAY: PATIENT DOES NOT DRINK
HOW OFTEN DO YOU HAVE A DRINK CONTAINING ALCOHOL: NEVER
HOW OFTEN DO YOU HAVE A DRINK CONTAINING ALCOHOL: 1

## 2024-07-28 SDOH — ECONOMIC STABILITY: FOOD INSECURITY: WITHIN THE PAST 12 MONTHS, YOU WORRIED THAT YOUR FOOD WOULD RUN OUT BEFORE YOU GOT MONEY TO BUY MORE.: NEVER TRUE

## 2024-07-28 SDOH — ECONOMIC STABILITY: INCOME INSECURITY: HOW HARD IS IT FOR YOU TO PAY FOR THE VERY BASICS LIKE FOOD, HOUSING, MEDICAL CARE, AND HEATING?: NOT HARD AT ALL

## 2024-07-28 SDOH — ECONOMIC STABILITY: FOOD INSECURITY: WITHIN THE PAST 12 MONTHS, THE FOOD YOU BOUGHT JUST DIDN'T LAST AND YOU DIDN'T HAVE MONEY TO GET MORE.: NEVER TRUE

## 2024-07-29 ENCOUNTER — HOSPITAL ENCOUNTER (OUTPATIENT)
Dept: LAB | Age: 69
Discharge: HOME | End: 2024-08-01
Payer: MEDICARE

## 2024-07-29 ENCOUNTER — OFFICE VISIT (OUTPATIENT)
Dept: ONCOLOGY | Age: 69
End: 2024-07-29
Payer: MEDICARE

## 2024-07-29 ENCOUNTER — HOSPITAL ENCOUNTER (OUTPATIENT)
Dept: INFUSION THERAPY | Age: 69
Setting detail: INFUSION SERIES
Discharge: HOME | End: 2024-07-29
Payer: MEDICARE

## 2024-07-29 VITALS
HEIGHT: 64 IN | WEIGHT: 293 LBS | DIASTOLIC BLOOD PRESSURE: 101 MMHG | OXYGEN SATURATION: 92 % | RESPIRATION RATE: 12 BRPM | SYSTOLIC BLOOD PRESSURE: 172 MMHG | TEMPERATURE: 98.6 F | BODY MASS INDEX: 50.02 KG/M2 | HEART RATE: 88 BPM

## 2024-07-29 DIAGNOSIS — M85.80 OSTEOPENIA, UNSPECIFIED LOCATION: Primary | ICD-10-CM

## 2024-07-29 DIAGNOSIS — Z12.31 ENCOUNTER FOR SCREENING MAMMOGRAM FOR MALIGNANT NEOPLASM OF BREAST: ICD-10-CM

## 2024-07-29 DIAGNOSIS — Z79.899 HIGH RISK MEDICATION USE: ICD-10-CM

## 2024-07-29 DIAGNOSIS — Z91.89 AT RISK FOR BONE DENSITY LOSS: ICD-10-CM

## 2024-07-29 DIAGNOSIS — Z79.811 LONG TERM (CURRENT) USE OF AROMATASE INHIBITORS: ICD-10-CM

## 2024-07-29 DIAGNOSIS — E55.9 VITAMIN D DEFICIENCY: ICD-10-CM

## 2024-07-29 DIAGNOSIS — D05.12 DUCTAL CARCINOMA IN SITU (DCIS) OF LEFT BREAST: ICD-10-CM

## 2024-07-29 DIAGNOSIS — D05.12 DUCTAL CARCINOMA IN SITU (DCIS) OF LEFT BREAST: Primary | ICD-10-CM

## 2024-07-29 LAB
25(OH)D3 SERPL-MCNC: 42.9 NG/ML (ref 30–100)
ALBUMIN SERPL-MCNC: 4 G/DL (ref 3.2–4.6)
ALBUMIN/GLOB SERPL: 1.2 (ref 1–1.9)
ALP SERPL-CCNC: 91 U/L (ref 35–104)
ALT SERPL-CCNC: 29 U/L (ref 12–65)
ANION GAP SERPL CALC-SCNC: 12 MMOL/L (ref 9–18)
AST SERPL-CCNC: 32 U/L (ref 15–37)
BASOPHILS # BLD: 0 K/UL (ref 0–0.2)
BASOPHILS NFR BLD: 1 % (ref 0–2)
BILIRUB SERPL-MCNC: 0.8 MG/DL (ref 0–1.2)
BUN SERPL-MCNC: 17 MG/DL (ref 8–23)
CALCIUM SERPL-MCNC: 9.8 MG/DL (ref 8.8–10.2)
CHLORIDE SERPL-SCNC: 103 MMOL/L (ref 98–107)
CO2 SERPL-SCNC: 25 MMOL/L (ref 20–28)
CREAT SERPL-MCNC: 0.9 MG/DL (ref 0.6–1.1)
DIFFERENTIAL METHOD BLD: ABNORMAL
EOSINOPHIL # BLD: 0.1 K/UL (ref 0–0.8)
EOSINOPHIL NFR BLD: 2 % (ref 0.5–7.8)
ERYTHROCYTE [DISTWIDTH] IN BLOOD BY AUTOMATED COUNT: 13.9 % (ref 11.9–14.6)
GLOBULIN SER CALC-MCNC: 3.3 G/DL (ref 2.3–3.5)
GLUCOSE SERPL-MCNC: 94 MG/DL (ref 70–99)
HCT VFR BLD AUTO: 40.2 % (ref 35.8–46.3)
HGB BLD-MCNC: 12.6 G/DL (ref 11.7–15.4)
IMM GRANULOCYTES # BLD AUTO: 0 K/UL (ref 0–0.5)
IMM GRANULOCYTES NFR BLD AUTO: 1 % (ref 0–5)
LYMPHOCYTES # BLD: 1.6 K/UL (ref 0.5–4.6)
LYMPHOCYTES NFR BLD: 25 % (ref 13–44)
MAGNESIUM SERPL-MCNC: 2 MG/DL (ref 1.8–2.4)
MCH RBC QN AUTO: 29 PG (ref 26.1–32.9)
MCHC RBC AUTO-ENTMCNC: 31.3 G/DL (ref 31.4–35)
MCV RBC AUTO: 92.4 FL (ref 82–102)
MONOCYTES # BLD: 0.4 K/UL (ref 0.1–1.3)
MONOCYTES NFR BLD: 7 % (ref 4–12)
NEUTS SEG # BLD: 4.2 K/UL (ref 1.7–8.2)
NEUTS SEG NFR BLD: 66 % (ref 43–78)
NRBC # BLD: 0 K/UL (ref 0–0.2)
PHOSPHATE SERPL-MCNC: 2.7 MG/DL (ref 2.5–4.5)
PLATELET # BLD AUTO: 181 K/UL (ref 150–450)
PMV BLD AUTO: 10.1 FL (ref 9.4–12.3)
POTASSIUM SERPL-SCNC: 4.2 MMOL/L (ref 3.5–5.1)
PROT SERPL-MCNC: 7.3 G/DL (ref 6.3–8.2)
RBC # BLD AUTO: 4.35 M/UL (ref 4.05–5.2)
SODIUM SERPL-SCNC: 140 MMOL/L (ref 136–145)
WBC # BLD AUTO: 6.3 K/UL (ref 4.3–11.1)

## 2024-07-29 PROCEDURE — G8399 PT W/DXA RESULTS DOCUMENT: HCPCS

## 2024-07-29 PROCEDURE — 1090F PRES/ABSN URINE INCON ASSESS: CPT

## 2024-07-29 PROCEDURE — 85025 COMPLETE CBC W/AUTO DIFF WBC: CPT

## 2024-07-29 PROCEDURE — 1036F TOBACCO NON-USER: CPT

## 2024-07-29 PROCEDURE — 99213 OFFICE O/P EST LOW 20 MIN: CPT

## 2024-07-29 PROCEDURE — 84100 ASSAY OF PHOSPHORUS: CPT

## 2024-07-29 PROCEDURE — 96372 THER/PROPH/DIAG INJ SC/IM: CPT

## 2024-07-29 PROCEDURE — 83735 ASSAY OF MAGNESIUM: CPT

## 2024-07-29 PROCEDURE — 3017F COLORECTAL CA SCREEN DOC REV: CPT

## 2024-07-29 PROCEDURE — 1123F ACP DISCUSS/DSCN MKR DOCD: CPT

## 2024-07-29 PROCEDURE — 36415 COLL VENOUS BLD VENIPUNCTURE: CPT

## 2024-07-29 PROCEDURE — 3080F DIAST BP >= 90 MM HG: CPT

## 2024-07-29 PROCEDURE — 82306 VITAMIN D 25 HYDROXY: CPT

## 2024-07-29 PROCEDURE — 3077F SYST BP >= 140 MM HG: CPT

## 2024-07-29 PROCEDURE — G8427 DOCREV CUR MEDS BY ELIG CLIN: HCPCS

## 2024-07-29 PROCEDURE — G8417 CALC BMI ABV UP PARAM F/U: HCPCS

## 2024-07-29 PROCEDURE — 80053 COMPREHEN METABOLIC PANEL: CPT

## 2024-07-29 PROCEDURE — 6360000002 HC RX W HCPCS

## 2024-07-29 RX ORDER — SODIUM CHLORIDE 9 MG/ML
INJECTION, SOLUTION INTRAVENOUS CONTINUOUS
Status: CANCELLED | OUTPATIENT
Start: 2024-07-29

## 2024-07-29 RX ORDER — ONDANSETRON 2 MG/ML
8 INJECTION INTRAMUSCULAR; INTRAVENOUS
OUTPATIENT
Start: 2025-01-27

## 2024-07-29 RX ORDER — DIPHENHYDRAMINE HYDROCHLORIDE 50 MG/ML
50 INJECTION INTRAMUSCULAR; INTRAVENOUS
OUTPATIENT
Start: 2025-01-27

## 2024-07-29 RX ORDER — ACETAMINOPHEN 325 MG/1
650 TABLET ORAL
Status: CANCELLED | OUTPATIENT
Start: 2024-07-29

## 2024-07-29 RX ORDER — EPINEPHRINE 1 MG/ML
0.3 INJECTION, SOLUTION, CONCENTRATE INTRAVENOUS PRN
OUTPATIENT
Start: 2025-01-27

## 2024-07-29 RX ORDER — EPINEPHRINE 1 MG/ML
0.3 INJECTION, SOLUTION, CONCENTRATE INTRAVENOUS PRN
Status: DISCONTINUED | OUTPATIENT
Start: 2024-07-29 | End: 2024-07-30 | Stop reason: HOSPADM

## 2024-07-29 RX ORDER — EPINEPHRINE 1 MG/ML
0.3 INJECTION, SOLUTION, CONCENTRATE INTRAVENOUS PRN
Status: CANCELLED | OUTPATIENT
Start: 2024-07-29

## 2024-07-29 RX ORDER — ALBUTEROL SULFATE 90 UG/1
4 AEROSOL, METERED RESPIRATORY (INHALATION) PRN
Status: DISCONTINUED | OUTPATIENT
Start: 2024-07-29 | End: 2024-07-30 | Stop reason: HOSPADM

## 2024-07-29 RX ORDER — ONDANSETRON 2 MG/ML
8 INJECTION INTRAMUSCULAR; INTRAVENOUS
Status: CANCELLED | OUTPATIENT
Start: 2024-07-29

## 2024-07-29 RX ORDER — DIPHENHYDRAMINE HYDROCHLORIDE 50 MG/ML
50 INJECTION INTRAMUSCULAR; INTRAVENOUS
Status: CANCELLED | OUTPATIENT
Start: 2024-07-29

## 2024-07-29 RX ORDER — SODIUM CHLORIDE 9 MG/ML
INJECTION, SOLUTION INTRAVENOUS CONTINUOUS
OUTPATIENT
Start: 2025-01-27

## 2024-07-29 RX ORDER — ACETAMINOPHEN 325 MG/1
650 TABLET ORAL
Status: DISCONTINUED | OUTPATIENT
Start: 2024-07-29 | End: 2024-07-30 | Stop reason: HOSPADM

## 2024-07-29 RX ORDER — ONDANSETRON 2 MG/ML
8 INJECTION INTRAMUSCULAR; INTRAVENOUS
Status: DISCONTINUED | OUTPATIENT
Start: 2024-07-29 | End: 2024-07-30 | Stop reason: HOSPADM

## 2024-07-29 RX ORDER — FAMOTIDINE 10 MG/ML
20 INJECTION, SOLUTION INTRAVENOUS
Status: CANCELLED | OUTPATIENT
Start: 2024-07-29

## 2024-07-29 RX ORDER — ALBUTEROL SULFATE 90 UG/1
4 AEROSOL, METERED RESPIRATORY (INHALATION) PRN
Status: CANCELLED | OUTPATIENT
Start: 2024-07-29

## 2024-07-29 RX ORDER — ALBUTEROL SULFATE 90 UG/1
4 AEROSOL, METERED RESPIRATORY (INHALATION) PRN
OUTPATIENT
Start: 2025-01-27

## 2024-07-29 RX ORDER — DIPHENHYDRAMINE HYDROCHLORIDE 50 MG/ML
50 INJECTION INTRAMUSCULAR; INTRAVENOUS
Status: DISCONTINUED | OUTPATIENT
Start: 2024-07-29 | End: 2024-07-30 | Stop reason: HOSPADM

## 2024-07-29 RX ORDER — ACETAMINOPHEN 325 MG/1
650 TABLET ORAL
OUTPATIENT
Start: 2025-01-27

## 2024-07-29 RX ADMIN — DENOSUMAB 60 MG: 60 INJECTION SUBCUTANEOUS at 12:15

## 2024-07-29 ASSESSMENT — PATIENT HEALTH QUESTIONNAIRE - PHQ9
SUM OF ALL RESPONSES TO PHQ QUESTIONS 1-9: 0
2. FEELING DOWN, DEPRESSED OR HOPELESS: NOT AT ALL
1. LITTLE INTEREST OR PLEASURE IN DOING THINGS: NOT AT ALL
SUM OF ALL RESPONSES TO PHQ QUESTIONS 1-9: 0
SUM OF ALL RESPONSES TO PHQ9 QUESTIONS 1 & 2: 0

## 2024-07-29 NOTE — PROGRESS NOTES
Arrived to the Infusion Center.  Prolia injection completed.   Patient instructed to report any side affects to ordering provider.  Patient tolerated well.   Any issues or concerns during appointment: none.  Patient aware of next infusion appointment on 12/02/2024 (date) at 1000 (time).  Discharged ambulatory.

## 2024-07-31 ENCOUNTER — OFFICE VISIT (OUTPATIENT)
Dept: INTERNAL MEDICINE CLINIC | Facility: CLINIC | Age: 69
End: 2024-07-31

## 2024-07-31 VITALS
BODY MASS INDEX: 50.02 KG/M2 | HEIGHT: 64 IN | DIASTOLIC BLOOD PRESSURE: 100 MMHG | SYSTOLIC BLOOD PRESSURE: 151 MMHG | WEIGHT: 293 LBS

## 2024-07-31 DIAGNOSIS — R73.09 ABNORMAL GLUCOSE: ICD-10-CM

## 2024-07-31 DIAGNOSIS — M1A.09X0 IDIOPATHIC CHRONIC GOUT OF MULTIPLE SITES WITHOUT TOPHUS: ICD-10-CM

## 2024-07-31 DIAGNOSIS — E55.9 VITAMIN D DEFICIENCY: ICD-10-CM

## 2024-07-31 DIAGNOSIS — E78.2 MODERATE MIXED HYPERLIPIDEMIA NOT REQUIRING STATIN THERAPY: ICD-10-CM

## 2024-07-31 DIAGNOSIS — K21.9 GASTROESOPHAGEAL REFLUX DISEASE, UNSPECIFIED WHETHER ESOPHAGITIS PRESENT: ICD-10-CM

## 2024-07-31 DIAGNOSIS — E66.01 MORBID OBESITY (HCC): ICD-10-CM

## 2024-07-31 DIAGNOSIS — R10.31 BILATERAL LOWER ABDOMINAL DISCOMFORT: ICD-10-CM

## 2024-07-31 DIAGNOSIS — E03.9 ACQUIRED HYPOTHYROIDISM: ICD-10-CM

## 2024-07-31 DIAGNOSIS — Z00.00 MEDICARE ANNUAL WELLNESS VISIT, SUBSEQUENT: Primary | ICD-10-CM

## 2024-07-31 DIAGNOSIS — R10.32 BILATERAL LOWER ABDOMINAL DISCOMFORT: ICD-10-CM

## 2024-07-31 DIAGNOSIS — I10 UNCONTROLLED HYPERTENSION: ICD-10-CM

## 2024-07-31 DIAGNOSIS — N18.30 STAGE 3 CHRONIC KIDNEY DISEASE, UNSPECIFIED WHETHER STAGE 3A OR 3B CKD (HCC): ICD-10-CM

## 2024-07-31 DIAGNOSIS — D05.10 DUCTAL CARCINOMA IN SITU (DCIS) OF BREAST, UNSPECIFIED LATERALITY: ICD-10-CM

## 2024-07-31 LAB
CHOLEST SERPL-MCNC: 201 MG/DL (ref 0–200)
EST. AVERAGE GLUCOSE BLD GHB EST-MCNC: 126 MG/DL
HBA1C MFR BLD: 6 % (ref 0–5.6)
HDLC SERPL-MCNC: 48 MG/DL (ref 40–60)
HDLC SERPL: 4.2 (ref 0–5)
LDLC SERPL CALC-MCNC: 119 MG/DL (ref 0–100)
TRIGL SERPL-MCNC: 171 MG/DL (ref 0–150)
TSH, 3RD GENERATION: 2.1 UIU/ML (ref 0.27–4.2)
URATE SERPL-MCNC: 4.8 MG/DL (ref 2.5–7.1)
VLDLC SERPL CALC-MCNC: 34 MG/DL (ref 6–23)

## 2024-07-31 RX ORDER — SPIRONOLACTONE 25 MG/1
25 TABLET ORAL DAILY
Qty: 30 TABLET | Refills: 2 | Status: SHIPPED | OUTPATIENT
Start: 2024-07-31

## 2024-07-31 NOTE — PROGRESS NOTES
Medicare Annual Wellness Visit    Whit Dsouza is here for Annual Exam    Assessment & Plan   Medicare annual wellness visit, subsequent  Uncontrolled hypertension  -     spironolactone (ALDACTONE) 25 MG tablet; Take 1 tablet by mouth daily, Disp-30 tablet, R-2Normal  Moderate mixed hyperlipidemia not requiring statin therapy  -     Lipid Panel; Future  Abnormal glucose  -     Hemoglobin A1C; Future  Stage 3 chronic kidney disease, unspecified whether stage 3a or 3b CKD (HCC)  Gastroesophageal reflux disease, unspecified whether esophagitis present  Acquired hypothyroidism  -     TSH; Future  Ductal carcinoma in situ (DCIS) of breast, unspecified laterality  Idiopathic chronic gout of multiple sites without tophus  -     Uric Acid; Future  Morbid obesity (HCC)  Bilateral lower abdominal discomfort  Vitamin D deficiency  Discussed BMI and healthy weight and diet, weight bearing exercise, smoking avoidance, sun protection and medication compliance. Reviewed appropriate health maintenance screening. The patient was counseled regarding regular monthly SBE, screening procedures and recommended schedules for immunizations, mammography, Pap smears, GI hemoccult testing, colonoscopy and BMD.  Fasting labs.   Add spironolactone. BP goal under 130/80. Recheck 2 weeks    Recommendations for Preventive Services Due: see orders and patient instructions/AVS.  Recommended screening schedule for the next 5-10 years is provided to the patient in written form: see Patient Instructions/AVS.     No follow-ups on file.     Subjective       Patient's complete Health Risk Assessment and screening values have been reviewed and are found in Flowsheets. The following problems were reviewed today and where indicated follow up appointments were made and/or referrals ordered.    Positive Risk Factor Screenings with Interventions:                Inactivity:  On average, how many days per week do you engage in moderate to strenuous

## 2024-07-31 NOTE — PATIENT INSTRUCTIONS
Learning About Being Active as an Older Adult  Why is being active important as you get older?     Being active is one of the best things you can do for your health. And it's never too late to start. Being active--or getting active, if you aren't already--has definite benefits. It can:  Give you more energy,  Keep your mind sharp.  Improve balance to reduce your risk of falls.  Help you manage chronic illness with fewer medicines.  No matter how old you are, how fit you are, or what health problems you have, there is a form of activity that will work for you. And the more physical activity you can do, the better your overall health will be.  What kinds of activity can help you stay healthy?  Being more active will make your daily activities easier. Physical activity includes planned exercise and things you do in daily life. There are four types of activity:  Aerobic.  Doing aerobic activity makes your heart and lungs strong.  Includes walking, dancing, and gardening.  Aim for at least 2½ hours spread throughout the week.  It improves your energy and can help you sleep better.  Muscle-strengthening.  This type of activity can help maintain muscle and strengthen bones.  Includes climbing stairs, using resistance bands, and lifting or carrying heavy loads.  Aim for at least twice a week.  It can help protect the knees and other joints.  Stretching.  Stretching gives you better range of motion in joints and muscles.  Includes upper arm stretches, calf stretches, and gentle yoga.  Aim for at least twice a week, preferably after your muscles are warmed up from other activities.  It can help you function better in daily life.  Balancing.  This helps you stay coordinated and have good posture.  Includes heel-to-toe walking, beatriz chi, and certain types of yoga.  Aim for at least 3 days a week.  It can reduce your risk of falling.  Even if you have a hard time meeting the recommendations, it's better to be more active

## 2024-08-05 DIAGNOSIS — G25.81 RESTLESS LEGS: ICD-10-CM

## 2024-08-05 DIAGNOSIS — I10 ESSENTIAL HYPERTENSION: ICD-10-CM

## 2024-08-05 RX ORDER — ROPINIROLE 0.5 MG/1
0.5 TABLET, FILM COATED ORAL DAILY
Qty: 90 TABLET | Refills: 0 | Status: SHIPPED | OUTPATIENT
Start: 2024-08-05

## 2024-08-05 RX ORDER — VERAPAMIL HYDROCHLORIDE 300 MG/1
300 CAPSULE, EXTENDED RELEASE ORAL DAILY
Qty: 90 CAPSULE | Refills: 0 | Status: SHIPPED | OUTPATIENT
Start: 2024-08-05

## 2024-08-05 NOTE — TELEPHONE ENCOUNTER
Both rx's last written by Dr. Augustin 7/28/23 for 1 year supply. Pt last seen by Poppy 7/31/24. Rx's pended.

## 2024-08-10 ASSESSMENT — ENCOUNTER SYMPTOMS
SCLERAL ICTERUS: 0
SHORTNESS OF BREATH: 0
TROUBLE SWALLOWING: 0
CHEST TIGHTNESS: 0
DIARRHEA: 0
BLOOD IN STOOL: 0
ABDOMINAL PAIN: 0
VOICE CHANGE: 0
ABDOMINAL DISTENTION: 0
SORE THROAT: 0
NAUSEA: 0
CONSTIPATION: 0
VOMITING: 0
WHEEZING: 0
HEMOPTYSIS: 0

## 2024-08-18 PROBLEM — M1A.29X0 DRUG-INDUCED CHRONIC GOUT OF MULTIPLE SITES WITHOUT TOPHUS: Status: ACTIVE | Noted: 2020-07-02

## 2024-08-18 PROBLEM — R73.09 ABNORMAL GLUCOSE: Status: ACTIVE | Noted: 2022-11-04

## 2024-08-18 NOTE — PROGRESS NOTES
readings improved and most under 140s/90 with few 120s/70s. Home monitor with readings comparable to readings in office today. Will continue current medications and continue to monitor 2-3 days per week.   LDL not to goal. Will work on diet and getting more active. Consider increase of Lipitor to 40 mg.   Discussed medical management options for obesity. Not interested in bariatric surgery.GLP1 too expensive. Interested in trying Contrave again.  Also add metformin  mg with evening meal.  Encouraged for compliance with CPAP.  Continue allopurinol and will check uric acid with next labs.    Over 40 minutes spent reviewing medical records, reconciling medications and in assessment and planning.     Medical problems and test results were reviewed with the patient today.     FOLLOW UP    Return for 3 months follow up.       REVIEW OF SYSTEMS    Review of Systems   Constitutional:  Positive for fatigue and unexpected weight change. Negative for activity change and appetite change.   HENT:  Negative for congestion and hearing loss.    Eyes:  Negative for redness, itching and visual disturbance.   Respiratory:  Negative for cough and shortness of breath.    Cardiovascular:  Negative for chest pain, palpitations and leg swelling.   Gastrointestinal:  Negative for abdominal pain, blood in stool, constipation, diarrhea and nausea.   Genitourinary:  Negative for dysuria, frequency, hematuria and urgency.   Musculoskeletal:  Negative for arthralgias, back pain, myalgias and neck pain.   Skin:  Negative for color change and rash.   Neurological:  Negative for dizziness, weakness, numbness and headaches.   Psychiatric/Behavioral:  Negative for dysphoric mood and sleep disturbance. The patient is not nervous/anxious.        PHYSICAL EXAM    BP (!) 156/90   Pulse 87   Wt (!) 137.9 kg (304 lb)   SpO2 97%   BMI 52.18 kg/m²      Physical Exam  Vitals and nursing note reviewed.   Constitutional:       General: She is not in

## 2024-08-19 ENCOUNTER — OFFICE VISIT (OUTPATIENT)
Dept: INTERNAL MEDICINE CLINIC | Facility: CLINIC | Age: 69
End: 2024-08-19

## 2024-08-19 VITALS
SYSTOLIC BLOOD PRESSURE: 156 MMHG | OXYGEN SATURATION: 97 % | WEIGHT: 293 LBS | HEART RATE: 87 BPM | DIASTOLIC BLOOD PRESSURE: 90 MMHG | BODY MASS INDEX: 52.18 KG/M2

## 2024-08-19 DIAGNOSIS — I10 WHITE COAT SYNDROME WITH HYPERTENSION: Primary | ICD-10-CM

## 2024-08-19 DIAGNOSIS — G47.33 OSA (OBSTRUCTIVE SLEEP APNEA): ICD-10-CM

## 2024-08-19 DIAGNOSIS — G25.81 RESTLESS LEGS: ICD-10-CM

## 2024-08-19 DIAGNOSIS — E03.9 ACQUIRED HYPOTHYROIDISM: ICD-10-CM

## 2024-08-19 DIAGNOSIS — E78.2 MODERATE MIXED HYPERLIPIDEMIA NOT REQUIRING STATIN THERAPY: ICD-10-CM

## 2024-08-19 DIAGNOSIS — F51.04 CHRONIC INSOMNIA: ICD-10-CM

## 2024-08-19 DIAGNOSIS — E55.9 VITAMIN D DEFICIENCY: ICD-10-CM

## 2024-08-19 DIAGNOSIS — N18.2 CHRONIC RENAL INSUFFICIENCY, STAGE 2 (MILD): ICD-10-CM

## 2024-08-19 DIAGNOSIS — Z96.652 STATUS POST TOTAL LEFT KNEE REPLACEMENT: ICD-10-CM

## 2024-08-19 DIAGNOSIS — R73.03 PREDIABETES: ICD-10-CM

## 2024-08-19 DIAGNOSIS — K21.9 GASTROESOPHAGEAL REFLUX DISEASE, UNSPECIFIED WHETHER ESOPHAGITIS PRESENT: ICD-10-CM

## 2024-08-19 DIAGNOSIS — R73.09 ABNORMAL GLUCOSE: ICD-10-CM

## 2024-08-19 DIAGNOSIS — M1A.00X0 IDIOPATHIC CHRONIC GOUT WITHOUT TOPHUS, UNSPECIFIED SITE: ICD-10-CM

## 2024-08-19 DIAGNOSIS — D05.12 DUCTAL CARCINOMA IN SITU (DCIS) OF LEFT BREAST: ICD-10-CM

## 2024-08-19 DIAGNOSIS — Z79.811 LONG TERM (CURRENT) USE OF AROMATASE INHIBITORS: ICD-10-CM

## 2024-08-19 DIAGNOSIS — M85.80 OSTEOPENIA, UNSPECIFIED LOCATION: ICD-10-CM

## 2024-08-19 DIAGNOSIS — J32.0 CHRONIC MAXILLARY SINUSITIS: ICD-10-CM

## 2024-08-19 DIAGNOSIS — J30.1 SEASONAL ALLERGIC RHINITIS DUE TO POLLEN: ICD-10-CM

## 2024-08-19 DIAGNOSIS — E66.01 MORBID OBESITY (HCC): ICD-10-CM

## 2024-08-19 RX ORDER — SPIRONOLACTONE 25 MG/1
25 TABLET ORAL DAILY
Qty: 90 TABLET | Refills: 3 | Status: SHIPPED | OUTPATIENT
Start: 2024-08-19

## 2024-08-19 RX ORDER — HYDRALAZINE HYDROCHLORIDE 50 MG/1
50 TABLET, FILM COATED ORAL 3 TIMES DAILY
Qty: 270 TABLET | Refills: 3 | Status: SHIPPED | OUTPATIENT
Start: 2024-08-19

## 2024-08-19 RX ORDER — VERAPAMIL HYDROCHLORIDE 300 MG/1
300 CAPSULE, EXTENDED RELEASE ORAL DAILY
Qty: 90 CAPSULE | Refills: 3 | Status: SHIPPED | OUTPATIENT
Start: 2024-08-19

## 2024-08-19 RX ORDER — METFORMIN HYDROCHLORIDE 500 MG/1
500 TABLET, EXTENDED RELEASE ORAL
Qty: 90 TABLET | Refills: 1 | Status: SHIPPED | OUTPATIENT
Start: 2024-08-19

## 2024-08-19 RX ORDER — LEVOTHYROXINE SODIUM 0.12 MG/1
125 TABLET ORAL
Qty: 90 TABLET | Refills: 3 | Status: SHIPPED | OUTPATIENT
Start: 2024-08-19

## 2024-08-19 RX ORDER — ALLOPURINOL 300 MG/1
300 TABLET ORAL DAILY
Qty: 90 TABLET | Refills: 3 | Status: SHIPPED | OUTPATIENT
Start: 2024-08-19

## 2024-08-19 RX ORDER — IRBESARTAN 300 MG/1
300 TABLET ORAL DAILY
Qty: 90 TABLET | Refills: 3 | Status: SHIPPED | OUTPATIENT
Start: 2024-08-19

## 2024-08-19 RX ORDER — OMEPRAZOLE 40 MG/1
40 CAPSULE, DELAYED RELEASE ORAL DAILY
Qty: 90 CAPSULE | Refills: 3 | Status: SHIPPED | OUTPATIENT
Start: 2024-08-19

## 2024-08-19 RX ORDER — ATORVASTATIN CALCIUM 40 MG/1
40 TABLET, FILM COATED ORAL DAILY
Qty: 90 TABLET | Refills: 3 | Status: SHIPPED | OUTPATIENT
Start: 2024-08-19

## 2024-08-19 RX ORDER — ROPINIROLE 0.5 MG/1
0.5 TABLET, FILM COATED ORAL DAILY
Qty: 90 TABLET | Refills: 0 | Status: SHIPPED | OUTPATIENT
Start: 2024-08-19

## 2024-08-19 ASSESSMENT — ENCOUNTER SYMPTOMS
COUGH: 0
BACK PAIN: 0
DIARRHEA: 0
COLOR CHANGE: 0
ABDOMINAL PAIN: 0
EYE REDNESS: 0
SHORTNESS OF BREATH: 0
NAUSEA: 0
EYE ITCHING: 0
CONSTIPATION: 0
BLOOD IN STOOL: 0

## 2024-08-20 DIAGNOSIS — E66.01 MORBID OBESITY (HCC): Primary | ICD-10-CM

## 2024-08-20 NOTE — PROGRESS NOTES
Patient notified of medication be sent and to look online at information. Patient verbalized understanding.   declines

## 2024-08-20 NOTE — PROGRESS NOTES
I did sent to [facility] and they will contact her. I would ask her to look online at [email or web address] for information regarding titration and side effects or she can come by and  the information card we have in drug closet

## 2024-10-26 DIAGNOSIS — G47.00 INSOMNIA, UNSPECIFIED TYPE: ICD-10-CM

## 2024-10-28 RX ORDER — ESZOPICLONE 2 MG/1
2 TABLET, FILM COATED ORAL NIGHTLY
Qty: 90 TABLET | Refills: 0 | Status: SHIPPED | OUTPATIENT
Start: 2024-10-28 | End: 2025-10-28

## 2024-10-28 NOTE — TELEPHONE ENCOUNTER
Rx last written 1/30/24 for #90 with 1 refills. Pt last seen 8/19/24 and next appt is 11/20/24. Rx pended.

## 2024-11-08 ENCOUNTER — HOSPITAL ENCOUNTER (OUTPATIENT)
Dept: MAMMOGRAPHY | Age: 69
Discharge: HOME | End: 2024-11-11
Payer: MEDICARE

## 2024-11-08 DIAGNOSIS — D05.12 DUCTAL CARCINOMA IN SITU (DCIS) OF LEFT BREAST: ICD-10-CM

## 2024-11-08 DIAGNOSIS — Z12.31 ENCOUNTER FOR SCREENING MAMMOGRAM FOR MALIGNANT NEOPLASM OF BREAST: ICD-10-CM

## 2024-11-08 PROCEDURE — 77063 BREAST TOMOSYNTHESIS BI: CPT

## 2024-11-20 ENCOUNTER — OFFICE VISIT (OUTPATIENT)
Dept: INTERNAL MEDICINE CLINIC | Facility: CLINIC | Age: 69
End: 2024-11-20

## 2024-11-20 VITALS
BODY MASS INDEX: 50.02 KG/M2 | WEIGHT: 293 LBS | SYSTOLIC BLOOD PRESSURE: 130 MMHG | HEIGHT: 64 IN | DIASTOLIC BLOOD PRESSURE: 86 MMHG

## 2024-11-20 DIAGNOSIS — I10 WHITE COAT SYNDROME WITH HYPERTENSION: Primary | ICD-10-CM

## 2024-11-20 DIAGNOSIS — E78.2 MODERATE MIXED HYPERLIPIDEMIA NOT REQUIRING STATIN THERAPY: ICD-10-CM

## 2024-11-20 DIAGNOSIS — G47.00 INSOMNIA, UNSPECIFIED TYPE: ICD-10-CM

## 2024-11-20 DIAGNOSIS — E03.9 ACQUIRED HYPOTHYROIDISM: ICD-10-CM

## 2024-11-20 DIAGNOSIS — M1A.00X0 IDIOPATHIC CHRONIC GOUT WITHOUT TOPHUS, UNSPECIFIED SITE: ICD-10-CM

## 2024-11-20 DIAGNOSIS — R73.03 PREDIABETES: ICD-10-CM

## 2024-11-20 DIAGNOSIS — G25.81 RESTLESS LEGS: ICD-10-CM

## 2024-11-20 DIAGNOSIS — E55.9 VITAMIN D DEFICIENCY: ICD-10-CM

## 2024-11-20 RX ORDER — ALLOPURINOL 300 MG/1
300 TABLET ORAL DAILY
Qty: 90 TABLET | Refills: 3 | Status: SHIPPED | OUTPATIENT
Start: 2024-11-20

## 2024-11-20 RX ORDER — METFORMIN HYDROCHLORIDE 500 MG/1
500 TABLET, EXTENDED RELEASE ORAL
Qty: 90 TABLET | Refills: 3 | Status: SHIPPED | OUTPATIENT
Start: 2024-11-20

## 2024-11-20 RX ORDER — ROPINIROLE 0.5 MG/1
0.5 TABLET, FILM COATED ORAL DAILY
Qty: 90 TABLET | Refills: 3 | Status: SHIPPED | OUTPATIENT
Start: 2024-11-20

## 2024-11-20 RX ORDER — SPIRONOLACTONE 25 MG/1
25 TABLET ORAL DAILY
Qty: 90 TABLET | Refills: 3 | Status: SHIPPED | OUTPATIENT
Start: 2024-11-20

## 2024-11-20 RX ORDER — ESZOPICLONE 2 MG/1
2 TABLET, FILM COATED ORAL NIGHTLY
Qty: 90 TABLET | Refills: 3 | Status: SHIPPED | OUTPATIENT
Start: 2024-11-20 | End: 2025-11-20

## 2024-11-20 ASSESSMENT — ENCOUNTER SYMPTOMS: SHORTNESS OF BREATH: 0

## 2024-11-20 NOTE — PROGRESS NOTES
TABS; Take 1 tablet by mouth at bedtime.    Idiopathic chronic gout without tophus, unspecified site  -     allopurinol (ZYLOPRIM) 300 MG tablet; Take 1 tablet by mouth daily  -     Uric Acid; Future    Restless legs  -     rOPINIRole (REQUIP) 0.5 MG tablet; Take 1 tablet by mouth daily    Vitamin D deficiency  -     Vitamin D 25 Hydroxy; Future    BP improved.Will continue to monitor at home - goal under 130/80.   Continue metformin for now - will hold if persistent diarrhea.   Contrave discontinued.  Return for fasting labs.       Medical problems and test results were reviewed with the patient today.     FOLLOW UP    Return for fasting labs soon, then awv vv with fasting labs/visit 1-2 weeks later.       REVIEW OF SYSTEMS    Review of Systems   Constitutional:  Negative for unexpected weight change.   Eyes:  Negative for visual disturbance.   Respiratory:  Negative for shortness of breath.    Cardiovascular:  Negative for chest pain, palpitations and leg swelling.   Neurological:  Negative for headaches.       PHYSICAL EXAM    /86   Ht 1.626 m (5' 4\")   Wt (!) 138 kg (304 lb 3.2 oz)   BMI 52.22 kg/m²      Physical Exam  Vitals and nursing note reviewed.   Constitutional:       Appearance: Normal appearance. She is not ill-appearing.   Cardiovascular:      Rate and Rhythm: Normal rate and regular rhythm.   Pulmonary:      Effort: Pulmonary effort is normal.      Breath sounds: Normal breath sounds.   Musculoskeletal:      Right lower leg: No edema.      Left lower leg: No edema.   Neurological:      Mental Status: She is alert.   Psychiatric:         Mood and Affect: Mood normal.         Behavior: Behavior normal.

## 2024-11-22 DIAGNOSIS — M1A.00X0 IDIOPATHIC CHRONIC GOUT WITHOUT TOPHUS, UNSPECIFIED SITE: ICD-10-CM

## 2024-11-22 DIAGNOSIS — E78.2 MODERATE MIXED HYPERLIPIDEMIA NOT REQUIRING STATIN THERAPY: ICD-10-CM

## 2024-11-22 DIAGNOSIS — E55.9 VITAMIN D DEFICIENCY: ICD-10-CM

## 2024-11-22 DIAGNOSIS — R73.03 PREDIABETES: ICD-10-CM

## 2024-11-22 LAB
25(OH)D3 SERPL-MCNC: 47.1 NG/ML (ref 30–100)
ALBUMIN SERPL-MCNC: 4.1 G/DL (ref 3.2–4.6)
ALBUMIN/GLOB SERPL: 1.3 (ref 1–1.9)
ALP SERPL-CCNC: 73 U/L (ref 35–104)
ALT SERPL-CCNC: 35 U/L (ref 8–45)
ANION GAP SERPL CALC-SCNC: 12 MMOL/L (ref 7–16)
AST SERPL-CCNC: 38 U/L (ref 15–37)
BILIRUB SERPL-MCNC: 0.6 MG/DL (ref 0–1.2)
BUN SERPL-MCNC: 21 MG/DL (ref 8–23)
CALCIUM SERPL-MCNC: 10 MG/DL (ref 8.8–10.2)
CHLORIDE SERPL-SCNC: 103 MMOL/L (ref 98–107)
CHOLEST SERPL-MCNC: 156 MG/DL (ref 0–200)
CO2 SERPL-SCNC: 22 MMOL/L (ref 20–29)
CREAT SERPL-MCNC: 1.08 MG/DL (ref 0.6–1.1)
EST. AVERAGE GLUCOSE BLD GHB EST-MCNC: 126 MG/DL
GLOBULIN SER CALC-MCNC: 3.2 G/DL (ref 2.3–3.5)
GLUCOSE SERPL-MCNC: 97 MG/DL (ref 70–99)
HBA1C MFR BLD: 6 % (ref 0–5.6)
HDLC SERPL-MCNC: 42 MG/DL (ref 40–60)
HDLC SERPL: 3.7 (ref 0–5)
LDLC SERPL CALC-MCNC: 83 MG/DL (ref 0–100)
POTASSIUM SERPL-SCNC: 4.7 MMOL/L (ref 3.5–5.1)
PROT SERPL-MCNC: 7.3 G/DL (ref 6.3–8.2)
SODIUM SERPL-SCNC: 138 MMOL/L (ref 136–145)
TRIGL SERPL-MCNC: 154 MG/DL (ref 0–150)
TSH, 3RD GENERATION: 1.82 UIU/ML (ref 0.27–4.2)
URATE SERPL-MCNC: 5.9 MG/DL (ref 2.5–7.1)
VLDLC SERPL CALC-MCNC: 31 MG/DL (ref 6–23)

## 2024-11-26 DIAGNOSIS — D05.12 DUCTAL CARCINOMA IN SITU (DCIS) OF LEFT BREAST: Primary | ICD-10-CM

## 2024-11-26 NOTE — PROGRESS NOTES
[facility]: Established patient - follow up     Chief Complaint   Patient presents with    Follow-up     Reason for Referral: Ductal carcinoma in situ  (DCIS) of left breast   Referring Provider:  Yury Lawrence MD   Primary Care Provider: Stormy Augustin MD   Family History of Cancer/Hematologic Disorders: Family history significant for father with prostate and pancreatic cancer.    Presenting Symptoms: Abnormal screening mammogram     History of Present Illness:  Ms. Dsouza is a 69 y.o. female who presents today for follow up regarding left breast DCIS.  The past medical history is significant for sleep apnea, osteopenia, chronic insomnia, HLD,  GERD, several orthopedic surgeries, DELLA/BSO, adenoidectomy, tonsillectomy, and breast augmentation.  She initially presented for a routine bilateral screening mammogram on 11/23/20 which identified asymmetries in the left breast.  Further evaluation with  digital diagnostic left breast mammogram and left breast ultrasound on 12/3/20 confirmed a 5-6 mm solid lesion at the 12:00 position in the left breast.  US guided bx of left breast from 12/8/20 c/w ER 96%/KY 92% positive, low grade, cribriform and papillary  type, ductal carcinoma in situ with microcalcifications focally present, associated with DCIS.  She was referred to sx and saw Dr Lawrence on 12/17/20.  She was assessed with signs and symptoms consistent with small volume left breast DCIS and recommended  for wire localized left breast lumpectomy.  Patient opted to proceed with surgical recommendations, and on 1/8/21, she underwent wire localized lumpectomy.  Pathology from the left breast lumpectomy revealed ductal carcinoma in situ, intermediate grade,  cribriform and micropapillary types, 4 mm in greatest dimension, margins uninvolved, within 3 mm of the lateral margin; changes consistent with prior biopsy site, including fat necrosis; and fibrocystic mastopathy.  The left breast

## 2024-12-02 ENCOUNTER — HOSPITAL ENCOUNTER (OUTPATIENT)
Dept: LAB | Age: 69
Discharge: HOME | End: 2024-12-02
Payer: MEDICARE

## 2024-12-02 ENCOUNTER — OFFICE VISIT (OUTPATIENT)
Dept: ONCOLOGY | Age: 69
End: 2024-12-02
Payer: MEDICARE

## 2024-12-02 VITALS
WEIGHT: 293 LBS | RESPIRATION RATE: 16 BRPM | OXYGEN SATURATION: 98 % | BODY MASS INDEX: 50.02 KG/M2 | HEART RATE: 72 BPM | DIASTOLIC BLOOD PRESSURE: 78 MMHG | HEIGHT: 64 IN | TEMPERATURE: 98 F | SYSTOLIC BLOOD PRESSURE: 146 MMHG

## 2024-12-02 DIAGNOSIS — Z79.899 HIGH RISK MEDICATION USE: ICD-10-CM

## 2024-12-02 DIAGNOSIS — D05.12 DUCTAL CARCINOMA IN SITU (DCIS) OF LEFT BREAST: Primary | ICD-10-CM

## 2024-12-02 DIAGNOSIS — Z91.89 AT RISK FOR BONE DENSITY LOSS: ICD-10-CM

## 2024-12-02 DIAGNOSIS — Z79.811 LONG TERM (CURRENT) USE OF AROMATASE INHIBITORS: ICD-10-CM

## 2024-12-02 DIAGNOSIS — Z79.811 AROMATASE INHIBITOR USE: ICD-10-CM

## 2024-12-02 DIAGNOSIS — Z12.39 BREAST CANCER SCREENING, HIGH RISK PATIENT: ICD-10-CM

## 2024-12-02 DIAGNOSIS — E55.9 VITAMIN D DEFICIENCY: ICD-10-CM

## 2024-12-02 DIAGNOSIS — D05.12 DUCTAL CARCINOMA IN SITU (DCIS) OF LEFT BREAST: ICD-10-CM

## 2024-12-02 LAB
BASOPHILS # BLD: 0.1 K/UL (ref 0–0.2)
BASOPHILS NFR BLD: 1 % (ref 0–2)
DIFFERENTIAL METHOD BLD: NORMAL
EOSINOPHIL # BLD: 0.1 K/UL (ref 0–0.8)
EOSINOPHIL NFR BLD: 2 % (ref 0.5–7.8)
ERYTHROCYTE [DISTWIDTH] IN BLOOD BY AUTOMATED COUNT: 14.2 % (ref 11.9–14.6)
HCT VFR BLD AUTO: 38.6 % (ref 35.8–46.3)
HGB BLD-MCNC: 12.4 G/DL (ref 11.7–15.4)
IMM GRANULOCYTES # BLD AUTO: 0 K/UL (ref 0–0.5)
IMM GRANULOCYTES NFR BLD AUTO: 0 % (ref 0–5)
LYMPHOCYTES # BLD: 1.8 K/UL (ref 0.5–4.6)
LYMPHOCYTES NFR BLD: 25 % (ref 13–44)
MCH RBC QN AUTO: 30.2 PG (ref 26.1–32.9)
MCHC RBC AUTO-ENTMCNC: 32.1 G/DL (ref 31.4–35)
MCV RBC AUTO: 93.9 FL (ref 82–102)
MONOCYTES # BLD: 0.5 K/UL (ref 0.1–1.3)
MONOCYTES NFR BLD: 7 % (ref 4–12)
NEUTS SEG # BLD: 4.7 K/UL (ref 1.7–8.2)
NEUTS SEG NFR BLD: 65 % (ref 43–78)
NRBC # BLD: 0 K/UL (ref 0–0.2)
PLATELET # BLD AUTO: 196 K/UL (ref 150–450)
PMV BLD AUTO: 10.2 FL (ref 9.4–12.3)
RBC # BLD AUTO: 4.11 M/UL (ref 4.05–5.2)
WBC # BLD AUTO: 7.2 K/UL (ref 4.3–11.1)

## 2024-12-02 PROCEDURE — G8417 CALC BMI ABV UP PARAM F/U: HCPCS | Performed by: INTERNAL MEDICINE

## 2024-12-02 PROCEDURE — G8427 DOCREV CUR MEDS BY ELIG CLIN: HCPCS | Performed by: INTERNAL MEDICINE

## 2024-12-02 PROCEDURE — 36415 COLL VENOUS BLD VENIPUNCTURE: CPT

## 2024-12-02 PROCEDURE — 1126F AMNT PAIN NOTED NONE PRSNT: CPT | Performed by: INTERNAL MEDICINE

## 2024-12-02 PROCEDURE — 3017F COLORECTAL CA SCREEN DOC REV: CPT | Performed by: INTERNAL MEDICINE

## 2024-12-02 PROCEDURE — 1036F TOBACCO NON-USER: CPT | Performed by: INTERNAL MEDICINE

## 2024-12-02 PROCEDURE — 85025 COMPLETE CBC W/AUTO DIFF WBC: CPT

## 2024-12-02 PROCEDURE — 3078F DIAST BP <80 MM HG: CPT | Performed by: INTERNAL MEDICINE

## 2024-12-02 PROCEDURE — 3077F SYST BP >= 140 MM HG: CPT | Performed by: INTERNAL MEDICINE

## 2024-12-02 PROCEDURE — 1090F PRES/ABSN URINE INCON ASSESS: CPT | Performed by: INTERNAL MEDICINE

## 2024-12-02 PROCEDURE — G8399 PT W/DXA RESULTS DOCUMENT: HCPCS | Performed by: INTERNAL MEDICINE

## 2024-12-02 PROCEDURE — 99214 OFFICE O/P EST MOD 30 MIN: CPT | Performed by: INTERNAL MEDICINE

## 2024-12-02 PROCEDURE — G8484 FLU IMMUNIZE NO ADMIN: HCPCS | Performed by: INTERNAL MEDICINE

## 2024-12-02 PROCEDURE — 1123F ACP DISCUSS/DSCN MKR DOCD: CPT | Performed by: INTERNAL MEDICINE

## 2024-12-02 ASSESSMENT — PATIENT HEALTH QUESTIONNAIRE - PHQ9
SUM OF ALL RESPONSES TO PHQ QUESTIONS 1-9: 0
SUM OF ALL RESPONSES TO PHQ QUESTIONS 1-9: 0
SUM OF ALL RESPONSES TO PHQ9 QUESTIONS 1 & 2: 0
SUM OF ALL RESPONSES TO PHQ QUESTIONS 1-9: 0
SUM OF ALL RESPONSES TO PHQ QUESTIONS 1-9: 0
1. LITTLE INTEREST OR PLEASURE IN DOING THINGS: NOT AT ALL
2. FEELING DOWN, DEPRESSED OR HOPELESS: NOT AT ALL

## 2024-12-02 NOTE — PATIENT INSTRUCTIONS
Patient Information from Today's Visit    The members of your [facility] are listed below:    Physician Provider: Delma Moore, Medical Oncologist  Advanced Practice Clinician: Lisbet Negrete NP  Registered Nurse: Sandy BOND RN  Navigator: Tracie ADKINS RN or Dipika FONTANA RN  Medical Assistant: Meghna MURRELL MA  : Gricelda CURIEL   Supportive Care Services: Arnoldo SALAZAR LMSW    Diagnosis: DCIS      Follow Up Instructions: 4-5 months.    Labs reviewed.  Symptoms reviewed.  We will delay your Prolia due to dental work.  DEXA bone density scan due in March 2025.  You can call to schedule this at [phone].  Breast MRI due April 2025.  You can call to schedule this at [phone].    Treatment Summary has been discussed and given to patient:N/A      Current Labs:   Hospital Outpatient Visit on 12/02/2024   Component Date Value Ref Range Status    WBC 12/02/2024 7.2  4.3 - 11.1 K/uL Final    RBC 12/02/2024 4.11  4.05 - 5.2 M/uL Final    Hemoglobin 12/02/2024 12.4  11.7 - 15.4 g/dL Final    Hematocrit 12/02/2024 38.6  35.8 - 46.3 % Final    MCV 12/02/2024 93.9  82.0 - 102.0 FL Final    MCH 12/02/2024 30.2  26.1 - 32.9 PG Final    MCHC 12/02/2024 32.1  31.4 - 35.0 g/dL Final    RDW 12/02/2024 14.2  11.9 - 14.6 % Final    Platelets 12/02/2024 196  150 - 450 K/uL Final    MPV 12/02/2024 10.2  9.4 - 12.3 FL Final    nRBC 12/02/2024 0.00  0.0 - 0.2 K/uL Final    **Note: Absolute NRBC parameter is now reported with Hemogram**    Neutrophils % 12/02/2024 65  43 - 78 % Final    Lymphocytes % 12/02/2024 25  13 - 44 % Final    Monocytes % 12/02/2024 7  4.0 - 12.0 % Final    Eosinophils % 12/02/2024 2  0.5 - 7.8 % Final    Basophils % 12/02/2024 1  0.0 - 2.0 % Final    Immature Granulocytes % 12/02/2024 0  0.0 - 5.0 % Final    Neutrophils Absolute 12/02/2024 4.7  1.7 - 8.2 K/UL Final    Lymphocytes Absolute 12/02/2024 1.8  0.5 - 4.6 K/UL Final    Monocytes Absolute 12/02/2024 0.5  0.1 - 1.3 K/UL Final    Eosinophils

## 2024-12-08 PROBLEM — Z12.39 BREAST CANCER SCREENING, HIGH RISK PATIENT: Status: ACTIVE | Noted: 2024-12-08

## 2024-12-08 PROBLEM — Z79.811 AROMATASE INHIBITOR USE: Status: ACTIVE | Noted: 2024-12-08

## 2024-12-08 RX ORDER — ANASTROZOLE 1 MG/1
1 TABLET ORAL DAILY
Qty: 90 TABLET | Refills: 3 | Status: SHIPPED | OUTPATIENT
Start: 2024-12-08

## 2025-01-07 PROBLEM — Z12.39 BREAST CANCER SCREENING, HIGH RISK PATIENT: Status: RESOLVED | Noted: 2024-12-08 | Resolved: 2025-01-07

## 2025-03-03 ENCOUNTER — HOSPITAL ENCOUNTER (OUTPATIENT)
Dept: MAMMOGRAPHY | Age: 70
Discharge: HOME | End: 2025-03-06
Attending: INTERNAL MEDICINE
Payer: MEDICARE

## 2025-03-03 DIAGNOSIS — Z91.89 AT RISK FOR BONE DENSITY LOSS: ICD-10-CM

## 2025-03-03 DIAGNOSIS — Z79.811 AROMATASE INHIBITOR USE: ICD-10-CM

## 2025-03-03 PROCEDURE — 77080 DXA BONE DENSITY AXIAL: CPT

## 2025-04-10 ENCOUNTER — HOSPITAL ENCOUNTER (OUTPATIENT)
Dept: LAB | Age: 70
Discharge: HOME | End: 2025-04-10
Payer: MEDICARE

## 2025-04-10 ENCOUNTER — OFFICE VISIT (OUTPATIENT)
Dept: ONCOLOGY | Age: 70
End: 2025-04-10
Payer: MEDICARE

## 2025-04-10 ENCOUNTER — HOSPITAL ENCOUNTER (OUTPATIENT)
Dept: INFUSION THERAPY | Age: 70
Setting detail: INFUSION SERIES
Discharge: HOME | End: 2025-04-10
Payer: MEDICARE

## 2025-04-10 VITALS
RESPIRATION RATE: 16 BRPM | TEMPERATURE: 98.2 F | OXYGEN SATURATION: 95 % | HEART RATE: 92 BPM | HEIGHT: 64 IN | SYSTOLIC BLOOD PRESSURE: 145 MMHG | BODY MASS INDEX: 50.02 KG/M2 | WEIGHT: 293 LBS | DIASTOLIC BLOOD PRESSURE: 86 MMHG

## 2025-04-10 DIAGNOSIS — E55.9 VITAMIN D DEFICIENCY: ICD-10-CM

## 2025-04-10 DIAGNOSIS — Z79.811 AROMATASE INHIBITOR USE: ICD-10-CM

## 2025-04-10 DIAGNOSIS — M25.50 AROMATASE INHIBITOR-ASSOCIATED ARTHRALGIA: ICD-10-CM

## 2025-04-10 DIAGNOSIS — D05.12 DUCTAL CARCINOMA IN SITU (DCIS) OF LEFT BREAST: Primary | ICD-10-CM

## 2025-04-10 DIAGNOSIS — T45.1X5A AROMATASE INHIBITOR-ASSOCIATED ARTHRALGIA: ICD-10-CM

## 2025-04-10 DIAGNOSIS — Z79.811 LONG TERM (CURRENT) USE OF AROMATASE INHIBITORS: ICD-10-CM

## 2025-04-10 DIAGNOSIS — Z79.899 HIGH RISK MEDICATION USE: ICD-10-CM

## 2025-04-10 DIAGNOSIS — D05.12 DUCTAL CARCINOMA IN SITU (DCIS) OF LEFT BREAST: ICD-10-CM

## 2025-04-10 DIAGNOSIS — M85.80 OSTEOPENIA, UNSPECIFIED LOCATION: ICD-10-CM

## 2025-04-10 DIAGNOSIS — M85.80 OSTEOPENIA, UNSPECIFIED LOCATION: Primary | ICD-10-CM

## 2025-04-10 LAB
25(OH)D3 SERPL-MCNC: 58.1 NG/ML (ref 30–100)
ALBUMIN SERPL-MCNC: 4.2 G/DL (ref 3.2–4.6)
ALBUMIN/GLOB SERPL: 1.1 (ref 1–1.9)
ALP SERPL-CCNC: 103 U/L (ref 35–104)
ALT SERPL-CCNC: 24 U/L (ref 8–45)
ANION GAP SERPL CALC-SCNC: 15 MMOL/L (ref 7–16)
AST SERPL-CCNC: 31 U/L (ref 15–37)
BASOPHILS # BLD: 0.05 K/UL (ref 0–0.2)
BASOPHILS NFR BLD: 0.6 % (ref 0–2)
BILIRUB SERPL-MCNC: 0.8 MG/DL (ref 0–1.2)
BUN SERPL-MCNC: 23 MG/DL (ref 8–23)
CALCIUM SERPL-MCNC: 10.7 MG/DL (ref 8.8–10.2)
CHLORIDE SERPL-SCNC: 99 MMOL/L (ref 98–107)
CO2 SERPL-SCNC: 22 MMOL/L (ref 20–29)
CREAT SERPL-MCNC: 1.17 MG/DL (ref 0.6–1.1)
DIFFERENTIAL METHOD BLD: NORMAL
EOSINOPHIL # BLD: 0.09 K/UL (ref 0–0.8)
EOSINOPHIL NFR BLD: 1.1 % (ref 0.5–7.8)
ERYTHROCYTE [DISTWIDTH] IN BLOOD BY AUTOMATED COUNT: 13.8 % (ref 11.9–14.6)
GLOBULIN SER CALC-MCNC: 3.9 G/DL (ref 2.3–3.5)
GLUCOSE SERPL-MCNC: 91 MG/DL (ref 70–99)
HCT VFR BLD AUTO: 38.6 % (ref 35.8–46.3)
HGB BLD-MCNC: 12.5 G/DL (ref 11.7–15.4)
IMM GRANULOCYTES # BLD AUTO: 0.05 K/UL (ref 0–0.5)
IMM GRANULOCYTES NFR BLD AUTO: 0.6 % (ref 0–5)
LYMPHOCYTES # BLD: 1.98 K/UL (ref 0.5–4.6)
LYMPHOCYTES NFR BLD: 23.2 % (ref 13–44)
MAGNESIUM SERPL-MCNC: 1.7 MG/DL (ref 1.8–2.4)
MCH RBC QN AUTO: 30 PG (ref 26.1–32.9)
MCHC RBC AUTO-ENTMCNC: 32.4 G/DL (ref 31.4–35)
MCV RBC AUTO: 92.8 FL (ref 82–102)
MONOCYTES # BLD: 0.5 K/UL (ref 0.1–1.3)
MONOCYTES NFR BLD: 5.9 % (ref 4–12)
NEUTS SEG # BLD: 5.85 K/UL (ref 1.7–8.2)
NEUTS SEG NFR BLD: 68.6 % (ref 43–78)
NRBC # BLD: 0 K/UL (ref 0–0.2)
PHOSPHATE SERPL-MCNC: 3.4 MG/DL (ref 2.5–4.5)
PLATELET # BLD AUTO: 205 K/UL (ref 150–450)
PMV BLD AUTO: 9.8 FL (ref 9.4–12.3)
POTASSIUM SERPL-SCNC: 4.3 MMOL/L (ref 3.5–5.1)
PROT SERPL-MCNC: 8.2 G/DL (ref 6.3–8.2)
RBC # BLD AUTO: 4.16 M/UL (ref 4.05–5.2)
SODIUM SERPL-SCNC: 136 MMOL/L (ref 136–145)
WBC # BLD AUTO: 8.5 K/UL (ref 4.3–11.1)

## 2025-04-10 PROCEDURE — G8399 PT W/DXA RESULTS DOCUMENT: HCPCS | Performed by: NURSE PRACTITIONER

## 2025-04-10 PROCEDURE — 83735 ASSAY OF MAGNESIUM: CPT

## 2025-04-10 PROCEDURE — 3079F DIAST BP 80-89 MM HG: CPT | Performed by: NURSE PRACTITIONER

## 2025-04-10 PROCEDURE — 82306 VITAMIN D 25 HYDROXY: CPT

## 2025-04-10 PROCEDURE — 99214 OFFICE O/P EST MOD 30 MIN: CPT | Performed by: NURSE PRACTITIONER

## 2025-04-10 PROCEDURE — 36415 COLL VENOUS BLD VENIPUNCTURE: CPT

## 2025-04-10 PROCEDURE — G8427 DOCREV CUR MEDS BY ELIG CLIN: HCPCS | Performed by: NURSE PRACTITIONER

## 2025-04-10 PROCEDURE — 80053 COMPREHEN METABOLIC PANEL: CPT

## 2025-04-10 PROCEDURE — 3017F COLORECTAL CA SCREEN DOC REV: CPT | Performed by: NURSE PRACTITIONER

## 2025-04-10 PROCEDURE — 3077F SYST BP >= 140 MM HG: CPT | Performed by: NURSE PRACTITIONER

## 2025-04-10 PROCEDURE — 1036F TOBACCO NON-USER: CPT | Performed by: NURSE PRACTITIONER

## 2025-04-10 PROCEDURE — G8417 CALC BMI ABV UP PARAM F/U: HCPCS | Performed by: NURSE PRACTITIONER

## 2025-04-10 PROCEDURE — 1160F RVW MEDS BY RX/DR IN RCRD: CPT | Performed by: NURSE PRACTITIONER

## 2025-04-10 PROCEDURE — 84100 ASSAY OF PHOSPHORUS: CPT

## 2025-04-10 PROCEDURE — 1090F PRES/ABSN URINE INCON ASSESS: CPT | Performed by: NURSE PRACTITIONER

## 2025-04-10 PROCEDURE — 96372 THER/PROPH/DIAG INJ SC/IM: CPT

## 2025-04-10 PROCEDURE — 1159F MED LIST DOCD IN RCRD: CPT | Performed by: NURSE PRACTITIONER

## 2025-04-10 PROCEDURE — 6360000002 HC RX W HCPCS

## 2025-04-10 PROCEDURE — 1123F ACP DISCUSS/DSCN MKR DOCD: CPT | Performed by: NURSE PRACTITIONER

## 2025-04-10 PROCEDURE — 1125F AMNT PAIN NOTED PAIN PRSNT: CPT | Performed by: NURSE PRACTITIONER

## 2025-04-10 PROCEDURE — 85025 COMPLETE CBC W/AUTO DIFF WBC: CPT

## 2025-04-10 RX ORDER — EPINEPHRINE 1 MG/ML
0.3 INJECTION, SOLUTION, CONCENTRATE INTRAVENOUS PRN
OUTPATIENT
Start: 2025-07-31

## 2025-04-10 RX ORDER — ONDANSETRON 2 MG/ML
8 INJECTION INTRAMUSCULAR; INTRAVENOUS
OUTPATIENT
Start: 2025-07-31

## 2025-04-10 RX ORDER — SODIUM CHLORIDE 9 MG/ML
INJECTION, SOLUTION INTRAVENOUS CONTINUOUS
OUTPATIENT
Start: 2025-07-31

## 2025-04-10 RX ORDER — ALBUTEROL SULFATE 90 UG/1
4 INHALANT RESPIRATORY (INHALATION) PRN
OUTPATIENT
Start: 2025-07-31

## 2025-04-10 RX ORDER — ACETAMINOPHEN 325 MG/1
650 TABLET ORAL
OUTPATIENT
Start: 2025-07-31

## 2025-04-10 RX ORDER — DIPHENHYDRAMINE HYDROCHLORIDE 50 MG/ML
50 INJECTION, SOLUTION INTRAMUSCULAR; INTRAVENOUS
OUTPATIENT
Start: 2025-07-31

## 2025-04-10 RX ORDER — HYDROCORTISONE SODIUM SUCCINATE 100 MG/2ML
100 INJECTION INTRAMUSCULAR; INTRAVENOUS
OUTPATIENT
Start: 2025-07-31

## 2025-04-10 RX ADMIN — DENOSUMAB 60 MG: 60 INJECTION SUBCUTANEOUS at 14:56

## 2025-04-10 ASSESSMENT — ENCOUNTER SYMPTOMS
SCLERAL ICTERUS: 0
TROUBLE SWALLOWING: 0
ABDOMINAL PAIN: 0
NAUSEA: 0
SHORTNESS OF BREATH: 0
VOMITING: 0
SORE THROAT: 0
DIARRHEA: 0
ABDOMINAL DISTENTION: 0
HEMOPTYSIS: 0
CHEST TIGHTNESS: 0
WHEEZING: 0
VOICE CHANGE: 0
BLOOD IN STOOL: 0
CONSTIPATION: 0

## 2025-04-10 ASSESSMENT — PAIN SCALES - GENERAL: PAINLEVEL_OUTOF10: 7

## 2025-04-10 ASSESSMENT — PAIN DESCRIPTION - ONSET: ONSET: ON-GOING

## 2025-04-10 ASSESSMENT — PAIN DESCRIPTION - ORIENTATION: ORIENTATION: RIGHT

## 2025-04-10 ASSESSMENT — PATIENT HEALTH QUESTIONNAIRE - PHQ9
SUM OF ALL RESPONSES TO PHQ QUESTIONS 1-9: 0
1. LITTLE INTEREST OR PLEASURE IN DOING THINGS: NOT AT ALL
2. FEELING DOWN, DEPRESSED OR HOPELESS: NOT AT ALL
SUM OF ALL RESPONSES TO PHQ QUESTIONS 1-9: 0

## 2025-04-10 ASSESSMENT — PAIN DESCRIPTION - LOCATION: LOCATION: SHOULDER

## 2025-04-10 NOTE — PROGRESS NOTES
04/10/2025 68.6  43.0 - 78.0 % Final    Lymphocytes % 04/10/2025 23.2  13.0 - 44.0 % Final    Monocytes % 04/10/2025 5.9  4.0 - 12.0 % Final    Eosinophils % 04/10/2025 1.1  0.5 - 7.8 % Final    Basophils % 04/10/2025 0.6  0.0 - 2.0 % Final    Immature Granulocytes % 04/10/2025 0.6  0.0 - 5.0 % Final    Neutrophils Absolute 04/10/2025 5.85  1.70 - 8.20 K/UL Final    Lymphocytes Absolute 04/10/2025 1.98  0.50 - 4.60 K/UL Final    Monocytes Absolute 04/10/2025 0.50  0.10 - 1.30 K/UL Final    Eosinophils Absolute 04/10/2025 0.09  0.00 - 0.80 K/UL Final    Basophils Absolute 04/10/2025 0.05  0.00 - 0.20 K/UL Final    Immature Granulocytes Absolute 04/10/2025 0.05  0.00 - 0.50 K/UL Final    Differential Type 04/10/2025 AUTOMATED    Final    Phosphorus 04/10/2025 3.4  2.5 - 4.5 MG/DL Final    Magnesium 04/10/2025 1.7 (L)  1.8 - 2.4 mg/dL Final      Imaging: reviewed   PATHOLOGY:                     ASSESSMENT:   Diagnosis Orders   1. Ductal carcinoma in situ (DCIS) of left breast        2. Aromatase inhibitor use        3. Long term (current) use of aromatase inhibitors        4. Aromatase inhibitor-associated arthralgia        5. Osteopenia, unspecified location        Ms. Dsouza is here for FU of DCIS     1. DCIS, left breast, intermediate grade, 4mm, ER96/PR92% s/p bx and subsequent lumpectomy - pTis   - curative intent of tx discussed with pt   - s/p XRT 3-4/2021   - 4/2021 started letrozole   - 10/2022 switched to anastrozole     - She returns today for routine 6 month follow up on Arimidex and next Prolia.  Since last seen she has been well overall.  There are no GI or bowel complaints.  Weight is stable.  Fatigue is ongoing, mostly attributed to insomnia and nocturia.  There are no respiratory symptoms.  Denies hot flashes.  Arthralgias are ongoing and reports recent right shoulder pain, unsure of injury.  At this time does not desire imaging.  She denies any breast changes or concerns.  Labs reviewed, Cr 1.2, Ca

## 2025-04-10 NOTE — PROGRESS NOTES
[facility].  Prolia injection completed.   Patient has received this medication before and has no questions.    Patient instructed to report any side affects to ordering provider.  Patient tolerated well.   Any issues or concerns during appointment: none.  Patient aware of next appointment on 10/15/2025 (date) at 1430 (time).  Discharged home ambulatory.

## 2025-04-23 ENCOUNTER — HOSPITAL ENCOUNTER (OUTPATIENT)
Dept: MRI IMAGING | Age: 70
Discharge: HOME | End: 2025-04-26
Attending: INTERNAL MEDICINE
Payer: MEDICARE

## 2025-04-23 DIAGNOSIS — Z12.39 BREAST CANCER SCREENING, HIGH RISK PATIENT: ICD-10-CM

## 2025-04-23 DIAGNOSIS — D05.12 DUCTAL CARCINOMA IN SITU (DCIS) OF LEFT BREAST: ICD-10-CM

## 2025-04-23 PROCEDURE — 6360000004 HC RX CONTRAST MEDICATION: Performed by: INTERNAL MEDICINE

## 2025-04-23 PROCEDURE — C8908 MRI W/O FOL W/CONT, BREAST,: HCPCS

## 2025-04-23 PROCEDURE — A9579 GAD-BASE MR CONTRAST NOS,1ML: HCPCS | Performed by: INTERNAL MEDICINE

## 2025-04-23 RX ADMIN — GADOTERIDOL 28 ML: 279.3 INJECTION, SOLUTION INTRAVENOUS at 08:30

## 2025-04-25 DIAGNOSIS — R93.89 ABNORMAL MAGNETIC RESONANCE IMAGING OF CHEST: Primary | ICD-10-CM

## 2025-04-28 ENCOUNTER — RESULTS FOLLOW-UP (OUTPATIENT)
Dept: ONCOLOGY | Age: 70
End: 2025-04-28

## 2025-05-05 ENCOUNTER — HOSPITAL ENCOUNTER (OUTPATIENT)
Dept: CT IMAGING | Age: 70
Discharge: HOME | End: 2025-05-08
Attending: INTERNAL MEDICINE
Payer: MEDICARE

## 2025-05-05 DIAGNOSIS — R93.89 ABNORMAL MAGNETIC RESONANCE IMAGING OF CHEST: ICD-10-CM

## 2025-05-05 PROCEDURE — 71260 CT THORAX DX C+: CPT

## 2025-05-05 PROCEDURE — 6360000004 HC RX CONTRAST MEDICATION: Performed by: INTERNAL MEDICINE

## 2025-05-05 RX ORDER — IOPAMIDOL 755 MG/ML
100 INJECTION, SOLUTION INTRAVASCULAR
Status: COMPLETED | OUTPATIENT
Start: 2025-05-05 | End: 2025-05-05

## 2025-05-05 RX ADMIN — IOPAMIDOL 100 ML: 755 INJECTION, SOLUTION INTRAVENOUS at 08:51

## 2025-05-27 ASSESSMENT — SLEEP AND FATIGUE QUESTIONNAIRES
HOW LIKELY ARE YOU TO NOD OFF OR FALL ASLEEP WHILE SITTING AND TALKING TO SOMEONE: WOULD NEVER DOZE
ESS TOTAL SCORE: 4
HOW LIKELY ARE YOU TO NOD OFF OR FALL ASLEEP WHILE SITTING AND TALKING TO SOMEONE: WOULD NEVER DOZE
HOW LIKELY ARE YOU TO NOD OFF OR FALL ASLEEP WHILE WATCHING TV: SLIGHT CHANCE OF DOZING
HOW LIKELY ARE YOU TO NOD OFF OR FALL ASLEEP WHILE SITTING INACTIVE IN A PUBLIC PLACE: WOULD NEVER DOZE
HOW LIKELY ARE YOU TO NOD OFF OR FALL ASLEEP WHILE SITTING QUIETLY AFTER LUNCH WITHOUT ALCOHOL: WOULD NEVER DOZE
HOW LIKELY ARE YOU TO NOD OFF OR FALL ASLEEP WHILE SITTING AND READING: WOULD NEVER DOZE
HOW LIKELY ARE YOU TO NOD OFF OR FALL ASLEEP WHILE SITTING AND READING: WOULD NEVER DOZE
HOW LIKELY ARE YOU TO NOD OFF OR FALL ASLEEP WHILE WATCHING TV: SLIGHT CHANCE OF DOZING
HOW LIKELY ARE YOU TO NOD OFF OR FALL ASLEEP WHILE SITTING INACTIVE IN A PUBLIC PLACE: WOULD NEVER DOZE
HOW LIKELY ARE YOU TO NOD OFF OR FALL ASLEEP IN A CAR, WHILE STOPPED FOR A FEW MINUTES IN TRAFFIC: WOULD NEVER DOZE
HOW LIKELY ARE YOU TO NOD OFF OR FALL ASLEEP WHEN YOU ARE A PASSENGER IN A CAR FOR AN HOUR WITHOUT A BREAK: SLIGHT CHANCE OF DOZING
HOW LIKELY ARE YOU TO NOD OFF OR FALL ASLEEP WHEN YOU ARE A PASSENGER IN A CAR FOR AN HOUR WITHOUT A BREAK: SLIGHT CHANCE OF DOZING
HOW LIKELY ARE YOU TO NOD OFF OR FALL ASLEEP WHILE LYING DOWN TO REST IN THE AFTERNOON WHEN CIRCUMSTANCES PERMIT: MODERATE CHANCE OF DOZING
HOW LIKELY ARE YOU TO NOD OFF OR FALL ASLEEP WHILE SITTING QUIETLY AFTER LUNCH WITHOUT ALCOHOL: WOULD NEVER DOZE
HOW LIKELY ARE YOU TO NOD OFF OR FALL ASLEEP IN A CAR, WHILE STOPPED FOR A FEW MINUTES IN TRAFFIC: WOULD NEVER DOZE
HOW LIKELY ARE YOU TO NOD OFF OR FALL ASLEEP WHILE LYING DOWN TO REST IN THE AFTERNOON WHEN CIRCUMSTANCES PERMIT: MODERATE CHANCE OF DOZING

## 2025-05-29 NOTE — PROGRESS NOTES
[facility]  [address]  [place] [address]  [phone]    Patient Name:  Whit Dsouza  YOB: 1955      Office Visit 5/30/2025    CHIEF COMPLAINT:    Chief Complaint   Patient presents with    New Patient    Sleep Apnea       HISTORY OF PRESENT ILLNESS:      The patient presents to re-establish care for management of obstructive sleep apnea. PMH includes PATRICK, GERD, insomnia and obesity. Patient's sleep study in 2021 revealed AHI of 26 with lowest desaturation 83%. She is prescribed cpap therapy with a humidifier set at 11cm with a 2L O2 bleed in using a nasal pillow mask. Most recent download reveals AHI on PAP therapy is 0.8, leak is 0.6 and the hourly usage is 10.7 hours nightly. The overall use is 118/118 days with days greater than four hours at 118/118. The patient is compliant with the Pap therapy and is feeling better as a result.     Last seen in 2022.  She denies any snoring on CPAP.  She denies any vivid dreams or parasomnias.  She does mention RLS symptoms however states this is more a sensation in her lower abdomen that causes her to feel that she needs to move her body.  She states this is improved with Requip.  She is falling asleep within 1 hour however is awakening frequently due to nocturia.  She is typically going to bed around 10 PM, awakening at 7:30 AM, sleeps in the supine position mostly.  She is rested in the morning, does have occasional daytime fatigue but has not regularly napping.  She states she may doze on occasion if sitting still.  Current [place] score 4/24.  Denies any dry mouth.  She states CPAP is functioning well and the pressure feels fine.    She is taking Lunesta 2 mg nightly, ropinirole 0.5 mg nightly.  She denies any over-the-counter sleep aids.  Denies any tobacco or alcohol.  Does drink 1 cup of coffee per day.  We did discuss ensuring she is taking her spironolactone in the morning.    Sleep study results

## 2025-05-30 ENCOUNTER — OFFICE VISIT (OUTPATIENT)
Dept: SLEEP MEDICINE | Age: 70
End: 2025-05-30
Payer: MEDICARE

## 2025-05-30 VITALS
WEIGHT: 293 LBS | HEART RATE: 87 BPM | TEMPERATURE: 97.2 F | OXYGEN SATURATION: 98 % | RESPIRATION RATE: 19 BRPM | BODY MASS INDEX: 51.91 KG/M2 | DIASTOLIC BLOOD PRESSURE: 89 MMHG | SYSTOLIC BLOOD PRESSURE: 146 MMHG | HEIGHT: 63 IN

## 2025-05-30 DIAGNOSIS — G47.34 NOCTURNAL HYPOXEMIA: ICD-10-CM

## 2025-05-30 DIAGNOSIS — G25.81 RLS (RESTLESS LEGS SYNDROME): ICD-10-CM

## 2025-05-30 DIAGNOSIS — G47.33 OSA (OBSTRUCTIVE SLEEP APNEA): Primary | ICD-10-CM

## 2025-05-30 DIAGNOSIS — E66.01 MORBID OBESITY WITH BMI OF 50.0-59.9, ADULT (HCC): ICD-10-CM

## 2025-05-30 DIAGNOSIS — G47.00 PERSISTENT DISORDER OF INITIATING OR MAINTAINING SLEEP: ICD-10-CM

## 2025-05-30 PROCEDURE — 3017F COLORECTAL CA SCREEN DOC REV: CPT | Performed by: STUDENT IN AN ORGANIZED HEALTH CARE EDUCATION/TRAINING PROGRAM

## 2025-05-30 PROCEDURE — G8427 DOCREV CUR MEDS BY ELIG CLIN: HCPCS | Performed by: STUDENT IN AN ORGANIZED HEALTH CARE EDUCATION/TRAINING PROGRAM

## 2025-05-30 PROCEDURE — 3077F SYST BP >= 140 MM HG: CPT | Performed by: STUDENT IN AN ORGANIZED HEALTH CARE EDUCATION/TRAINING PROGRAM

## 2025-05-30 PROCEDURE — 1159F MED LIST DOCD IN RCRD: CPT | Performed by: STUDENT IN AN ORGANIZED HEALTH CARE EDUCATION/TRAINING PROGRAM

## 2025-05-30 PROCEDURE — 1123F ACP DISCUSS/DSCN MKR DOCD: CPT | Performed by: STUDENT IN AN ORGANIZED HEALTH CARE EDUCATION/TRAINING PROGRAM

## 2025-05-30 PROCEDURE — G8399 PT W/DXA RESULTS DOCUMENT: HCPCS | Performed by: STUDENT IN AN ORGANIZED HEALTH CARE EDUCATION/TRAINING PROGRAM

## 2025-05-30 PROCEDURE — G2211 COMPLEX E/M VISIT ADD ON: HCPCS | Performed by: STUDENT IN AN ORGANIZED HEALTH CARE EDUCATION/TRAINING PROGRAM

## 2025-05-30 PROCEDURE — 3079F DIAST BP 80-89 MM HG: CPT | Performed by: STUDENT IN AN ORGANIZED HEALTH CARE EDUCATION/TRAINING PROGRAM

## 2025-05-30 PROCEDURE — 1090F PRES/ABSN URINE INCON ASSESS: CPT | Performed by: STUDENT IN AN ORGANIZED HEALTH CARE EDUCATION/TRAINING PROGRAM

## 2025-05-30 PROCEDURE — 99204 OFFICE O/P NEW MOD 45 MIN: CPT | Performed by: STUDENT IN AN ORGANIZED HEALTH CARE EDUCATION/TRAINING PROGRAM

## 2025-05-30 PROCEDURE — 1160F RVW MEDS BY RX/DR IN RCRD: CPT | Performed by: STUDENT IN AN ORGANIZED HEALTH CARE EDUCATION/TRAINING PROGRAM

## 2025-05-30 PROCEDURE — G8417 CALC BMI ABV UP PARAM F/U: HCPCS | Performed by: STUDENT IN AN ORGANIZED HEALTH CARE EDUCATION/TRAINING PROGRAM

## 2025-05-30 PROCEDURE — 1036F TOBACCO NON-USER: CPT | Performed by: STUDENT IN AN ORGANIZED HEALTH CARE EDUCATION/TRAINING PROGRAM

## 2025-07-31 SDOH — HEALTH STABILITY: PHYSICAL HEALTH: ON AVERAGE, HOW MANY MINUTES DO YOU ENGAGE IN EXERCISE AT THIS LEVEL?: 10 MIN

## 2025-07-31 SDOH — HEALTH STABILITY: PHYSICAL HEALTH: ON AVERAGE, HOW MANY DAYS PER WEEK DO YOU ENGAGE IN MODERATE TO STRENUOUS EXERCISE (LIKE A BRISK WALK)?: 1 DAY

## 2025-07-31 ASSESSMENT — PATIENT HEALTH QUESTIONNAIRE - PHQ9
SUM OF ALL RESPONSES TO PHQ QUESTIONS 1-9: 0
2. FEELING DOWN, DEPRESSED OR HOPELESS: NOT AT ALL
1. LITTLE INTEREST OR PLEASURE IN DOING THINGS: NOT AT ALL
SUM OF ALL RESPONSES TO PHQ QUESTIONS 1-9: 0

## 2025-07-31 ASSESSMENT — LIFESTYLE VARIABLES
HOW MANY STANDARD DRINKS CONTAINING ALCOHOL DO YOU HAVE ON A TYPICAL DAY: 0
HOW OFTEN DO YOU HAVE SIX OR MORE DRINKS ON ONE OCCASION: 1
HOW OFTEN DO YOU HAVE A DRINK CONTAINING ALCOHOL: NEVER
HOW MANY STANDARD DRINKS CONTAINING ALCOHOL DO YOU HAVE ON A TYPICAL DAY: PATIENT DOES NOT DRINK
HOW OFTEN DO YOU HAVE A DRINK CONTAINING ALCOHOL: 1

## 2025-08-01 SDOH — ECONOMIC STABILITY: FOOD INSECURITY: WITHIN THE PAST 12 MONTHS, THE FOOD YOU BOUGHT JUST DIDN'T LAST AND YOU DIDN'T HAVE MONEY TO GET MORE.: NEVER TRUE

## 2025-08-01 SDOH — ECONOMIC STABILITY: INCOME INSECURITY: IN THE LAST 12 MONTHS, WAS THERE A TIME WHEN YOU WERE NOT ABLE TO PAY THE MORTGAGE OR RENT ON TIME?: NO

## 2025-08-01 SDOH — ECONOMIC STABILITY: FOOD INSECURITY: WITHIN THE PAST 12 MONTHS, YOU WORRIED THAT YOUR FOOD WOULD RUN OUT BEFORE YOU GOT MONEY TO BUY MORE.: NEVER TRUE

## 2025-08-01 SDOH — ECONOMIC STABILITY: TRANSPORTATION INSECURITY
IN THE PAST 12 MONTHS, HAS THE LACK OF TRANSPORTATION KEPT YOU FROM MEDICAL APPOINTMENTS OR FROM GETTING MEDICATIONS?: NO

## 2025-08-03 PROBLEM — I10 WHITE COAT SYNDROME WITH HYPERTENSION: Status: ACTIVE | Noted: 2025-08-03

## 2025-08-04 ENCOUNTER — TELEMEDICINE (OUTPATIENT)
Dept: INTERNAL MEDICINE CLINIC | Facility: CLINIC | Age: 70
End: 2025-08-04

## 2025-08-04 DIAGNOSIS — M1A.09X0 IDIOPATHIC CHRONIC GOUT OF MULTIPLE SITES WITHOUT TOPHUS: ICD-10-CM

## 2025-08-04 DIAGNOSIS — Z00.00 MEDICARE ANNUAL WELLNESS VISIT, SUBSEQUENT: Primary | ICD-10-CM

## 2025-08-04 DIAGNOSIS — N18.2 CHRONIC RENAL INSUFFICIENCY, STAGE 2 (MILD): ICD-10-CM

## 2025-08-04 DIAGNOSIS — I1A.0 RESISTANT HYPERTENSION: ICD-10-CM

## 2025-08-04 DIAGNOSIS — E03.9 ACQUIRED HYPOTHYROIDISM: ICD-10-CM

## 2025-08-04 DIAGNOSIS — E55.9 VITAMIN D DEFICIENCY: ICD-10-CM

## 2025-08-04 DIAGNOSIS — R73.09 ABNORMAL GLUCOSE: ICD-10-CM

## 2025-08-04 DIAGNOSIS — E78.2 MIXED HYPERLIPIDEMIA: ICD-10-CM

## 2025-08-04 LAB
25(OH)D3 SERPL-MCNC: 47 NG/ML (ref 30–100)
ALBUMIN SERPL-MCNC: 4 G/DL (ref 3.2–4.6)
ALBUMIN/GLOB SERPL: 1.2 (ref 1–1.9)
ALP SERPL-CCNC: 81 U/L (ref 35–104)
ALT SERPL-CCNC: 32 U/L (ref 8–45)
ANION GAP SERPL CALC-SCNC: 15 MMOL/L (ref 7–16)
APPEARANCE UR: CLEAR
AST SERPL-CCNC: 28 U/L (ref 15–37)
BASOPHILS # BLD: 0.05 K/UL (ref 0–0.2)
BASOPHILS NFR BLD: 0.7 % (ref 0–2)
BILIRUB SERPL-MCNC: 0.8 MG/DL (ref 0–1.2)
BILIRUB UR QL: NEGATIVE
BUN SERPL-MCNC: 20 MG/DL (ref 8–23)
CALCIUM SERPL-MCNC: 9.8 MG/DL (ref 8.8–10.2)
CHLORIDE SERPL-SCNC: 103 MMOL/L (ref 98–107)
CHOLEST SERPL-MCNC: 176 MG/DL (ref 0–200)
CO2 SERPL-SCNC: 22 MMOL/L (ref 20–29)
COLOR UR: NORMAL
CREAT SERPL-MCNC: 1.06 MG/DL (ref 0.6–1.1)
DIFFERENTIAL METHOD BLD: NORMAL
EOSINOPHIL # BLD: 0.09 K/UL (ref 0–0.8)
EOSINOPHIL NFR BLD: 1.2 % (ref 0.5–7.8)
ERYTHROCYTE [DISTWIDTH] IN BLOOD BY AUTOMATED COUNT: 13.9 % (ref 11.9–14.6)
EST. AVERAGE GLUCOSE BLD GHB EST-MCNC: 125 MG/DL
GLOBULIN SER CALC-MCNC: 3.3 G/DL (ref 2.3–3.5)
GLUCOSE SERPL-MCNC: 82 MG/DL (ref 70–99)
GLUCOSE UR STRIP.AUTO-MCNC: NEGATIVE MG/DL
HBA1C MFR BLD: 6 % (ref 0–5.6)
HCT VFR BLD AUTO: 38.6 % (ref 35.8–46.3)
HDLC SERPL-MCNC: 41 MG/DL (ref 40–60)
HDLC SERPL: 4.3 (ref 0–5)
HGB BLD-MCNC: 12.5 G/DL (ref 11.7–15.4)
HGB UR QL STRIP: NEGATIVE
IMM GRANULOCYTES # BLD AUTO: 0.03 K/UL (ref 0–0.5)
IMM GRANULOCYTES NFR BLD AUTO: 0.4 % (ref 0–5)
KETONES UR QL STRIP.AUTO: NEGATIVE MG/DL
LDLC SERPL CALC-MCNC: 93 MG/DL (ref 0–100)
LEUKOCYTE ESTERASE UR QL STRIP.AUTO: NEGATIVE
LYMPHOCYTES # BLD: 1.61 K/UL (ref 0.5–4.6)
LYMPHOCYTES NFR BLD: 21.4 % (ref 13–44)
MCH RBC QN AUTO: 30.2 PG (ref 26.1–32.9)
MCHC RBC AUTO-ENTMCNC: 32.4 G/DL (ref 31.4–35)
MCV RBC AUTO: 93.2 FL (ref 82–102)
MONOCYTES # BLD: 0.47 K/UL (ref 0.1–1.3)
MONOCYTES NFR BLD: 6.2 % (ref 4–12)
NEUTS SEG # BLD: 5.29 K/UL (ref 1.7–8.2)
NEUTS SEG NFR BLD: 70.1 % (ref 43–78)
NITRITE UR QL STRIP.AUTO: NEGATIVE
NRBC # BLD: 0 K/UL (ref 0–0.2)
PH UR STRIP: 5.5 (ref 5–9)
PLATELET # BLD AUTO: 200 K/UL (ref 150–450)
PMV BLD AUTO: 10.5 FL (ref 9.4–12.3)
POTASSIUM SERPL-SCNC: 4.2 MMOL/L (ref 3.5–5.1)
PROT SERPL-MCNC: 7.4 G/DL (ref 6.3–8.2)
PROT UR STRIP-MCNC: NEGATIVE MG/DL
RBC # BLD AUTO: 4.14 M/UL (ref 4.05–5.2)
SODIUM SERPL-SCNC: 139 MMOL/L (ref 136–145)
SP GR UR REFRACTOMETRY: 1.01 (ref 1–1.02)
TRIGL SERPL-MCNC: 210 MG/DL (ref 0–150)
TSH, 3RD GENERATION: 0.78 UIU/ML (ref 0.27–4.2)
URATE SERPL-MCNC: 5.2 MG/DL (ref 2.5–7.1)
UROBILINOGEN UR QL STRIP.AUTO: 0.2 EU/DL (ref 0.2–1)
VLDLC SERPL CALC-MCNC: 42 MG/DL (ref 6–23)
WBC # BLD AUTO: 7.5 K/UL (ref 4.3–11.1)

## 2025-08-05 ENCOUNTER — OFFICE VISIT (OUTPATIENT)
Dept: INTERNAL MEDICINE CLINIC | Facility: CLINIC | Age: 70
End: 2025-08-05

## 2025-08-05 VITALS
WEIGHT: 293 LBS | SYSTOLIC BLOOD PRESSURE: 134 MMHG | HEIGHT: 63 IN | DIASTOLIC BLOOD PRESSURE: 86 MMHG | BODY MASS INDEX: 51.91 KG/M2

## 2025-08-05 DIAGNOSIS — Z80.0 FAMILY HISTORY OF COLON CANCER: ICD-10-CM

## 2025-08-05 DIAGNOSIS — E78.2 MIXED HYPERLIPIDEMIA: ICD-10-CM

## 2025-08-05 DIAGNOSIS — G25.81 RESTLESS LEGS: ICD-10-CM

## 2025-08-05 DIAGNOSIS — I1A.0 RESISTANT HYPERTENSION: Primary | ICD-10-CM

## 2025-08-05 DIAGNOSIS — G47.33 OSA (OBSTRUCTIVE SLEEP APNEA): ICD-10-CM

## 2025-08-05 DIAGNOSIS — E66.01 MORBID OBESITY (HCC): ICD-10-CM

## 2025-08-05 DIAGNOSIS — N18.2 CHRONIC RENAL INSUFFICIENCY, STAGE 2 (MILD): ICD-10-CM

## 2025-08-05 DIAGNOSIS — Z91.89 AT RISK FOR BONE DENSITY LOSS: ICD-10-CM

## 2025-08-05 DIAGNOSIS — M1A.00X0 IDIOPATHIC CHRONIC GOUT WITHOUT TOPHUS, UNSPECIFIED SITE: ICD-10-CM

## 2025-08-05 DIAGNOSIS — R73.03 PREDIABETES: ICD-10-CM

## 2025-08-05 DIAGNOSIS — M85.80 OSTEOPENIA, UNSPECIFIED LOCATION: ICD-10-CM

## 2025-08-05 DIAGNOSIS — M1A.09X0 IDIOPATHIC CHRONIC GOUT OF MULTIPLE SITES WITHOUT TOPHUS: ICD-10-CM

## 2025-08-05 DIAGNOSIS — D05.12 DUCTAL CARCINOMA IN SITU (DCIS) OF LEFT BREAST: ICD-10-CM

## 2025-08-05 DIAGNOSIS — I10 WHITE COAT SYNDROME WITH HYPERTENSION: ICD-10-CM

## 2025-08-05 DIAGNOSIS — E55.9 VITAMIN D DEFICIENCY: ICD-10-CM

## 2025-08-05 DIAGNOSIS — E03.9 ACQUIRED HYPOTHYROIDISM: ICD-10-CM

## 2025-08-05 DIAGNOSIS — J30.1 SEASONAL ALLERGIC RHINITIS DUE TO POLLEN: ICD-10-CM

## 2025-08-05 DIAGNOSIS — F51.01 PRIMARY INSOMNIA: ICD-10-CM

## 2025-08-05 DIAGNOSIS — E78.2 MODERATE MIXED HYPERLIPIDEMIA NOT REQUIRING STATIN THERAPY: ICD-10-CM

## 2025-08-05 DIAGNOSIS — F51.04 CHRONIC INSOMNIA: ICD-10-CM

## 2025-08-05 DIAGNOSIS — K21.9 GASTROESOPHAGEAL REFLUX DISEASE, UNSPECIFIED WHETHER ESOPHAGITIS PRESENT: ICD-10-CM

## 2025-08-05 DIAGNOSIS — R73.09 ABNORMAL GLUCOSE: ICD-10-CM

## 2025-08-05 DIAGNOSIS — S16.1XXA STRAIN OF NECK MUSCLE, INITIAL ENCOUNTER: ICD-10-CM

## 2025-08-05 DIAGNOSIS — Z79.811 LONG TERM (CURRENT) USE OF AROMATASE INHIBITORS: ICD-10-CM

## 2025-08-05 RX ORDER — SPIRONOLACTONE 25 MG/1
25 TABLET ORAL DAILY
Qty: 90 TABLET | Refills: 3 | Status: SHIPPED | OUTPATIENT
Start: 2025-08-05

## 2025-08-05 RX ORDER — VERAPAMIL HYDROCHLORIDE 300 MG/1
300 CAPSULE, EXTENDED RELEASE ORAL DAILY
Qty: 90 CAPSULE | Refills: 3 | Status: SHIPPED | OUTPATIENT
Start: 2025-08-05

## 2025-08-05 RX ORDER — IRBESARTAN 300 MG/1
300 TABLET ORAL DAILY
Qty: 90 TABLET | Refills: 3 | Status: SHIPPED | OUTPATIENT
Start: 2025-08-05

## 2025-08-05 RX ORDER — ESZOPICLONE 2 MG/1
2 TABLET, FILM COATED ORAL NIGHTLY
Qty: 10 TABLET | Refills: 0 | Status: SHIPPED | OUTPATIENT
Start: 2025-08-05 | End: 2025-09-04

## 2025-08-05 RX ORDER — OMEPRAZOLE 40 MG/1
40 CAPSULE, DELAYED RELEASE ORAL DAILY
Qty: 90 CAPSULE | Refills: 3 | Status: SHIPPED | OUTPATIENT
Start: 2025-08-05

## 2025-08-05 RX ORDER — HYDRALAZINE HYDROCHLORIDE 50 MG/1
50 TABLET, FILM COATED ORAL 3 TIMES DAILY
Qty: 270 TABLET | Refills: 3 | Status: SHIPPED | OUTPATIENT
Start: 2025-08-05

## 2025-08-05 RX ORDER — ATORVASTATIN CALCIUM 40 MG/1
40 TABLET, FILM COATED ORAL DAILY
Qty: 90 TABLET | Refills: 3 | Status: SHIPPED | OUTPATIENT
Start: 2025-08-05

## 2025-08-05 RX ORDER — ALLOPURINOL 300 MG/1
300 TABLET ORAL DAILY
Qty: 90 TABLET | Refills: 3 | Status: SHIPPED | OUTPATIENT
Start: 2025-08-05

## 2025-08-05 RX ORDER — ESZOPICLONE 2 MG/1
2 TABLET, FILM COATED ORAL NIGHTLY
Qty: 90 TABLET | Refills: 3 | Status: SHIPPED | OUTPATIENT
Start: 2025-08-05 | End: 2026-08-05

## 2025-08-05 RX ORDER — ROPINIROLE 0.5 MG/1
0.5 TABLET, FILM COATED ORAL DAILY
Qty: 90 TABLET | Refills: 3 | Status: SHIPPED | OUTPATIENT
Start: 2025-08-05

## 2025-08-05 RX ORDER — LEVOTHYROXINE SODIUM 125 UG/1
125 TABLET ORAL
Qty: 90 TABLET | Refills: 3 | Status: SHIPPED | OUTPATIENT
Start: 2025-08-05

## 2025-08-12 ENCOUNTER — TELEPHONE (OUTPATIENT)
Dept: INTERNAL MEDICINE CLINIC | Facility: CLINIC | Age: 70
End: 2025-08-12

## 2025-08-26 ENCOUNTER — HOSPITAL ENCOUNTER (OUTPATIENT)
Dept: PHYSICAL THERAPY | Age: 70
Setting detail: RECURRING SERIES
Discharge: HOME | End: 2025-08-29
Payer: MEDICARE

## 2025-08-26 DIAGNOSIS — M47.22 OTHER SPONDYLOSIS WITH RADICULOPATHY, CERVICAL REGION: ICD-10-CM

## 2025-08-26 DIAGNOSIS — M43.6 NECK STIFFNESS: Primary | ICD-10-CM

## 2025-08-26 DIAGNOSIS — S46.011A STRAIN OF MUSCLE(S) AND TENDON(S) OF THE ROTATOR CUFF OF RIGHT SHOULDER, INITIAL ENCOUNTER: ICD-10-CM

## 2025-08-26 PROCEDURE — 97161 PT EVAL LOW COMPLEX 20 MIN: CPT

## 2025-08-26 PROCEDURE — 97140 MANUAL THERAPY 1/> REGIONS: CPT

## 2025-09-02 ENCOUNTER — HOSPITAL ENCOUNTER (OUTPATIENT)
Dept: PHYSICAL THERAPY | Age: 70
Setting detail: RECURRING SERIES
Discharge: HOME | End: 2025-09-05
Payer: MEDICARE

## 2025-09-02 ENCOUNTER — TRANSCRIBE ORDERS (OUTPATIENT)
Dept: SCHEDULING | Age: 70
End: 2025-09-02

## 2025-09-02 DIAGNOSIS — Z12.31 OTHER SCREENING MAMMOGRAM: Primary | ICD-10-CM

## 2025-09-02 PROCEDURE — 97110 THERAPEUTIC EXERCISES: CPT

## 2025-09-02 PROCEDURE — 97140 MANUAL THERAPY 1/> REGIONS: CPT

## (undated) DEVICE — (D)SYR 10ML 1/5ML GRAD NSAF -- PKGING CHANGE USE ITEM 338027

## (undated) DEVICE — BLOCK BITE AD 60FR W/ VELC STRP ADDRESSES MOST PT AND

## (undated) DEVICE — SUTURE VCRL SZ 1 L27IN ABSRB UD L36MM CP-1 1/2 CIR REV CUT J268H

## (undated) DEVICE — SYR 5ML 1/5 GRAD LL NSAF LF --

## (undated) DEVICE — CONTAINER PREFIL FRMLN 40ML --

## (undated) DEVICE — DRAPE,TOP,102X53,STERILE: Brand: MEDLINE

## (undated) DEVICE — MEDI-VAC YANKAUER SUCTION HANDLE W/BULBOUS TIP: Brand: CARDINAL HEALTH

## (undated) DEVICE — NDL PRT INJ NSAF BLNT 18GX1.5 --

## (undated) DEVICE — SOLUTION IRRIG 3000ML 0.9% SOD CHL FLX CONT 0797208] ICU MEDICAL INC]

## (undated) DEVICE — SUTURE STRATAFIX SYMMETRIC PDS + SZ 2-0 L18IN ABSRB VLT SXPP1A403

## (undated) DEVICE — GARMENT,MEDLINE,DVT,INT,CALF,LG, GEN2: Brand: MEDLINE

## (undated) DEVICE — MASTISOL ADHESIVE LIQ 2/3ML

## (undated) DEVICE — BLADE SAW PAT RMR PILT H 41MM --

## (undated) DEVICE — 2000CC GUARDIAN II: Brand: GUARDIAN

## (undated) DEVICE — TRAY CATH 16F DRN BG LTX -- CONVERT TO ITEM 363158

## (undated) DEVICE — SUTURE MCRYL SZ 4-0 L18IN ABSRB UD L19MM PS-2 3/8 CIR PRIM Y496G

## (undated) DEVICE — 3M™ COBAN™ NL STERILE NON-LATEX SELF-ADHERENT WRAP, 2084S, 4 IN X 5 YD (10 CM X 4,5 M), 18 ROLLS/CASE: Brand: 3M™ COBAN™

## (undated) DEVICE — REM POLYHESIVE ADULT PATIENT RETURN ELECTRODE: Brand: VALLEYLAB

## (undated) DEVICE — SUTURE PDS II SZ 1 L96IN ABSRB VLT TP-1 L65MM 1/2 CIR Z880G

## (undated) DEVICE — T4 HOOD

## (undated) DEVICE — CANNULA NSL ORAL AD FOR CAPNOFLEX CO2 O2 AIRLFE

## (undated) DEVICE — SYR 3ML LL TIP 1/10ML GRAD --

## (undated) DEVICE — SOLUTION IV 1000ML 0.9% SOD CHL

## (undated) DEVICE — SHEET, DRAPE, SPLIT, STERILE: Brand: MEDLINE

## (undated) DEVICE — WIRE CUTTER, STERILE (WCS144): Brand: CENTURION MEDICAL PRODUCTS CORP

## (undated) DEVICE — SUTURE PERMA HND SZ 2 0 L18IN NONABSORBABLE BLK L19MM PS 2 583H

## (undated) DEVICE — (D)PREP SKN CHLRAPRP APPL 26ML -- CONVERT TO ITEM 371833

## (undated) DEVICE — SYR 50ML LR LCK 1ML GRAD NSAF --

## (undated) DEVICE — SOLUTION IV DEXTROSE/SALINE 5%-0.9% 500ML - 500ML

## (undated) DEVICE — MINOR SPLIT GENERAL: Brand: MEDLINE INDUSTRIES, INC.

## (undated) DEVICE — Z DISCONTINUED USE 2744636  DRESSING AQUACEL 14 IN ALG W3.5XL14IN POLYUR FLM CVR W/ HYDRCOLL

## (undated) DEVICE — SKIN STAPLER: Brand: SIGNET

## (undated) DEVICE — 3M™ STERI-DRAPE™ INSTRUMENT POUCH 1018: Brand: STERI-DRAPE™

## (undated) DEVICE — BIPOLAR SEALER 23-112-1 AQM 6.0: Brand: AQUAMANTYS ®

## (undated) DEVICE — TRAY PREP DRY W/ PREM GLV 2 APPL 6 SPNG 2 UNDPD 1 OVERWRAP

## (undated) DEVICE — X-LARGE COTTON GLOVE: Brand: DEROYAL

## (undated) DEVICE — STRIP,CLOSURE,WOUND,MEDI-STRIP,1/2X4: Brand: MEDLINE

## (undated) DEVICE — CONTAINER SPEC FRMLN 120ML --

## (undated) DEVICE — 3000CC GUARDIAN II: Brand: GUARDIAN

## (undated) DEVICE — BUTTON SWITCH PENCIL BLADE ELECTRODE, HOLSTER: Brand: EDGE

## (undated) DEVICE — SUTURE VCRL SZ 3-0 L27IN ABSRB UD L26MM SH 1/2 CIR J416H

## (undated) DEVICE — SUTURE ABSRB X-1 REV CUT 1/2 CIR 22MM UD BRAID 27IN SZ 3-0 J458H

## (undated) DEVICE — SUT ETHBND 2 30IN LR DA GRN --

## (undated) DEVICE — CURETTE BNE CEM 10IN DISP --

## (undated) DEVICE — FORCEPS BX L240CM JAW DIA2.8MM L CAP W/ NDL MIC MESH TOOTH

## (undated) DEVICE — SYR LR LCK 1ML GRAD NSAF 30ML --

## (undated) DEVICE — CONTAINER COLL/TRNSPRT BX BRST -- E-Z-EM CAT 8390

## (undated) DEVICE — PACK PROCEDURE SURG TOT KNEE

## (undated) DEVICE — KENDALL RADIOLUCENT FOAM MONITORING ELECTRODE RECTANGULAR SHAPE: Brand: KENDALL

## (undated) DEVICE — NEEDLE HYPO 21GA L1.5IN INTRAMUSCULAR S STL LATCH BVL UP

## (undated) DEVICE — PRECISION FALCON OSCILLATING TIP SAW CARTRIDGE: Brand: PRECISION FALCON

## (undated) DEVICE — SYRINGE CATH TIP 50ML

## (undated) DEVICE — FAN SPRAY KIT: Brand: PULSAVAC®

## (undated) DEVICE — CONNECTOR TBNG OD5-7MM O2 END DISP